# Patient Record
Sex: FEMALE | Race: WHITE | NOT HISPANIC OR LATINO
[De-identification: names, ages, dates, MRNs, and addresses within clinical notes are randomized per-mention and may not be internally consistent; named-entity substitution may affect disease eponyms.]

---

## 2017-01-04 ENCOUNTER — LABORATORY RESULT (OUTPATIENT)
Age: 57
End: 2017-01-04

## 2017-01-04 ENCOUNTER — APPOINTMENT (OUTPATIENT)
Dept: INTERNAL MEDICINE | Facility: CLINIC | Age: 57
End: 2017-01-04

## 2017-01-04 LAB
BASOPHILS # BLD AUTO: 0.04 K/UL
BASOPHILS NFR BLD AUTO: 0.5 %
EOSINOPHIL # BLD AUTO: 0.14 K/UL
EOSINOPHIL NFR BLD AUTO: 1.6 %
HCT VFR BLD CALC: 45 %
HGB BLD-MCNC: 14.5 G/DL
IMM GRANULOCYTES NFR BLD AUTO: 0.2 %
LYMPHOCYTES # BLD AUTO: 3.32 K/UL
LYMPHOCYTES NFR BLD AUTO: 38.3 %
MAN DIFF?: NORMAL
MCHC RBC-ENTMCNC: 29.7 PG
MCHC RBC-ENTMCNC: 32.2 GM/DL
MCV RBC AUTO: 92.2 FL
MONOCYTES # BLD AUTO: 0.55 K/UL
MONOCYTES NFR BLD AUTO: 6.4 %
NEUTROPHILS # BLD AUTO: 4.59 K/UL
NEUTROPHILS NFR BLD AUTO: 53 %
PLATELET # BLD AUTO: 240 K/UL
RBC # BLD: 4.88 M/UL
RBC # FLD: 13.4 %
SAVE SPECIMEN: NORMAL
WBC # FLD AUTO: 8.66 K/UL

## 2017-01-05 LAB
25(OH)D3 SERPL-MCNC: 25.4 NG/ML
ALBUMIN SERPL ELPH-MCNC: 4.5 G/DL
ALP BLD-CCNC: 77 U/L
ALT SERPL-CCNC: 26 U/L
ANION GAP SERPL CALC-SCNC: 17 MMOL/L
APPEARANCE: CLEAR
AST SERPL-CCNC: 27 U/L
BILIRUB SERPL-MCNC: 0.3 MG/DL
BILIRUBIN URINE: NEGATIVE
BLOOD URINE: NEGATIVE
BUN SERPL-MCNC: 32 MG/DL
CALCIUM SERPL-MCNC: 9.8 MG/DL
CHLORIDE SERPL-SCNC: 101 MMOL/L
CHOLEST SERPL-MCNC: 158 MG/DL
CHOLEST/HDLC SERPL: 3.1 RATIO
CO2 SERPL-SCNC: 22 MMOL/L
COLOR: YELLOW
CREAT SERPL-MCNC: 0.99 MG/DL
ERYTHROCYTE [SEDIMENTATION RATE] IN BLOOD BY WESTERGREN METHOD: 6 MM/HR
GLUCOSE QUALITATIVE U: NORMAL MG/DL
GLUCOSE SERPL-MCNC: 104 MG/DL
HDLC SERPL-MCNC: 51 MG/DL
KETONES URINE: NEGATIVE
LDLC SERPL CALC-MCNC: 59 MG/DL
LDLC SERPL DIRECT ASSAY-MCNC: 72 MG/DL
LEUKOCYTE ESTERASE URINE: ABNORMAL
NITRITE URINE: NEGATIVE
PH URINE: 7
POTASSIUM SERPL-SCNC: 3.9 MMOL/L
PROT SERPL-MCNC: 7.1 G/DL
PROTEIN URINE: NEGATIVE MG/DL
SODIUM SERPL-SCNC: 140 MMOL/L
SPECIFIC GRAVITY URINE: 1.02
T4 FREE SERPL-MCNC: 1.3 NG/DL
TRIGL SERPL-MCNC: 238 MG/DL
TSH SERPL-ACNC: 1.76 UU/ML
UROBILINOGEN URINE: NORMAL MG/DL

## 2017-01-07 ENCOUNTER — APPOINTMENT (OUTPATIENT)
Dept: INTERNAL MEDICINE | Facility: CLINIC | Age: 57
End: 2017-01-07

## 2017-01-07 VITALS — DIASTOLIC BLOOD PRESSURE: 70 MMHG | SYSTOLIC BLOOD PRESSURE: 110 MMHG

## 2017-08-11 ENCOUNTER — RX RENEWAL (OUTPATIENT)
Age: 57
End: 2017-08-11

## 2017-09-13 ENCOUNTER — OUTPATIENT (OUTPATIENT)
Dept: OUTPATIENT SERVICES | Facility: HOSPITAL | Age: 57
LOS: 1 days | Discharge: HOME | End: 2017-09-13

## 2017-09-13 DIAGNOSIS — Z92.21 PERSONAL HISTORY OF ANTINEOPLASTIC CHEMOTHERAPY: Chronic | ICD-10-CM

## 2017-09-13 DIAGNOSIS — Z90.13 ACQUIRED ABSENCE OF BILATERAL BREASTS AND NIPPLES: Chronic | ICD-10-CM

## 2017-09-13 DIAGNOSIS — Z98.89 OTHER SPECIFIED POSTPROCEDURAL STATES: Chronic | ICD-10-CM

## 2017-09-13 DIAGNOSIS — C50.919 MALIGNANT NEOPLASM OF UNSPECIFIED SITE OF UNSPECIFIED FEMALE BREAST: ICD-10-CM

## 2017-09-15 ENCOUNTER — APPOINTMENT (OUTPATIENT)
Dept: HEMATOLOGY ONCOLOGY | Facility: CLINIC | Age: 57
End: 2017-09-15
Payer: COMMERCIAL

## 2017-09-15 VITALS
HEART RATE: 62 BPM | OXYGEN SATURATION: 97 % | TEMPERATURE: 97.3 F | BODY MASS INDEX: 30.72 KG/M2 | WEIGHT: 152.12 LBS | RESPIRATION RATE: 16 BRPM | DIASTOLIC BLOOD PRESSURE: 70 MMHG | SYSTOLIC BLOOD PRESSURE: 120 MMHG

## 2017-09-15 PROCEDURE — 99214 OFFICE O/P EST MOD 30 MIN: CPT

## 2017-09-15 RX ORDER — AZITHROMYCIN DIHYDRATE 250 MG/1
250 TABLET, FILM COATED ORAL
Qty: 1 | Refills: 0 | Status: DISCONTINUED | COMMUNITY
Start: 2017-02-27 | End: 2017-09-15

## 2017-10-28 ENCOUNTER — TRANSCRIPTION ENCOUNTER (OUTPATIENT)
Age: 57
End: 2017-10-28

## 2017-11-01 ENCOUNTER — APPOINTMENT (OUTPATIENT)
Dept: INTERNAL MEDICINE | Facility: CLINIC | Age: 57
End: 2017-11-01
Payer: COMMERCIAL

## 2017-11-01 PROCEDURE — 77080 DXA BONE DENSITY AXIAL: CPT

## 2017-11-08 ENCOUNTER — TRANSCRIPTION ENCOUNTER (OUTPATIENT)
Age: 57
End: 2017-11-08

## 2018-01-29 ENCOUNTER — MEDICATION RENEWAL (OUTPATIENT)
Age: 58
End: 2018-01-29

## 2018-04-02 ENCOUNTER — APPOINTMENT (OUTPATIENT)
Dept: INTERNAL MEDICINE | Facility: CLINIC | Age: 58
End: 2018-04-02
Payer: COMMERCIAL

## 2018-04-02 LAB
ALBUMIN SERPL ELPH-MCNC: 4.8 G/DL
ALP BLD-CCNC: 95 U/L
ALT SERPL-CCNC: 29 U/L
ANION GAP SERPL CALC-SCNC: 13 MMOL/L
AST SERPL-CCNC: 32 U/L
BASOPHILS # BLD AUTO: 0.04 K/UL
BASOPHILS NFR BLD AUTO: 0.5 %
BILIRUB SERPL-MCNC: 0.3 MG/DL
BUN SERPL-MCNC: 26 MG/DL
CALCIUM SERPL-MCNC: 10.3 MG/DL
CHLORIDE SERPL-SCNC: 100 MMOL/L
CHOLEST SERPL-MCNC: 165 MG/DL
CHOLEST/HDLC SERPL: 3.4 RATIO
CO2 SERPL-SCNC: 26 MMOL/L
CREAT SERPL-MCNC: 0.91 MG/DL
EOSINOPHIL # BLD AUTO: 0.11 K/UL
EOSINOPHIL NFR BLD AUTO: 1.4 %
ERYTHROCYTE [SEDIMENTATION RATE] IN BLOOD BY WESTERGREN METHOD: 4 MM/HR
GLUCOSE SERPL-MCNC: 94 MG/DL
HCT VFR BLD CALC: 46.8 %
HDLC SERPL-MCNC: 48 MG/DL
HGB BLD-MCNC: 15.1 G/DL
IMM GRANULOCYTES NFR BLD AUTO: 0.3 %
LDLC SERPL CALC-MCNC: 84 MG/DL
LDLC SERPL DIRECT ASSAY-MCNC: 85 MG/DL
LYMPHOCYTES # BLD AUTO: 2.96 K/UL
LYMPHOCYTES NFR BLD AUTO: 37.5 %
MAN DIFF?: NORMAL
MCHC RBC-ENTMCNC: 30.3 PG
MCHC RBC-ENTMCNC: 32.3 GM/DL
MCV RBC AUTO: 94 FL
MONOCYTES # BLD AUTO: 0.51 K/UL
MONOCYTES NFR BLD AUTO: 6.5 %
NEUTROPHILS # BLD AUTO: 4.25 K/UL
NEUTROPHILS NFR BLD AUTO: 53.8 %
PLATELET # BLD AUTO: 258 K/UL
POTASSIUM SERPL-SCNC: 3.8 MMOL/L
PROT SERPL-MCNC: 7.7 G/DL
RBC # BLD: 4.98 M/UL
RBC # FLD: 13.4 %
SAVE SPECIMEN: NORMAL
SODIUM SERPL-SCNC: 139 MMOL/L
T4 FREE SERPL-MCNC: 1.4 NG/DL
TRIGL SERPL-MCNC: 166 MG/DL
TSH SERPL-ACNC: 2.2 UIU/ML
WBC # FLD AUTO: 7.89 K/UL

## 2018-04-02 PROCEDURE — 36415 COLL VENOUS BLD VENIPUNCTURE: CPT

## 2018-04-03 LAB
25(OH)D3 SERPL-MCNC: 37.8 NG/ML
APPEARANCE: CLEAR
BACTERIA: NEGATIVE
BILIRUBIN URINE: NEGATIVE
BLOOD URINE: NEGATIVE
COLOR: YELLOW
GLUCOSE QUALITATIVE U: NEGATIVE MG/DL
HBA1C MFR BLD HPLC: 5.7 %
HYALINE CASTS: 1 /LPF
KETONES URINE: NEGATIVE
LEUKOCYTE ESTERASE URINE: NEGATIVE
MICROSCOPIC-UA: NORMAL
NITRITE URINE: NEGATIVE
PH URINE: 7
PROTEIN URINE: NEGATIVE MG/DL
RED BLOOD CELLS URINE: 2 /HPF
SPECIFIC GRAVITY URINE: 1.02
SQUAMOUS EPITHELIAL CELLS: 1 /HPF
UROBILINOGEN URINE: NEGATIVE MG/DL
WHITE BLOOD CELLS URINE: 0 /HPF

## 2018-04-14 ENCOUNTER — NON-APPOINTMENT (OUTPATIENT)
Age: 58
End: 2018-04-14

## 2018-04-14 ENCOUNTER — APPOINTMENT (OUTPATIENT)
Dept: INTERNAL MEDICINE | Facility: CLINIC | Age: 58
End: 2018-04-14
Payer: COMMERCIAL

## 2018-04-14 VITALS — SYSTOLIC BLOOD PRESSURE: 100 MMHG | DIASTOLIC BLOOD PRESSURE: 80 MMHG

## 2018-04-14 VITALS
HEIGHT: 57.5 IN | SYSTOLIC BLOOD PRESSURE: 110 MMHG | BODY MASS INDEX: 31.49 KG/M2 | DIASTOLIC BLOOD PRESSURE: 90 MMHG | WEIGHT: 148 LBS | TEMPERATURE: 97.4 F

## 2018-04-14 DIAGNOSIS — E55.9 VITAMIN D DEFICIENCY, UNSPECIFIED: ICD-10-CM

## 2018-04-14 PROCEDURE — 93000 ELECTROCARDIOGRAM COMPLETE: CPT

## 2018-04-14 PROCEDURE — 94010 BREATHING CAPACITY TEST: CPT

## 2018-04-14 PROCEDURE — 99396 PREV VISIT EST AGE 40-64: CPT | Mod: 25

## 2018-04-18 ENCOUNTER — TRANSCRIPTION ENCOUNTER (OUTPATIENT)
Age: 58
End: 2018-04-18

## 2018-06-22 ENCOUNTER — TRANSCRIPTION ENCOUNTER (OUTPATIENT)
Age: 58
End: 2018-06-22

## 2018-06-29 ENCOUNTER — OUTPATIENT (OUTPATIENT)
Dept: OUTPATIENT SERVICES | Facility: HOSPITAL | Age: 58
LOS: 1 days | Discharge: HOME | End: 2018-06-29

## 2018-06-29 DIAGNOSIS — Z92.21 PERSONAL HISTORY OF ANTINEOPLASTIC CHEMOTHERAPY: Chronic | ICD-10-CM

## 2018-06-29 DIAGNOSIS — Z90.13 ACQUIRED ABSENCE OF BILATERAL BREASTS AND NIPPLES: Chronic | ICD-10-CM

## 2018-06-29 DIAGNOSIS — Z98.89 OTHER SPECIFIED POSTPROCEDURAL STATES: Chronic | ICD-10-CM

## 2018-06-29 DIAGNOSIS — C50.919 MALIGNANT NEOPLASM OF UNSPECIFIED SITE OF UNSPECIFIED FEMALE BREAST: ICD-10-CM

## 2018-07-02 DIAGNOSIS — Z86.19 PERSONAL HISTORY OF OTHER INFECTIOUS AND PARASITIC DISEASES: ICD-10-CM

## 2018-07-06 ENCOUNTER — APPOINTMENT (OUTPATIENT)
Dept: HEMATOLOGY ONCOLOGY | Facility: CLINIC | Age: 58
End: 2018-07-06
Payer: COMMERCIAL

## 2018-07-06 VITALS
RESPIRATION RATE: 17 BRPM | OXYGEN SATURATION: 99 % | TEMPERATURE: 98.6 F | HEART RATE: 70 BPM | DIASTOLIC BLOOD PRESSURE: 78 MMHG | SYSTOLIC BLOOD PRESSURE: 110 MMHG | BODY MASS INDEX: 31.17 KG/M2 | WEIGHT: 146.59 LBS

## 2018-07-06 PROCEDURE — 99214 OFFICE O/P EST MOD 30 MIN: CPT

## 2018-07-06 RX ORDER — FLUCONAZOLE 150 MG/1
150 TABLET ORAL
Qty: 1 | Refills: 0 | Status: DISCONTINUED | COMMUNITY
Start: 2018-07-02 | End: 2018-07-06

## 2018-07-06 RX ORDER — TOBRAMYCIN AND DEXAMETHASONE 3; 1 MG/ML; MG/ML
0.3-0.1 SUSPENSION/ DROPS OPHTHALMIC
Qty: 5 | Refills: 0 | Status: DISCONTINUED | COMMUNITY
Start: 2017-12-02 | End: 2018-07-06

## 2018-08-10 ENCOUNTER — TRANSCRIPTION ENCOUNTER (OUTPATIENT)
Age: 58
End: 2018-08-10

## 2018-08-22 ENCOUNTER — RESULT REVIEW (OUTPATIENT)
Age: 58
End: 2018-08-22

## 2018-11-03 ENCOUNTER — APPOINTMENT (OUTPATIENT)
Dept: CT IMAGING | Facility: CLINIC | Age: 58
End: 2018-11-03

## 2018-11-05 ENCOUNTER — APPOINTMENT (OUTPATIENT)
Dept: COLORECTAL SURGERY | Facility: CLINIC | Age: 58
End: 2018-11-05

## 2018-11-15 ENCOUNTER — CHART COPY (OUTPATIENT)
Age: 58
End: 2018-11-15

## 2018-12-05 ENCOUNTER — APPOINTMENT (OUTPATIENT)
Dept: SURGERY | Facility: CLINIC | Age: 58
End: 2018-12-05

## 2018-12-10 ENCOUNTER — OUTPATIENT (OUTPATIENT)
Dept: OUTPATIENT SERVICES | Facility: HOSPITAL | Age: 58
LOS: 1 days | Discharge: HOME | End: 2018-12-10

## 2018-12-10 DIAGNOSIS — Z90.13 ACQUIRED ABSENCE OF BILATERAL BREASTS AND NIPPLES: Chronic | ICD-10-CM

## 2018-12-10 DIAGNOSIS — Z92.21 PERSONAL HISTORY OF ANTINEOPLASTIC CHEMOTHERAPY: Chronic | ICD-10-CM

## 2018-12-10 DIAGNOSIS — C50.919 MALIGNANT NEOPLASM OF UNSPECIFIED SITE OF UNSPECIFIED FEMALE BREAST: ICD-10-CM

## 2018-12-10 DIAGNOSIS — Z98.89 OTHER SPECIFIED POSTPROCEDURAL STATES: Chronic | ICD-10-CM

## 2018-12-14 ENCOUNTER — APPOINTMENT (OUTPATIENT)
Dept: HEMATOLOGY ONCOLOGY | Facility: CLINIC | Age: 58
End: 2018-12-14
Payer: COMMERCIAL

## 2018-12-14 VITALS
OXYGEN SATURATION: 99 % | SYSTOLIC BLOOD PRESSURE: 139 MMHG | BODY MASS INDEX: 30.83 KG/M2 | DIASTOLIC BLOOD PRESSURE: 88 MMHG | WEIGHT: 145 LBS | RESPIRATION RATE: 16 BRPM | TEMPERATURE: 98.5 F | HEART RATE: 75 BPM

## 2018-12-14 DIAGNOSIS — E04.1 NONTOXIC SINGLE THYROID NODULE: ICD-10-CM

## 2018-12-14 PROCEDURE — 99214 OFFICE O/P EST MOD 30 MIN: CPT

## 2018-12-20 PROBLEM — E04.1 THYROID NODULE: Status: ACTIVE | Noted: 2018-12-20

## 2018-12-20 NOTE — HISTORY OF PRESENT ILLNESS
[Disease: _____________________] : Disease: [unfilled] [T: ___] : T[unfilled] [N: ___] : N[unfilled] [M: ___] : M[unfilled] [AJCC Stage: ____] : AJCC Stage: [unfilled] [de-identified] : H/O B/L MRM / CARLOS 7/13 (Dr. Martine Rico) for right breast cancer   1.8 cm ILC, 2+ / 8 LNs, ER+ LA+ her 2 rafiq negative. s/P DD AC-T 12 / 13 followed by nipple reconstruction. Began anastrozole 4/14 with initial moderate arthralgias that gradually decreased to a "tolerable" level.    [de-identified] : ILC [de-identified] : She is here for a f/up visit. \par \par In the interim she had c/o LLQ / groin discomfort and had a CT abd/pelvis sent with the finding of 7mm left renal angiomyolipoma and 5-7 mm LLL lung nodule. The pain resolved but she then went on to a CT Chest with the finding of  an 18 mm left thyroid nodule and a cluster of subpleural nodules in the left post costophrenic sulcus measuring up to 9 mm of indeterminate but likely benign etiology, possibly representing mucous plugging but with f/u in 3-6 mo recommended to assess for stability. A thyroid US showed a 16 mm nodule in the lower pole of the left thyroid with f/u in 6mo to assess stability recommended and alternately an FNA if clinically warranted.  Pt notes that her PCP, Dr. Krunal Curry said he would be repeating the CT and thyroid US in 6 months. \par \par She has minimal joint stiffness w/o change. She notes a good appetite, stable wt and excellent performance status. \par \par DEXA 11/17 : osteopenia

## 2018-12-20 NOTE — PHYSICAL EXAM
[Fully active, able to carry on all pre-disease performance without restriction] : Status 0 - Fully active, able to carry on all pre-disease performance without restriction [Normal] : affect appropriate [de-identified] : supple w/o JVD or thyromegaly; I was unable to feel a left thyroid nodule on my exam [de-identified] : s/p B/L MRM with Shirin reconstruction, no masses. B/L ax neg

## 2019-03-12 DIAGNOSIS — D17.9 BENIGN LIPOMATOUS NEOPLASM, UNSPECIFIED: ICD-10-CM

## 2019-05-03 ENCOUNTER — APPOINTMENT (OUTPATIENT)
Dept: INTERNAL MEDICINE | Facility: CLINIC | Age: 59
End: 2019-05-03
Payer: COMMERCIAL

## 2019-05-03 LAB
APPEARANCE: CLEAR
BACTERIA: NEGATIVE
BASOPHILS # BLD AUTO: 0.04 K/UL
BASOPHILS NFR BLD AUTO: 0.6 %
BILIRUBIN URINE: NEGATIVE
BLOOD URINE: NEGATIVE
COLOR: NORMAL
EOSINOPHIL # BLD AUTO: 0.12 K/UL
EOSINOPHIL NFR BLD AUTO: 1.7 %
ERYTHROCYTE [SEDIMENTATION RATE] IN BLOOD BY WESTERGREN METHOD: 5 MM/HR
GLUCOSE QUALITATIVE U: NEGATIVE
HCT VFR BLD CALC: 43.4 %
HGB BLD-MCNC: 14.2 G/DL
HYALINE CASTS: 1 /LPF
IMM GRANULOCYTES NFR BLD AUTO: 0.3 %
KETONES URINE: NEGATIVE
LEUKOCYTE ESTERASE URINE: NEGATIVE
LYMPHOCYTES # BLD AUTO: 2.69 K/UL
LYMPHOCYTES NFR BLD AUTO: 37.7 %
MAN DIFF?: NORMAL
MCHC RBC-ENTMCNC: 30.2 PG
MCHC RBC-ENTMCNC: 32.7 GM/DL
MCV RBC AUTO: 92.3 FL
MICROSCOPIC-UA: NORMAL
MONOCYTES # BLD AUTO: 0.46 K/UL
MONOCYTES NFR BLD AUTO: 6.5 %
NEUTROPHILS # BLD AUTO: 3.8 K/UL
NEUTROPHILS NFR BLD AUTO: 53.2 %
NITRITE URINE: NEGATIVE
PH URINE: 7
PLATELET # BLD AUTO: 232 K/UL
PROTEIN URINE: NEGATIVE
RBC # BLD: 4.7 M/UL
RBC # FLD: 13.2 %
RED BLOOD CELLS URINE: 2 /HPF
SAVE SPECIMEN: NORMAL
SPECIFIC GRAVITY URINE: 1.02
SQUAMOUS EPITHELIAL CELLS: 1 /HPF
UROBILINOGEN URINE: NORMAL
WBC # FLD AUTO: 7.13 K/UL
WHITE BLOOD CELLS URINE: 1 /HPF

## 2019-05-03 PROCEDURE — 36415 COLL VENOUS BLD VENIPUNCTURE: CPT

## 2019-05-04 LAB
25(OH)D3 SERPL-MCNC: 34.2 NG/ML
ALBUMIN SERPL ELPH-MCNC: 4.5 G/DL
ALP BLD-CCNC: 76 U/L
ALT SERPL-CCNC: 23 U/L
ANION GAP SERPL CALC-SCNC: 13 MMOL/L
AST SERPL-CCNC: 29 U/L
BILIRUB SERPL-MCNC: 0.4 MG/DL
BUN SERPL-MCNC: 14 MG/DL
CALCIUM SERPL-MCNC: 9.9 MG/DL
CHLORIDE SERPL-SCNC: 103 MMOL/L
CHOLEST SERPL-MCNC: 152 MG/DL
CHOLEST/HDLC SERPL: 3.3 RATIO
CO2 SERPL-SCNC: 26 MMOL/L
CREAT SERPL-MCNC: 0.72 MG/DL
GLUCOSE SERPL-MCNC: 102 MG/DL
HDLC SERPL-MCNC: 46 MG/DL
LDLC SERPL CALC-MCNC: 74 MG/DL
LDLC SERPL DIRECT ASSAY-MCNC: 81 MG/DL
POTASSIUM SERPL-SCNC: 3.8 MMOL/L
PROT SERPL-MCNC: 6.7 G/DL
SODIUM SERPL-SCNC: 142 MMOL/L
T4 FREE SERPL-MCNC: 1.3 NG/DL
TRIGL SERPL-MCNC: 159 MG/DL
TSH SERPL-ACNC: 1.87 UIU/ML

## 2019-05-07 LAB — HBA1C MFR BLD HPLC: 5.8 %

## 2019-05-18 ENCOUNTER — APPOINTMENT (OUTPATIENT)
Dept: INTERNAL MEDICINE | Facility: CLINIC | Age: 59
End: 2019-05-18
Payer: COMMERCIAL

## 2019-05-18 VITALS
TEMPERATURE: 98 F | WEIGHT: 152 LBS | BODY MASS INDEX: 30.64 KG/M2 | HEIGHT: 59 IN | DIASTOLIC BLOOD PRESSURE: 80 MMHG | SYSTOLIC BLOOD PRESSURE: 118 MMHG

## 2019-05-18 VITALS — DIASTOLIC BLOOD PRESSURE: 80 MMHG | SYSTOLIC BLOOD PRESSURE: 120 MMHG

## 2019-05-18 PROCEDURE — 99396 PREV VISIT EST AGE 40-64: CPT | Mod: 25

## 2019-05-18 PROCEDURE — 93000 ELECTROCARDIOGRAM COMPLETE: CPT

## 2019-05-18 NOTE — ASSESSMENT
[FreeTextEntry1] : \par \par ecg----SR; WNL\par cxr--had recent CT chest\par pfts--not performed\par \par imp--breast ca---s/p double mastx. in 2013; disease free; on anastrazole; followed by Dr. Naranjo\par         hypertension---normotensive on meds\par         hyperlipidemia----good LDLs on statin\par         impaired fasting glucsoe----A1C=5.8 (status quo)\par         pulmonary nodules---stable on chest CT\par \par plan---FBW reviewed with pt\par            dietary counseling re: carbs (following very low carb diet)\par            continue current meds\par             DEXA in Nov.\par             MRI w w/o contrast for f/u angiomyoplipoma in next 6 mos\par             Shingrix recommended\par             annualCPE\par

## 2019-05-18 NOTE — PHYSICAL EXAM
[No Acute Distress] : no acute distress [Well Nourished] : well nourished [Well Developed] : well developed [Well-Appearing] : well-appearing [Normal Sclera/Conjunctiva] : normal sclera/conjunctiva [PERRL] : pupils equal round and reactive to light [EOMI] : extraocular movements intact [Normal Outer Ear/Nose] : the outer ears and nose were normal in appearance [Normal Oropharynx] : the oropharynx was normal [No JVD] : no jugular venous distention [Supple] : supple [No Lymphadenopathy] : no lymphadenopathy [Thyroid Normal, No Nodules] : the thyroid was normal and there were no nodules present [Clear to Auscultation] : lungs were clear to auscultation bilaterally [No Respiratory Distress] : no respiratory distress  [No Accessory Muscle Use] : no accessory muscle use [Regular Rhythm] : with a regular rhythm [Normal S1, S2] : normal S1 and S2 [Normal Rate] : normal rate  [No Carotid Bruits] : no carotid bruits [No Murmur] : no murmur heard [No Varicosities] : no varicosities [No Abdominal Bruit] : a ~M bruit was not heard ~T in the abdomen [Pedal Pulses Present] : the pedal pulses are present [No Edema] : there was no peripheral edema [No Extremity Clubbing/Cyanosis] : no extremity clubbing/cyanosis [No Palpable Aorta] : no palpable aorta [Soft] : abdomen soft [Non Tender] : non-tender [Non-distended] : non-distended [No Masses] : no abdominal mass palpated [No HSM] : no HSM [Normal Bowel Sounds] : normal bowel sounds [Normal Posterior Cervical Nodes] : no posterior cervical lymphadenopathy [Normal Anterior Cervical Nodes] : no anterior cervical lymphadenopathy [No CVA Tenderness] : no CVA  tenderness [No Joint Swelling] : no joint swelling [No Spinal Tenderness] : no spinal tenderness [No Rash] : no rash [Grossly Normal Strength/Tone] : grossly normal strength/tone [Normal Gait] : normal gait [Coordination Grossly Intact] : coordination grossly intact [Deep Tendon Reflexes (DTR)] : deep tendon reflexes were 2+ and symmetric [No Focal Deficits] : no focal deficits [Normal Affect] : the affect was normal [Normal Insight/Judgement] : insight and judgment were intact [de-identified] : per oncology

## 2019-05-18 NOTE — HISTORY OF PRESENT ILLNESS
[FreeTextEntry1] : \par \par here for CPE [de-identified] : \par \par breast ca (lobular ca); dxd June 1, 2013; detected on physical; had bilat mastx.; on anastrazole----tolerating well; mild joint pain\par recent chest CT---stable pulmonary nodules\par \par recent head/neck  US---no change in thyroid nodules\par

## 2019-05-18 NOTE — HEALTH RISK ASSESSMENT
[Very Good] : ~his/her~  mood as very good [No falls in past year] : Patient reported no falls in the past year [0] : 2) Feeling down, depressed, or hopeless: Not at all (0) [Alone] : lives alone [None] : None [Fully functional (bathing, dressing, toileting, transferring, walking, feeding)] : Fully functional (bathing, dressing, toileting, transferring, walking, feeding) [Employed] : employed [Significant Other] : lives with significant other [Fully functional (using the telephone, shopping, preparing meals, housekeeping, doing laundry, using] : Fully functional and needs no help or supervision to perform IADLs (using the telephone, shopping, preparing meals, housekeeping, doing laundry, using transportation, managing medications and managing finances) [I will adhere to the patient's wishes as expressed in the advance directive except as noted below.] : I will adhere to the patient's wishes as expressed in the advance directive except as noted below [] : No [de-identified] : occasional, q. month [de-identified] : oncologist (Dr. Naranjo q. 6 mos) [de-identified] : q.AM---100 sit ups and stretches; no cardio....walks [de-identified] : low fat [[id number]] : 0 [Language] : denies difficulty with language [Change in mental status noted] : No change in mental status noted [Behavior] : denies difficulty with behavior [Handling Complex Tasks] : denies difficulty handling complex tasks [Learning/Retaining New Information] : denies difficulty learning/retaining new information [Reports changes in vision] : Reports no changes in vision [Spatial Ability and Orientation] : denies difficulty with spatial ability and orientation [Reasoning] : denies difficulty with reasoning [Reports changes in hearing] : Reports no changes in hearing [BoneDensityDate] : 11/17 [Reports changes in dental health] : Reports no changes in dental health [MammogramComments] : none---radha [AdvancecareDate] : 05/18/19 [FreeTextEntry2] : works for [employer] [ColonoscopyDate] : 02/2016

## 2019-11-13 ENCOUNTER — OUTPATIENT (OUTPATIENT)
Dept: OUTPATIENT SERVICES | Facility: HOSPITAL | Age: 59
LOS: 1 days | Discharge: HOME | End: 2019-11-13

## 2019-11-13 DIAGNOSIS — Z90.13 ACQUIRED ABSENCE OF BILATERAL BREASTS AND NIPPLES: Chronic | ICD-10-CM

## 2019-11-13 DIAGNOSIS — Z98.89 OTHER SPECIFIED POSTPROCEDURAL STATES: Chronic | ICD-10-CM

## 2019-11-13 DIAGNOSIS — Z92.21 PERSONAL HISTORY OF ANTINEOPLASTIC CHEMOTHERAPY: Chronic | ICD-10-CM

## 2019-11-13 DIAGNOSIS — C50.919 MALIGNANT NEOPLASM OF UNSPECIFIED SITE OF UNSPECIFIED FEMALE BREAST: ICD-10-CM

## 2019-11-20 ENCOUNTER — APPOINTMENT (OUTPATIENT)
Dept: HEMATOLOGY ONCOLOGY | Facility: CLINIC | Age: 59
End: 2019-11-20
Payer: COMMERCIAL

## 2019-11-20 VITALS
OXYGEN SATURATION: 96 % | SYSTOLIC BLOOD PRESSURE: 129 MMHG | TEMPERATURE: 98.3 F | HEART RATE: 75 BPM | RESPIRATION RATE: 16 BRPM | DIASTOLIC BLOOD PRESSURE: 84 MMHG | WEIGHT: 151.9 LBS | BODY MASS INDEX: 30.68 KG/M2

## 2019-11-20 PROCEDURE — 99214 OFFICE O/P EST MOD 30 MIN: CPT

## 2019-11-21 NOTE — PHYSICAL EXAM
[Fully active, able to carry on all pre-disease performance without restriction] : Status 0 - Fully active, able to carry on all pre-disease performance without restriction [Normal] : affect appropriate [de-identified] : supple w/o JVD or thyromegaly; I was unable to feel a left thyroid nodule on my exam [de-identified] : s/p B/L MRM with Shirin reconstruction, no masses. B/L ax neg

## 2019-11-21 NOTE — HISTORY OF PRESENT ILLNESS
[Disease: _____________________] : Disease: [unfilled] [T: ___] : T[unfilled] [N: ___] : N[unfilled] [M: ___] : M[unfilled] [AJCC Stage: ____] : AJCC Stage: [unfilled] [de-identified] : H/O B/L MRM / CARLOS 7/13 (Dr. Martine Rico) for right breast cancer   1.8 cm ILC, 2+ / 8 LNs, ER+ [place]+ her 2 rafiq negative. s/P DD AC-T 12 / 13 followed by nipple reconstruction. Began anastrozole 4/14 with initial moderate arthralgias that gradually decreased to a "tolerable" level.    [de-identified] : ILC [de-identified] : She is here for a f/up visit. \par \par She is coping with the loss of her mother. She works FT and then some,. She notes a good appetite stable weight and excellent performance status. She has baseline mild arthralgias w/o change. \par \par In the past  she had c/o LLQ / groin discomfort and had a CT abd/pelvis sent with the finding of 7mm left renal angiomyolipoma and 5-7 mm LLL lung nodule. The pain resolved but she then went on to a CT Chest with the finding of  an 18 mm left thyroid nodule and a cluster of subpleural nodules in the left post costophrenic sulcus measuring up to 9 mm of indeterminate but likely benign etiology, possibly representing mucous plugging but with f/u in 3-6 mo recommended to assess for stability. A thyroid US showed a 16 mm nodule in the lower pole of the left thyroid with f/u in 6mo to assess stability recommended and alternately an FNA if clinically warranted.  Pt noted that her PCP, Dr. Krunal Curry said he would be repeating the CT and thyroid US in 6 months. \par \par CT chest 5/19 no change\par \par DEXA 11/17 : osteopenia

## 2019-12-15 ENCOUNTER — TRANSCRIPTION ENCOUNTER (OUTPATIENT)
Age: 59
End: 2019-12-15

## 2020-02-12 ENCOUNTER — APPOINTMENT (OUTPATIENT)
Dept: INTERNAL MEDICINE | Facility: CLINIC | Age: 60
End: 2020-02-12
Payer: COMMERCIAL

## 2020-02-12 PROCEDURE — 77080 DXA BONE DENSITY AXIAL: CPT

## 2020-10-20 ENCOUNTER — APPOINTMENT (OUTPATIENT)
Dept: INTERNAL MEDICINE | Facility: CLINIC | Age: 60
End: 2020-10-20
Payer: COMMERCIAL

## 2020-10-20 LAB
ALBUMIN SERPL ELPH-MCNC: 5 G/DL
ALP BLD-CCNC: 93 U/L
ALT SERPL-CCNC: 27 U/L
ANION GAP SERPL CALC-SCNC: 14 MMOL/L
AST SERPL-CCNC: 28 U/L
BASOPHILS # BLD AUTO: 0.08 K/UL
BASOPHILS NFR BLD AUTO: 1 %
BILIRUB SERPL-MCNC: 0.4 MG/DL
BUN SERPL-MCNC: 16 MG/DL
CALCIUM SERPL-MCNC: 10 MG/DL
CHLORIDE SERPL-SCNC: 104 MMOL/L
CO2 SERPL-SCNC: 24 MMOL/L
CREAT SERPL-MCNC: 0.82 MG/DL
EOSINOPHIL # BLD AUTO: 0.13 K/UL
EOSINOPHIL NFR BLD AUTO: 1.6 %
GLUCOSE SERPL-MCNC: 101 MG/DL
HCT VFR BLD CALC: 45 %
HGB BLD-MCNC: 14.9 G/DL
IMM GRANULOCYTES NFR BLD AUTO: 0.1 %
LYMPHOCYTES # BLD AUTO: 2.97 K/UL
LYMPHOCYTES NFR BLD AUTO: 36.2 %
MAN DIFF?: NORMAL
MCHC RBC-ENTMCNC: 30.5 PG
MCHC RBC-ENTMCNC: 33.1 GM/DL
MCV RBC AUTO: 92 FL
MONOCYTES # BLD AUTO: 0.63 K/UL
MONOCYTES NFR BLD AUTO: 7.7 %
NEUTROPHILS # BLD AUTO: 4.38 K/UL
NEUTROPHILS NFR BLD AUTO: 53.4 %
PLATELET # BLD AUTO: 259 K/UL
POTASSIUM SERPL-SCNC: 3.6 MMOL/L
PROT SERPL-MCNC: 7.2 G/DL
RBC # BLD: 4.89 M/UL
RBC # FLD: 13.3 %
SAVE SPECIMEN: NORMAL
SODIUM SERPL-SCNC: 142 MMOL/L
WBC # FLD AUTO: 8.2 K/UL

## 2020-10-20 PROCEDURE — 36415 COLL VENOUS BLD VENIPUNCTURE: CPT

## 2020-10-21 LAB
25(OH)D3 SERPL-MCNC: 46.5 NG/ML
CHOLEST SERPL-MCNC: 158 MG/DL
HDLC SERPL-MCNC: 50 MG/DL
LDLC SERPL CALC-MCNC: 80 MG/DL
LDLC SERPL DIRECT ASSAY-MCNC: 86 MG/DL
NONHDLC SERPL-MCNC: 108 MG/DL
T4 FREE SERPL-MCNC: 1.3 NG/DL
TRIGL SERPL-MCNC: 138 MG/DL
TSH SERPL-ACNC: 2.63 UIU/ML

## 2020-11-05 ENCOUNTER — OUTPATIENT (OUTPATIENT)
Dept: OUTPATIENT SERVICES | Facility: HOSPITAL | Age: 60
LOS: 1 days | Discharge: HOME | End: 2020-11-05

## 2020-11-05 DIAGNOSIS — Z98.89 OTHER SPECIFIED POSTPROCEDURAL STATES: Chronic | ICD-10-CM

## 2020-11-05 DIAGNOSIS — Z92.21 PERSONAL HISTORY OF ANTINEOPLASTIC CHEMOTHERAPY: Chronic | ICD-10-CM

## 2020-11-05 DIAGNOSIS — Z90.13 ACQUIRED ABSENCE OF BILATERAL BREASTS AND NIPPLES: Chronic | ICD-10-CM

## 2020-11-05 DIAGNOSIS — C50.919 MALIGNANT NEOPLASM OF UNSPECIFIED SITE OF UNSPECIFIED FEMALE BREAST: ICD-10-CM

## 2020-11-11 ENCOUNTER — APPOINTMENT (OUTPATIENT)
Dept: HEMATOLOGY ONCOLOGY | Facility: CLINIC | Age: 60
End: 2020-11-11
Payer: COMMERCIAL

## 2020-11-11 VITALS
WEIGHT: 154.08 LBS | TEMPERATURE: 97.2 F | SYSTOLIC BLOOD PRESSURE: 129 MMHG | BODY MASS INDEX: 30.25 KG/M2 | DIASTOLIC BLOOD PRESSURE: 85 MMHG | OXYGEN SATURATION: 99 % | HEART RATE: 76 BPM | RESPIRATION RATE: 16 BRPM | HEIGHT: 59.84 IN

## 2020-11-11 DIAGNOSIS — Z79.811 LONG TERM (CURRENT) USE OF AROMATASE INHIBITORS: ICD-10-CM

## 2020-11-11 PROCEDURE — 99072 ADDL SUPL MATRL&STAF TM PHE: CPT

## 2020-11-11 PROCEDURE — 99214 OFFICE O/P EST MOD 30 MIN: CPT

## 2020-11-11 RX ORDER — DOXYCYCLINE 100 MG/1
100 TABLET, FILM COATED ORAL
Qty: 28 | Refills: 0 | Status: DISCONTINUED | COMMUNITY
Start: 2020-06-24 | End: 2020-11-11

## 2020-11-11 RX ORDER — PRAMOXINE HYDROCHLORIDE AND HYDROCORTISONE ACETATE 10; 25 MG/G; MG/G
2.5-1 CREAM TOPICAL
Qty: 1 | Refills: 5 | Status: DISCONTINUED | COMMUNITY
Start: 2018-04-14 | End: 2020-11-11

## 2020-11-11 RX ORDER — AZITHROMYCIN 250 MG/1
250 TABLET, FILM COATED ORAL
Qty: 1 | Refills: 0 | Status: DISCONTINUED | COMMUNITY
Start: 2017-09-28 | End: 2020-11-11

## 2020-11-12 ENCOUNTER — RX RENEWAL (OUTPATIENT)
Age: 60
End: 2020-11-12

## 2020-11-12 PROBLEM — Z79.811 AROMATASE INHIBITOR USE: Status: ACTIVE | Noted: 2020-11-12

## 2020-11-12 NOTE — HISTORY OF PRESENT ILLNESS
[Disease: _____________________] : Disease: [unfilled] [T: ___] : T[unfilled] [N: ___] : N[unfilled] [M: ___] : M[unfilled] [AJCC Stage: ____] : AJCC Stage: [unfilled] [de-identified] : H/O B/L MRM / CARLOS 7/13 (Dr. Martine Rico) for right breast cancer   1.8 cm ILC, 2+ / 8 LNs, ER+ [place]+ her 2 rafiq negative. s/P DD AC-T 12 / 13 followed by nipple reconstruction. Began anastrozole 4/14 with initial moderate arthralgias that gradually decreased to a "tolerable" level.    [de-identified] : ILC [de-identified] : She is here for a f/up visit. \par \par She notes a good appetite stable weight and excellent performance status. She cont to work remotely. She has baseline mild arthralgias w/o change. \par \par CT chest 1/20: no change in calcified and non-calcified pulm nodules. \par DEXA  2/20 : osteopenia

## 2020-11-12 NOTE — PHYSICAL EXAM
[Fully active, able to carry on all pre-disease performance without restriction] : Status 0 - Fully active, able to carry on all pre-disease performance without restriction [Normal] : affect appropriate [de-identified] : s/p B/L MRM with Shirin reconstruction, no masses. B/L ax neg

## 2020-11-30 ENCOUNTER — RESULT REVIEW (OUTPATIENT)
Age: 60
End: 2020-11-30

## 2020-12-16 PROBLEM — Z86.19 HISTORY OF CANDIDIASIS OF VAGINA: Status: RESOLVED | Noted: 2018-07-02 | Resolved: 2020-12-16

## 2020-12-17 ENCOUNTER — APPOINTMENT (OUTPATIENT)
Dept: INTERNAL MEDICINE | Facility: CLINIC | Age: 60
End: 2020-12-17
Payer: COMMERCIAL

## 2020-12-17 DIAGNOSIS — Z71.9 COUNSELING, UNSPECIFIED: ICD-10-CM

## 2020-12-17 PROCEDURE — 99422 OL DIG E/M SVC 11-20 MIN: CPT

## 2020-12-17 RX ORDER — MECLIZINE HYDROCHLORIDE 25 MG/1
25 TABLET ORAL 3 TIMES DAILY
Qty: 30 | Refills: 2 | Status: ACTIVE | COMMUNITY
Start: 2018-04-23 | End: 1900-01-01

## 2021-04-13 ENCOUNTER — NON-APPOINTMENT (OUTPATIENT)
Age: 61
End: 2021-04-13

## 2021-04-14 ENCOUNTER — APPOINTMENT (OUTPATIENT)
Dept: INTERNAL MEDICINE | Facility: CLINIC | Age: 61
End: 2021-04-14
Payer: COMMERCIAL

## 2021-04-14 VITALS
RESPIRATION RATE: 17 BRPM | HEIGHT: 59 IN | OXYGEN SATURATION: 99 % | HEART RATE: 90 BPM | BODY MASS INDEX: 30.84 KG/M2 | DIASTOLIC BLOOD PRESSURE: 70 MMHG | SYSTOLIC BLOOD PRESSURE: 118 MMHG | WEIGHT: 153 LBS

## 2021-04-14 VITALS — SYSTOLIC BLOOD PRESSURE: 126 MMHG | DIASTOLIC BLOOD PRESSURE: 72 MMHG

## 2021-04-14 DIAGNOSIS — J45.909 UNSPECIFIED ASTHMA, UNCOMPLICATED: ICD-10-CM

## 2021-04-14 PROCEDURE — 99072 ADDL SUPL MATRL&STAF TM PHE: CPT

## 2021-04-14 PROCEDURE — 71046 X-RAY EXAM CHEST 2 VIEWS: CPT

## 2021-04-14 PROCEDURE — 99396 PREV VISIT EST AGE 40-64: CPT | Mod: 25

## 2021-04-14 PROCEDURE — 93000 ELECTROCARDIOGRAM COMPLETE: CPT

## 2021-04-15 LAB
ALBUMIN SERPL ELPH-MCNC: 4.6 G/DL
ALP BLD-CCNC: 87 U/L
ALT SERPL-CCNC: 30 U/L
ANION GAP SERPL CALC-SCNC: 13 MMOL/L
AST SERPL-CCNC: 28 U/L
BASOPHILS # BLD AUTO: 0.07 K/UL
BASOPHILS NFR BLD AUTO: 0.9 %
BILIRUB SERPL-MCNC: 0.4 MG/DL
BUN SERPL-MCNC: 13 MG/DL
CALCIUM SERPL-MCNC: 10.1 MG/DL
CHLORIDE SERPL-SCNC: 103 MMOL/L
CHOLEST SERPL-MCNC: 146 MG/DL
CO2 SERPL-SCNC: 26 MMOL/L
CREAT SERPL-MCNC: 0.79 MG/DL
EOSINOPHIL # BLD AUTO: 0.14 K/UL
EOSINOPHIL NFR BLD AUTO: 1.8 %
ESTIMATED AVERAGE GLUCOSE: 114 MG/DL
GLUCOSE SERPL-MCNC: 102 MG/DL
HBA1C MFR BLD HPLC: 5.6 %
HCT VFR BLD CALC: 49 %
HDLC SERPL-MCNC: 45 MG/DL
HGB BLD-MCNC: 15.6 G/DL
IMM GRANULOCYTES NFR BLD AUTO: 0.1 %
LDLC SERPL CALC-MCNC: 64 MG/DL
LDLC SERPL DIRECT ASSAY-MCNC: 71 MG/DL
LYMPHOCYTES # BLD AUTO: 3.14 K/UL
LYMPHOCYTES NFR BLD AUTO: 41.2 %
MAN DIFF?: NORMAL
MCHC RBC-ENTMCNC: 30 PG
MCHC RBC-ENTMCNC: 31.8 GM/DL
MCV RBC AUTO: 94.2 FL
MONOCYTES # BLD AUTO: 0.39 K/UL
MONOCYTES NFR BLD AUTO: 5.1 %
NEUTROPHILS # BLD AUTO: 3.87 K/UL
NEUTROPHILS NFR BLD AUTO: 50.9 %
NONHDLC SERPL-MCNC: 101 MG/DL
PLATELET # BLD AUTO: 252 K/UL
POTASSIUM SERPL-SCNC: 4.1 MMOL/L
PROT SERPL-MCNC: 7.1 G/DL
RBC # BLD: 5.2 M/UL
RBC # FLD: 13.3 %
SODIUM SERPL-SCNC: 142 MMOL/L
T4 FREE SERPL-MCNC: 1.3 NG/DL
TRIGL SERPL-MCNC: 188 MG/DL
TSH SERPL-ACNC: 1.69 UIU/ML
VIT B12 SERPL-MCNC: 951 PG/ML
WBC # FLD AUTO: 7.62 K/UL

## 2021-09-21 ENCOUNTER — RX RENEWAL (OUTPATIENT)
Age: 61
End: 2021-09-21

## 2022-01-11 ENCOUNTER — NON-APPOINTMENT (OUTPATIENT)
Age: 62
End: 2022-01-11

## 2022-01-12 ENCOUNTER — APPOINTMENT (OUTPATIENT)
Dept: OBGYN | Facility: CLINIC | Age: 62
End: 2022-01-12
Payer: COMMERCIAL

## 2022-01-12 ENCOUNTER — ASOB RESULT (OUTPATIENT)
Age: 62
End: 2022-01-12

## 2022-01-12 PROCEDURE — 99213 OFFICE O/P EST LOW 20 MIN: CPT

## 2022-01-12 PROCEDURE — 76830 TRANSVAGINAL US NON-OB: CPT

## 2022-01-12 NOTE — PHYSICAL EXAM
[Chaperone Present] : A chaperone was present in the examining room during all aspects of the physical examination [Appropriately responsive] : appropriately responsive [Alert] : alert [No Acute Distress] : no acute distress [Soft] : soft [Non-tender] : non-tender [Non-distended] : non-distended [No Lesions] : no lesions [No Mass] : no mass [Oriented x3] : oriented x3 [Vulvar Atrophy] : vulvar atrophy [Labia Majora] : normal [Labia Minora] : normal [Atrophy] : atrophy [Normal] : normal [Absent] : absent [FreeTextEntry7] : point tenderness on R side superior to iliac crest when palpated. No CVAT

## 2022-01-12 NOTE — HISTORY OF PRESENT ILLNESS
[FreeTextEntry1] : 61 year old female pt presents with complaints right sided back pain radiating to the front groin since end of 12/2021. Pt reports she broke her toe at that time and has been walking with a limp wearing a boot. \par \par Pt takes Advil which improves pain. No fever or chills. No N/V, NO dysuria, denies crampy pain or vaginal bleeding. \par \par Shx partial hysterectomy, denies VB. NO major gyn problems. Is sexualyl active with BF. \par \par \par Pt had pelvic US today 01/12/2022 - uterus is surgically absent. normal cervix visualized. R ovary normal. L ovary normal with 0.6 cm simple cyst\par \par She lives at home with boyfriend

## 2022-01-12 NOTE — PLAN
[FreeTextEntry1] : 61 year old female pt presents for back pain radiating to front. Not likley GYN in origin. \par Reviewed today's pelvic US - normal. no gyn concerns, likely muscle spasms\par Take 600mg Tylenol q 6 hours after meals\par F/u with PCP for further management and muscle relaxers\par \par RTO PRN

## 2022-04-27 ENCOUNTER — APPOINTMENT (OUTPATIENT)
Dept: INTERNAL MEDICINE | Facility: CLINIC | Age: 62
End: 2022-04-27
Payer: COMMERCIAL

## 2022-04-27 LAB
25(OH)D3 SERPL-MCNC: 50.2 NG/ML
ALBUMIN SERPL ELPH-MCNC: 4.7 G/DL
ALP BLD-CCNC: 81 U/L
ALT SERPL-CCNC: 21 U/L
ANION GAP SERPL CALC-SCNC: 14 MMOL/L
AST SERPL-CCNC: 25 U/L
BASOPHILS # BLD AUTO: 0.07 K/UL
BASOPHILS NFR BLD AUTO: 1 %
BILIRUB SERPL-MCNC: 0.4 MG/DL
BUN SERPL-MCNC: 10 MG/DL
CALCIUM SERPL-MCNC: 10 MG/DL
CHLORIDE SERPL-SCNC: 104 MMOL/L
CHOLEST SERPL-MCNC: 150 MG/DL
CO2 SERPL-SCNC: 25 MMOL/L
CREAT SERPL-MCNC: 0.77 MG/DL
EGFR: 87 ML/MIN/1.73M2
EOSINOPHIL # BLD AUTO: 0.15 K/UL
EOSINOPHIL NFR BLD AUTO: 2.2 %
ESTIMATED AVERAGE GLUCOSE: 117 MG/DL
GLUCOSE SERPL-MCNC: 101 MG/DL
HBA1C MFR BLD HPLC: 5.7 %
HCT VFR BLD CALC: 44.8 %
HDLC SERPL-MCNC: 51 MG/DL
HGB BLD-MCNC: 14.3 G/DL
IMM GRANULOCYTES NFR BLD AUTO: 0.1 %
LDLC SERPL CALC-MCNC: 68 MG/DL
LDLC SERPL DIRECT ASSAY-MCNC: 74 MG/DL
LYMPHOCYTES # BLD AUTO: 2.52 K/UL
LYMPHOCYTES NFR BLD AUTO: 37.7 %
MAN DIFF?: NORMAL
MCHC RBC-ENTMCNC: 29.3 PG
MCHC RBC-ENTMCNC: 31.9 GM/DL
MCV RBC AUTO: 91.8 FL
MONOCYTES # BLD AUTO: 0.48 K/UL
MONOCYTES NFR BLD AUTO: 7.2 %
NEUTROPHILS # BLD AUTO: 3.45 K/UL
NEUTROPHILS NFR BLD AUTO: 51.8 %
NONHDLC SERPL-MCNC: 98 MG/DL
PLATELET # BLD AUTO: 244 K/UL
POTASSIUM SERPL-SCNC: 3.6 MMOL/L
PROT SERPL-MCNC: 7 G/DL
RBC # BLD: 4.88 M/UL
RBC # FLD: 13.7 %
SODIUM SERPL-SCNC: 143 MMOL/L
T4 FREE SERPL-MCNC: 1.4 NG/DL
TRIGL SERPL-MCNC: 154 MG/DL
TSH SERPL-ACNC: 2.64 UIU/ML
WBC # FLD AUTO: 6.68 K/UL

## 2022-04-27 PROCEDURE — 36415 COLL VENOUS BLD VENIPUNCTURE: CPT

## 2022-05-04 ENCOUNTER — APPOINTMENT (OUTPATIENT)
Dept: INTERNAL MEDICINE | Facility: CLINIC | Age: 62
End: 2022-05-04
Payer: COMMERCIAL

## 2022-05-04 ENCOUNTER — NON-APPOINTMENT (OUTPATIENT)
Age: 62
End: 2022-05-04

## 2022-05-04 ENCOUNTER — APPOINTMENT (OUTPATIENT)
Dept: GASTROENTEROLOGY | Facility: CLINIC | Age: 62
End: 2022-05-04

## 2022-05-04 ENCOUNTER — LABORATORY RESULT (OUTPATIENT)
Age: 62
End: 2022-05-04

## 2022-05-04 VITALS
DIASTOLIC BLOOD PRESSURE: 84 MMHG | WEIGHT: 153 LBS | TEMPERATURE: 97.2 F | SYSTOLIC BLOOD PRESSURE: 126 MMHG | BODY MASS INDEX: 30.84 KG/M2 | OXYGEN SATURATION: 98 % | HEART RATE: 90 BPM | HEIGHT: 59 IN

## 2022-05-04 VITALS — DIASTOLIC BLOOD PRESSURE: 80 MMHG | SYSTOLIC BLOOD PRESSURE: 120 MMHG

## 2022-05-04 DIAGNOSIS — R73.01 IMPAIRED FASTING GLUCOSE: ICD-10-CM

## 2022-05-04 DIAGNOSIS — E66.3 OVERWEIGHT: ICD-10-CM

## 2022-05-04 DIAGNOSIS — R91.1 SOLITARY PULMONARY NODULE: ICD-10-CM

## 2022-05-04 PROCEDURE — 93000 ELECTROCARDIOGRAM COMPLETE: CPT

## 2022-05-04 PROCEDURE — 99396 PREV VISIT EST AGE 40-64: CPT | Mod: 25

## 2022-05-31 ENCOUNTER — OUTPATIENT (OUTPATIENT)
Dept: OUTPATIENT SERVICES | Facility: HOSPITAL | Age: 62
LOS: 1 days | Discharge: HOME | End: 2022-05-31

## 2022-05-31 DIAGNOSIS — Z92.21 PERSONAL HISTORY OF ANTINEOPLASTIC CHEMOTHERAPY: Chronic | ICD-10-CM

## 2022-05-31 DIAGNOSIS — C50.919 MALIGNANT NEOPLASM OF UNSPECIFIED SITE OF UNSPECIFIED FEMALE BREAST: ICD-10-CM

## 2022-05-31 DIAGNOSIS — Z90.13 ACQUIRED ABSENCE OF BILATERAL BREASTS AND NIPPLES: Chronic | ICD-10-CM

## 2022-05-31 DIAGNOSIS — Z98.89 OTHER SPECIFIED POSTPROCEDURAL STATES: Chronic | ICD-10-CM

## 2022-06-03 ENCOUNTER — APPOINTMENT (OUTPATIENT)
Dept: HEMATOLOGY ONCOLOGY | Facility: CLINIC | Age: 62
End: 2022-06-03
Payer: COMMERCIAL

## 2022-06-03 VITALS
HEART RATE: 89 BPM | WEIGHT: 152.12 LBS | BODY MASS INDEX: 30.67 KG/M2 | OXYGEN SATURATION: 98 % | RESPIRATION RATE: 16 BRPM | TEMPERATURE: 97.7 F | DIASTOLIC BLOOD PRESSURE: 88 MMHG | SYSTOLIC BLOOD PRESSURE: 139 MMHG | HEIGHT: 58.98 IN

## 2022-06-03 PROCEDURE — 99213 OFFICE O/P EST LOW 20 MIN: CPT

## 2022-06-03 NOTE — HISTORY OF PRESENT ILLNESS
[Disease: _____________________] : Disease: [unfilled] [T: ___] : T[unfilled] [N: ___] : N[unfilled] [M: ___] : M[unfilled] [AJCC Stage: ____] : AJCC Stage: [unfilled] [de-identified] : H/O B/L MRM / CARLOS 7/13 (Dr. Martine Rico) for right breast cancer   1.8 cm ILC, 2+ / 8 LNs, ER+ LA+ her 2 rafiq negative. s/P DD AC-T 12 / 13 followed by nipple reconstruction. Began anastrozole 4/14 with initial moderate arthralgias that gradually decreased to a "tolerable" level.   \par \par She completed a 7 yr course of anastrozole in 3/21 and then d/c'd it.  [de-identified] : ILC [de-identified] : She is here for a f/up visit. \par \par She notes a good appetite stable weight and excellent performance status. \par \par She cont to work remotely. \par \par She has baseline mild arthralgias w/o change. \par \par "I have countless energy". \par \par

## 2022-06-03 NOTE — PHYSICAL EXAM
[Fully active, able to carry on all pre-disease performance without restriction] : Status 0 - Fully active, able to carry on all pre-disease performance without restriction [Normal] : affect appropriate [de-identified] : s/p B/L MRM with CARLOS reconstruction, no masses. B/L ax neg

## 2022-06-06 DIAGNOSIS — Z20.822 CONTACT WITH AND (SUSPECTED) EXPOSURE TO COVID-19: ICD-10-CM

## 2022-06-07 LAB — SARS-COV-2 N GENE NPH QL NAA+PROBE: NOT DETECTED

## 2022-06-09 ENCOUNTER — APPOINTMENT (OUTPATIENT)
Dept: GASTROENTEROLOGY | Facility: CLINIC | Age: 62
End: 2022-06-09
Payer: COMMERCIAL

## 2022-06-09 DIAGNOSIS — Z86.010 PERSONAL HISTORY OF COLONIC POLYPS: ICD-10-CM

## 2022-06-09 PROCEDURE — 45378 DIAGNOSTIC COLONOSCOPY: CPT | Mod: 33

## 2022-07-17 ENCOUNTER — NON-APPOINTMENT (OUTPATIENT)
Age: 62
End: 2022-07-17

## 2022-12-29 ENCOUNTER — APPOINTMENT (OUTPATIENT)
Dept: INTERNAL MEDICINE | Facility: CLINIC | Age: 62
End: 2022-12-29

## 2022-12-29 VITALS
HEIGHT: 59 IN | SYSTOLIC BLOOD PRESSURE: 138 MMHG | DIASTOLIC BLOOD PRESSURE: 86 MMHG | BODY MASS INDEX: 30.44 KG/M2 | WEIGHT: 151 LBS

## 2022-12-29 VITALS
WEIGHT: 151 LBS | BODY MASS INDEX: 30.44 KG/M2 | HEART RATE: 81 BPM | TEMPERATURE: 97.2 F | SYSTOLIC BLOOD PRESSURE: 145 MMHG | HEIGHT: 59 IN | DIASTOLIC BLOOD PRESSURE: 86 MMHG | OXYGEN SATURATION: 97 %

## 2022-12-29 DIAGNOSIS — Z00.00 ENCOUNTER FOR GENERAL ADULT MEDICAL EXAMINATION W/OUT ABNORMAL FINDINGS: ICD-10-CM

## 2022-12-29 PROCEDURE — 99205 OFFICE O/P NEW HI 60 MIN: CPT

## 2022-12-29 NOTE — HISTORY OF PRESENT ILLNESS
[FreeTextEntry1] : Establish care, transferring from previous PCP Dr. Krunal Curry who is retiring [de-identified] : \par Advised to get CT chest by previous PCP to follow up a lung nodule vs mucus plug but pt declines. She does not want to know if there is something worrisome there or not.\par \par She is due for DEXA scan and would like to get this done to f/u her osteopenia.\par \par She follows with her oncologist yearly now for her history of breast cancer.\par \par She takes medication for cholesterol and a blood pressure medication too but she says this is for vertigo.

## 2022-12-29 NOTE — ASSESSMENT
[FreeTextEntry1] : \par HLD: cont statin, check FLP today, LFTs\par \par HTN: on diuretic although pt says this is for vertigo, not HTN\par \par Osteopenia: ordered DEXA scan\par \par Lung nodule/plug: overdue for CT chest but pt declines; she understands the risk of not detecting cancer early but she does not wish to proceed with more imaging

## 2023-01-04 LAB
ALBUMIN SERPL ELPH-MCNC: 4.9 G/DL
ALP BLD-CCNC: 93 U/L
ALT SERPL-CCNC: 29 U/L
ANION GAP SERPL CALC-SCNC: 13 MMOL/L
AST SERPL-CCNC: 26 U/L
BASOPHILS # BLD AUTO: 0.05 K/UL
BASOPHILS NFR BLD AUTO: 0.7 %
BILIRUB SERPL-MCNC: 0.5 MG/DL
BUN SERPL-MCNC: 13 MG/DL
CALCIUM SERPL-MCNC: 10.1 MG/DL
CHLORIDE SERPL-SCNC: 103 MMOL/L
CHOLEST SERPL-MCNC: 162 MG/DL
CO2 SERPL-SCNC: 24 MMOL/L
CREAT SERPL-MCNC: 0.75 MG/DL
EGFR: 90 ML/MIN/1.73M2
EOSINOPHIL # BLD AUTO: 0.11 K/UL
EOSINOPHIL NFR BLD AUTO: 1.5 %
ESTIMATED AVERAGE GLUCOSE: 117 MG/DL
GLUCOSE SERPL-MCNC: 101 MG/DL
HBA1C MFR BLD HPLC: 5.7 %
HCT VFR BLD CALC: 44.8 %
HDLC SERPL-MCNC: 59 MG/DL
HGB BLD-MCNC: 14.8 G/DL
IMM GRANULOCYTES NFR BLD AUTO: 0.1 %
LDLC SERPL CALC-MCNC: 67 MG/DL
LYMPHOCYTES # BLD AUTO: 2.85 K/UL
LYMPHOCYTES NFR BLD AUTO: 38.2 %
MAN DIFF?: NORMAL
MCHC RBC-ENTMCNC: 30.1 PG
MCHC RBC-ENTMCNC: 33 GM/DL
MCV RBC AUTO: 91.1 FL
MONOCYTES # BLD AUTO: 0.48 K/UL
MONOCYTES NFR BLD AUTO: 6.4 %
NEUTROPHILS # BLD AUTO: 3.96 K/UL
NEUTROPHILS NFR BLD AUTO: 53.1 %
NONHDLC SERPL-MCNC: 103 MG/DL
PLATELET # BLD AUTO: 286 K/UL
POTASSIUM SERPL-SCNC: 3.7 MMOL/L
PROT SERPL-MCNC: 7.1 G/DL
RBC # BLD: 4.92 M/UL
RBC # FLD: 13.7 %
SODIUM SERPL-SCNC: 141 MMOL/L
TRIGL SERPL-MCNC: 181 MG/DL
TSH SERPL-ACNC: 1.67 UIU/ML
WBC # FLD AUTO: 7.46 K/UL

## 2023-01-21 ENCOUNTER — APPOINTMENT (OUTPATIENT)
Dept: INTERNAL MEDICINE | Facility: CLINIC | Age: 63
End: 2023-01-21

## 2023-03-15 ENCOUNTER — APPOINTMENT (OUTPATIENT)
Dept: INTERNAL MEDICINE | Facility: CLINIC | Age: 63
End: 2023-03-15
Payer: COMMERCIAL

## 2023-03-15 VITALS
TEMPERATURE: 97.5 F | HEART RATE: 79 BPM | HEIGHT: 59 IN | BODY MASS INDEX: 31.25 KG/M2 | OXYGEN SATURATION: 98 % | WEIGHT: 155 LBS | SYSTOLIC BLOOD PRESSURE: 132 MMHG | DIASTOLIC BLOOD PRESSURE: 82 MMHG

## 2023-03-15 DIAGNOSIS — R53.83 OTHER FATIGUE: ICD-10-CM

## 2023-03-15 PROCEDURE — 99213 OFFICE O/P EST LOW 20 MIN: CPT

## 2023-03-15 RX ORDER — AMOXICILLIN AND CLAVULANATE POTASSIUM 875; 125 MG/1; MG/1
875-125 TABLET, COATED ORAL TWICE DAILY
Qty: 20 | Refills: 0 | Status: COMPLETED | COMMUNITY
Start: 2023-03-15 | End: 2023-03-25

## 2023-03-16 VITALS — DIASTOLIC BLOOD PRESSURE: 82 MMHG | TEMPERATURE: 97.5 F | SYSTOLIC BLOOD PRESSURE: 132 MMHG | HEART RATE: 79 BPM

## 2023-03-16 NOTE — HISTORY OF PRESENT ILLNESS
[FreeTextEntry8] : Sinus pressure, congestion for 10 days. No fevers or chills. Thought viral etiology initially but symptoms are worsening. Describes thick green/yellow nasal discharge and cough.

## 2023-03-16 NOTE — ASSESSMENT
[FreeTextEntry1] : \par Sinusitis: given length and severity of symptoms, will start Augmentin Abx now x10 days

## 2023-03-16 NOTE — PHYSICAL EXAM
[Normal] : soft, non-tender, non-distended, no masses palpated, no HSM and normal bowel sounds [de-identified] : thick nasal discharge and sinus tenderness upon palpation

## 2023-03-20 LAB
ALBUMIN SERPL ELPH-MCNC: 4.7 G/DL
ALP BLD-CCNC: 110 U/L
ALT SERPL-CCNC: 27 U/L
ANION GAP SERPL CALC-SCNC: 15 MMOL/L
AST SERPL-CCNC: 26 U/L
BASOPHILS # BLD AUTO: 0.07 K/UL
BASOPHILS NFR BLD AUTO: 0.8 %
BILIRUB SERPL-MCNC: 0.3 MG/DL
BUN SERPL-MCNC: 18 MG/DL
CALCIUM SERPL-MCNC: 10.7 MG/DL
CHLORIDE SERPL-SCNC: 102 MMOL/L
CHOLEST SERPL-MCNC: 167 MG/DL
CO2 SERPL-SCNC: 25 MMOL/L
CREAT SERPL-MCNC: 0.67 MG/DL
EGFR: 99 ML/MIN/1.73M2
EOSINOPHIL # BLD AUTO: 0.18 K/UL
EOSINOPHIL NFR BLD AUTO: 2 %
ESTIMATED AVERAGE GLUCOSE: 117 MG/DL
FERRITIN SERPL-MCNC: 186 NG/ML
FOLATE SERPL-MCNC: 7.6 NG/ML
GLUCOSE SERPL-MCNC: 89 MG/DL
HBA1C MFR BLD HPLC: 5.7 %
HCT VFR BLD CALC: 46.4 %
HDLC SERPL-MCNC: 58 MG/DL
HGB BLD-MCNC: 15.1 G/DL
IMM GRANULOCYTES NFR BLD AUTO: 0.2 %
IRON SATN MFR SERPL: 18 %
IRON SERPL-MCNC: 62 UG/DL
LDLC SERPL CALC-MCNC: 71 MG/DL
LYMPHOCYTES # BLD AUTO: 3.37 K/UL
LYMPHOCYTES NFR BLD AUTO: 37.7 %
MAN DIFF?: NORMAL
MCHC RBC-ENTMCNC: 30.1 PG
MCHC RBC-ENTMCNC: 32.5 GM/DL
MCV RBC AUTO: 92.6 FL
MONOCYTES # BLD AUTO: 0.42 K/UL
MONOCYTES NFR BLD AUTO: 4.7 %
NEUTROPHILS # BLD AUTO: 4.87 K/UL
NEUTROPHILS NFR BLD AUTO: 54.6 %
NONHDLC SERPL-MCNC: 109 MG/DL
PLATELET # BLD AUTO: 268 K/UL
POTASSIUM SERPL-SCNC: 4.1 MMOL/L
PROT SERPL-MCNC: 7.1 G/DL
RBC # BLD: 5.01 M/UL
RBC # FLD: 13.4 %
SODIUM SERPL-SCNC: 142 MMOL/L
TIBC SERPL-MCNC: 334 UG/DL
TRIGL SERPL-MCNC: 190 MG/DL
TSH SERPL-ACNC: 1.46 UIU/ML
UIBC SERPL-MCNC: 272 UG/DL
VIT B12 SERPL-MCNC: >2000 PG/ML
WBC # FLD AUTO: 8.93 K/UL

## 2023-03-24 ENCOUNTER — OFFICE (OUTPATIENT)
Dept: URBAN - METROPOLITAN AREA CLINIC 102 | Facility: CLINIC | Age: 63
Setting detail: OPHTHALMOLOGY
End: 2023-03-24
Payer: COMMERCIAL

## 2023-03-24 DIAGNOSIS — H01.004: ICD-10-CM

## 2023-03-24 DIAGNOSIS — H25.13: ICD-10-CM

## 2023-03-24 DIAGNOSIS — H01.002: ICD-10-CM

## 2023-03-24 DIAGNOSIS — H01.005: ICD-10-CM

## 2023-03-24 DIAGNOSIS — H43.813: ICD-10-CM

## 2023-03-24 DIAGNOSIS — D31.32: ICD-10-CM

## 2023-03-24 DIAGNOSIS — H01.001: ICD-10-CM

## 2023-03-24 PROCEDURE — 92250 FUNDUS PHOTOGRAPHY W/I&R: CPT | Performed by: OPHTHALMOLOGY

## 2023-03-24 PROCEDURE — 99214 OFFICE O/P EST MOD 30 MIN: CPT | Performed by: OPHTHALMOLOGY

## 2023-03-24 ASSESSMENT — REFRACTION_CURRENTRX
OD_VPRISM_DIRECTION: PROGS
OS_OVR_VA: 20/
OD_SPHERE: -5.75
OD_OVR_VA: 20/
OD_CYLINDER: -2.50
OS_ADD: +2.00
OS_OVR_VA: 20/
OD_VPRISM_DIRECTION: PROGS
OS_CYLINDER: -2.75
OS_VPRISM_DIRECTION: PROGS
OS_CYLINDER: -2.50
OD_SPHERE: -5.50
OD_ADD: +2.00
OS_AXIS: 169
OS_SPHERE: -5.00
OS_SPHERE: -5.25
OS_ADD: +2.50
OS_AXIS: 170
OD_CYLINDER: -2.50
OD_OVR_VA: 20/
OD_AXIS: 005
OD_AXIS: 11
OD_ADD: +2.50
OS_VPRISM_DIRECTION: PROGS

## 2023-03-24 ASSESSMENT — AXIALLENGTH_DERIVED
OD_AL: 25.4811
OS_AL: 25.259
OD_AL: 25.6527
OS_AL: 25.5413
OS_AL: 25.5413
OD_AL: 25.4811

## 2023-03-24 ASSESSMENT — REFRACTION_MANIFEST
OD_VA1: 20/40
OD_AXIS: 010
OD_AXIS: 010
OD_VA1: 20/NI
OS_CYLINDER: -2.75
OD_SPHERE: -5.50
OS_VA1: 20/50
OD_CYLINDER: -2.50
OS_ADD: +2.50
OS_ADD: +2.50
OS_AXIS: 170
OS_SPHERE: -5.25
OD_ADD: +2.50
OS_AXIS: 170
OD_ADD: +2.50
OS_SPHERE: -5.25
OD_SPHERE: -5.50
OS_VA1: 20/NI
OD_CYLINDER: -1.75
OS_CYLINDER: -2.75

## 2023-03-24 ASSESSMENT — VISUAL ACUITY
OS_BCVA: 20/40
OD_BCVA: 20/50

## 2023-03-24 ASSESSMENT — KERATOMETRY
METHOD_AUTO_MANUAL: AUTO
OS_K2POWER_DIOPTERS: 47.00
OS_K1POWER_DIOPTERS: 44.25
OD_K2POWER_DIOPTERS: 46.50
OD_K1POWER_DIOPTERS: 44.50
OD_AXISANGLE_DEGREES: 101
OS_AXISANGLE_DEGREES: 077

## 2023-03-24 ASSESSMENT — CONFRONTATIONAL VISUAL FIELD TEST (CVF)
OS_FINDINGS: FULL
OD_FINDINGS: FULL

## 2023-03-24 ASSESSMENT — SPHEQUIV_DERIVED
OS_SPHEQUIV: -6.625
OD_SPHEQUIV: -6.75
OS_SPHEQUIV: -6.625
OS_SPHEQUIV: -6
OD_SPHEQUIV: -6.375
OD_SPHEQUIV: -6.375

## 2023-03-24 ASSESSMENT — REFRACTION_AUTOREFRACTION
OD_AXIS: 019
OS_CYLINDER: -3.00
OD_SPHERE: -5.50
OS_AXIS: 150
OD_CYLINDER: -1.75
OS_SPHERE: -4.50

## 2023-03-24 ASSESSMENT — LID EXAM ASSESSMENTS
OS_BLEPHARITIS: LLL LUL T
OD_BLEPHARITIS: RLL RUL T

## 2023-03-24 ASSESSMENT — TONOMETRY
OD_IOP_MMHG: 11
OS_IOP_MMHG: 10

## 2023-05-12 ENCOUNTER — APPOINTMENT (OUTPATIENT)
Dept: INTERNAL MEDICINE | Facility: CLINIC | Age: 63
End: 2023-05-12

## 2023-05-31 ENCOUNTER — APPOINTMENT (OUTPATIENT)
Dept: OBGYN | Facility: CLINIC | Age: 63
End: 2023-05-31
Payer: COMMERCIAL

## 2023-05-31 PROCEDURE — 77080 DXA BONE DENSITY AXIAL: CPT

## 2023-06-20 ENCOUNTER — APPOINTMENT (OUTPATIENT)
Dept: NUCLEAR MEDICINE | Facility: IMAGING CENTER | Age: 63
End: 2023-06-20
Payer: COMMERCIAL

## 2023-06-20 ENCOUNTER — OUTPATIENT (OUTPATIENT)
Dept: OUTPATIENT SERVICES | Facility: HOSPITAL | Age: 63
LOS: 1 days | End: 2023-06-20
Payer: COMMERCIAL

## 2023-06-20 DIAGNOSIS — Z98.89 OTHER SPECIFIED POSTPROCEDURAL STATES: Chronic | ICD-10-CM

## 2023-06-20 DIAGNOSIS — Z92.21 PERSONAL HISTORY OF ANTINEOPLASTIC CHEMOTHERAPY: Chronic | ICD-10-CM

## 2023-06-20 DIAGNOSIS — Z90.13 ACQUIRED ABSENCE OF BILATERAL BREASTS AND NIPPLES: Chronic | ICD-10-CM

## 2023-06-20 DIAGNOSIS — C50.919 MALIGNANT NEOPLASM OF UNSPECIFIED SITE OF UNSPECIFIED FEMALE BREAST: ICD-10-CM

## 2023-06-20 PROCEDURE — 78815 PET IMAGE W/CT SKULL-THIGH: CPT

## 2023-06-20 PROCEDURE — 78815 PET IMAGE W/CT SKULL-THIGH: CPT | Mod: 26,PI

## 2023-06-20 PROCEDURE — A9552: CPT

## 2023-06-21 ENCOUNTER — RESULT REVIEW (OUTPATIENT)
Age: 63
End: 2023-06-21

## 2023-06-23 ENCOUNTER — RESULT REVIEW (OUTPATIENT)
Age: 63
End: 2023-06-23

## 2023-06-23 ENCOUNTER — OUTPATIENT (OUTPATIENT)
Dept: OUTPATIENT SERVICES | Facility: HOSPITAL | Age: 63
LOS: 1 days | Discharge: HOME | End: 2023-06-23
Payer: COMMERCIAL

## 2023-06-23 ENCOUNTER — APPOINTMENT (OUTPATIENT)
Dept: HEMATOLOGY ONCOLOGY | Facility: CLINIC | Age: 63
End: 2023-06-23
Payer: COMMERCIAL

## 2023-06-23 VITALS
TEMPERATURE: 96.3 F | OXYGEN SATURATION: 99 % | DIASTOLIC BLOOD PRESSURE: 83 MMHG | RESPIRATION RATE: 16 BRPM | WEIGHT: 147.71 LBS | HEART RATE: 90 BPM | BODY MASS INDEX: 29.83 KG/M2 | SYSTOLIC BLOOD PRESSURE: 134 MMHG

## 2023-06-23 DIAGNOSIS — Z98.89 OTHER SPECIFIED POSTPROCEDURAL STATES: Chronic | ICD-10-CM

## 2023-06-23 DIAGNOSIS — Z90.13 ACQUIRED ABSENCE OF BILATERAL BREASTS AND NIPPLES: Chronic | ICD-10-CM

## 2023-06-23 DIAGNOSIS — Z92.21 PERSONAL HISTORY OF ANTINEOPLASTIC CHEMOTHERAPY: Chronic | ICD-10-CM

## 2023-06-23 DIAGNOSIS — C50.919 MALIGNANT NEOPLASM OF UNSPECIFIED SITE OF UNSPECIFIED FEMALE BREAST: ICD-10-CM

## 2023-06-23 LAB
BASOPHILS # BLD AUTO: 0.07 K/UL — SIGNIFICANT CHANGE UP (ref 0–0.2)
BASOPHILS NFR BLD AUTO: 0.7 % — SIGNIFICANT CHANGE UP (ref 0–2)
EOSINOPHIL # BLD AUTO: 0.1 K/UL — SIGNIFICANT CHANGE UP (ref 0–0.5)
EOSINOPHIL NFR BLD AUTO: 1 % — SIGNIFICANT CHANGE UP (ref 0–6)
HCT VFR BLD CALC: 44.3 % — SIGNIFICANT CHANGE UP (ref 34.5–45)
HGB BLD-MCNC: 15.2 G/DL — SIGNIFICANT CHANGE UP (ref 11.5–15.5)
IMM GRANULOCYTES NFR BLD AUTO: 0.2 % — SIGNIFICANT CHANGE UP (ref 0–0.9)
LYMPHOCYTES # BLD AUTO: 3.15 K/UL — SIGNIFICANT CHANGE UP (ref 1–3.3)
LYMPHOCYTES # BLD AUTO: 31 % — SIGNIFICANT CHANGE UP (ref 13–44)
MCHC RBC-ENTMCNC: 30.1 PG — SIGNIFICANT CHANGE UP (ref 27–34)
MCHC RBC-ENTMCNC: 34.3 G/DL — SIGNIFICANT CHANGE UP (ref 32–36)
MCV RBC AUTO: 87.7 FL — SIGNIFICANT CHANGE UP (ref 80–100)
MONOCYTES # BLD AUTO: 0.67 K/UL — SIGNIFICANT CHANGE UP (ref 0–0.9)
MONOCYTES NFR BLD AUTO: 6.6 % — SIGNIFICANT CHANGE UP (ref 2–14)
NEUTROPHILS # BLD AUTO: 6.16 K/UL — SIGNIFICANT CHANGE UP (ref 1.8–7.4)
NEUTROPHILS NFR BLD AUTO: 60.5 % — SIGNIFICANT CHANGE UP (ref 43–77)
NRBC # BLD: 0 /100 WBCS — SIGNIFICANT CHANGE UP (ref 0–0)
PLATELET # BLD AUTO: 316 K/UL — SIGNIFICANT CHANGE UP (ref 150–400)
RBC # BLD: 5.05 M/UL — SIGNIFICANT CHANGE UP (ref 3.8–5.2)
RBC # FLD: 13.3 % — SIGNIFICANT CHANGE UP (ref 10.3–14.5)
WBC # BLD: 10.17 K/UL — SIGNIFICANT CHANGE UP (ref 3.8–10.5)
WBC # FLD AUTO: 10.17 K/UL — SIGNIFICANT CHANGE UP (ref 3.8–10.5)

## 2023-06-23 PROCEDURE — 93010 ELECTROCARDIOGRAM REPORT: CPT

## 2023-06-23 PROCEDURE — 99417 PROLNG OP E/M EACH 15 MIN: CPT

## 2023-06-23 PROCEDURE — 99215 OFFICE O/P EST HI 40 MIN: CPT

## 2023-06-23 PROCEDURE — G2212 PROLONG OUTPT/OFFICE VIS: CPT

## 2023-06-26 ENCOUNTER — APPOINTMENT (OUTPATIENT)
Dept: OBGYN | Facility: CLINIC | Age: 63
End: 2023-06-26
Payer: COMMERCIAL

## 2023-06-26 ENCOUNTER — ASOB RESULT (OUTPATIENT)
Age: 63
End: 2023-06-26

## 2023-06-26 VITALS
DIASTOLIC BLOOD PRESSURE: 87 MMHG | SYSTOLIC BLOOD PRESSURE: 139 MMHG | HEIGHT: 59 IN | WEIGHT: 148 LBS | BODY MASS INDEX: 29.84 KG/M2

## 2023-06-26 DIAGNOSIS — R10.2 PELVIC AND PERINEAL PAIN: ICD-10-CM

## 2023-06-26 PROCEDURE — 99213 OFFICE O/P EST LOW 20 MIN: CPT | Mod: 25

## 2023-06-26 PROCEDURE — 99396 PREV VISIT EST AGE 40-64: CPT

## 2023-06-26 PROCEDURE — 82270 OCCULT BLOOD FECES: CPT

## 2023-06-26 PROCEDURE — 76830 TRANSVAGINAL US NON-OB: CPT

## 2023-06-26 NOTE — PHYSICAL EXAM
[Chaperone Present] : A chaperone was present in the examining room during all aspects of the physical examination [Appropriately responsive] : appropriately responsive [Alert] : alert [No Acute Distress] : no acute distress [No Lymphadenopathy] : no lymphadenopathy [Regular Rate Rhythm] : regular rate rhythm [No Murmurs] : no murmurs [Clear to Auscultation B/L] : clear to auscultation bilaterally [Soft] : soft [Non-tender] : non-tender [Non-distended] : non-distended [No HSM] : No HSM [No Lesions] : no lesions [No Mass] : no mass [Oriented x3] : oriented x3 [Examination Of The Breasts] : a normal appearance [No Masses] : no breast masses were palpable [Labia Majora] : normal [Labia Minora] : normal [Normal] : normal [Absent] : absent [Uterine Adnexae] : normal [FreeTextEntry9] : Guaiac negative, no masses

## 2023-06-26 NOTE — HISTORY OF PRESENT ILLNESS
[Disease: _____________________] : Disease: [unfilled] [T: ___] : T[unfilled] [N: ___] : N[unfilled] [M: ___] : M[unfilled] [AJCC Stage: ____] : AJCC Stage: [unfilled] [de-identified] : H/O B/L MRM / CARLOS 7/13 (Dr. Martine Rico) for right breast cancer   1.8 cm ILC, 2+ / 8 LNs, ER+ [place]+ her 2 rafiq negative. s/P DD AC-T 12 / 13 followed by nipple reconstruction. Began anastrozole 4/14 with initial moderate arthralgias that gradually decreased to a "tolerable" level.   \par \par She completed a 7 yr course of anastrozole in 3/21 and then d/c'd it.  [de-identified] : ILC [de-identified] : Here today for an evaluation and treatment recommendation for her newly dxd met breast cancer.\par \par Last seen by me in 6/22\par \par In th e interim seen by derm for a new R chest wall "reddish pimple" - bx returned c/w met breast cancer, invasive mod diff ILC in dermis ,  ER+ [place]- Her 2 rafiq (-). \par \par Spoke with her by phone and ordered an EOD w/u. Found to have met br ca. \par \par Pt c/o vague discomfort R post hip area, not wt bearing related . \par \par She notes a good appetite stable weight and excellent performance status. \par \par She cont to work remotely. \par \par She has baseline mild arthralgias w/o change. \par \par PEt CT 6/20/23\par IMPRESSION:\par 1.  Mildly avid RIGHT axillary mass, with infiltration of adjacent structures. This is consistent with reported RIGHT chest wall recurrence of patient's breast cancer.\par 2.  Findings highly suspicious for osseous metastases involving the axial and appendicular skeleton.\par \par

## 2023-06-26 NOTE — PLAN
[FreeTextEntry1] : Health Maintenance:\par Normal gyn examination.\par Pap smear performed.\par F/u with Internist for screening laboratories.\par Nutrition and exercised discussed.\par \par Breast Ca Recurrence:\par Pt under care of Dr. Naranjo\par Encouraged genetic counseling, as pt has daughter\par \par Pelvic Discomfort:\par Reviewed PET and pelvic sono\par No obvious mass on exam or etiology  \par \par F/u in 9/23, or PRN

## 2023-06-26 NOTE — PHYSICAL EXAM
[Fully active, able to carry on all pre-disease performance without restriction] : Status 0 - Fully active, able to carry on all pre-disease performance without restriction [Normal] : affect appropriate [de-identified] : s/p B/L MRM with CARLOS reconstruction, no masses.R axilla with fixed density / mass extending around th eback of axilla wall. L ax neg

## 2023-06-26 NOTE — HISTORY OF PRESENT ILLNESS
[FreeTextEntry1] : 2023 [facility] 63 year old female presents for an annual gyn exam. Accompanied by partner Soni.\par Patient offers complaints of vaginal pressure towards rectum. No abnl VB, pain, or vaginal discharge.\par Today's pelvic sonogram revealed hysterectomy, no free fluid, stable small R hypoechoic lesion, small L ovarian simple cyst, normal b/l adnexa.\par Recently dx'd w/ recurrence of breast ca metastasized to bones. Currently under the care of medical onc Dr. Naranjo. Plan to start Fulvestrant tomorrow. Reviewed PET scan from .\par Reviewed patient's current medications.\par \par MedHx breast ca ER+ IL+ HER2- treated w/ chemo (), stage 4 breast ca\par OBHx  x1\par SurgHx CARLOS Flap, hysterectomy for fibroid\par Meds Crestor, Dyazide

## 2023-06-27 ENCOUNTER — APPOINTMENT (OUTPATIENT)
Dept: INFUSION THERAPY | Facility: HOSPITAL | Age: 63
End: 2023-06-27

## 2023-06-27 ENCOUNTER — NON-APPOINTMENT (OUTPATIENT)
Age: 63
End: 2023-06-27

## 2023-06-27 LAB — HPV HIGH+LOW RISK DNA PNL CVX: NOT DETECTED

## 2023-06-28 ENCOUNTER — NON-APPOINTMENT (OUTPATIENT)
Age: 63
End: 2023-06-28

## 2023-06-28 LAB
ALBUMIN SERPL ELPH-MCNC: 5.2 G/DL
ALP BLD-CCNC: 127 U/L
ALT SERPL-CCNC: 22 U/L
ANION GAP SERPL CALC-SCNC: 18 MMOL/L
AST SERPL-CCNC: 28 U/L
BILIRUB SERPL-MCNC: 0.4 MG/DL
BUN SERPL-MCNC: 17 MG/DL
CALCIUM SERPL-MCNC: 10.8 MG/DL
CANCER AG27-29 SERPL-ACNC: 121.9 U/ML
CEA SERPL-MCNC: 12.7 NG/ML
CHLORIDE SERPL-SCNC: 102 MMOL/L
CO2 SERPL-SCNC: 24 MMOL/L
CREAT SERPL-MCNC: 0.78 MG/DL
EGFR: 85 ML/MIN/1.73M2
GLUCOSE SERPL-MCNC: 80 MG/DL
POTASSIUM SERPL-SCNC: 3.9 MMOL/L
PROT SERPL-MCNC: 7.5 G/DL
SODIUM SERPL-SCNC: 144 MMOL/L

## 2023-06-29 LAB — CYTOLOGY CVX/VAG DOC THIN PREP: NORMAL

## 2023-07-05 ENCOUNTER — APPOINTMENT (OUTPATIENT)
Dept: INTERNAL MEDICINE | Facility: CLINIC | Age: 63
End: 2023-07-05
Payer: COMMERCIAL

## 2023-07-05 VITALS
SYSTOLIC BLOOD PRESSURE: 120 MMHG | DIASTOLIC BLOOD PRESSURE: 80 MMHG | BODY MASS INDEX: 29.84 KG/M2 | WEIGHT: 148 LBS | HEIGHT: 59 IN

## 2023-07-05 DIAGNOSIS — Z82.69 FAMILY HISTORY OF OTHER DISEASES OF THE MUSCULOSKELETAL SYSTEM AND CONNECTIVE TISSUE: ICD-10-CM

## 2023-07-05 DIAGNOSIS — Z87.09 PERSONAL HISTORY OF OTHER DISEASES OF THE RESPIRATORY SYSTEM: ICD-10-CM

## 2023-07-05 DIAGNOSIS — M65.9 SYNOVITIS AND TENOSYNOVITIS, UNSPECIFIED: ICD-10-CM

## 2023-07-05 DIAGNOSIS — Z87.898 PERSONAL HISTORY OF OTHER SPECIFIED CONDITIONS: ICD-10-CM

## 2023-07-05 DIAGNOSIS — Z80.52 FAMILY HISTORY OF MALIGNANT NEOPLASM OF BLADDER: ICD-10-CM

## 2023-07-05 DIAGNOSIS — M62.830 MUSCLE SPASM OF BACK: ICD-10-CM

## 2023-07-05 DIAGNOSIS — R21 RASH AND OTHER NONSPECIFIC SKIN ERUPTION: ICD-10-CM
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
## 2023-07-05 DIAGNOSIS — N39.0 URINARY TRACT INFECTION, SITE NOT SPECIFIED: ICD-10-CM

## 2023-07-05 DIAGNOSIS — K62.89 OTHER SPECIFIED DISEASES OF ANUS AND RECTUM: ICD-10-CM

## 2023-07-05 PROCEDURE — 99213 OFFICE O/P EST LOW 20 MIN: CPT

## 2023-07-05 PROCEDURE — 99203 OFFICE O/P NEW LOW 30 MIN: CPT

## 2023-07-05 RX ORDER — COVID-19 MOLECULAR TEST ASSAY
KIT MISCELLANEOUS
Qty: 8 | Refills: 0 | Status: COMPLETED | COMMUNITY
Start: 2023-04-29

## 2023-07-05 RX ORDER — CEFADROXIL 500 MG/1
500 CAPSULE ORAL
Qty: 10 | Refills: 0 | Status: COMPLETED | COMMUNITY
Start: 2023-05-11

## 2023-07-05 RX ORDER — FLUTICASONE PROPIONATE 0.5 MG/G
0.05 CREAM TOPICAL
Qty: 15 | Refills: 0 | Status: COMPLETED | COMMUNITY
Start: 2023-05-11

## 2023-07-05 RX ORDER — DOXYCYCLINE HYCLATE 100 MG/1
100 CAPSULE ORAL
Qty: 20 | Refills: 1 | Status: DISCONTINUED | COMMUNITY
Start: 2022-11-22 | End: 2023-07-05

## 2023-07-05 NOTE — HISTORY OF PRESENT ILLNESS
[Other: _____] : [unfilled] [FreeTextEntry8] : cc: here to establish care - previous PCP moved\par \par 63F with BPPV on diuretic, HLD, recently diagnosed with metastatic breast cancer started on chemo & following up with oncologist Dr. Naranjo is here to establish care. \par \par after 1st chemo treatment had lower back spasm x 24 hours that resolved- not a/w paresthesia or weakness \par no bowel or bladder incontinence \par in the distant past had taken valium prn for low back spasm & would like a renewal \par

## 2023-07-05 NOTE — PHYSICAL EXAM
[No Acute Distress] : no acute distress [Well Nourished] : well nourished [No Accessory Muscle Use] : no accessory muscle use [Clear to Auscultation] : lungs were clear to auscultation bilaterally [Regular Rhythm] : with a regular rhythm [Normal S1, S2] : normal S1 and S2 [No Murmur] : no murmur heard [No Spinal Tenderness] : no spinal tenderness [Normal] : the cranial nerves were intact [Sensation Tactile Decrease] : light touch was intact [2+] : left 2+ [Normal Affect] : the affect was normal [Normal Insight/Judgement] : insight and judgment were intact

## 2023-07-05 NOTE — ASSESSMENT
[FreeTextEntry1] : \par 63F with BPPV on diuretic, HLD, recently diagnosed with metastatic breast cancer started on chemo & following up with oncologist Dr. Naranjo is here to establish care. \par \par \par can check TSH, lipids, a1c in 4 months - can get BW drawn with oncologist - to call here for rx\par \par \par f/u in 1year for CPE or prn \par

## 2023-07-11 ENCOUNTER — RESULT REVIEW (OUTPATIENT)
Age: 63
End: 2023-07-11

## 2023-07-11 ENCOUNTER — APPOINTMENT (OUTPATIENT)
Dept: INFUSION THERAPY | Facility: HOSPITAL | Age: 63
End: 2023-07-11

## 2023-07-11 ENCOUNTER — APPOINTMENT (OUTPATIENT)
Dept: HEMATOLOGY ONCOLOGY | Facility: CLINIC | Age: 63
End: 2023-07-11

## 2023-07-11 LAB
BASOPHILS # BLD AUTO: 0.05 K/UL — SIGNIFICANT CHANGE UP (ref 0–0.2)
BASOPHILS NFR BLD AUTO: 1.4 % — SIGNIFICANT CHANGE UP (ref 0–2)
EOSINOPHIL # BLD AUTO: 0.11 K/UL — SIGNIFICANT CHANGE UP (ref 0–0.5)
EOSINOPHIL NFR BLD AUTO: 3.1 % — SIGNIFICANT CHANGE UP (ref 0–6)
HCT VFR BLD CALC: 39.5 % — SIGNIFICANT CHANGE UP (ref 34.5–45)
HGB BLD-MCNC: 13.4 G/DL — SIGNIFICANT CHANGE UP (ref 11.5–15.5)
IMM GRANULOCYTES NFR BLD AUTO: 0.3 % — SIGNIFICANT CHANGE UP (ref 0–0.9)
LYMPHOCYTES # BLD AUTO: 1.66 K/UL — SIGNIFICANT CHANGE UP (ref 1–3.3)
LYMPHOCYTES # BLD AUTO: 46.2 % — HIGH (ref 13–44)
MCHC RBC-ENTMCNC: 30.5 PG — SIGNIFICANT CHANGE UP (ref 27–34)
MCHC RBC-ENTMCNC: 33.9 G/DL — SIGNIFICANT CHANGE UP (ref 32–36)
MCV RBC AUTO: 90 FL — SIGNIFICANT CHANGE UP (ref 80–100)
MONOCYTES # BLD AUTO: 0.14 K/UL — SIGNIFICANT CHANGE UP (ref 0–0.9)
MONOCYTES NFR BLD AUTO: 3.9 % — SIGNIFICANT CHANGE UP (ref 2–14)
NEUTROPHILS # BLD AUTO: 1.62 K/UL — LOW (ref 1.8–7.4)
NEUTROPHILS NFR BLD AUTO: 45.1 % — SIGNIFICANT CHANGE UP (ref 43–77)
NRBC # BLD: 0 /100 WBCS — SIGNIFICANT CHANGE UP (ref 0–0)
PLATELET # BLD AUTO: 303 K/UL — SIGNIFICANT CHANGE UP (ref 150–400)
RBC # BLD: 4.39 M/UL — SIGNIFICANT CHANGE UP (ref 3.8–5.2)
RBC # FLD: 13.6 % — SIGNIFICANT CHANGE UP (ref 10.3–14.5)
WBC # BLD: 3.59 K/UL — LOW (ref 3.8–10.5)
WBC # FLD AUTO: 3.59 K/UL — LOW (ref 3.8–10.5)

## 2023-07-11 PROCEDURE — 93010 ELECTROCARDIOGRAM REPORT: CPT

## 2023-07-13 LAB
ALBUMIN SERPL ELPH-MCNC: 4.8 G/DL
ALP BLD-CCNC: 118 U/L
ALT SERPL-CCNC: 20 U/L
ANION GAP SERPL CALC-SCNC: 12 MMOL/L
AST SERPL-CCNC: 29 U/L
BILIRUB SERPL-MCNC: 0.3 MG/DL
BUN SERPL-MCNC: 15 MG/DL
CALCIUM SERPL-MCNC: 10.3 MG/DL
CHLORIDE SERPL-SCNC: 102 MMOL/L
CO2 SERPL-SCNC: 28 MMOL/L
CREAT SERPL-MCNC: 0.98 MG/DL
EGFR: 65 ML/MIN/1.73M2
GLUCOSE SERPL-MCNC: 99 MG/DL
POTASSIUM SERPL-SCNC: 4.1 MMOL/L
PROT SERPL-MCNC: 6.9 G/DL
SODIUM SERPL-SCNC: 142 MMOL/L

## 2023-07-25 ENCOUNTER — APPOINTMENT (OUTPATIENT)
Dept: HEMATOLOGY ONCOLOGY | Facility: CLINIC | Age: 63
End: 2023-07-25
Payer: COMMERCIAL

## 2023-07-25 ENCOUNTER — APPOINTMENT (OUTPATIENT)
Dept: INFUSION THERAPY | Facility: HOSPITAL | Age: 63
End: 2023-07-25

## 2023-07-25 ENCOUNTER — RESULT REVIEW (OUTPATIENT)
Age: 63
End: 2023-07-25

## 2023-07-25 VITALS
HEART RATE: 80 BPM | OXYGEN SATURATION: 98 % | BODY MASS INDEX: 29.78 KG/M2 | RESPIRATION RATE: 16 BRPM | SYSTOLIC BLOOD PRESSURE: 132 MMHG | HEIGHT: 58.98 IN | DIASTOLIC BLOOD PRESSURE: 85 MMHG | TEMPERATURE: 98 F | WEIGHT: 147.71 LBS

## 2023-07-25 LAB
BASOPHILS # BLD AUTO: 0.05 K/UL — SIGNIFICANT CHANGE UP (ref 0–0.2)
BASOPHILS NFR BLD AUTO: 1.1 % — SIGNIFICANT CHANGE UP (ref 0–2)
EOSINOPHIL # BLD AUTO: 0.05 K/UL — SIGNIFICANT CHANGE UP (ref 0–0.5)
EOSINOPHIL NFR BLD AUTO: 1.1 % — SIGNIFICANT CHANGE UP (ref 0–6)
HCT VFR BLD CALC: 40.7 % — SIGNIFICANT CHANGE UP (ref 34.5–45)
HGB BLD-MCNC: 13.5 G/DL — SIGNIFICANT CHANGE UP (ref 11.5–15.5)
IMM GRANULOCYTES NFR BLD AUTO: 0 % — SIGNIFICANT CHANGE UP (ref 0–0.9)
LYMPHOCYTES # BLD AUTO: 2.47 K/UL — SIGNIFICANT CHANGE UP (ref 1–3.3)
LYMPHOCYTES # BLD AUTO: 56.4 % — HIGH (ref 13–44)
MCHC RBC-ENTMCNC: 30.3 PG — SIGNIFICANT CHANGE UP (ref 27–34)
MCHC RBC-ENTMCNC: 33.2 G/DL — SIGNIFICANT CHANGE UP (ref 32–36)
MCV RBC AUTO: 91.5 FL — SIGNIFICANT CHANGE UP (ref 80–100)
MONOCYTES # BLD AUTO: 0.45 K/UL — SIGNIFICANT CHANGE UP (ref 0–0.9)
MONOCYTES NFR BLD AUTO: 10.3 % — SIGNIFICANT CHANGE UP (ref 2–14)
NEUTROPHILS # BLD AUTO: 1.36 K/UL — LOW (ref 1.8–7.4)
NEUTROPHILS NFR BLD AUTO: 31.1 % — LOW (ref 43–77)
NRBC # BLD: 0 /100 WBCS — SIGNIFICANT CHANGE UP (ref 0–0)
PLATELET # BLD AUTO: 287 K/UL — SIGNIFICANT CHANGE UP (ref 150–400)
RBC # BLD: 4.45 M/UL — SIGNIFICANT CHANGE UP (ref 3.8–5.2)
RBC # FLD: 15.1 % — HIGH (ref 10.3–14.5)
WBC # BLD: 4.38 K/UL — SIGNIFICANT CHANGE UP (ref 3.8–10.5)
WBC # FLD AUTO: 4.38 K/UL — SIGNIFICANT CHANGE UP (ref 3.8–10.5)

## 2023-07-25 PROCEDURE — 99214 OFFICE O/P EST MOD 30 MIN: CPT

## 2023-07-25 PROCEDURE — 93010 ELECTROCARDIOGRAM REPORT: CPT

## 2023-07-25 NOTE — PHYSICAL EXAM
[Fully active, able to carry on all pre-disease performance without restriction] : Status 0 - Fully active, able to carry on all pre-disease performance without restriction [Normal] : affect appropriate [de-identified] : s/p B/L MRM with CARLOS reconstruction, no discrete palpable masses.  R axilla with fixed density / mass extending around the back of axilla wall, stable.  No palpable axillary  nodes.

## 2023-07-25 NOTE — HISTORY OF PRESENT ILLNESS
[Disease: _____________________] : Disease: [unfilled] [T: ___] : T[unfilled] [N: ___] : N[unfilled] [M: ___] : M[unfilled] [AJCC Stage: ____] : AJCC Stage: [unfilled] [de-identified] : H/O B/L MRM / CARLOS 7/13 (Dr. Martine Rico) for right breast cancer   1.8 cm ILC, 2+ / 8 LNs, ER+ [place]+ her 2 rafiq negative. s/P DD AC-T 12 / 13 followed by nipple reconstruction. Began anastrozole 4/14 with initial moderate arthralgias that gradually decreased to a "tolerable" level.   \par \par She completed a 7 yr course of anastrozole in 3/21 and then d/c'd it. \par \par 6/2023 seen by derm for a new R chest wall "reddish pimple" - bx returned c/w met breast cancer, invasive mod diff ILC in dermis ,  ER+ [place]- Her 2 rafiq (-).  EOD w/u was done and showed met br ca.  \par \par The patient was started on ribociclib/fulvestrant  6/27/23.\par \par Guardant 360dx reveled no ESR1 mutation.  Test showed VUS in FOREST and ARID1A genes.   [de-identified] : ILC [de-identified] : Here today for follow up.  The patient is on ribociclib/fulvestrant since 6/27/23.\par \par She is currently on C2 D1 of ribociclib.\par \par She states she has noticed some hair thinning since starting treatment.  \par \par She denies any other treatment related complaints. \par \par She c/o occasional arthralgias with no change. \par \par She states she tripped and fell this week on hip.  She denies any residual complaints from fall, other than mild soreness of hip.\par \par She notes a good appetite stable weight and excellent performance status.  She remains active and swims regularly.\par \par She cont to work remotely. \par \par  \par PEt CT 6/20/23\par IMPRESSION:\par 1.  Mildly avid RIGHT axillary mass, with infiltration of adjacent structures. This is consistent with reported RIGHT chest wall recurrence of patient's breast cancer.\par 2.  Findings highly suspicious for osseous metastases involving the axial and appendicular skeleton.\par \par

## 2023-07-26 LAB
ALBUMIN SERPL ELPH-MCNC: 4.7 G/DL
ALP BLD-CCNC: 121 U/L
ALT SERPL-CCNC: 19 U/L
ANION GAP SERPL CALC-SCNC: 15 MMOL/L
AST SERPL-CCNC: 26 U/L
BILIRUB SERPL-MCNC: 0.2 MG/DL
BUN SERPL-MCNC: 20 MG/DL
CALCIUM SERPL-MCNC: 9.7 MG/DL
CANCER AG27-29 SERPL-ACNC: 117.9 U/ML
CEA SERPL-MCNC: 11.1 NG/ML
CHLORIDE SERPL-SCNC: 100 MMOL/L
CO2 SERPL-SCNC: 23 MMOL/L
CREAT SERPL-MCNC: 0.92 MG/DL
EGFR: 70 ML/MIN/1.73M2
GLUCOSE SERPL-MCNC: 103 MG/DL
POTASSIUM SERPL-SCNC: 4.1 MMOL/L
PROT SERPL-MCNC: 7 G/DL
SODIUM SERPL-SCNC: 137 MMOL/L

## 2023-07-28 ENCOUNTER — TRANSCRIPTION ENCOUNTER (OUTPATIENT)
Age: 63
End: 2023-07-28

## 2023-08-02 ENCOUNTER — APPOINTMENT (OUTPATIENT)
Dept: HEMATOLOGY ONCOLOGY | Facility: CLINIC | Age: 63
End: 2023-08-02

## 2023-08-02 DIAGNOSIS — Z01.818 ENCOUNTER FOR OTHER PREPROCEDURAL EXAMINATION: ICD-10-CM

## 2023-08-08 ENCOUNTER — RESULT REVIEW (OUTPATIENT)
Age: 63
End: 2023-08-08

## 2023-08-08 ENCOUNTER — APPOINTMENT (OUTPATIENT)
Dept: HEMATOLOGY ONCOLOGY | Facility: CLINIC | Age: 63
End: 2023-08-08

## 2023-08-08 LAB
ALBUMIN SERPL ELPH-MCNC: 4.5 G/DL
ALP BLD-CCNC: 113 U/L
ALT SERPL-CCNC: 17 U/L
ANION GAP SERPL CALC-SCNC: 12 MMOL/L
AST SERPL-CCNC: 16 U/L
BASOPHILS # BLD AUTO: 0.07 K/UL — SIGNIFICANT CHANGE UP (ref 0–0.2)
BASOPHILS NFR BLD AUTO: 1.8 % — SIGNIFICANT CHANGE UP (ref 0–2)
BILIRUB SERPL-MCNC: 0.4 MG/DL
BUN SERPL-MCNC: 22 MG/DL
CALCIUM SERPL-MCNC: 9.8 MG/DL
CHLORIDE SERPL-SCNC: 103 MMOL/L
CO2 SERPL-SCNC: 26 MMOL/L
CREAT SERPL-MCNC: 1.15 MG/DL
EGFR: 54 ML/MIN/1.73M2
EOSINOPHIL # BLD AUTO: 0.07 K/UL — SIGNIFICANT CHANGE UP (ref 0–0.5)
EOSINOPHIL NFR BLD AUTO: 1.8 % — SIGNIFICANT CHANGE UP (ref 0–6)
GLUCOSE SERPL-MCNC: 96 MG/DL
HCT VFR BLD CALC: 37.5 % — SIGNIFICANT CHANGE UP (ref 34.5–45)
HGB BLD-MCNC: 12.8 G/DL — SIGNIFICANT CHANGE UP (ref 11.5–15.5)
IMM GRANULOCYTES NFR BLD AUTO: 0 % — SIGNIFICANT CHANGE UP (ref 0–0.9)
LYMPHOCYTES # BLD AUTO: 2.18 K/UL — SIGNIFICANT CHANGE UP (ref 1–3.3)
LYMPHOCYTES # BLD AUTO: 56.6 % — HIGH (ref 13–44)
MCHC RBC-ENTMCNC: 31.1 PG — SIGNIFICANT CHANGE UP (ref 27–34)
MCHC RBC-ENTMCNC: 34.1 G/DL — SIGNIFICANT CHANGE UP (ref 32–36)
MCV RBC AUTO: 91.2 FL — SIGNIFICANT CHANGE UP (ref 80–100)
MONOCYTES # BLD AUTO: 0.2 K/UL — SIGNIFICANT CHANGE UP (ref 0–0.9)
MONOCYTES NFR BLD AUTO: 5.2 % — SIGNIFICANT CHANGE UP (ref 2–14)
NEUTROPHILS # BLD AUTO: 1.33 K/UL — LOW (ref 1.8–7.4)
NEUTROPHILS NFR BLD AUTO: 34.6 % — LOW (ref 43–77)
NRBC # BLD: 0 /100 WBCS — SIGNIFICANT CHANGE UP (ref 0–0)
PLATELET # BLD AUTO: 355 K/UL — SIGNIFICANT CHANGE UP (ref 150–400)
POTASSIUM SERPL-SCNC: 4.4 MMOL/L
PROT SERPL-MCNC: 6.5 G/DL
RBC # BLD: 4.11 M/UL — SIGNIFICANT CHANGE UP (ref 3.8–5.2)
RBC # FLD: 15.4 % — HIGH (ref 10.3–14.5)
SODIUM SERPL-SCNC: 141 MMOL/L
WBC # BLD: 3.85 K/UL — SIGNIFICANT CHANGE UP (ref 3.8–10.5)
WBC # FLD AUTO: 3.85 K/UL — SIGNIFICANT CHANGE UP (ref 3.8–10.5)

## 2023-08-09 ENCOUNTER — NON-APPOINTMENT (OUTPATIENT)
Age: 63
End: 2023-08-09

## 2023-08-22 ENCOUNTER — APPOINTMENT (OUTPATIENT)
Dept: INFUSION THERAPY | Facility: HOSPITAL | Age: 63
End: 2023-08-22

## 2023-08-22 ENCOUNTER — RESULT REVIEW (OUTPATIENT)
Age: 63
End: 2023-08-22

## 2023-08-22 ENCOUNTER — APPOINTMENT (OUTPATIENT)
Dept: HEMATOLOGY ONCOLOGY | Facility: CLINIC | Age: 63
End: 2023-08-22
Payer: COMMERCIAL

## 2023-08-22 VITALS
RESPIRATION RATE: 16 BRPM | WEIGHT: 147.71 LBS | DIASTOLIC BLOOD PRESSURE: 84 MMHG | HEIGHT: 58.98 IN | BODY MASS INDEX: 29.78 KG/M2 | SYSTOLIC BLOOD PRESSURE: 131 MMHG | HEART RATE: 75 BPM | OXYGEN SATURATION: 98 % | TEMPERATURE: 98 F

## 2023-08-22 LAB
BASOPHILS # BLD AUTO: 0.12 K/UL — SIGNIFICANT CHANGE UP (ref 0–0.2)
BASOPHILS NFR BLD AUTO: 2.7 % — HIGH (ref 0–2)
EOSINOPHIL # BLD AUTO: 0.04 K/UL — SIGNIFICANT CHANGE UP (ref 0–0.5)
EOSINOPHIL NFR BLD AUTO: 0.9 % — SIGNIFICANT CHANGE UP (ref 0–6)
HCT VFR BLD CALC: 40.6 % — SIGNIFICANT CHANGE UP (ref 34.5–45)
HGB BLD-MCNC: 14 G/DL — SIGNIFICANT CHANGE UP (ref 11.5–15.5)
IMM GRANULOCYTES NFR BLD AUTO: 0.2 % — SIGNIFICANT CHANGE UP (ref 0–0.9)
LYMPHOCYTES # BLD AUTO: 2.21 K/UL — SIGNIFICANT CHANGE UP (ref 1–3.3)
LYMPHOCYTES # BLD AUTO: 48.9 % — HIGH (ref 13–44)
MCHC RBC-ENTMCNC: 31.7 PG — SIGNIFICANT CHANGE UP (ref 27–34)
MCHC RBC-ENTMCNC: 34.5 G/DL — SIGNIFICANT CHANGE UP (ref 32–36)
MCV RBC AUTO: 91.9 FL — SIGNIFICANT CHANGE UP (ref 80–100)
MONOCYTES # BLD AUTO: 0.44 K/UL — SIGNIFICANT CHANGE UP (ref 0–0.9)
MONOCYTES NFR BLD AUTO: 9.7 % — SIGNIFICANT CHANGE UP (ref 2–14)
NEUTROPHILS # BLD AUTO: 1.7 K/UL — LOW (ref 1.8–7.4)
NEUTROPHILS NFR BLD AUTO: 37.6 % — LOW (ref 43–77)
NRBC # BLD: 0 /100 WBCS — SIGNIFICANT CHANGE UP (ref 0–0)
PLATELET # BLD AUTO: 268 K/UL — SIGNIFICANT CHANGE UP (ref 150–400)
RBC # BLD: 4.42 M/UL — SIGNIFICANT CHANGE UP (ref 3.8–5.2)
RBC # FLD: 16.5 % — HIGH (ref 10.3–14.5)
WBC # BLD: 4.52 K/UL — SIGNIFICANT CHANGE UP (ref 3.8–10.5)
WBC # FLD AUTO: 4.52 K/UL — SIGNIFICANT CHANGE UP (ref 3.8–10.5)

## 2023-08-22 PROCEDURE — 99214 OFFICE O/P EST MOD 30 MIN: CPT

## 2023-08-22 NOTE — HISTORY OF PRESENT ILLNESS
[Disease: _____________________] : Disease: [unfilled] [T: ___] : T[unfilled] [N: ___] : N[unfilled] [M: ___] : M[unfilled] [AJCC Stage: ____] : AJCC Stage: [unfilled] [de-identified] : H/O B/L MRM / CARLOS 7/13 (Dr. Martine Rico) for right breast cancer   1.8 cm ILC, 2+ / 8 LNs, ER+ NH+ her 2 rafiq negative. s/P DD AC-T 12 / 13 followed by nipple reconstruction. Began anastrozole 4/14 with initial moderate arthralgias that gradually decreased to a "tolerable" level.   \par  \par  She completed a 7 yr course of anastrozole in 3/21 and then d/c'd it. \par  \par  6/2023 seen by derm for a new R chest wall "reddish pimple" - bx returned c/w met breast cancer, invasive mod diff ILC in dermis ,  ER+ NH- Her 2 rafiq (-).  EOD w/u was done and showed met br ca.  \par  \par  The patient was started on ribociclib/fulvestrant  6/27/23.\par  \par  Guardant 360dx reveled no ESR1 mutation.  Test showed VUS in FOREST and ARID1A genes.   [de-identified] : ILC [de-identified] : Here today for follow up.  The patient is on ribociclib/fulvestrant since 6/27/23.  She is currently starting third week of current cycle of ribociclib.   She denies any treatment related complaints.  She states she is feeling well.  She states hair thinning has resolved.  She denies any complaints.  She notes a good appetite stable weight and excellent performance status.  She remains active and swims regularly.  She cont to work remotely.     PEt CT 6/20/23 IMPRESSION: 1.  Mildly avid RIGHT axillary mass, with infiltration of adjacent structures. This is consistent with reported RIGHT chest wall recurrence of patient's breast cancer. 2.  Findings highly suspicious for osseous metastases involving the axial and appendicular skeleton.

## 2023-08-22 NOTE — PHYSICAL EXAM
[Fully active, able to carry on all pre-disease performance without restriction] : Status 0 - Fully active, able to carry on all pre-disease performance without restriction [Normal] : affect appropriate [de-identified] : s/p B/L MRM with CARLOS reconstruction, no discrete palpable masses.  R axilla with fixed density / mass extending around the back of axilla wall, stable.  No palpable axillary  nodes.

## 2023-08-23 ENCOUNTER — NON-APPOINTMENT (OUTPATIENT)
Age: 63
End: 2023-08-23

## 2023-08-23 LAB
ALBUMIN SERPL ELPH-MCNC: 4.8 G/DL
ALP BLD-CCNC: 117 U/L
ALT SERPL-CCNC: 25 U/L
ANION GAP SERPL CALC-SCNC: 17 MMOL/L
AST SERPL-CCNC: 26 U/L
BILIRUB SERPL-MCNC: 0.3 MG/DL
BUN SERPL-MCNC: 22 MG/DL
CALCIUM SERPL-MCNC: 10 MG/DL
CANCER AG27-29 SERPL-ACNC: 136.6 U/ML
CEA SERPL-MCNC: 14.3 NG/ML
CHLORIDE SERPL-SCNC: 102 MMOL/L
CO2 SERPL-SCNC: 23 MMOL/L
CREAT SERPL-MCNC: 0.89 MG/DL
EGFR: 73 ML/MIN/1.73M2
GLUCOSE SERPL-MCNC: 104 MG/DL
POTASSIUM SERPL-SCNC: 4 MMOL/L
PROT SERPL-MCNC: 7.2 G/DL
SODIUM SERPL-SCNC: 142 MMOL/L

## 2023-08-28 ENCOUNTER — TRANSCRIPTION ENCOUNTER (OUTPATIENT)
Age: 63
End: 2023-08-28

## 2023-08-29 ENCOUNTER — TRANSCRIPTION ENCOUNTER (OUTPATIENT)
Age: 63
End: 2023-08-29

## 2023-09-13 ENCOUNTER — OUTPATIENT (OUTPATIENT)
Dept: OUTPATIENT SERVICES | Facility: HOSPITAL | Age: 63
LOS: 1 days | Discharge: HOME | End: 2023-09-13

## 2023-09-13 DIAGNOSIS — Z98.89 OTHER SPECIFIED POSTPROCEDURAL STATES: Chronic | ICD-10-CM

## 2023-09-13 DIAGNOSIS — Z90.13 ACQUIRED ABSENCE OF BILATERAL BREASTS AND NIPPLES: Chronic | ICD-10-CM

## 2023-09-13 DIAGNOSIS — C50.919 MALIGNANT NEOPLASM OF UNSPECIFIED SITE OF UNSPECIFIED FEMALE BREAST: ICD-10-CM

## 2023-09-13 DIAGNOSIS — Z92.21 PERSONAL HISTORY OF ANTINEOPLASTIC CHEMOTHERAPY: Chronic | ICD-10-CM

## 2023-09-19 ENCOUNTER — RESULT REVIEW (OUTPATIENT)
Age: 63
End: 2023-09-19

## 2023-09-19 ENCOUNTER — APPOINTMENT (OUTPATIENT)
Dept: INFUSION THERAPY | Facility: HOSPITAL | Age: 63
End: 2023-09-19

## 2023-09-19 ENCOUNTER — APPOINTMENT (OUTPATIENT)
Dept: HEMATOLOGY ONCOLOGY | Facility: CLINIC | Age: 63
End: 2023-09-19
Payer: COMMERCIAL

## 2023-09-19 VITALS
SYSTOLIC BLOOD PRESSURE: 138 MMHG | BODY MASS INDEX: 29.72 KG/M2 | RESPIRATION RATE: 16 BRPM | HEART RATE: 75 BPM | DIASTOLIC BLOOD PRESSURE: 81 MMHG | TEMPERATURE: 96.8 F | OXYGEN SATURATION: 98 % | WEIGHT: 147.05 LBS

## 2023-09-19 LAB
BASOPHILS # BLD AUTO: 0.07 K/UL — SIGNIFICANT CHANGE UP (ref 0–0.2)
BASOPHILS NFR BLD AUTO: 1.5 % — SIGNIFICANT CHANGE UP (ref 0–2)
EOSINOPHIL # BLD AUTO: 0.03 K/UL — SIGNIFICANT CHANGE UP (ref 0–0.5)
EOSINOPHIL NFR BLD AUTO: 0.7 % — SIGNIFICANT CHANGE UP (ref 0–6)
HCT VFR BLD CALC: 38.6 % — SIGNIFICANT CHANGE UP (ref 34.5–45)
HGB BLD-MCNC: 13.5 G/DL — SIGNIFICANT CHANGE UP (ref 11.5–15.5)
IMM GRANULOCYTES NFR BLD AUTO: 0.2 % — SIGNIFICANT CHANGE UP (ref 0–0.9)
LYMPHOCYTES # BLD AUTO: 2.17 K/UL — SIGNIFICANT CHANGE UP (ref 1–3.3)
LYMPHOCYTES # BLD AUTO: 47.9 % — HIGH (ref 13–44)
MCHC RBC-ENTMCNC: 32.5 PG — SIGNIFICANT CHANGE UP (ref 27–34)
MCHC RBC-ENTMCNC: 35 G/DL — SIGNIFICANT CHANGE UP (ref 32–36)
MCV RBC AUTO: 92.8 FL — SIGNIFICANT CHANGE UP (ref 80–100)
MONOCYTES # BLD AUTO: 0.48 K/UL — SIGNIFICANT CHANGE UP (ref 0–0.9)
MONOCYTES NFR BLD AUTO: 10.6 % — SIGNIFICANT CHANGE UP (ref 2–14)
NEUTROPHILS # BLD AUTO: 1.77 K/UL — LOW (ref 1.8–7.4)
NEUTROPHILS NFR BLD AUTO: 39.1 % — LOW (ref 43–77)
NRBC # BLD: 0 /100 WBCS — SIGNIFICANT CHANGE UP (ref 0–0)
PLATELET # BLD AUTO: 241 K/UL — SIGNIFICANT CHANGE UP (ref 150–400)
RBC # BLD: 4.16 M/UL — SIGNIFICANT CHANGE UP (ref 3.8–5.2)
RBC # FLD: 16.4 % — HIGH (ref 10.3–14.5)
WBC # BLD: 4.53 K/UL — SIGNIFICANT CHANGE UP (ref 3.8–10.5)
WBC # FLD AUTO: 4.53 K/UL — SIGNIFICANT CHANGE UP (ref 3.8–10.5)

## 2023-09-19 PROCEDURE — 99214 OFFICE O/P EST MOD 30 MIN: CPT

## 2023-09-20 DIAGNOSIS — Z51.11 ENCOUNTER FOR ANTINEOPLASTIC CHEMOTHERAPY: ICD-10-CM

## 2023-09-22 ENCOUNTER — APPOINTMENT (OUTPATIENT)
Dept: OBGYN | Facility: CLINIC | Age: 63
End: 2023-09-22
Payer: COMMERCIAL

## 2023-09-22 ENCOUNTER — TRANSCRIPTION ENCOUNTER (OUTPATIENT)
Age: 63
End: 2023-09-22

## 2023-09-22 DIAGNOSIS — C50.919 MALIGNANT NEOPLASM OF UNSPECIFIED SITE OF UNSPECIFIED FEMALE BREAST: ICD-10-CM

## 2023-09-22 DIAGNOSIS — Z15.01 GENETIC SUSCEPTIBILITY TO MALIGNANT NEOPLASM OF BREAST: ICD-10-CM

## 2023-09-22 LAB
ALBUMIN SERPL ELPH-MCNC: 4.8 G/DL
ALP BLD-CCNC: 101 U/L
ALT SERPL-CCNC: 23 U/L
ANION GAP SERPL CALC-SCNC: 16 MMOL/L
AST SERPL-CCNC: 27 U/L
BILIRUB SERPL-MCNC: 0.3 MG/DL
BUN SERPL-MCNC: 19 MG/DL
CALCIUM SERPL-MCNC: 9.8 MG/DL
CANCER AG27-29 SERPL-ACNC: 120.9 U/ML
CEA SERPL-MCNC: 15.3 NG/ML
CHLORIDE SERPL-SCNC: 103 MMOL/L
CO2 SERPL-SCNC: 21 MMOL/L
CREAT SERPL-MCNC: 0.75 MG/DL
EGFR: 89 ML/MIN/1.73M2
GLUCOSE SERPL-MCNC: 97 MG/DL
POTASSIUM SERPL-SCNC: 4 MMOL/L
PROT SERPL-MCNC: 6.9 G/DL
SODIUM SERPL-SCNC: 141 MMOL/L

## 2023-09-22 PROCEDURE — 99214 OFFICE O/P EST MOD 30 MIN: CPT

## 2023-10-10 ENCOUNTER — RESULT REVIEW (OUTPATIENT)
Age: 63
End: 2023-10-10

## 2023-10-10 ENCOUNTER — APPOINTMENT (OUTPATIENT)
Dept: HEMATOLOGY ONCOLOGY | Facility: CLINIC | Age: 63
End: 2023-10-10

## 2023-10-10 LAB
BASOPHILS # BLD AUTO: 0.09 K/UL — SIGNIFICANT CHANGE UP (ref 0–0.2)
BASOPHILS NFR BLD AUTO: 2.3 % — HIGH (ref 0–2)
EOSINOPHIL # BLD AUTO: 0.05 K/UL — SIGNIFICANT CHANGE UP (ref 0–0.5)
EOSINOPHIL NFR BLD AUTO: 1.3 % — SIGNIFICANT CHANGE UP (ref 0–6)
HCT VFR BLD CALC: 40.3 % — SIGNIFICANT CHANGE UP (ref 34.5–45)
HGB BLD-MCNC: 13.9 G/DL — SIGNIFICANT CHANGE UP (ref 11.5–15.5)
IMM GRANULOCYTES NFR BLD AUTO: 0.3 % — SIGNIFICANT CHANGE UP (ref 0–0.9)
LYMPHOCYTES # BLD AUTO: 1.53 K/UL — SIGNIFICANT CHANGE UP (ref 1–3.3)
LYMPHOCYTES # BLD AUTO: 38.5 % — SIGNIFICANT CHANGE UP (ref 13–44)
MCHC RBC-ENTMCNC: 32.9 PG — SIGNIFICANT CHANGE UP (ref 27–34)
MCHC RBC-ENTMCNC: 34.5 G/DL — SIGNIFICANT CHANGE UP (ref 32–36)
MCV RBC AUTO: 95.3 FL — SIGNIFICANT CHANGE UP (ref 80–100)
MONOCYTES # BLD AUTO: 0.31 K/UL — SIGNIFICANT CHANGE UP (ref 0–0.9)
MONOCYTES NFR BLD AUTO: 7.8 % — SIGNIFICANT CHANGE UP (ref 2–14)
NEUTROPHILS # BLD AUTO: 1.98 K/UL — SIGNIFICANT CHANGE UP (ref 1.8–7.4)
NEUTROPHILS NFR BLD AUTO: 49.8 % — SIGNIFICANT CHANGE UP (ref 43–77)
NRBC # BLD: 0 /100 WBCS — SIGNIFICANT CHANGE UP (ref 0–0)
PLATELET # BLD AUTO: 281 K/UL — SIGNIFICANT CHANGE UP (ref 150–400)
RBC # BLD: 4.23 M/UL — SIGNIFICANT CHANGE UP (ref 3.8–5.2)
RBC # FLD: 15.9 % — HIGH (ref 10.3–14.5)
WBC # BLD: 3.97 K/UL — SIGNIFICANT CHANGE UP (ref 3.8–10.5)
WBC # FLD AUTO: 3.97 K/UL — SIGNIFICANT CHANGE UP (ref 3.8–10.5)

## 2023-10-12 ENCOUNTER — TRANSCRIPTION ENCOUNTER (OUTPATIENT)
Age: 63
End: 2023-10-12

## 2023-10-13 ENCOUNTER — TRANSCRIPTION ENCOUNTER (OUTPATIENT)
Age: 63
End: 2023-10-13

## 2023-10-13 LAB
ALBUMIN SERPL ELPH-MCNC: 4.5 G/DL
ALP BLD-CCNC: 115 U/L
ALT SERPL-CCNC: 94 U/L
ANION GAP SERPL CALC-SCNC: 15 MMOL/L
AST SERPL-CCNC: 60 U/L
BILIRUB SERPL-MCNC: 0.3 MG/DL
BUN SERPL-MCNC: 27 MG/DL
CALCIUM SERPL-MCNC: 10.3 MG/DL
CANCER AG27-29 SERPL-ACNC: 148.6 U/ML
CEA SERPL-MCNC: 17.1 NG/ML
CHLORIDE SERPL-SCNC: 102 MMOL/L
CO2 SERPL-SCNC: 24 MMOL/L
CREAT SERPL-MCNC: 1.23 MG/DL
EGFR: 49 ML/MIN/1.73M2
GLUCOSE SERPL-MCNC: 105 MG/DL
POTASSIUM SERPL-SCNC: 4.3 MMOL/L
PROT SERPL-MCNC: 7 G/DL
SODIUM SERPL-SCNC: 141 MMOL/L

## 2023-10-16 ENCOUNTER — TRANSCRIPTION ENCOUNTER (OUTPATIENT)
Age: 63
End: 2023-10-16

## 2023-10-17 ENCOUNTER — TRANSCRIPTION ENCOUNTER (OUTPATIENT)
Age: 63
End: 2023-10-17

## 2023-10-17 ENCOUNTER — APPOINTMENT (OUTPATIENT)
Dept: HEMATOLOGY ONCOLOGY | Facility: CLINIC | Age: 63
End: 2023-10-17
Payer: COMMERCIAL

## 2023-10-17 ENCOUNTER — APPOINTMENT (OUTPATIENT)
Dept: INFUSION THERAPY | Facility: HOSPITAL | Age: 63
End: 2023-10-17

## 2023-10-17 ENCOUNTER — APPOINTMENT (OUTPATIENT)
Dept: HEMATOLOGY ONCOLOGY | Facility: CLINIC | Age: 63
End: 2023-10-17

## 2023-10-17 VITALS
OXYGEN SATURATION: 98 % | TEMPERATURE: 96.8 F | RESPIRATION RATE: 16 BRPM | HEART RATE: 72 BPM | WEIGHT: 147.71 LBS | DIASTOLIC BLOOD PRESSURE: 89 MMHG | BODY MASS INDEX: 29.86 KG/M2 | SYSTOLIC BLOOD PRESSURE: 136 MMHG

## 2023-10-17 PROCEDURE — 99215 OFFICE O/P EST HI 40 MIN: CPT

## 2023-10-18 ENCOUNTER — TRANSCRIPTION ENCOUNTER (OUTPATIENT)
Age: 63
End: 2023-10-18

## 2023-10-19 ENCOUNTER — TRANSCRIPTION ENCOUNTER (OUTPATIENT)
Age: 63
End: 2023-10-19

## 2023-10-20 ENCOUNTER — APPOINTMENT (OUTPATIENT)
Dept: NUCLEAR MEDICINE | Facility: IMAGING CENTER | Age: 63
End: 2023-10-20
Payer: COMMERCIAL

## 2023-10-20 ENCOUNTER — OUTPATIENT (OUTPATIENT)
Dept: OUTPATIENT SERVICES | Facility: HOSPITAL | Age: 63
LOS: 1 days | End: 2023-10-20
Payer: COMMERCIAL

## 2023-10-20 DIAGNOSIS — C50.919 MALIGNANT NEOPLASM OF UNSPECIFIED SITE OF UNSPECIFIED FEMALE BREAST: ICD-10-CM

## 2023-10-20 DIAGNOSIS — Z92.21 PERSONAL HISTORY OF ANTINEOPLASTIC CHEMOTHERAPY: Chronic | ICD-10-CM

## 2023-10-20 DIAGNOSIS — Z98.89 OTHER SPECIFIED POSTPROCEDURAL STATES: Chronic | ICD-10-CM

## 2023-10-20 DIAGNOSIS — Z90.13 ACQUIRED ABSENCE OF BILATERAL BREASTS AND NIPPLES: Chronic | ICD-10-CM

## 2023-10-20 DIAGNOSIS — Z00.8 ENCOUNTER FOR OTHER GENERAL EXAMINATION: ICD-10-CM

## 2023-10-20 PROCEDURE — 78815 PET IMAGE W/CT SKULL-THIGH: CPT | Mod: 26,PS

## 2023-10-20 PROCEDURE — 78815 PET IMAGE W/CT SKULL-THIGH: CPT

## 2023-10-20 PROCEDURE — A9552: CPT

## 2023-10-24 ENCOUNTER — RESULT REVIEW (OUTPATIENT)
Age: 63
End: 2023-10-24

## 2023-10-24 ENCOUNTER — APPOINTMENT (OUTPATIENT)
Dept: HEMATOLOGY ONCOLOGY | Facility: CLINIC | Age: 63
End: 2023-10-24
Payer: COMMERCIAL

## 2023-10-24 ENCOUNTER — APPOINTMENT (OUTPATIENT)
Dept: INFUSION THERAPY | Facility: HOSPITAL | Age: 63
End: 2023-10-24

## 2023-10-24 VITALS
HEIGHT: 58.98 IN | TEMPERATURE: 97.7 F | OXYGEN SATURATION: 98 % | RESPIRATION RATE: 16 BRPM | DIASTOLIC BLOOD PRESSURE: 94 MMHG | SYSTOLIC BLOOD PRESSURE: 150 MMHG | WEIGHT: 149.25 LBS | BODY MASS INDEX: 30.09 KG/M2 | HEART RATE: 77 BPM

## 2023-10-24 LAB
BASOPHILS # BLD AUTO: 0.14 K/UL — SIGNIFICANT CHANGE UP (ref 0–0.2)
BASOPHILS # BLD AUTO: 0.14 K/UL — SIGNIFICANT CHANGE UP (ref 0–0.2)
BASOPHILS NFR BLD AUTO: 1.7 % — SIGNIFICANT CHANGE UP (ref 0–2)
BASOPHILS NFR BLD AUTO: 1.7 % — SIGNIFICANT CHANGE UP (ref 0–2)
EOSINOPHIL # BLD AUTO: 0.06 K/UL — SIGNIFICANT CHANGE UP (ref 0–0.5)
EOSINOPHIL # BLD AUTO: 0.06 K/UL — SIGNIFICANT CHANGE UP (ref 0–0.5)
EOSINOPHIL NFR BLD AUTO: 0.7 % — SIGNIFICANT CHANGE UP (ref 0–6)
EOSINOPHIL NFR BLD AUTO: 0.7 % — SIGNIFICANT CHANGE UP (ref 0–6)
HCT VFR BLD CALC: 39.9 % — SIGNIFICANT CHANGE UP (ref 34.5–45)
HCT VFR BLD CALC: 39.9 % — SIGNIFICANT CHANGE UP (ref 34.5–45)
HGB BLD-MCNC: 14.5 G/DL — SIGNIFICANT CHANGE UP (ref 11.5–15.5)
HGB BLD-MCNC: 14.5 G/DL — SIGNIFICANT CHANGE UP (ref 11.5–15.5)
IMM GRANULOCYTES NFR BLD AUTO: 0.4 % — SIGNIFICANT CHANGE UP (ref 0–0.9)
IMM GRANULOCYTES NFR BLD AUTO: 0.4 % — SIGNIFICANT CHANGE UP (ref 0–0.9)
LYMPHOCYTES # BLD AUTO: 3.03 K/UL — SIGNIFICANT CHANGE UP (ref 1–3.3)
LYMPHOCYTES # BLD AUTO: 3.03 K/UL — SIGNIFICANT CHANGE UP (ref 1–3.3)
LYMPHOCYTES # BLD AUTO: 37.5 % — SIGNIFICANT CHANGE UP (ref 13–44)
LYMPHOCYTES # BLD AUTO: 37.5 % — SIGNIFICANT CHANGE UP (ref 13–44)
MCHC RBC-ENTMCNC: 33.9 PG — SIGNIFICANT CHANGE UP (ref 27–34)
MCHC RBC-ENTMCNC: 33.9 PG — SIGNIFICANT CHANGE UP (ref 27–34)
MCHC RBC-ENTMCNC: 36.3 G/DL — HIGH (ref 32–36)
MCHC RBC-ENTMCNC: 36.3 G/DL — HIGH (ref 32–36)
MCV RBC AUTO: 93.2 FL — SIGNIFICANT CHANGE UP (ref 80–100)
MCV RBC AUTO: 93.2 FL — SIGNIFICANT CHANGE UP (ref 80–100)
MONOCYTES # BLD AUTO: 0.55 K/UL — SIGNIFICANT CHANGE UP (ref 0–0.9)
MONOCYTES # BLD AUTO: 0.55 K/UL — SIGNIFICANT CHANGE UP (ref 0–0.9)
MONOCYTES NFR BLD AUTO: 6.8 % — SIGNIFICANT CHANGE UP (ref 2–14)
MONOCYTES NFR BLD AUTO: 6.8 % — SIGNIFICANT CHANGE UP (ref 2–14)
NEUTROPHILS # BLD AUTO: 4.27 K/UL — SIGNIFICANT CHANGE UP (ref 1.8–7.4)
NEUTROPHILS # BLD AUTO: 4.27 K/UL — SIGNIFICANT CHANGE UP (ref 1.8–7.4)
NEUTROPHILS NFR BLD AUTO: 52.9 % — SIGNIFICANT CHANGE UP (ref 43–77)
NEUTROPHILS NFR BLD AUTO: 52.9 % — SIGNIFICANT CHANGE UP (ref 43–77)
NRBC # BLD: 0 /100 WBCS — SIGNIFICANT CHANGE UP (ref 0–0)
NRBC # BLD: 0 /100 WBCS — SIGNIFICANT CHANGE UP (ref 0–0)
PLATELET # BLD AUTO: 274 K/UL — SIGNIFICANT CHANGE UP (ref 150–400)
PLATELET # BLD AUTO: 274 K/UL — SIGNIFICANT CHANGE UP (ref 150–400)
RBC # BLD: 4.28 M/UL — SIGNIFICANT CHANGE UP (ref 3.8–5.2)
RBC # BLD: 4.28 M/UL — SIGNIFICANT CHANGE UP (ref 3.8–5.2)
RBC # FLD: 14.6 % — HIGH (ref 10.3–14.5)
RBC # FLD: 14.6 % — HIGH (ref 10.3–14.5)
WBC # BLD: 8.08 K/UL — SIGNIFICANT CHANGE UP (ref 3.8–10.5)
WBC # BLD: 8.08 K/UL — SIGNIFICANT CHANGE UP (ref 3.8–10.5)
WBC # FLD AUTO: 8.08 K/UL — SIGNIFICANT CHANGE UP (ref 3.8–10.5)
WBC # FLD AUTO: 8.08 K/UL — SIGNIFICANT CHANGE UP (ref 3.8–10.5)

## 2023-10-24 PROCEDURE — 99214 OFFICE O/P EST MOD 30 MIN: CPT

## 2023-10-25 ENCOUNTER — TRANSCRIPTION ENCOUNTER (OUTPATIENT)
Age: 63
End: 2023-10-25

## 2023-10-26 LAB
ALBUMIN SERPL ELPH-MCNC: 4.7 G/DL
ALP BLD-CCNC: 108 U/L
ALT SERPL-CCNC: 120 U/L
ANION GAP SERPL CALC-SCNC: 15 MMOL/L
AST SERPL-CCNC: 75 U/L
BILIRUB SERPL-MCNC: 0.3 MG/DL
BUN SERPL-MCNC: 15 MG/DL
CALCIUM SERPL-MCNC: 10.4 MG/DL
CANCER AG27-29 SERPL-ACNC: 169.8 U/ML
CEA SERPL-MCNC: 19.9 NG/ML
CHLORIDE SERPL-SCNC: 101 MMOL/L
CO2 SERPL-SCNC: 26 MMOL/L
CREAT SERPL-MCNC: 0.82 MG/DL
EGFR: 80 ML/MIN/1.73M2
GLUCOSE SERPL-MCNC: 99 MG/DL
POTASSIUM SERPL-SCNC: 3.9 MMOL/L
PROT SERPL-MCNC: 6.9 G/DL
SODIUM SERPL-SCNC: 142 MMOL/L

## 2023-11-08 ENCOUNTER — OUTPATIENT (OUTPATIENT)
Dept: OUTPATIENT SERVICES | Facility: HOSPITAL | Age: 63
LOS: 1 days | Discharge: HOME | End: 2023-11-08

## 2023-11-08 DIAGNOSIS — Z90.13 ACQUIRED ABSENCE OF BILATERAL BREASTS AND NIPPLES: Chronic | ICD-10-CM

## 2023-11-08 DIAGNOSIS — Z98.89 OTHER SPECIFIED POSTPROCEDURAL STATES: Chronic | ICD-10-CM

## 2023-11-08 DIAGNOSIS — Z92.21 PERSONAL HISTORY OF ANTINEOPLASTIC CHEMOTHERAPY: Chronic | ICD-10-CM

## 2023-11-08 DIAGNOSIS — C50.919 MALIGNANT NEOPLASM OF UNSPECIFIED SITE OF UNSPECIFIED FEMALE BREAST: ICD-10-CM

## 2023-11-14 ENCOUNTER — APPOINTMENT (OUTPATIENT)
Dept: INFUSION THERAPY | Facility: HOSPITAL | Age: 63
End: 2023-11-14

## 2023-11-21 ENCOUNTER — APPOINTMENT (OUTPATIENT)
Dept: INFUSION THERAPY | Facility: HOSPITAL | Age: 63
End: 2023-11-21

## 2023-11-21 ENCOUNTER — RESULT REVIEW (OUTPATIENT)
Age: 63
End: 2023-11-21

## 2023-11-21 ENCOUNTER — APPOINTMENT (OUTPATIENT)
Dept: HEMATOLOGY ONCOLOGY | Facility: CLINIC | Age: 63
End: 2023-11-21
Payer: COMMERCIAL

## 2023-11-21 VITALS
HEART RATE: 81 BPM | SYSTOLIC BLOOD PRESSURE: 128 MMHG | BODY MASS INDEX: 30.52 KG/M2 | TEMPERATURE: 97.3 F | RESPIRATION RATE: 16 BRPM | OXYGEN SATURATION: 99 % | WEIGHT: 150.99 LBS | DIASTOLIC BLOOD PRESSURE: 83 MMHG

## 2023-11-21 LAB
BASOPHILS # BLD AUTO: 0.1 K/UL — SIGNIFICANT CHANGE UP (ref 0–0.2)
BASOPHILS # BLD AUTO: 0.1 K/UL — SIGNIFICANT CHANGE UP (ref 0–0.2)
BASOPHILS NFR BLD AUTO: 2 % — SIGNIFICANT CHANGE UP (ref 0–2)
BASOPHILS NFR BLD AUTO: 2 % — SIGNIFICANT CHANGE UP (ref 0–2)
EOSINOPHIL # BLD AUTO: 0.04 K/UL — SIGNIFICANT CHANGE UP (ref 0–0.5)
EOSINOPHIL # BLD AUTO: 0.04 K/UL — SIGNIFICANT CHANGE UP (ref 0–0.5)
EOSINOPHIL NFR BLD AUTO: 0.8 % — SIGNIFICANT CHANGE UP (ref 0–6)
EOSINOPHIL NFR BLD AUTO: 0.8 % — SIGNIFICANT CHANGE UP (ref 0–6)
HCT VFR BLD CALC: 39.9 % — SIGNIFICANT CHANGE UP (ref 34.5–45)
HCT VFR BLD CALC: 39.9 % — SIGNIFICANT CHANGE UP (ref 34.5–45)
HGB BLD-MCNC: 14.2 G/DL — SIGNIFICANT CHANGE UP (ref 11.5–15.5)
HGB BLD-MCNC: 14.2 G/DL — SIGNIFICANT CHANGE UP (ref 11.5–15.5)
IMM GRANULOCYTES NFR BLD AUTO: 0.4 % — SIGNIFICANT CHANGE UP (ref 0–0.9)
IMM GRANULOCYTES NFR BLD AUTO: 0.4 % — SIGNIFICANT CHANGE UP (ref 0–0.9)
LYMPHOCYTES # BLD AUTO: 2.68 K/UL — SIGNIFICANT CHANGE UP (ref 1–3.3)
LYMPHOCYTES # BLD AUTO: 2.68 K/UL — SIGNIFICANT CHANGE UP (ref 1–3.3)
LYMPHOCYTES # BLD AUTO: 52.3 % — HIGH (ref 13–44)
LYMPHOCYTES # BLD AUTO: 52.3 % — HIGH (ref 13–44)
MCHC RBC-ENTMCNC: 33.8 PG — SIGNIFICANT CHANGE UP (ref 27–34)
MCHC RBC-ENTMCNC: 33.8 PG — SIGNIFICANT CHANGE UP (ref 27–34)
MCHC RBC-ENTMCNC: 35.6 G/DL — SIGNIFICANT CHANGE UP (ref 32–36)
MCHC RBC-ENTMCNC: 35.6 G/DL — SIGNIFICANT CHANGE UP (ref 32–36)
MCV RBC AUTO: 95 FL — SIGNIFICANT CHANGE UP (ref 80–100)
MCV RBC AUTO: 95 FL — SIGNIFICANT CHANGE UP (ref 80–100)
MONOCYTES # BLD AUTO: 0.42 K/UL — SIGNIFICANT CHANGE UP (ref 0–0.9)
MONOCYTES # BLD AUTO: 0.42 K/UL — SIGNIFICANT CHANGE UP (ref 0–0.9)
MONOCYTES NFR BLD AUTO: 8.2 % — SIGNIFICANT CHANGE UP (ref 2–14)
MONOCYTES NFR BLD AUTO: 8.2 % — SIGNIFICANT CHANGE UP (ref 2–14)
NEUTROPHILS # BLD AUTO: 1.86 K/UL — SIGNIFICANT CHANGE UP (ref 1.8–7.4)
NEUTROPHILS # BLD AUTO: 1.86 K/UL — SIGNIFICANT CHANGE UP (ref 1.8–7.4)
NEUTROPHILS NFR BLD AUTO: 36.3 % — LOW (ref 43–77)
NEUTROPHILS NFR BLD AUTO: 36.3 % — LOW (ref 43–77)
NRBC # BLD: 0 /100 WBCS — SIGNIFICANT CHANGE UP (ref 0–0)
NRBC # BLD: 0 /100 WBCS — SIGNIFICANT CHANGE UP (ref 0–0)
PLATELET # BLD AUTO: 245 K/UL — SIGNIFICANT CHANGE UP (ref 150–400)
PLATELET # BLD AUTO: 245 K/UL — SIGNIFICANT CHANGE UP (ref 150–400)
RBC # BLD: 4.2 M/UL — SIGNIFICANT CHANGE UP (ref 3.8–5.2)
RBC # BLD: 4.2 M/UL — SIGNIFICANT CHANGE UP (ref 3.8–5.2)
RBC # FLD: 14.3 % — SIGNIFICANT CHANGE UP (ref 10.3–14.5)
RBC # FLD: 14.3 % — SIGNIFICANT CHANGE UP (ref 10.3–14.5)
WBC # BLD: 5.12 K/UL — SIGNIFICANT CHANGE UP (ref 3.8–10.5)
WBC # BLD: 5.12 K/UL — SIGNIFICANT CHANGE UP (ref 3.8–10.5)
WBC # FLD AUTO: 5.12 K/UL — SIGNIFICANT CHANGE UP (ref 3.8–10.5)
WBC # FLD AUTO: 5.12 K/UL — SIGNIFICANT CHANGE UP (ref 3.8–10.5)

## 2023-11-21 PROCEDURE — 99214 OFFICE O/P EST MOD 30 MIN: CPT

## 2023-11-22 DIAGNOSIS — Z51.11 ENCOUNTER FOR ANTINEOPLASTIC CHEMOTHERAPY: ICD-10-CM

## 2023-12-03 LAB
ALBUMIN SERPL ELPH-MCNC: 4.7 G/DL
ALP BLD-CCNC: 107 U/L
ALT SERPL-CCNC: 74 U/L
ANION GAP SERPL CALC-SCNC: 13 MMOL/L
AST SERPL-CCNC: 53 U/L
BILIRUB SERPL-MCNC: 0.3 MG/DL
BUN SERPL-MCNC: 26 MG/DL
CALCIUM SERPL-MCNC: 10.5 MG/DL
CANCER AG27-29 SERPL-ACNC: 163.5 U/ML
CEA SERPL-MCNC: 21.2 NG/ML
CHLORIDE SERPL-SCNC: 102 MMOL/L
CO2 SERPL-SCNC: 23 MMOL/L
CREAT SERPL-MCNC: 0.94 MG/DL
EGFR: 68 ML/MIN/1.73M2
GLUCOSE SERPL-MCNC: 102 MG/DL
POTASSIUM SERPL-SCNC: 3.8 MMOL/L
PROT SERPL-MCNC: 6.9 G/DL
SODIUM SERPL-SCNC: 139 MMOL/L

## 2023-12-06 ENCOUNTER — APPOINTMENT (OUTPATIENT)
Dept: NUCLEAR MEDICINE | Facility: IMAGING CENTER | Age: 63
End: 2023-12-06
Payer: COMMERCIAL

## 2023-12-06 ENCOUNTER — OUTPATIENT (OUTPATIENT)
Dept: OUTPATIENT SERVICES | Facility: HOSPITAL | Age: 63
LOS: 1 days | End: 2023-12-06
Payer: COMMERCIAL

## 2023-12-06 DIAGNOSIS — Z98.89 OTHER SPECIFIED POSTPROCEDURAL STATES: Chronic | ICD-10-CM

## 2023-12-06 DIAGNOSIS — Z92.21 PERSONAL HISTORY OF ANTINEOPLASTIC CHEMOTHERAPY: Chronic | ICD-10-CM

## 2023-12-06 DIAGNOSIS — C50.919 MALIGNANT NEOPLASM OF UNSPECIFIED SITE OF UNSPECIFIED FEMALE BREAST: ICD-10-CM

## 2023-12-06 DIAGNOSIS — Z00.8 ENCOUNTER FOR OTHER GENERAL EXAMINATION: ICD-10-CM

## 2023-12-06 DIAGNOSIS — Z90.13 ACQUIRED ABSENCE OF BILATERAL BREASTS AND NIPPLES: Chronic | ICD-10-CM

## 2023-12-06 PROCEDURE — 78815 PET IMAGE W/CT SKULL-THIGH: CPT

## 2023-12-06 PROCEDURE — A9552: CPT

## 2023-12-06 PROCEDURE — 78815 PET IMAGE W/CT SKULL-THIGH: CPT | Mod: 26,PS

## 2023-12-10 ENCOUNTER — OUTPATIENT (OUTPATIENT)
Dept: OUTPATIENT SERVICES | Facility: HOSPITAL | Age: 63
LOS: 1 days | End: 2023-12-10
Payer: COMMERCIAL

## 2023-12-10 ENCOUNTER — APPOINTMENT (OUTPATIENT)
Dept: MRI IMAGING | Facility: CLINIC | Age: 63
End: 2023-12-10
Payer: COMMERCIAL

## 2023-12-10 DIAGNOSIS — Z90.13 ACQUIRED ABSENCE OF BILATERAL BREASTS AND NIPPLES: Chronic | ICD-10-CM

## 2023-12-10 DIAGNOSIS — Z92.21 PERSONAL HISTORY OF ANTINEOPLASTIC CHEMOTHERAPY: Chronic | ICD-10-CM

## 2023-12-10 DIAGNOSIS — Z98.89 OTHER SPECIFIED POSTPROCEDURAL STATES: Chronic | ICD-10-CM

## 2023-12-10 DIAGNOSIS — Z00.8 ENCOUNTER FOR OTHER GENERAL EXAMINATION: ICD-10-CM

## 2023-12-10 DIAGNOSIS — C50.919 MALIGNANT NEOPLASM OF UNSPECIFIED SITE OF UNSPECIFIED FEMALE BREAST: ICD-10-CM

## 2023-12-10 PROCEDURE — 70553 MRI BRAIN STEM W/O & W/DYE: CPT | Mod: 26

## 2023-12-10 PROCEDURE — A9585: CPT

## 2023-12-10 PROCEDURE — 70553 MRI BRAIN STEM W/O & W/DYE: CPT

## 2023-12-12 ENCOUNTER — APPOINTMENT (OUTPATIENT)
Dept: INFUSION THERAPY | Facility: HOSPITAL | Age: 63
End: 2023-12-12

## 2023-12-19 ENCOUNTER — APPOINTMENT (OUTPATIENT)
Dept: INFUSION THERAPY | Facility: HOSPITAL | Age: 63
End: 2023-12-19

## 2023-12-19 ENCOUNTER — RESULT REVIEW (OUTPATIENT)
Age: 63
End: 2023-12-19

## 2023-12-19 ENCOUNTER — APPOINTMENT (OUTPATIENT)
Dept: HEMATOLOGY ONCOLOGY | Facility: CLINIC | Age: 63
End: 2023-12-19
Payer: COMMERCIAL

## 2023-12-19 VITALS
OXYGEN SATURATION: 98 % | SYSTOLIC BLOOD PRESSURE: 145 MMHG | HEIGHT: 58.98 IN | HEART RATE: 72 BPM | RESPIRATION RATE: 16 BRPM | WEIGHT: 151.68 LBS | TEMPERATURE: 97.7 F | DIASTOLIC BLOOD PRESSURE: 96 MMHG | BODY MASS INDEX: 30.58 KG/M2

## 2023-12-19 DIAGNOSIS — Z79.899 OTHER LONG TERM (CURRENT) DRUG THERAPY: ICD-10-CM

## 2023-12-19 LAB
BASOPHILS # BLD AUTO: 0.06 K/UL — SIGNIFICANT CHANGE UP (ref 0–0.2)
BASOPHILS # BLD AUTO: 0.06 K/UL — SIGNIFICANT CHANGE UP (ref 0–0.2)
BASOPHILS NFR BLD AUTO: 1.4 % — SIGNIFICANT CHANGE UP (ref 0–2)
BASOPHILS NFR BLD AUTO: 1.4 % — SIGNIFICANT CHANGE UP (ref 0–2)
EOSINOPHIL # BLD AUTO: 0.04 K/UL — SIGNIFICANT CHANGE UP (ref 0–0.5)
EOSINOPHIL # BLD AUTO: 0.04 K/UL — SIGNIFICANT CHANGE UP (ref 0–0.5)
EOSINOPHIL NFR BLD AUTO: 0.9 % — SIGNIFICANT CHANGE UP (ref 0–6)
EOSINOPHIL NFR BLD AUTO: 0.9 % — SIGNIFICANT CHANGE UP (ref 0–6)
HCT VFR BLD CALC: 40.6 % — SIGNIFICANT CHANGE UP (ref 34.5–45)
HCT VFR BLD CALC: 40.6 % — SIGNIFICANT CHANGE UP (ref 34.5–45)
HGB BLD-MCNC: 14.2 G/DL — SIGNIFICANT CHANGE UP (ref 11.5–15.5)
HGB BLD-MCNC: 14.2 G/DL — SIGNIFICANT CHANGE UP (ref 11.5–15.5)
IMM GRANULOCYTES NFR BLD AUTO: 0.2 % — SIGNIFICANT CHANGE UP (ref 0–0.9)
IMM GRANULOCYTES NFR BLD AUTO: 0.2 % — SIGNIFICANT CHANGE UP (ref 0–0.9)
LYMPHOCYTES # BLD AUTO: 2.25 K/UL — SIGNIFICANT CHANGE UP (ref 1–3.3)
LYMPHOCYTES # BLD AUTO: 2.25 K/UL — SIGNIFICANT CHANGE UP (ref 1–3.3)
LYMPHOCYTES # BLD AUTO: 52 % — HIGH (ref 13–44)
LYMPHOCYTES # BLD AUTO: 52 % — HIGH (ref 13–44)
MCHC RBC-ENTMCNC: 33.8 PG — SIGNIFICANT CHANGE UP (ref 27–34)
MCHC RBC-ENTMCNC: 33.8 PG — SIGNIFICANT CHANGE UP (ref 27–34)
MCHC RBC-ENTMCNC: 35 G/DL — SIGNIFICANT CHANGE UP (ref 32–36)
MCHC RBC-ENTMCNC: 35 G/DL — SIGNIFICANT CHANGE UP (ref 32–36)
MCV RBC AUTO: 96.7 FL — SIGNIFICANT CHANGE UP (ref 80–100)
MCV RBC AUTO: 96.7 FL — SIGNIFICANT CHANGE UP (ref 80–100)
MONOCYTES # BLD AUTO: 0.46 K/UL — SIGNIFICANT CHANGE UP (ref 0–0.9)
MONOCYTES # BLD AUTO: 0.46 K/UL — SIGNIFICANT CHANGE UP (ref 0–0.9)
MONOCYTES NFR BLD AUTO: 10.6 % — SIGNIFICANT CHANGE UP (ref 2–14)
MONOCYTES NFR BLD AUTO: 10.6 % — SIGNIFICANT CHANGE UP (ref 2–14)
NEUTROPHILS # BLD AUTO: 1.51 K/UL — LOW (ref 1.8–7.4)
NEUTROPHILS # BLD AUTO: 1.51 K/UL — LOW (ref 1.8–7.4)
NEUTROPHILS NFR BLD AUTO: 34.9 % — LOW (ref 43–77)
NEUTROPHILS NFR BLD AUTO: 34.9 % — LOW (ref 43–77)
NRBC # BLD: 0 /100 WBCS — SIGNIFICANT CHANGE UP (ref 0–0)
NRBC # BLD: 0 /100 WBCS — SIGNIFICANT CHANGE UP (ref 0–0)
PLATELET # BLD AUTO: 264 K/UL — SIGNIFICANT CHANGE UP (ref 150–400)
PLATELET # BLD AUTO: 264 K/UL — SIGNIFICANT CHANGE UP (ref 150–400)
RBC # BLD: 4.2 M/UL — SIGNIFICANT CHANGE UP (ref 3.8–5.2)
RBC # BLD: 4.2 M/UL — SIGNIFICANT CHANGE UP (ref 3.8–5.2)
RBC # FLD: 14.2 % — SIGNIFICANT CHANGE UP (ref 10.3–14.5)
RBC # FLD: 14.2 % — SIGNIFICANT CHANGE UP (ref 10.3–14.5)
WBC # BLD: 4.33 K/UL — SIGNIFICANT CHANGE UP (ref 3.8–10.5)
WBC # BLD: 4.33 K/UL — SIGNIFICANT CHANGE UP (ref 3.8–10.5)
WBC # FLD AUTO: 4.33 K/UL — SIGNIFICANT CHANGE UP (ref 3.8–10.5)
WBC # FLD AUTO: 4.33 K/UL — SIGNIFICANT CHANGE UP (ref 3.8–10.5)

## 2023-12-19 PROCEDURE — 99214 OFFICE O/P EST MOD 30 MIN: CPT

## 2023-12-21 PROBLEM — Z79.899 HIGH RISK MEDICATION USE: Status: ACTIVE | Noted: 2023-06-26

## 2023-12-21 LAB
ALBUMIN SERPL ELPH-MCNC: 5 G/DL
ALP BLD-CCNC: 114 U/L
ALT SERPL-CCNC: 81 U/L
ANION GAP SERPL CALC-SCNC: 12 MMOL/L
AST SERPL-CCNC: 61 U/L
BILIRUB SERPL-MCNC: 0.4 MG/DL
BUN SERPL-MCNC: 12 MG/DL
CALCIUM SERPL-MCNC: 10.2 MG/DL
CANCER AG27-29 SERPL-ACNC: 193.9 U/ML
CEA SERPL-MCNC: 22.8 NG/ML
CHLORIDE SERPL-SCNC: 103 MMOL/L
CO2 SERPL-SCNC: 25 MMOL/L
CREAT SERPL-MCNC: 0.9 MG/DL
EGFR: 72 ML/MIN/1.73M2
GLUCOSE SERPL-MCNC: 99 MG/DL
POTASSIUM SERPL-SCNC: 4.4 MMOL/L
PROT SERPL-MCNC: 7.2 G/DL
SODIUM SERPL-SCNC: 140 MMOL/L

## 2023-12-21 RX ORDER — DIAZEPAM 5 MG/1
5 TABLET ORAL DAILY
Qty: 14 | Refills: 0 | Status: ACTIVE | COMMUNITY
Start: 2023-07-05 | End: 1900-01-01

## 2023-12-21 NOTE — PHYSICAL EXAM
[Fully active, able to carry on all pre-disease performance without restriction] : Status 0 - Fully active, able to carry on all pre-disease performance without restriction [Normal] : affect appropriate [de-identified] : s/p B/L MRM with CARLOS reconstruction, no discrete palpable masses.  R post axilla with fixed density / mass extending around the back of axillary wall, subjectively softer an dperhaps smaller .  No palpable axillary  nodes. [de-identified] : extreme mood swings, irate at times and then calm and appreciative.

## 2023-12-21 NOTE — REASON FOR VISIT
[Follow-Up Visit] : a follow-up [Pre-Treatment Visit] : a pre-treatment [Other: _____] : [unfilled] [FreeTextEntry2] : metastatic breast cancer

## 2023-12-21 NOTE — HISTORY OF PRESENT ILLNESS
[Disease: _____________________] : Disease: [unfilled] [T: ___] : T[unfilled] [N: ___] : N[unfilled] [M: ___] : M[unfilled] [AJCC Stage: ____] : AJCC Stage: [unfilled] [de-identified] : H/O B/L MRM / CARLOS 7/13 (Dr. Martine Rico) for right breast cancer   1.8 cm ILC, 2+ / 8 LNs, ER+ OR+ her 2 rafiq negative. s/P DD AC-T 12 / 13 followed by nipple reconstruction. Began anastrozole 4/14 with initial moderate arthralgias that gradually decreased to a "tolerable" level.     She completed a 7 yr course of anastrozole in 3/21 and then d/c'd it.   6/2023 seen by derm for a new R chest wall "reddish pimple" - bx returned c/w met breast cancer, invasive mod diff ILC in dermis ,  ER+ OR- Her 2 rafiq (-).  EOD w/u was done and showed met br ca.    The patient was started on ribociclib/fulvestrant  6/27/23.  Guardant 360dx revealed no ESR1 mutation.  Test showed VUS in FOREST and ARID1A genes.   [de-identified] : ILC [de-identified] : Here today for follow up.    The patient is on ribociclib/fulvestrant since 6/27/23.  Has been on off week of ribo - to restart today.   She has occ mild dyspepsia after taking ribo - takes OTC pepcid as needed.   Her headaches are better.      He prior back pain is resolved; her R hip pain is sig better.   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 She remains physical active with walks ("6 mi").   She denies any other complaints.    She notes a good appetite stable weight and excellent performance status.   MRI head 12/10/23  IMPRESSION:  -Numerous calvarial metastatic lesions, largest in left parietal bone measuring up to 3.8 cm with mild expansile changes and mild mass effect upon the underlying left parietal lobe. No brain parenchymal signal abnormality.  -Mild right frontal dural thickening, cannot rule out dural metastatic involvement.  -No brain parenchymal lesions identified.   PET CT 12/6/23 IMPRESSION:  Compared with PET/CT 10/20/2023:  1. Stable soft tissue changes in the right axilla, with no increasing FDG avidity.  2. FDG avid bone lesion shows similar to slightly less FDG avidity when compared with prior. Residual disease is evident.  3. No new FDG avid lesions  PEt CT 10/20/23 IMPRESSION: 1. Although not well delineated, soft tissue changes in the right axilla appear less prominent than on prior examination with low-grade activity low-grade activity. Right subscapularis muscle has smaller area of hypodense changes with low-grade activity. No new abnormal activity around either breast reconstruction site.  2. Various sites of bone abnormalities showing increased sclerosis since prior PET/CT with variable changes in intensity of FDG activity likely reflecting different stages of bone healing and lingering metastases. Right humeral shaft shows no interval low-dose CT changes with intense activity suggestive of lingering metastasis. Attention is called to the left femoral neck, which even with improved sclerosis, has intense activity likely reflecting bone healing and persistent metastatic process; there may be increased risk of fracture at the site. Reassess these findings on follow up.

## 2023-12-22 ENCOUNTER — TRANSCRIPTION ENCOUNTER (OUTPATIENT)
Age: 63
End: 2023-12-22

## 2023-12-26 ENCOUNTER — TRANSCRIPTION ENCOUNTER (OUTPATIENT)
Age: 63
End: 2023-12-26

## 2023-12-28 ENCOUNTER — APPOINTMENT (OUTPATIENT)
Dept: OBGYN | Facility: CLINIC | Age: 63
End: 2023-12-28

## 2024-01-10 ENCOUNTER — OUTPATIENT (OUTPATIENT)
Dept: OUTPATIENT SERVICES | Facility: HOSPITAL | Age: 64
LOS: 1 days | Discharge: HOME | End: 2024-01-10

## 2024-01-10 ENCOUNTER — APPOINTMENT (OUTPATIENT)
Dept: INFUSION THERAPY | Facility: HOSPITAL | Age: 64
End: 2024-01-10

## 2024-01-10 DIAGNOSIS — C50.919 MALIGNANT NEOPLASM OF UNSPECIFIED SITE OF UNSPECIFIED FEMALE BREAST: ICD-10-CM

## 2024-01-10 DIAGNOSIS — Z92.21 PERSONAL HISTORY OF ANTINEOPLASTIC CHEMOTHERAPY: Chronic | ICD-10-CM

## 2024-01-10 DIAGNOSIS — Z98.89 OTHER SPECIFIED POSTPROCEDURAL STATES: Chronic | ICD-10-CM

## 2024-01-10 DIAGNOSIS — Z90.13 ACQUIRED ABSENCE OF BILATERAL BREASTS AND NIPPLES: Chronic | ICD-10-CM

## 2024-01-16 ENCOUNTER — TRANSCRIPTION ENCOUNTER (OUTPATIENT)
Age: 64
End: 2024-01-16

## 2024-01-17 ENCOUNTER — APPOINTMENT (OUTPATIENT)
Dept: HEMATOLOGY ONCOLOGY | Facility: CLINIC | Age: 64
End: 2024-01-17
Payer: COMMERCIAL

## 2024-01-17 ENCOUNTER — RESULT REVIEW (OUTPATIENT)
Age: 64
End: 2024-01-17

## 2024-01-17 ENCOUNTER — APPOINTMENT (OUTPATIENT)
Dept: INFUSION THERAPY | Facility: HOSPITAL | Age: 64
End: 2024-01-17

## 2024-01-17 VITALS
SYSTOLIC BLOOD PRESSURE: 138 MMHG | TEMPERATURE: 97.2 F | DIASTOLIC BLOOD PRESSURE: 84 MMHG | RESPIRATION RATE: 16 BRPM | WEIGHT: 150 LBS | BODY MASS INDEX: 30.32 KG/M2 | OXYGEN SATURATION: 99 % | HEART RATE: 76 BPM

## 2024-01-17 DIAGNOSIS — Z79.818 LONG TERM (CURRENT) USE OF OTHER AGENTS AFFECTING ESTROGEN RECEPTORS AND ESTROGEN LEVELS: ICD-10-CM

## 2024-01-17 DIAGNOSIS — M85.80 OTHER SPECIFIED DISORDERS OF BONE DENSITY AND STRUCTURE, UNSPECIFIED SITE: ICD-10-CM

## 2024-01-17 LAB
BASOPHILS # BLD AUTO: 0.07 K/UL — SIGNIFICANT CHANGE UP (ref 0–0.2)
BASOPHILS NFR BLD AUTO: 1.4 % — SIGNIFICANT CHANGE UP (ref 0–2)
EOSINOPHIL # BLD AUTO: 0.03 K/UL — SIGNIFICANT CHANGE UP (ref 0–0.5)
EOSINOPHIL NFR BLD AUTO: 0.6 % — SIGNIFICANT CHANGE UP (ref 0–6)
HCT VFR BLD CALC: 40.8 % — SIGNIFICANT CHANGE UP (ref 34.5–45)
HGB BLD-MCNC: 14.4 G/DL — SIGNIFICANT CHANGE UP (ref 11.5–15.5)
IMM GRANULOCYTES NFR BLD AUTO: 0.2 % — SIGNIFICANT CHANGE UP (ref 0–0.9)
LYMPHOCYTES # BLD AUTO: 1.96 K/UL — SIGNIFICANT CHANGE UP (ref 1–3.3)
LYMPHOCYTES # BLD AUTO: 40.4 % — SIGNIFICANT CHANGE UP (ref 13–44)
MCHC RBC-ENTMCNC: 34 PG — SIGNIFICANT CHANGE UP (ref 27–34)
MCHC RBC-ENTMCNC: 35.3 G/DL — SIGNIFICANT CHANGE UP (ref 32–36)
MCV RBC AUTO: 96.5 FL — SIGNIFICANT CHANGE UP (ref 80–100)
MONOCYTES # BLD AUTO: 0.45 K/UL — SIGNIFICANT CHANGE UP (ref 0–0.9)
MONOCYTES NFR BLD AUTO: 9.3 % — SIGNIFICANT CHANGE UP (ref 2–14)
NEUTROPHILS # BLD AUTO: 2.33 K/UL — SIGNIFICANT CHANGE UP (ref 1.8–7.4)
NEUTROPHILS NFR BLD AUTO: 48.1 % — SIGNIFICANT CHANGE UP (ref 43–77)
NRBC # BLD: 0 /100 WBCS — SIGNIFICANT CHANGE UP (ref 0–0)
PLATELET # BLD AUTO: 239 K/UL — SIGNIFICANT CHANGE UP (ref 150–400)
RBC # BLD: 4.23 M/UL — SIGNIFICANT CHANGE UP (ref 3.8–5.2)
RBC # FLD: 14.2 % — SIGNIFICANT CHANGE UP (ref 10.3–14.5)
WBC # BLD: 4.85 K/UL — SIGNIFICANT CHANGE UP (ref 3.8–10.5)
WBC # FLD AUTO: 4.85 K/UL — SIGNIFICANT CHANGE UP (ref 3.8–10.5)

## 2024-01-17 PROCEDURE — 99215 OFFICE O/P EST HI 40 MIN: CPT

## 2024-01-18 DIAGNOSIS — Z51.11 ENCOUNTER FOR ANTINEOPLASTIC CHEMOTHERAPY: ICD-10-CM

## 2024-01-18 LAB
ALBUMIN SERPL ELPH-MCNC: 4.6 G/DL
ALP BLD-CCNC: 107 U/L
ALT SERPL-CCNC: 71 U/L
ANION GAP SERPL CALC-SCNC: 15 MMOL/L
AST SERPL-CCNC: 59 U/L
BILIRUB SERPL-MCNC: 0.3 MG/DL
BUN SERPL-MCNC: 23 MG/DL
CALCIUM SERPL-MCNC: 10.3 MG/DL
CANCER AG27-29 SERPL-ACNC: 205.4 U/ML
CEA SERPL-MCNC: 23.2 NG/ML
CHLORIDE SERPL-SCNC: 104 MMOL/L
CO2 SERPL-SCNC: 24 MMOL/L
CREAT SERPL-MCNC: 0.85 MG/DL
EGFR: 77 ML/MIN/1.73M2
GLUCOSE SERPL-MCNC: 95 MG/DL
POTASSIUM SERPL-SCNC: 4 MMOL/L
PROT SERPL-MCNC: 7.3 G/DL
SODIUM SERPL-SCNC: 143 MMOL/L

## 2024-01-18 NOTE — END OF VISIT
[] : Fellow [FreeTextEntry3] : Patient being seen as per physician's primary plan of care. Pt on 1st line palliative CDKi and fulvestrant with genomic testing prior to treatment without ESR 1 mutation. Tolerating therapy with CBC counts stable and mild Grade 1 transaminitis. We reviewed her concerns for denosumab versus zolendronic acid. On exam, lungs clear, chest wall changes: site of prior skin biopsy scar over the R outer quadrant with hyperpigmentation but no puckering or dimpling, abdomen NT and nondistended. We reviewed slowly uptrending tumor markers with negative Pet/ CT. Given invasive lobular carcinoma, there may be subtle changes that are difficult to detect with imaging; will consider short interval imaging in March: [place] pet is consideration given lobular histology; will ask insurance authorization and if not authorized, will get CT imaging.  [Time Spent: ___ minutes] : I have spent [unfilled] minutes of time on the encounter.

## 2024-01-18 NOTE — PHYSICAL EXAM
[Fully active, able to carry on all pre-disease performance without restriction] : Status 0 - Fully active, able to carry on all pre-disease performance without restriction [Normal] : affect appropriate [de-identified] : s/p B/L MRM with CARLOS reconstruction, no discrete palpable masses, + post surgical thickening. No palpable axillary nodes.

## 2024-01-18 NOTE — HISTORY OF PRESENT ILLNESS
[Disease: _____________________] : Disease: [unfilled] [T: ___] : T[unfilled] [N: ___] : N[unfilled] [M: ___] : M[unfilled] [ECOG Performance Status: 0 - Fully active, able to carry on all pre-disease performance without restriction] : Performance Status: 0 - Fully active, able to carry on all pre-disease performance without restriction [AJCC Stage: ____] : AJCC Stage: [unfilled] [de-identified] : Age 53: screen detected: 9:00 3 cm FN ill-defined solid mass R breast: 14 x 14mm and second irregular area 9:00 6 cm FN. She had u/s guided biopsy of the irregular R breast showing 9:00 3 cm: invasive moderate differentiated lobular carcinoma SBR 6/9 measuring at least 1.2 cm; and 9:00 6 cm FN: invasive moderately differentiated carcinoma lobular carcinoma measuring at least 0.5 cm. She had B/L MRM/ CARL[facility] (Dr. Martine Rico) for right breast cancer 1.8 cm ILC, 2+ / 8 LNs, ER+ NM+ her 2 rafiq negative. With Dr Romero, she underwent adjuvant chemotherapy with dd AC-T 12 /2013 followed by nipple reconstruction. Began anastrozole 4/2014 with initial moderate arthralgias that gradually decreased to a "tolerable" level.  She completed a 7-year course of anastrozole in 3/21 and was monitored off therapy.   Age 63: 6/2023 seen by derm for a new R chest wall "reddish pimple" - bx returned c/w met breast cancer, invasive mod diff ILC in dermis, ER+100% NM- Her 2 rafiq (-).  EOD w/u was done: Pet/ CT showed mildly avid R axillary mass with infiltration of the adjacent structures consistent with chest wall recurrence, diffuse mild moderate uptake throughout the skeleton: T4, T6, T7, L1, L2. With Dr Naranjo, she was started on ribociclib/fulvestrant 6/27/23.  [de-identified] : invasive lobular carcinoma %, PA negative, Her2 negative (1) Ki67 15%  [de-identified] : Genomic testin2023 Guardant 360dx revealed no ESR1 mutation. Test showed VUS in FOREST and ARID1A genes.   [de-identified] : Patient here for an initial follow up visit, transfer of care from Dr. Naranjo, accompanied by soon to be jessica. Patient reports to be complaint with ribociclib/fulvestrant since 6/27/23. Her only active symptom is mild dyspepsia after taking ribo - takes OTC pepcid as needed. She reports not bone pain/arthralgias at rest, in which endorses right hip pain after walking >3 miles. She continues to work fulltime remotely, and expressed her concern of staying away from large crowed to prevent infections. Continues to have a good appetite and energy levels. Concerned about numerous calvarial metastatic lesions and diffuse bony mets. Reported had discussed on starting osteoclast inhibitor however was deferred at that time due to side effect profile (cytopenia, concern for neurological decline in memory). Patient reports to be tolerating ribociclib/fulvestrant and denies any other complaints.

## 2024-01-18 NOTE — REVIEW OF SYSTEMS
[Fatigue] : fatigue [Negative] : Endocrine [Fever] : no fever [Chills] : no chills [Night Sweats] : no night sweats [Recent Change In Weight] : ~T no recent weight change

## 2024-01-18 NOTE — ASSESSMENT
[FreeTextEntry1] : She is a 62 y/o F with metastatic invasive lobular carcinoma to the chest wall and bones on ribociclib and fulvestrant since 6/2023. Clinically she has been asymptomatic from bone metastases and chest wall lesion has resolved. She is tolerating CDKi and endocrine therapy well. We reviewed her blood counts which have been stable on therapy: currently D2 of therapy with ANC above 1 K/microL. LFTs mild elevation due to ribociclib with no evidence of metastatic disease on Pet CT 12/2023 to the liver. Her tumor markers have continued to slowly uptrend and will consider short interval repeat imaging to further evaluate disease.   Bone metastases: she has not yet started denosumab once a month. Reviewed pros and cons of therapy and concerns for this therapy. Had seen dental in the fall. Will ask insurance approval for addition of denosumab next month.  [Palliative] : Goals of care discussed with patient: Palliative

## 2024-02-06 ENCOUNTER — APPOINTMENT (OUTPATIENT)
Dept: INFUSION THERAPY | Facility: HOSPITAL | Age: 64
End: 2024-02-06

## 2024-02-13 ENCOUNTER — APPOINTMENT (OUTPATIENT)
Dept: HEMATOLOGY ONCOLOGY | Facility: CLINIC | Age: 64
End: 2024-02-13

## 2024-02-13 ENCOUNTER — RESULT REVIEW (OUTPATIENT)
Age: 64
End: 2024-02-13

## 2024-02-13 ENCOUNTER — APPOINTMENT (OUTPATIENT)
Dept: INFUSION THERAPY | Facility: HOSPITAL | Age: 64
End: 2024-02-13

## 2024-02-13 LAB
BASOPHILS # BLD AUTO: 0.09 K/UL — SIGNIFICANT CHANGE UP (ref 0–0.2)
BASOPHILS NFR BLD AUTO: 2 % — SIGNIFICANT CHANGE UP (ref 0–2)
EOSINOPHIL # BLD AUTO: 0.07 K/UL — SIGNIFICANT CHANGE UP (ref 0–0.5)
EOSINOPHIL NFR BLD AUTO: 1.5 % — SIGNIFICANT CHANGE UP (ref 0–6)
HCT VFR BLD CALC: 39.5 % — SIGNIFICANT CHANGE UP (ref 34.5–45)
HGB BLD-MCNC: 13.7 G/DL — SIGNIFICANT CHANGE UP (ref 11.5–15.5)
IMM GRANULOCYTES NFR BLD AUTO: 0.4 % — SIGNIFICANT CHANGE UP (ref 0–0.9)
LYMPHOCYTES # BLD AUTO: 2.56 K/UL — SIGNIFICANT CHANGE UP (ref 1–3.3)
LYMPHOCYTES # BLD AUTO: 55.9 % — HIGH (ref 13–44)
MCHC RBC-ENTMCNC: 33.7 PG — SIGNIFICANT CHANGE UP (ref 27–34)
MCHC RBC-ENTMCNC: 34.7 G/DL — SIGNIFICANT CHANGE UP (ref 32–36)
MCV RBC AUTO: 97.1 FL — SIGNIFICANT CHANGE UP (ref 80–100)
MONOCYTES # BLD AUTO: 0.5 K/UL — SIGNIFICANT CHANGE UP (ref 0–0.9)
MONOCYTES NFR BLD AUTO: 10.9 % — SIGNIFICANT CHANGE UP (ref 2–14)
NEUTROPHILS # BLD AUTO: 1.34 K/UL — LOW (ref 1.8–7.4)
NEUTROPHILS NFR BLD AUTO: 29.3 % — LOW (ref 43–77)
NRBC # BLD: 0 /100 WBCS — SIGNIFICANT CHANGE UP (ref 0–0)
PLATELET # BLD AUTO: 243 K/UL — SIGNIFICANT CHANGE UP (ref 150–400)
RBC # BLD: 4.07 M/UL — SIGNIFICANT CHANGE UP (ref 3.8–5.2)
RBC # FLD: 14.1 % — SIGNIFICANT CHANGE UP (ref 10.3–14.5)
WBC # BLD: 4.58 K/UL — SIGNIFICANT CHANGE UP (ref 3.8–10.5)
WBC # FLD AUTO: 4.58 K/UL — SIGNIFICANT CHANGE UP (ref 3.8–10.5)

## 2024-02-14 ENCOUNTER — APPOINTMENT (OUTPATIENT)
Dept: HEMATOLOGY ONCOLOGY | Facility: CLINIC | Age: 64
End: 2024-02-14
Payer: COMMERCIAL

## 2024-02-14 ENCOUNTER — APPOINTMENT (OUTPATIENT)
Dept: RADIOLOGY | Facility: IMAGING CENTER | Age: 64
End: 2024-02-14
Payer: COMMERCIAL

## 2024-02-14 ENCOUNTER — RESULT REVIEW (OUTPATIENT)
Age: 64
End: 2024-02-14

## 2024-02-14 ENCOUNTER — APPOINTMENT (OUTPATIENT)
Dept: INFUSION THERAPY | Facility: HOSPITAL | Age: 64
End: 2024-02-14

## 2024-02-14 ENCOUNTER — OUTPATIENT (OUTPATIENT)
Dept: OUTPATIENT SERVICES | Facility: HOSPITAL | Age: 64
LOS: 1 days | End: 2024-02-14
Payer: COMMERCIAL

## 2024-02-14 VITALS
BODY MASS INDEX: 30.52 KG/M2 | WEIGHT: 150.99 LBS | TEMPERATURE: 97.2 F | HEART RATE: 70 BPM | SYSTOLIC BLOOD PRESSURE: 154 MMHG | OXYGEN SATURATION: 98 % | DIASTOLIC BLOOD PRESSURE: 85 MMHG | RESPIRATION RATE: 16 BRPM

## 2024-02-14 DIAGNOSIS — C50.919 MALIGNANT NEOPLASM OF UNSPECIFIED SITE OF UNSPECIFIED FEMALE BREAST: ICD-10-CM

## 2024-02-14 DIAGNOSIS — Z98.89 OTHER SPECIFIED POSTPROCEDURAL STATES: Chronic | ICD-10-CM

## 2024-02-14 DIAGNOSIS — Z92.21 PERSONAL HISTORY OF ANTINEOPLASTIC CHEMOTHERAPY: Chronic | ICD-10-CM

## 2024-02-14 LAB
ALBUMIN SERPL ELPH-MCNC: 4.7 G/DL
ALP BLD-CCNC: 114 U/L
ALT SERPL-CCNC: 50 U/L
ANION GAP SERPL CALC-SCNC: 14 MMOL/L
AST SERPL-CCNC: 37 U/L
BILIRUB SERPL-MCNC: 0.4 MG/DL
BUN SERPL-MCNC: 23 MG/DL
CALCIUM SERPL-MCNC: 10.4 MG/DL
CANCER AG27-29 SERPL-ACNC: 218.6 U/ML
CEA SERPL-MCNC: 32.8 NG/ML
CHLORIDE SERPL-SCNC: 101 MMOL/L
CO2 SERPL-SCNC: 26 MMOL/L
CREAT SERPL-MCNC: 1.05 MG/DL
EGFR: 60 ML/MIN/1.73M2
GLUCOSE SERPL-MCNC: 103 MG/DL
POTASSIUM SERPL-SCNC: 4.3 MMOL/L
PROT SERPL-MCNC: 6.6 G/DL
SODIUM SERPL-SCNC: 141 MMOL/L

## 2024-02-14 PROCEDURE — 73521 X-RAY EXAM HIPS BI 2 VIEWS: CPT | Mod: 26

## 2024-02-14 PROCEDURE — 99215 OFFICE O/P EST HI 40 MIN: CPT

## 2024-02-14 PROCEDURE — 73521 X-RAY EXAM HIPS BI 2 VIEWS: CPT

## 2024-02-14 NOTE — PHYSICAL EXAM
[Fully active, able to carry on all pre-disease performance without restriction] : Status 0 - Fully active, able to carry on all pre-disease performance without restriction [Normal] : affect appropriate [de-identified] : B mastectomy with CARLOS flap reconstruction: no abnl masses or skin changes or axillary LN  [de-identified] : point tenderness over the L hip on palpation

## 2024-02-14 NOTE — REASON FOR VISIT
[Follow-Up Visit] : a follow-up [Spouse] : spouse [FreeTextEntry2] : follow up for metastatic breast cancer on fulvestrant and Kisqali with increased L hip pain

## 2024-02-14 NOTE — ASSESSMENT
[FreeTextEntry1] : She is a 64 y/o F with metastatic invasive lobular carcinoma to the chest wall and bones on ribociclib and fulvestrant since 6/2023. Clinically she has been asymptomatic from bone metastases and chest wall lesion has resolved. She is tolerating CDKi and endocrine therapy with ANC over 1 K/microL. We reviewed tumor markers are continuing to rise and would recommend short interval imaging with CT of the chest/ abd/ pelvis to evaluate for progression of disease. Questions answered to her satisfaction. She is agreeable with plan. Next follow up in 1 month but earlier if any new symptoms  L more than R hip pain: she had bone mets and increasing symptoms: will start dexamethasone 2 mg in am after food to help decrease cancer related bone pain and will obtain Xray today to further evaluate and assess need for orthopedic evaluation/ radiation evaluation.   Bone mets: will proceed with denosumab today. Reviewed potential AE from denosumab. She will take calcium supplement while on Prolia and will monitor calcium levels.

## 2024-02-14 NOTE — HISTORY OF PRESENT ILLNESS
[Disease: _____________________] : Disease: [unfilled] [T: ___] : T[unfilled] [N: ___] : N[unfilled] [M: ___] : M[unfilled] [AJCC Stage: ____] : AJCC Stage: [unfilled] [de-identified] : Age 53: screen detected: 9:00 3 cm FN ill-defined solid mass R breast: 14 x 14mm and second irregular area 9:00 6 cm FN. She had u/s guided biopsy of the irregular R breast showing 9:00 3 cm: invasive moderate differentiated lobular carcinoma SBR 6/9 measuring at least 1.2 cm; and 9:00 6 cm FN: invasive moderately differentiated carcinoma lobular carcinoma measuring at least 0.5 cm. She had B/L MRM/ CARL[facility] (Dr. Martine Rico) for right breast cancer 1.8 cm ILC, 2+ / 8 LNs, ER+ VT+ her 2 rafiq negative. With Dr Romero, she underwent adjuvant chemotherapy with dd AC-T 12 /2013 followed by nipple reconstruction. Began anastrozole 4/2014 with initial moderate arthralgias that gradually decreased to a "tolerable" level.  She completed a 7-year course of anastrozole in 3/21 and was monitored off therapy.   Age 63: 6/2023 seen by derm for a new R chest wall "reddish pimple" - bx returned c/w met breast cancer, invasive mod diff ILC in dermis, ER+100% VT- Her 2 rafiq (-).  EOD w/u was done: Pet/ CT showed mildly avid R axillary mass with infiltration of the adjacent structures consistent with chest wall recurrence, diffuse mild moderate uptake throughout the skeleton: T4, T6, T7, L1, L2. With Dr Naranjo, she was started on ribociclib/fulvestrant 6/27/23.  [de-identified] : invasive lobular carcinoma %, ME negative, Her2 negative (1) Ki67 15%  [de-identified] : Genomic testin2023 Guardant 360dx revealed no ESR1 mutation. Test showed VUS in FOREST and ARID1A genes.   [de-identified] : She has been noticing more L hip pain that has woken her up during sleep few nights. Occasionally takes Advil. Able to walk and weight bear. She has been having R hip pain also but feels the L hip is worse. She has not been taking anything chronically for the prevention of pain. Denies any weakness of the legs. She denies any new HA or cough. She will be starting denosumab today. Wondering about changes in blood work and rising tumor markers.

## 2024-02-14 NOTE — REVIEW OF SYSTEMS
[Diarrhea: Grade 0] : Diarrhea: Grade 0 [Joint Pain] : joint pain [Joint Stiffness] : no joint stiffness [Muscle Pain] : no muscle pain [Muscle Weakness] : no muscle weakness [Negative] : Allergic/Immunologic [FreeTextEntry9] : L more than R hip pain

## 2024-02-16 ENCOUNTER — APPOINTMENT (OUTPATIENT)
Dept: CT IMAGING | Facility: CLINIC | Age: 64
End: 2024-02-16
Payer: COMMERCIAL

## 2024-02-16 ENCOUNTER — OUTPATIENT (OUTPATIENT)
Dept: OUTPATIENT SERVICES | Facility: HOSPITAL | Age: 64
LOS: 1 days | End: 2024-02-16
Payer: COMMERCIAL

## 2024-02-16 ENCOUNTER — NON-APPOINTMENT (OUTPATIENT)
Age: 64
End: 2024-02-16

## 2024-02-16 ENCOUNTER — RESULT REVIEW (OUTPATIENT)
Age: 64
End: 2024-02-16

## 2024-02-16 DIAGNOSIS — Z90.13 ACQUIRED ABSENCE OF BILATERAL BREASTS AND NIPPLES: Chronic | ICD-10-CM

## 2024-02-16 DIAGNOSIS — C50.919 MALIGNANT NEOPLASM OF UNSPECIFIED SITE OF UNSPECIFIED FEMALE BREAST: ICD-10-CM

## 2024-02-16 DIAGNOSIS — Z98.89 OTHER SPECIFIED POSTPROCEDURAL STATES: Chronic | ICD-10-CM

## 2024-02-16 DIAGNOSIS — Z92.21 PERSONAL HISTORY OF ANTINEOPLASTIC CHEMOTHERAPY: Chronic | ICD-10-CM

## 2024-02-16 DIAGNOSIS — C41.9 MALIGNANT NEOPLASM OF BONE AND ARTICULAR CARTILAGE, UNSPECIFIED: ICD-10-CM

## 2024-02-16 PROCEDURE — 74177 CT ABD & PELVIS W/CONTRAST: CPT

## 2024-02-16 PROCEDURE — 74177 CT ABD & PELVIS W/CONTRAST: CPT | Mod: 26

## 2024-02-16 PROCEDURE — 71260 CT THORAX DX C+: CPT

## 2024-02-16 PROCEDURE — 71260 CT THORAX DX C+: CPT | Mod: 26

## 2024-02-20 ENCOUNTER — APPOINTMENT (OUTPATIENT)
Dept: ORTHOPEDIC SURGERY | Facility: CLINIC | Age: 64
End: 2024-02-20
Payer: COMMERCIAL

## 2024-02-20 DIAGNOSIS — M54.9 DORSALGIA, UNSPECIFIED: ICD-10-CM

## 2024-02-20 PROCEDURE — 99204 OFFICE O/P NEW MOD 45 MIN: CPT

## 2024-02-22 PROBLEM — M54.9 BACK PAIN, ACUTE: Status: ACTIVE | Noted: 2022-01-12

## 2024-02-22 NOTE — PHYSICAL EXAM
[FreeTextEntry1] : On exam the patient stands in good balance.  The majority of her pain is coming from her posterior ilium on the left.  She is tender directly in this area over the iliac crest posteriorly.  She has some mild pain going radiating down her she has no radiculopathy below her hip.  She has no pain in the groin.  She has minimal pain with rotation of the hip itself.  She has good flexion up to 100 degrees.  She is actively able to flex both hips without any problem.  She is able to do single-leg stance on the left with pain only in the back.  Her leg lengths are equal.  She is neurovascular intact distally.  She is pain in her upper extremities. [General Appearance - Well-Appearing] : Well appearing [General Appearance - Well Nourished] : well nourished [Oriented To Time, Place, And Person] : Oriented to person, place, and time [Sclera] : the sclera and conjunctiva were normal [Heart Rate And Rhythm] : heart rate was normal and rhythm regular [Neck Cervical Mass (___cm)] : no neck mass was observed [] : No respiratory distress [Abdomen Soft] : Soft [Swelling] : no swelling [Skin Changes - Describe changes:] : No skin changes noted [Full ROM Unless otherwise noted:] : Full range of motion unless otherwise noted: [LE  Motor Strength Normal unless otherwise noted:] : 5/5 strength in bilateral lower extemities unless otherwise noted. [Normal] : Sensation intact to light touch. [Tenderness] : tenderness

## 2024-02-22 NOTE — HISTORY OF PRESENT ILLNESS
[FreeTextEntry1] : This is a 63-year-old who is approximately 10 years status post breast cancer treated with bilateral mastectomy reconstruction as well as chemotherapy.  June 2000 2310 years later she started having new chest wall mass which was found to have consistent with metastatic breast cancer.  She was also found to have axillary masses with infiltration and was started on chemotherapy.  She was found to have some bone lesions.  She recently just started Xgeva and has had 1 dose.  She had imaging studies which showed significant lesions especially in the left femoral neck and was sent to me for evaluation.  Currently her pain is in her left lower back posteriorly and occasionally goes to the buttock.  It does not go into the groin but it does bother her when she bears weight however mostly in the back and not in the hip.  She is not using any assistive device.  She occasionally uses over-the-counter pain medicine.  She is here for surgical consultation. [Stable] : stable [3] : currently ~his/her~ pain is 3 out of 10 [___ mths] : [unfilled] month(s) ago [Bending] : worsened by bending [Walking] : worsened by walking [Direct Pressure] : worsened by direct pressure [NSAIDs] : relieved by nonsteroidal anti-inflammatory drugs

## 2024-02-22 NOTE — DATA REVIEWED
[Imaging Present] : Present [de-identified] : X-rays hip and pelvis from February 14, 2024 shows a lucency in the left base of the neck/intertrochanteric area.  It does not seem to be affected the cortex.  There are other scattered lucencies seen throughout. PET/CT from December 6, 2023 shows: BONES/SOFT TISSUES: FDG avid bone lesion shows similar or slightly less FDG avidity when compared with prior exam, with residual disease evident. Index lesions: Right humerus marrow-based lesion, SUV 4.8, image 48; previously SUV 7. Lytic lesion in the sternum, SUV 2.3, image 94; previously SUV 3.5. Lytic lesion in the left posterior iliac, SUV 2.9, image 161; previously SUV 3.6. Lytic lesion in the right sacrum, SUV 3.3, image 170; previously SUV 3.8. Lucent lesion in the left femoral neck with surrounding sclerosis, SUV 4.3, image 203; previously SUV 5.7. No fracture. Lucent lesion in the right proximal femur, SUV 2.9, image 207; previously SUV 3.8. No fracture.  Mildly FDG avid subcutaneous injection granulomas in the flanks.  IMPRESSION:  Compared with PET/CT 10/20/2023:  1. Stable soft tissue changes in the right axilla, with no increasing FDG avidity.  2. FDG avid bone lesion shows similar to slightly less FDG avidity when compared with prior. Residual disease is evident.  3. No new FDG avid lesions.  CT of the Chest abdomen pelvis from February 16, 2024 shows: BONES: Stable lytic bone lesions for reference: 1.5 cm lytic lesion within lower sternum (series 301 image 63). 2.5 cm lesion within posterior left iliac bone (series 301 image 166). 3.2 cm lytic lesion within the right sacrum (image 174)  IMPRESSION: Stable numerous metastatic lytic bone lesions.  Status post bilateral mastectomy and breast reconstruction with stable postsurgical changes.  Stable small branching tubular opacity within basilar left lower lobe (series 301 image 78) likely impacted distal airway.

## 2024-02-22 NOTE — DISCUSSION/SUMMARY
[Interested in Proceeding] : Patient (and family) expressed understanding and interest in proceeding with the plan as outlined [All Questions Answered] : Patient (and family) had all questions answered to an agreeable level of satisfaction [de-identified] : Patient has multiple bone lesions however she seems to be most symptomatic from the posterior one at the ilium near the SI joint.  She does have 1 in the left femoral neck however this does not to be symptomatic 1.  We discussed this is somewhat at risk for fracture however the treatment for it would involve sleep.  Should it break or begins to crack the treatment would also be arthroplasty.  She just started antiresorptive medications  at this point.  I would treat the symptomatic metastasis possibly with radiation should the bone medication not help.  This will be in the posterior portion of the ileum.  Follow-up again in 3 to 4 months for reevaluation of hip pain.  Weightbearing as tolerated in the meantime  If imaging or pathology/biopsy was ordered, the patient was told to make an appointment to review findings right after all imaging is completed.  We discussed risks, benefits and alternatives. Rationale of care was reviewed and all questions were answered. Patient (and family) had all questions answered to her degree of the level of satisfaction. Patient (and family) expressed understanding and interest in proceeding with the plan as outlined.     This note was done with a voice recognition transcription software and any typos are related to this rather than medical error. Surgical risks reviewed. Patient (and family) had all questions answered to an agreeable level of satisfaction. Patient (and family) expressed understanding and interest in proceeding with the plan as outlined.

## 2024-02-22 NOTE — REVIEW OF SYSTEMS
[Feeling Tired] : not feeling tired [Joint Pain] : joint pain [Anxiety] : anxiety [Joint Stiffness] : joint stiffness [Nl] : Hematologic/Lymphatic

## 2024-03-04 ENCOUNTER — OUTPATIENT (OUTPATIENT)
Dept: OUTPATIENT SERVICES | Facility: HOSPITAL | Age: 64
LOS: 1 days | Discharge: HOME | End: 2024-03-04

## 2024-03-04 DIAGNOSIS — Z92.21 PERSONAL HISTORY OF ANTINEOPLASTIC CHEMOTHERAPY: Chronic | ICD-10-CM

## 2024-03-04 DIAGNOSIS — C50.919 MALIGNANT NEOPLASM OF UNSPECIFIED SITE OF UNSPECIFIED FEMALE BREAST: ICD-10-CM

## 2024-03-04 DIAGNOSIS — Z98.89 OTHER SPECIFIED POSTPROCEDURAL STATES: Chronic | ICD-10-CM

## 2024-03-04 DIAGNOSIS — Z90.13 ACQUIRED ABSENCE OF BILATERAL BREASTS AND NIPPLES: Chronic | ICD-10-CM

## 2024-03-12 ENCOUNTER — APPOINTMENT (OUTPATIENT)
Dept: HEMATOLOGY ONCOLOGY | Facility: CLINIC | Age: 64
End: 2024-03-12

## 2024-03-12 ENCOUNTER — RESULT REVIEW (OUTPATIENT)
Age: 64
End: 2024-03-12

## 2024-03-12 LAB
ALBUMIN SERPL ELPH-MCNC: 4.3 G/DL
ALP BLD-CCNC: 94 U/L
ALT SERPL-CCNC: 36 U/L
ANION GAP SERPL CALC-SCNC: 15 MMOL/L
AST SERPL-CCNC: 31 U/L
BASOPHILS # BLD AUTO: 0.08 K/UL — SIGNIFICANT CHANGE UP (ref 0–0.2)
BASOPHILS NFR BLD AUTO: 1.6 % — SIGNIFICANT CHANGE UP (ref 0–2)
BILIRUB SERPL-MCNC: 0.3 MG/DL
BUN SERPL-MCNC: 16 MG/DL
CALCIUM SERPL-MCNC: 9.2 MG/DL
CEA SERPL-MCNC: 31.9 NG/ML
CHLORIDE SERPL-SCNC: 105 MMOL/L
CO2 SERPL-SCNC: 22 MMOL/L
CREAT SERPL-MCNC: 0.93 MG/DL
EGFR: 69 ML/MIN/1.73M2
EOSINOPHIL # BLD AUTO: 0.05 K/UL — SIGNIFICANT CHANGE UP (ref 0–0.5)
EOSINOPHIL NFR BLD AUTO: 1 % — SIGNIFICANT CHANGE UP (ref 0–6)
GLUCOSE SERPL-MCNC: 104 MG/DL
HCT VFR BLD CALC: 40.6 % — SIGNIFICANT CHANGE UP (ref 34.5–45)
HGB BLD-MCNC: 14.2 G/DL — SIGNIFICANT CHANGE UP (ref 11.5–15.5)
IMM GRANULOCYTES NFR BLD AUTO: 0.2 % — SIGNIFICANT CHANGE UP (ref 0–0.9)
LYMPHOCYTES # BLD AUTO: 2.68 K/UL — SIGNIFICANT CHANGE UP (ref 1–3.3)
LYMPHOCYTES # BLD AUTO: 55.3 % — HIGH (ref 13–44)
MCHC RBC-ENTMCNC: 34.1 PG — HIGH (ref 27–34)
MCHC RBC-ENTMCNC: 35 G/DL — SIGNIFICANT CHANGE UP (ref 32–36)
MCV RBC AUTO: 97.6 FL — SIGNIFICANT CHANGE UP (ref 80–100)
MONOCYTES # BLD AUTO: 0.48 K/UL — SIGNIFICANT CHANGE UP (ref 0–0.9)
MONOCYTES NFR BLD AUTO: 9.9 % — SIGNIFICANT CHANGE UP (ref 2–14)
NEUTROPHILS # BLD AUTO: 1.55 K/UL — LOW (ref 1.8–7.4)
NEUTROPHILS NFR BLD AUTO: 32 % — LOW (ref 43–77)
NRBC # BLD: 0 /100 WBCS — SIGNIFICANT CHANGE UP (ref 0–0)
PLATELET # BLD AUTO: 203 K/UL — SIGNIFICANT CHANGE UP (ref 150–400)
POTASSIUM SERPL-SCNC: 3.6 MMOL/L
PROT SERPL-MCNC: 6.4 G/DL
RBC # BLD: 4.16 M/UL — SIGNIFICANT CHANGE UP (ref 3.8–5.2)
RBC # FLD: 14.6 % — HIGH (ref 10.3–14.5)
SODIUM SERPL-SCNC: 142 MMOL/L
WBC # BLD: 4.85 K/UL — SIGNIFICANT CHANGE UP (ref 3.8–10.5)
WBC # FLD AUTO: 4.85 K/UL — SIGNIFICANT CHANGE UP (ref 3.8–10.5)

## 2024-03-13 ENCOUNTER — APPOINTMENT (OUTPATIENT)
Dept: HEMATOLOGY ONCOLOGY | Facility: CLINIC | Age: 64
End: 2024-03-13
Payer: COMMERCIAL

## 2024-03-13 ENCOUNTER — APPOINTMENT (OUTPATIENT)
Dept: INFUSION THERAPY | Facility: HOSPITAL | Age: 64
End: 2024-03-13

## 2024-03-13 VITALS
TEMPERATURE: 97.1 F | BODY MASS INDEX: 30.97 KG/M2 | RESPIRATION RATE: 17 BRPM | HEART RATE: 70 BPM | OXYGEN SATURATION: 98 % | DIASTOLIC BLOOD PRESSURE: 80 MMHG | WEIGHT: 153.22 LBS | SYSTOLIC BLOOD PRESSURE: 125 MMHG

## 2024-03-13 DIAGNOSIS — M25.552 PAIN IN LEFT HIP: ICD-10-CM

## 2024-03-13 LAB — CANCER AG27-29 SERPL-ACNC: 233.9 U/ML

## 2024-03-13 PROCEDURE — 99214 OFFICE O/P EST MOD 30 MIN: CPT

## 2024-03-13 RX ORDER — DEXAMETHASONE 2 MG/1
2 TABLET ORAL DAILY
Qty: 20 | Refills: 0 | Status: DISCONTINUED | COMMUNITY
Start: 2024-02-14 | End: 2024-03-13

## 2024-03-13 NOTE — REASON FOR VISIT
[Follow-Up Visit] : a follow-up [FreeTextEntry2] : follow up for metastatic breast cancer to the bones on Faslodex/ Kisqali/ Xgeva

## 2024-03-13 NOTE — REVIEW OF SYSTEMS
[Abdominal Pain] : no abdominal pain [Vomiting] : no vomiting [Constipation] : constipation [Diarrhea: Grade 0] : Diarrhea: Grade 0 [Joint Pain] : joint pain [Joint Stiffness] : no joint stiffness [Muscle Pain] : no muscle pain [Muscle Weakness] : no muscle weakness [FreeTextEntry9] : L hip pain  [Negative] : Allergic/Immunologic

## 2024-03-13 NOTE — PHYSICAL EXAM
[Fully active, able to carry on all pre-disease performance without restriction] : Status 0 - Fully active, able to carry on all pre-disease performance without restriction [Normal] : affect appropriate [de-identified] : B mastectomy with CARLOS flap reconstruction: no abnl masses or skin changes or axillary LN  [de-identified] : point tenderness over the L hip on palpation

## 2024-03-13 NOTE — ASSESSMENT
[FreeTextEntry1] : She is a 62 y/o F with metastatic invasive lobular carcinoma to the chest wall and bones on ribociclib and fulvestrant since 6/2023. Clinically she has been asymptomatic from bone metastases and chest wall lesion has resolved. She is tolerating CDKi and endocrine therapy with ANC over 1 K/microL. Last CT of the chest/ abd/ pelvis without any progression. Will continue to monitor hip pain and symptoms. Counts are currently stable on therapy. Will continue with regimen. Questions answered to her satisfaction. She is agreeable with plan. Next follow up in 1 month but earlier if any new symptoms  L more than R hip pain: saw Dr Hernandez and will continue to monitor pain: may refer for RT if pain symptomatic.   Bone mets: started on denosumab 2/2024. Had constipation on calcium supplements: reviewed options. .

## 2024-03-13 NOTE — HISTORY OF PRESENT ILLNESS
[Disease: _____________________] : Disease: [unfilled] [T: ___] : T[unfilled] [N: ___] : N[unfilled] [M: ___] : M[unfilled] [AJCC Stage: ____] : AJCC Stage: [unfilled] [de-identified] : Age 53: screen detected: 9:00 3 cm FN ill-defined solid mass R breast: 14 x 14mm and second irregular area 9:00 6 cm FN. She had u/s guided biopsy of the irregular R breast showing 9:00 3 cm: invasive moderate differentiated lobular carcinoma SBR 6/9 measuring at least 1.2 cm; and 9:00 6 cm FN: invasive moderately differentiated carcinoma lobular carcinoma measuring at least 0.5 cm. She had B/L MRM/ [facility] (Dr. Martine Rico) for right breast cancer 1.8 cm ILC, 2+ / 8 LNs, ER+ [place]+ her 2 rafiq negative. With Dr Romero, she underwent adjuvant chemotherapy with dd AC-T 12 /2013 followed by nipple reconstruction. Began anastrozole 4/2014 with initial moderate arthralgias that gradually decreased to a "tolerable" level.  She completed a 7-year course of anastrozole in 3/21 and was monitored off therapy.   Age 63: 6/2023 seen by derm for a new R chest wall "reddish pimple" - bx returned c/w met breast cancer, invasive mod diff ILC in dermis, ER+100% [place]- Her 2 rafiq (-).  EOD w/u was done: Pet/ CT showed mildly avid R axillary mass with infiltration of the adjacent structures consistent with chest wall recurrence, diffuse mild moderate uptake throughout the skeleton: T4, T6, T7, L1, L2. With Dr Naranjo, she was started on ribociclib/fulvestrant 6/27/23.  [de-identified] : invasive lobular carcinoma %, [place] negative, Her2 negative (1) Ki67 15%  [de-identified] : Genomic testin2023 Guardant 360dx revealed no ESR1 mutation. Test showed VUS in FOREST and ARID1A genes.   Kisqali/ Faslodex 2023 to present Xgeva 2024 to present  [de-identified] : She completed course of dexamethasone and feels hip overall is a bit better: rates 4/ 10. Off dexamethasone x 1 week. Saw Dr Hernandez and no surgical recommendations currently. She tolerated the denosumab and continuing on therapy. Felt more tired lately: getting 5 to 6 hours/ sleep. She denies any new chest wall changes or pain in the back or new cough. Will plan to go on trip in April but will return in time with next evaluation.

## 2024-03-14 DIAGNOSIS — C79.51 SECONDARY MALIGNANT NEOPLASM OF BONE: ICD-10-CM

## 2024-03-14 DIAGNOSIS — Z51.11 ENCOUNTER FOR ANTINEOPLASTIC CHEMOTHERAPY: ICD-10-CM

## 2024-03-15 ENCOUNTER — APPOINTMENT (OUTPATIENT)
Dept: OBGYN | Facility: CLINIC | Age: 64
End: 2024-03-15
Payer: COMMERCIAL

## 2024-03-15 ENCOUNTER — ASOB RESULT (OUTPATIENT)
Age: 64
End: 2024-03-15

## 2024-03-15 VITALS
SYSTOLIC BLOOD PRESSURE: 146 MMHG | HEIGHT: 59 IN | DIASTOLIC BLOOD PRESSURE: 85 MMHG | BODY MASS INDEX: 30.44 KG/M2 | WEIGHT: 151 LBS

## 2024-03-15 DIAGNOSIS — Z01.419 ENCOUNTER FOR GYNECOLOGICAL EXAMINATION (GENERAL) (ROUTINE) W/OUT ABNORMAL FINDINGS: ICD-10-CM

## 2024-03-15 PROCEDURE — 76830 TRANSVAGINAL US NON-OB: CPT

## 2024-03-15 PROCEDURE — 99213 OFFICE O/P EST LOW 20 MIN: CPT | Mod: 25

## 2024-03-15 PROCEDURE — 99396 PREV VISIT EST AGE 40-64: CPT

## 2024-03-15 PROCEDURE — 82270 OCCULT BLOOD FECES: CPT

## 2024-03-15 NOTE — PLAN
[FreeTextEntry1] : Health Maintenance: Normal gyn examination. F/u with Internist for screening laboratories. Nutrition and exercised discussed.  Metastatic Breast Ca: Followed by medical onc Dr. Juan FLORESO in 6 months, or PRN.

## 2024-03-15 NOTE — PHYSICAL EXAM
[Appropriately responsive] : appropriately responsive [Chaperone Present] : A chaperone was present in the examining room during all aspects of the physical examination [Alert] : alert [No Acute Distress] : no acute distress [No Lymphadenopathy] : no lymphadenopathy [Regular Rate Rhythm] : regular rate rhythm [No Murmurs] : no murmurs [Clear to Auscultation B/L] : clear to auscultation bilaterally [Soft] : soft [Non-tender] : non-tender [No HSM] : No HSM [Non-distended] : non-distended [No Lesions] : no lesions [No Mass] : no mass [Oriented x3] : oriented x3 [Examination Of The Breasts] : a normal appearance [Breast Reconstruction Right] : breast reconstruction [Breast Reconstruction Left] : breast reconstruction [No Masses] : no breast masses were palpable [Labia Majora] : normal [Labia Minora] : normal [Normal] : normal [Uterine Adnexae] : normal [FreeTextEntry6] : b/l CARLOS flap implants, no obvious masses felt

## 2024-03-15 NOTE — SIGNATURES
[TextEntry] : This note was authored by Elvin Sprague working as a scribe for Dr. Bebeto Watson.   I, Dr. Bebeto Watson, have reviewed the content of this note and confirm it is true and accurate. I personally performed the history and physical examination and made all the decisions. 03/15/2024

## 2024-03-15 NOTE — HISTORY OF PRESENT ILLNESS
[FreeTextEntry1] : 03/15/2024 CL[place] OG 63-year-old female presents for a follow-up visit, metastatic breast ca.  Patient offers no complaints. No abnormal vaginal bleeding, pain, or vaginal discharge.  Patient followed by med onc Dr. Martinez, given metastatic breast ca.  Reviewed abd/pelvic CT from 2/24: stable numerous metastatic lytic bone lesions, status post bilateral mastectomy and breast reconstruction with stable postsurgical changes, stable small branching tubular opacity within basilar left lower lobe likely impacted distal airway.  Reviewed today's pelvic sonogram: wnl.   Denies changes in medical status, medications, serious illness, hospitalizations, and surgeries. Reviewed patient's current medications.

## 2024-03-29 ENCOUNTER — OFFICE (OUTPATIENT)
Dept: URBAN - METROPOLITAN AREA CLINIC 102 | Facility: CLINIC | Age: 64
Setting detail: OPHTHALMOLOGY
End: 2024-03-29
Payer: COMMERCIAL

## 2024-03-29 VITALS — HEIGHT: 55 IN

## 2024-03-29 DIAGNOSIS — D31.32: ICD-10-CM

## 2024-03-29 DIAGNOSIS — H01.004: ICD-10-CM

## 2024-03-29 DIAGNOSIS — H25.13: ICD-10-CM

## 2024-03-29 DIAGNOSIS — H01.002: ICD-10-CM

## 2024-03-29 DIAGNOSIS — H01.001: ICD-10-CM

## 2024-03-29 DIAGNOSIS — H43.813: ICD-10-CM

## 2024-03-29 DIAGNOSIS — H52.4: ICD-10-CM

## 2024-03-29 DIAGNOSIS — H01.005: ICD-10-CM

## 2024-03-29 PROCEDURE — 92015 DETERMINE REFRACTIVE STATE: CPT | Performed by: OPHTHALMOLOGY

## 2024-03-29 PROCEDURE — 99214 OFFICE O/P EST MOD 30 MIN: CPT | Performed by: OPHTHALMOLOGY

## 2024-03-29 ASSESSMENT — REFRACTION_MANIFEST
OS_VA1: 20/NI
OD_ADD: +2.50
OD_ADD: +2.50
OD_CYLINDER: -2.00
OD_CYLINDER: -2.50
OD_HPRISM_DIRECTION: PH
OS_ADD: +2.50
OS_ADD: +2.50
OS_CYLINDER: -2.75
OD_VA1: 20/40
OD_VA1: 20/NI
OS_HPRISM_DIRECTION: PH
OD_AXIS: 010
OS_SPHERE: -5.25
OS_CYLINDER: -2.50
OS_VA1: 20/40-
OS_AXIS: 170
OD_SPHERE: -5.50
OS_SPHERE: -4.50
OS_AXIS: 170
OD_AXIS: 010
OD_SPHERE: -5.75

## 2024-03-29 ASSESSMENT — REFRACTION_CURRENTRX
OS_SPHERE: -5.00
OD_VPRISM_DIRECTION: PROGS
OS_ADD: +2.00
OD_SPHERE: -5.50
OS_OVR_VA: 20/
OD_OVR_VA: 20/
OD_VPRISM_DIRECTION: PROGS
OS_ADD: +2.50
OS_AXIS: 159
OD_OVR_VA: 20/
OD_AXIS: 11
OD_SPHERE: -5.25
OD_AXIS: 011
OS_OVR_VA: 20/
OS_CYLINDER: -2.75
OS_ADD: +2.50
OS_CYLINDER: -2.75
OD_CYLINDER: -2.50
OS_AXIS: 170
OD_ADD: +2.50
OS_CYLINDER: -2.50
OD_AXIS: 005
OS_SPHERE: -5.25
OS_OVR_VA: 20/
OD_VPRISM_DIRECTION: PROGS
OS_VPRISM_DIRECTION: PROGS
OD_ADD: +2.00
OS_VPRISM_DIRECTION: PROGS
OD_SPHERE: -5.75
OS_VPRISM_DIRECTION: PROGS
OS_SPHERE: -5.00
OD_CYLINDER: -2.00
OS_AXIS: 169
OD_CYLINDER: -2.50
OD_OVR_VA: 20/
OD_ADD: +2.50

## 2024-03-29 ASSESSMENT — SPHEQUIV_DERIVED
OD_SPHEQUIV: -6.75
OS_SPHEQUIV: -5.75
OS_SPHEQUIV: -6.625
OD_SPHEQUIV: -6.75

## 2024-03-29 ASSESSMENT — LID EXAM ASSESSMENTS
OD_BLEPHARITIS: RLL RUL T
OS_BLEPHARITIS: LLL LUL T

## 2024-04-11 ENCOUNTER — RESULT REVIEW (OUTPATIENT)
Age: 64
End: 2024-04-11

## 2024-04-11 ENCOUNTER — NON-APPOINTMENT (OUTPATIENT)
Age: 64
End: 2024-04-11

## 2024-04-11 ENCOUNTER — APPOINTMENT (OUTPATIENT)
Dept: HEMATOLOGY ONCOLOGY | Facility: CLINIC | Age: 64
End: 2024-04-11

## 2024-04-11 LAB
ALBUMIN SERPL ELPH-MCNC: 4.4 G/DL
ALP BLD-CCNC: 91 U/L
ALT SERPL-CCNC: 33 U/L
ANION GAP SERPL CALC-SCNC: 13 MMOL/L
AST SERPL-CCNC: 34 U/L
BASOPHILS # BLD AUTO: 0.09 K/UL — SIGNIFICANT CHANGE UP (ref 0–0.2)
BASOPHILS NFR BLD AUTO: 2.1 % — HIGH (ref 0–2)
BILIRUB SERPL-MCNC: 0.3 MG/DL
BUN SERPL-MCNC: 15 MG/DL
CALCIUM SERPL-MCNC: 9.9 MG/DL
CEA SERPL-MCNC: 37.2 NG/ML
CHLORIDE SERPL-SCNC: 104 MMOL/L
CO2 SERPL-SCNC: 26 MMOL/L
CREAT SERPL-MCNC: 1.02 MG/DL
EGFR: 62 ML/MIN/1.73M2
EOSINOPHIL # BLD AUTO: 0.02 K/UL — SIGNIFICANT CHANGE UP (ref 0–0.5)
EOSINOPHIL NFR BLD AUTO: 0.5 % — SIGNIFICANT CHANGE UP (ref 0–6)
GLUCOSE SERPL-MCNC: 106 MG/DL
HCT VFR BLD CALC: 37.9 % — SIGNIFICANT CHANGE UP (ref 34.5–45)
HGB BLD-MCNC: 13.3 G/DL — SIGNIFICANT CHANGE UP (ref 11.5–15.5)
IMM GRANULOCYTES NFR BLD AUTO: 0.2 % — SIGNIFICANT CHANGE UP (ref 0–0.9)
LYMPHOCYTES # BLD AUTO: 1.83 K/UL — SIGNIFICANT CHANGE UP (ref 1–3.3)
LYMPHOCYTES # BLD AUTO: 42.9 % — SIGNIFICANT CHANGE UP (ref 13–44)
MCHC RBC-ENTMCNC: 34.7 PG — HIGH (ref 27–34)
MCHC RBC-ENTMCNC: 35.1 G/DL — SIGNIFICANT CHANGE UP (ref 32–36)
MCV RBC AUTO: 99 FL — SIGNIFICANT CHANGE UP (ref 80–100)
MONOCYTES # BLD AUTO: 0.54 K/UL — SIGNIFICANT CHANGE UP (ref 0–0.9)
MONOCYTES NFR BLD AUTO: 12.6 % — SIGNIFICANT CHANGE UP (ref 2–14)
NEUTROPHILS # BLD AUTO: 1.78 K/UL — LOW (ref 1.8–7.4)
NEUTROPHILS NFR BLD AUTO: 41.7 % — LOW (ref 43–77)
NRBC # BLD: 0 /100 WBCS — SIGNIFICANT CHANGE UP (ref 0–0)
PLATELET # BLD AUTO: 225 K/UL — SIGNIFICANT CHANGE UP (ref 150–400)
POTASSIUM SERPL-SCNC: 4.4 MMOL/L
PROT SERPL-MCNC: 6.3 G/DL
RBC # BLD: 3.83 M/UL — SIGNIFICANT CHANGE UP (ref 3.8–5.2)
RBC # FLD: 14.1 % — SIGNIFICANT CHANGE UP (ref 10.3–14.5)
SODIUM SERPL-SCNC: 143 MMOL/L
WBC # BLD: 4.27 K/UL — SIGNIFICANT CHANGE UP (ref 3.8–10.5)
WBC # FLD AUTO: 4.27 K/UL — SIGNIFICANT CHANGE UP (ref 3.8–10.5)

## 2024-04-12 ENCOUNTER — APPOINTMENT (OUTPATIENT)
Dept: INFUSION THERAPY | Facility: HOSPITAL | Age: 64
End: 2024-04-12

## 2024-04-12 ENCOUNTER — APPOINTMENT (OUTPATIENT)
Dept: HEMATOLOGY ONCOLOGY | Facility: CLINIC | Age: 64
End: 2024-04-12
Payer: COMMERCIAL

## 2024-04-12 VITALS
TEMPERATURE: 96.8 F | BODY MASS INDEX: 30.5 KG/M2 | WEIGHT: 150.99 LBS | RESPIRATION RATE: 16 BRPM | OXYGEN SATURATION: 99 % | SYSTOLIC BLOOD PRESSURE: 138 MMHG | DIASTOLIC BLOOD PRESSURE: 89 MMHG | HEART RATE: 70 BPM

## 2024-04-12 PROCEDURE — 99214 OFFICE O/P EST MOD 30 MIN: CPT

## 2024-04-12 NOTE — PHYSICAL EXAM
[Fully active, able to carry on all pre-disease performance without restriction] : Status 0 - Fully active, able to carry on all pre-disease performance without restriction [Normal] : affect appropriate [de-identified] : B mastectomy with CARLOS flap reconstruction: no abnl masses or skin changes or axillary LN  [de-identified] : point tenderness over the R hip on palpation

## 2024-04-12 NOTE — HISTORY OF PRESENT ILLNESS
[Disease: _____________________] : Disease: [unfilled] [T: ___] : T[unfilled] [N: ___] : N[unfilled] [M: ___] : M[unfilled] [AJCC Stage: ____] : AJCC Stage: [unfilled] [de-identified] : Age 53: screen detected: 9:00 3 cm FN ill-defined solid mass R breast: 14 x 14mm and second irregular area 9:00 6 cm FN. She had u/s guided biopsy of the irregular R breast showing 9:00 3 cm: invasive moderate differentiated lobular carcinoma SBR 6/9 measuring at least 1.2 cm; and 9:00 6 cm FN: invasive moderately differentiated carcinoma lobular carcinoma measuring at least 0.5 cm. She had B/L MRM/ [facility] (Dr. Martine Rico) for right breast cancer 1.8 cm ILC, 2+ / 8 LNs, ER+ [place]+ her 2 rafiq negative. With Dr Romero, she underwent adjuvant chemotherapy with dd AC-T 12 /2013 followed by nipple reconstruction. Began anastrozole 4/2014 with initial moderate arthralgias that gradually decreased to a "tolerable" level.  She completed a 7-year course of anastrozole in 3/21 and was monitored off therapy.   Age 63: 6/2023 seen by derm for a new R chest wall "reddish pimple" - bx returned c/w met breast cancer, invasive mod diff ILC in dermis, ER+100% [place]- Her 2 rafiq (-).  EOD w/u was done: Pet/ CT showed mildly avid R axillary mass with infiltration of the adjacent structures consistent with chest wall recurrence, diffuse mild moderate uptake throughout the skeleton: T4, T6, T7, L1, L2. With Dr Naranjo, she was started on ribociclib/fulvestrant 6/27/23.  [de-identified] : invasive lobular carcinoma %, [place] negative, Her2 negative (1) Ki67 15%  [de-identified] : Genomic testin2023 Guardant 360dx revealed no ESR1 mutation. Test showed VUS in FOREST and ARID1A genes.   Kisqali/ Faslodex 2023 to present Xgeva 2024 to present  [de-identified] : She has been tolerating Xgeva: taking calcium supplement and now tolerating. She went to [place] to visit dtr and was able to do all the walking/ standing without any hip/ back pain. Has not needed to take any Motrin or pain medication. She denies any new cough or chest wall pain or HA. She has been seeing eye doctor and may be considering cataract surgery in the fall. She denies any new health changes.

## 2024-04-12 NOTE — REASON FOR VISIT
[Follow-Up Visit] : a follow-up [Spouse] : spouse [FreeTextEntry2] : follow up for metastatic breast cancer on Kisqali and letrozole

## 2024-04-12 NOTE — ASSESSMENT
[FreeTextEntry1] : She is a 64 y/o F with metastatic invasive lobular carcinoma to the chest wall and bones on ribociclib and fulvestrant since 6/2023. Clinically she has been asymptomatic from bone metastases and chest wall lesion has resolved. She is tolerating CDKi and endocrine therapy with ANC over 1 K/microL.Tumor markers are continuing to uptrend despite CT done in 2/2024 without any new evidence of disease progression. We reviewed rationale for Cerianna Pet/ CT due to last year pet/ CT negative and markers consistently increase every month. Questions answered to her satisfaction. She is agreeable with plan. Will continue with current therapy: next CBC in 1 month.   L more than R hip pain: follow up with Dr Hernandez in 5/2024.   Bone mets: started on denosumab 2/2024. Currently tolerating calcium supplement.   Cataract surgery planning for the fall: reviewed optimal timing of cataract surgery at the 4th week off kisqali to allow for counts to be optimized and may hold therapy for wound healing for 1 to 2 weeks.

## 2024-04-19 LAB — CANCER AG27-29 SERPL-ACNC: 245.4 U/ML

## 2024-04-29 ENCOUNTER — OUTPATIENT (OUTPATIENT)
Dept: OUTPATIENT SERVICES | Facility: HOSPITAL | Age: 64
LOS: 1 days | Discharge: HOME | End: 2024-04-29

## 2024-04-29 DIAGNOSIS — Z90.13 ACQUIRED ABSENCE OF BILATERAL BREASTS AND NIPPLES: Chronic | ICD-10-CM

## 2024-04-29 DIAGNOSIS — C50.919 MALIGNANT NEOPLASM OF UNSPECIFIED SITE OF UNSPECIFIED FEMALE BREAST: ICD-10-CM

## 2024-04-29 DIAGNOSIS — Z98.89 OTHER SPECIFIED POSTPROCEDURAL STATES: Chronic | ICD-10-CM

## 2024-04-29 DIAGNOSIS — Z92.21 PERSONAL HISTORY OF ANTINEOPLASTIC CHEMOTHERAPY: Chronic | ICD-10-CM

## 2024-05-01 ENCOUNTER — APPOINTMENT (OUTPATIENT)
Dept: NUCLEAR MEDICINE | Facility: CLINIC | Age: 64
End: 2024-05-01
Payer: COMMERCIAL

## 2024-05-01 PROCEDURE — 78816 PET IMAGE W/CT FULL BODY: CPT | Mod: PI

## 2024-05-01 PROCEDURE — A9591: CPT

## 2024-05-07 ENCOUNTER — RESULT REVIEW (OUTPATIENT)
Age: 64
End: 2024-05-07

## 2024-05-07 ENCOUNTER — APPOINTMENT (OUTPATIENT)
Dept: HEMATOLOGY ONCOLOGY | Facility: CLINIC | Age: 64
End: 2024-05-07

## 2024-05-07 LAB
ALBUMIN SERPL ELPH-MCNC: 4.6 G/DL
ALP BLD-CCNC: 115 U/L
ALT SERPL-CCNC: 32 U/L
ANION GAP SERPL CALC-SCNC: 15 MMOL/L
AST SERPL-CCNC: 35 U/L
BASOPHILS # BLD AUTO: 0.15 K/UL — SIGNIFICANT CHANGE UP (ref 0–0.2)
BASOPHILS NFR BLD AUTO: 2.4 % — HIGH (ref 0–2)
BILIRUB SERPL-MCNC: 0.3 MG/DL
BUN SERPL-MCNC: 32 MG/DL
CALCIUM SERPL-MCNC: 10.8 MG/DL
CANCER AG27-29 SERPL-ACNC: 294 U/ML
CEA SERPL-MCNC: 36.6 NG/ML
CHLORIDE SERPL-SCNC: 102 MMOL/L
CO2 SERPL-SCNC: 25 MMOL/L
CREAT SERPL-MCNC: 1.21 MG/DL
EGFR: 50 ML/MIN/1.73M2
EOSINOPHIL # BLD AUTO: 0.05 K/UL — SIGNIFICANT CHANGE UP (ref 0–0.5)
EOSINOPHIL NFR BLD AUTO: 0.8 % — SIGNIFICANT CHANGE UP (ref 0–6)
GLUCOSE SERPL-MCNC: 112 MG/DL
HCT VFR BLD CALC: 43.2 % — SIGNIFICANT CHANGE UP (ref 34.5–45)
HGB BLD-MCNC: 15 G/DL — SIGNIFICANT CHANGE UP (ref 11.5–15.5)
IMM GRANULOCYTES NFR BLD AUTO: 0.3 % — SIGNIFICANT CHANGE UP (ref 0–0.9)
LYMPHOCYTES # BLD AUTO: 3.29 K/UL — SIGNIFICANT CHANGE UP (ref 1–3.3)
LYMPHOCYTES # BLD AUTO: 52.4 % — HIGH (ref 13–44)
MCHC RBC-ENTMCNC: 34.6 PG — HIGH (ref 27–34)
MCHC RBC-ENTMCNC: 34.7 G/DL — SIGNIFICANT CHANGE UP (ref 32–36)
MCV RBC AUTO: 99.8 FL — SIGNIFICANT CHANGE UP (ref 80–100)
MONOCYTES # BLD AUTO: 0.83 K/UL — SIGNIFICANT CHANGE UP (ref 0–0.9)
MONOCYTES NFR BLD AUTO: 13.2 % — SIGNIFICANT CHANGE UP (ref 2–14)
NEUTROPHILS # BLD AUTO: 1.94 K/UL — SIGNIFICANT CHANGE UP (ref 1.8–7.4)
NEUTROPHILS NFR BLD AUTO: 30.9 % — LOW (ref 43–77)
NRBC # BLD: 0 /100 WBCS — SIGNIFICANT CHANGE UP (ref 0–0)
PLATELET # BLD AUTO: 267 K/UL — SIGNIFICANT CHANGE UP (ref 150–400)
POTASSIUM SERPL-SCNC: 4.9 MMOL/L
PROT SERPL-MCNC: 7.1 G/DL
RBC # BLD: 4.33 M/UL — SIGNIFICANT CHANGE UP (ref 3.8–5.2)
RBC # FLD: 13.7 % — SIGNIFICANT CHANGE UP (ref 10.3–14.5)
SODIUM SERPL-SCNC: 143 MMOL/L
WBC # BLD: 6.28 K/UL — SIGNIFICANT CHANGE UP (ref 3.8–10.5)
WBC # FLD AUTO: 6.28 K/UL — SIGNIFICANT CHANGE UP (ref 3.8–10.5)

## 2024-05-08 ENCOUNTER — APPOINTMENT (OUTPATIENT)
Dept: HEMATOLOGY ONCOLOGY | Facility: CLINIC | Age: 64
End: 2024-05-08
Payer: COMMERCIAL

## 2024-05-08 ENCOUNTER — APPOINTMENT (OUTPATIENT)
Dept: INFUSION THERAPY | Facility: HOSPITAL | Age: 64
End: 2024-05-08

## 2024-05-08 VITALS
SYSTOLIC BLOOD PRESSURE: 132 MMHG | BODY MASS INDEX: 30.5 KG/M2 | TEMPERATURE: 97.9 F | WEIGHT: 150.99 LBS | RESPIRATION RATE: 16 BRPM | HEART RATE: 77 BPM | DIASTOLIC BLOOD PRESSURE: 84 MMHG | OXYGEN SATURATION: 97 %

## 2024-05-08 DIAGNOSIS — R97.8 OTHER ABNORMAL TUMOR MARKERS: ICD-10-CM

## 2024-05-08 PROCEDURE — 99214 OFFICE O/P EST MOD 30 MIN: CPT

## 2024-05-09 DIAGNOSIS — C79.51 SECONDARY MALIGNANT NEOPLASM OF BONE: ICD-10-CM

## 2024-05-09 DIAGNOSIS — Z51.11 ENCOUNTER FOR ANTINEOPLASTIC CHEMOTHERAPY: ICD-10-CM

## 2024-05-12 PROBLEM — R97.8 ABNORMAL TUMOR MARKERS: Status: ACTIVE | Noted: 2024-01-18

## 2024-05-12 NOTE — PHYSICAL EXAM
[Normal] : RRR, normal S1S2, no murmurs, rubs, gallops [de-identified] : B mastectomy with reconstruction; no new chest wall nodules or axillary LN palpable

## 2024-05-12 NOTE — ASSESSMENT
[FreeTextEntry1] : She is a 65 y/o F with metastatic invasive lobular carcinoma to the chest wall and bones on ribociclib and fulvestrant since 6/2023. Clinically she has been asymptomatic from bone metastases and chest wall lesion has resolved. She is tolerating CDKi and endocrine therapy with ANC over 1 K/microL. Tumor markers remain elevated vs. continuing to uptrend despite CT done in 2/2024 without any new evidence of disease progression. We reviewed rationale for Cerianna Pet/ CT due to last year pet/ CT negative and markers consistently increase every month; we reviewed results of the Cerianna scan performed 5/1/24 which are similar to prior, as well as recent labwork today, which still showing evidence of some tumor marker uptitration. Continue to take Calcium supplementation.  Questions answered to her satisfaction. She is agreeable with plan. Will continue with current therapy, RTC 1 month and will plan for next CBC prior to that visit.  Bilateral hip pain, today R>L: follow up with Dr Hernandez and will obtain MRI of hips in interim to further evaluate, continue to monitor at future visits.  Bone mets: started on denosumab 2/2024. Currently tolerating calcium supplement.  GERD: consider GI f/u for ongoing symptoms Cataract surgery planning for this coming fall: previously reviewed optimal timing of cataract surgery at the 4th week off kisqali to allow for counts to be optimized and may hold therapy for wound healing for 1 to 2 weeks.   James Aragon MD Hematology/Oncology Fellow, PGY-4 05/08/2024 [Palliative] : Goals of care discussed with patient: Palliative

## 2024-05-12 NOTE — REVIEW OF SYSTEMS
[Joint Pain] : joint pain [Joint Stiffness] : joint stiffness [Hot Flashes] : hot flashes [Fever] : no fever [Chills] : no chills [Night Sweats] : no night sweats [Dysphagia] : no dysphagia [Odynophagia] : no odynophagia [Chest Pain] : no chest pain [Palpitations] : no palpitations [Shortness Of Breath] : no shortness of breath [Cough] : no cough [Abdominal Pain] : no abdominal pain [Vomiting] : no vomiting [Constipation] : no constipation [Easy Bleeding] : no tendency for easy bleeding [Easy Bruising] : no tendency for easy bruising [FreeTextEntry7] : (+) acid reflux symptoms worse when doesn't eat frequently [de-identified] : BIW hot flashes [de-identified] : (+) pruritus especially in back

## 2024-05-12 NOTE — HISTORY OF PRESENT ILLNESS
[Disease: _____________________] : Disease: [unfilled] [T: ___] : T[unfilled] [N: ___] : N[unfilled] [M: ___] : M[unfilled] [AJCC Stage: ____] : AJCC Stage: [unfilled] [de-identified] : Age 53: screen detected: 9:00 3 cm FN ill-defined solid mass R breast: 14 x 14mm and second irregular area 9:00 6 cm FN. She had u/s guided biopsy of the irregular R breast showing 9:00 3 cm: invasive moderate differentiated lobular carcinoma SBR 6/9 measuring at least 1.2 cm; and 9:00 6 cm FN: invasive moderately differentiated carcinoma lobular carcinoma measuring at least 0.5 cm. She had B/L MRM/ [facility] (Dr. Martine Rico) for right breast cancer 1.8 cm ILC, 2+ / 8 LNs, ER+ [place]+ her 2 rafiq negative. With Dr Romero, she underwent adjuvant chemotherapy with dd AC-T 12 /2013 followed by nipple reconstruction. Began anastrozole 4/2014 with initial moderate arthralgias that gradually decreased to a "tolerable" level.  She completed a 7-year course of anastrozole in 3/21 and was monitored off therapy.   Age 63: 6/2023 seen by derm for a new R chest wall "reddish pimple" - bx returned c/w met breast cancer, invasive mod diff ILC in dermis, ER+100% [place]- Her 2 rafiq (-).  EOD w/u was done: Pet/ CT showed mildly avid R axillary mass with infiltration of the adjacent structures consistent with chest wall recurrence, diffuse mild moderate uptake throughout the skeleton: T4, T6, T7, L1, L2. With Dr Naranjo, she was started on ribociclib/fulvestrant 6/27/23.  [de-identified] : invasive lobular carcinoma %, [place] negative, Her2 negative (1) Ki67 15%  [de-identified] : Genomic testin2023 Guardant 360dx revealed no ESR1 mutation. Test showed VUS in FOREST and ARID1A genes.   Kisqali/ Faslodex 2023 to present Xgeva 2024 to present  [de-identified] : No trouble with kisqali and fulvestrant; has been taking motrin ~BIW for hip pain and pelvic pain, typically R>L, the pain is better with ambulation, though she also notices her hip in its joint when she walks; she gets stiff with less activity, also losing more hair. Had c scope 2 years ago, endoscopy 7-8 yrs ago (performed to further evaluate GERD related symptoms), thinks she had a hiatal hernia. She has some GI upset consistent with epigastric related discomfort that developed over last year. It's usually worse when hasn't eaten for a prolonged period of time.

## 2024-05-12 NOTE — END OF VISIT
[] : Fellow [Time Spent: ___ minutes] : I have spent [unfilled] minutes of time on the encounter. [FreeTextEntry3] : She has no new symptoms despite increasing tumor markers. On exam, no new findings. Given persistent B hip pain: will obtain B MRI of the hips. She understands we will continue to monitor her for new symptoms and will repeat imaging at intervals to find progression. Current imaging without progression. She is willing to continue with Kisqali and fulvestrant.

## 2024-05-12 NOTE — RESULTS/DATA
[FreeTextEntry1] : EXAM: [id number] - PETCT WB CERIANNA INIT  - ORDERED BY: ALIA MAST  PROCEDURE DATE:  05/01/2024  INTERPRETATION:  CLINICAL INFORMATION: 64 years-old Female with breast cancer. Last Fulvestrant 4/12/24 and Kisqali 4/29/24. Study performed to assess for worsening disease as patient reportedly has worsening biomarkers. It should be noted that being on some of these medications may decrease the sensitivity of this study. Patient's histologic profile is reportedly invasive lobular carcinoma, with 100% ER positivity, with GA and HER-2 negative.  TREATMENT STRATEGY EVALUATION: Initial RADIOPHARMACEUTICAL: 5.72 mCi F-18 Fluoroestradiol (Cerianna), I.V. I.V. SITE: antecubital left UPTAKE PERIOD: 58 min SCANNER: Siemens Biograph Vision 450 ORAL CONTRAST: Omnipaque 300  TECHNIQUE:  Following intravenous injection of radiopharmaceutical and above uptake period, PET/CT was obtained from vertex of skull to below the knees. CT was performed during shallow respiration. CT protocol was optimized for PET attenuation correction and anatomic localization and was not designed to produce and cannot replace state-of-the-art diagnostic CT images with specific imaging protocols for different body parts and indications. Images were reconstructed and reviewed in axial, coronal and sagittal views and three-dimensional MIP.  Standardized uptake values ("SUV") are normalized to patient body weight and indicate the highest activity concentration (SUVmax) in a given disease site. All image numbers refer to axial image numbers.  COMPARISON:  No prior FES-PET/CT  CORRELATION: FDG PET/CT 12/6/2023, CT chest and abdomen/pelvis with IV contrast 2/16/2024  FINDINGS:  HEAD/NECK: No abnormal FES activity.  CHEST: There is irregular infiltrative-appearing soft tissue within the RIGHT axilla, extending to the RIGHT retropectoral region, which, along with portions of the musculature of the posterior border of the RIGHT axilla, exhibits diffuse, mild estrogen receptor activity (SUV 2.1, image 105). Post surgical changes in the breasts and LEFT axilla are without increased estrogen receptor uptake. No abnormal FES activity otherwise noted. No lymphadenopathy.  LUNGS: No abnormal FES activity. No lung nodule(s).  PLEURA/PERICARDIUM: No abnormal FES activity. No effusions.  HEPATOBILIARY/PANCREAS: Physiologic hepatobiliary FES activity. For reference, liver SUV mean is 10.8, previously .  SPLEEN: Physiologic FES activity. Normal size.  ADRENAL GLANDS: No abnormal FES activity. No nodule(s).  KIDNEYS/URINARY BLADDER: Physiologic excreted FES activity.  REPRODUCTIVE ORGANS: No abnormal FES activity.  ABDOMINOPELVIC NODES: No enlarged or FES-avid lymph node.  BOWEL/PERITONEUM/MESENTERY: No abnormal FES activity.  BONES/SOFT TISSUES: No abnormal FES activity.  BONES/SOFT TISSUES: There are multiple lucent/lytic lesions involving the axial and appendicular skeleton, with moderate estrogen receptor activity. These are present in the skull, ribs, spine, pelvic bones and BILATERAL UPPER and LOWER extremities.  Representative examples include:  Right frontal bone (SUV 3.0, image 27) soft tissue lesion with cortical thinning in the RIGHT hemisacrum (2.6 x 2.4 cm, SUV 5.5, image 216) Proximal LEFT femur (SUV 3.9, image 256)  VESSELS: Normal.  IMPRESSION: Study appears abnormal. Please note that patient's current medication regimen may decrease sensitivity of lesion detection due to possible interference at estrogen receptor binding sites. 1.  Multiple osseous foci involving the axial and appendicular skeleton, including lucent and lytic lesions with soft tissue components. No gross evidence of fracture. These findings appear similar in distribution to most recent FDG study, with the exclusion of the skull which was not completely within the field of view. 2.  Infiltrative-appearing soft tissue in the RIGHT axilla and retropectoral regions, with mild uptake overlying its along with adjacent musculature. This appears similar in distribution on CT as well as on PET and correlated with prior FDG exam. Sonography and biopsy may be helpful if there is suspicion for active malignancy there. 3.  No abnormal uptake in the breasts. 4.  No additional suspicious findings.   --- End of Report ---

## 2024-05-23 ENCOUNTER — APPOINTMENT (OUTPATIENT)
Dept: CARDIOLOGY | Facility: CLINIC | Age: 64
End: 2024-05-23
Payer: COMMERCIAL

## 2024-05-23 ENCOUNTER — LABORATORY RESULT (OUTPATIENT)
Age: 64
End: 2024-05-23

## 2024-05-23 VITALS
RESPIRATION RATE: 18 BRPM | HEIGHT: 59 IN | WEIGHT: 154 LBS | TEMPERATURE: 97.9 F | OXYGEN SATURATION: 99 % | BODY MASS INDEX: 31.04 KG/M2 | DIASTOLIC BLOOD PRESSURE: 80 MMHG | SYSTOLIC BLOOD PRESSURE: 130 MMHG | HEART RATE: 74 BPM

## 2024-05-23 DIAGNOSIS — Z12.39 ENCOUNTER FOR OTHER SCREENING FOR MALIGNANT NEOPLASM OF BREAST: ICD-10-CM

## 2024-05-23 DIAGNOSIS — L81.2 FRECKLES: ICD-10-CM

## 2024-05-23 DIAGNOSIS — Z13.228 ENCOUNTER FOR SCREENING FOR OTHER METABOLIC DISORDERS: ICD-10-CM

## 2024-05-23 DIAGNOSIS — R60.0 LOCALIZED EDEMA: ICD-10-CM

## 2024-05-23 DIAGNOSIS — E78.5 HYPERLIPIDEMIA, UNSPECIFIED: ICD-10-CM

## 2024-05-23 DIAGNOSIS — Z12.11 ENCOUNTER FOR SCREENING FOR MALIGNANT NEOPLASM OF COLON: ICD-10-CM

## 2024-05-23 DIAGNOSIS — I10 ESSENTIAL (PRIMARY) HYPERTENSION: ICD-10-CM

## 2024-05-23 DIAGNOSIS — Z13.820 ENCOUNTER FOR SCREENING FOR OSTEOPOROSIS: ICD-10-CM

## 2024-05-23 DIAGNOSIS — Z83.511 FAMILY HISTORY OF GLAUCOMA: ICD-10-CM

## 2024-05-23 DIAGNOSIS — Z00.00 ENCOUNTER FOR GENERAL ADULT MEDICAL EXAMINATION W/OUT ABNORMAL FINDINGS: ICD-10-CM

## 2024-05-23 DIAGNOSIS — R42 DIZZINESS AND GIDDINESS: ICD-10-CM

## 2024-05-23 PROCEDURE — 99386 PREV VISIT NEW AGE 40-64: CPT

## 2024-05-23 PROCEDURE — 93000 ELECTROCARDIOGRAM COMPLETE: CPT

## 2024-05-23 PROCEDURE — 93970 EXTREMITY STUDY: CPT

## 2024-05-23 PROCEDURE — 93306 TTE W/DOPPLER COMPLETE: CPT

## 2024-05-23 RX ORDER — ROSUVASTATIN CALCIUM 5 MG/1
5 TABLET, FILM COATED ORAL DAILY
Qty: 90 | Refills: 3 | Status: ACTIVE | COMMUNITY
Start: 2020-11-12 | End: 1900-01-01

## 2024-05-23 NOTE — PHYSICAL EXAM

## 2024-05-24 LAB
25(OH)D3 SERPL-MCNC: 49 NG/ML
ALBUMIN SERPL ELPH-MCNC: 4.4 G/DL
ALP BLD-CCNC: 84 U/L
ALT SERPL-CCNC: 38 U/L
ANION GAP SERPL CALC-SCNC: 15 MMOL/L
APPEARANCE: CLEAR
AST SERPL-CCNC: 37 U/L
BACTERIA: NEGATIVE /HPF
BASOPHILS # BLD AUTO: 0.1 K/UL
BASOPHILS NFR BLD AUTO: 2.6 %
BILIRUB DIRECT SERPL-MCNC: 0.1 MG/DL
BILIRUB INDIRECT SERPL-MCNC: 0.3 MG/DL
BILIRUB SERPL-MCNC: 0.4 MG/DL
BILIRUBIN URINE: NEGATIVE
BLOOD URINE: NEGATIVE
BUN SERPL-MCNC: 13 MG/DL
CALCIUM SERPL-MCNC: 10.2 MG/DL
CAST: 0 /LPF
CHLORIDE SERPL-SCNC: 103 MMOL/L
CHOLEST SERPL-MCNC: 147 MG/DL
CK SERPL-CCNC: 65 U/L
CO2 SERPL-SCNC: 26 MMOL/L
COLOR: YELLOW
CREAT SERPL-MCNC: 0.93 MG/DL
EGFR: 69 ML/MIN/1.73M2
EOSINOPHIL # BLD AUTO: 0.16 K/UL
EOSINOPHIL NFR BLD AUTO: 4.4 %
EPITHELIAL CELLS: 0 /HPF
ESTIMATED AVERAGE GLUCOSE: 103 MG/DL
GLUCOSE QUALITATIVE U: NEGATIVE MG/DL
GLUCOSE SERPL-MCNC: 89 MG/DL
HBA1C MFR BLD HPLC: 5.2 %
HCT VFR BLD CALC: 37.3 %
HDLC SERPL-MCNC: 63 MG/DL
HGB BLD-MCNC: 12.6 G/DL
KETONES URINE: NEGATIVE MG/DL
LDLC SERPL CALC-MCNC: 59 MG/DL
LEUKOCYTE ESTERASE URINE: NEGATIVE
LYMPHOCYTES # BLD AUTO: 1.99 K/UL
LYMPHOCYTES NFR BLD AUTO: 53.9 %
MAGNESIUM SERPL-MCNC: 1.9 MG/DL
MAN DIFF?: NORMAL
MCHC RBC-ENTMCNC: 33.8 GM/DL
MCHC RBC-ENTMCNC: 34.3 PG
MCV RBC AUTO: 101.6 FL
MICROSCOPIC-UA: NORMAL
MONOCYTES # BLD AUTO: 0.1 K/UL
MONOCYTES NFR BLD AUTO: 2.6 %
NEUTROPHILS # BLD AUTO: 1.16 K/UL
NEUTROPHILS NFR BLD AUTO: 31.3 %
NITRITE URINE: NEGATIVE
NONHDLC SERPL-MCNC: 83 MG/DL
NT-PROBNP SERPL-MCNC: <36 PG/ML
PH URINE: 6
PHOSPHATE SERPL-MCNC: 4 MG/DL
PLATELET # BLD AUTO: 290 K/UL
POTASSIUM SERPL-SCNC: 4 MMOL/L
PROT SERPL-MCNC: 6.5 G/DL
PROTEIN URINE: NORMAL MG/DL
RBC # BLD: 3.67 M/UL
RBC # FLD: 13.8 %
RED BLOOD CELLS URINE: 1 /HPF
SODIUM SERPL-SCNC: 144 MMOL/L
SPECIFIC GRAVITY URINE: 1.02
T4 FREE SERPL-MCNC: 1.1 NG/DL
TRIGL SERPL-MCNC: 147 MG/DL
TSH SERPL-ACNC: 1.05 UIU/ML
UROBILINOGEN URINE: 0.2 MG/DL
WBC # FLD AUTO: 3.7 K/UL
WHITE BLOOD CELLS URINE: 0 /HPF

## 2024-05-24 RX ORDER — TRIAMTERENE/HYDROCHLOROTHIAZID 50 MG-25MG
1 CAPSULE ORAL
Refills: 0 | DISCHARGE
Start: 2024-05-24

## 2024-05-24 RX ORDER — TRIAMTERENE AND HYDROCHLOROTHIAZIDE 50; 75 MG/1; MG/1
75-50 TABLET ORAL DAILY
Qty: 30 | Refills: 1 | Status: ACTIVE | COMMUNITY
Start: 2024-05-24 | End: 1900-01-01

## 2024-05-26 ENCOUNTER — NON-APPOINTMENT (OUTPATIENT)
Age: 64
End: 2024-05-26

## 2024-06-04 ENCOUNTER — APPOINTMENT (OUTPATIENT)
Dept: HEMATOLOGY ONCOLOGY | Facility: CLINIC | Age: 64
End: 2024-06-04

## 2024-06-04 ENCOUNTER — RESULT REVIEW (OUTPATIENT)
Age: 64
End: 2024-06-04

## 2024-06-04 LAB
BASOPHILS # BLD AUTO: 0.1 K/UL — SIGNIFICANT CHANGE UP (ref 0–0.2)
BASOPHILS NFR BLD AUTO: 2.5 % — HIGH (ref 0–2)
EOSINOPHIL # BLD AUTO: 0.06 K/UL — SIGNIFICANT CHANGE UP (ref 0–0.5)
EOSINOPHIL NFR BLD AUTO: 1.5 % — SIGNIFICANT CHANGE UP (ref 0–6)
HCT VFR BLD CALC: 38.9 % — SIGNIFICANT CHANGE UP (ref 34.5–45)
HGB BLD-MCNC: 13.7 G/DL — SIGNIFICANT CHANGE UP (ref 11.5–15.5)
IMM GRANULOCYTES NFR BLD AUTO: 0.3 % — SIGNIFICANT CHANGE UP (ref 0–0.9)
LYMPHOCYTES # BLD AUTO: 2.15 K/UL — SIGNIFICANT CHANGE UP (ref 1–3.3)
LYMPHOCYTES # BLD AUTO: 54.7 % — HIGH (ref 13–44)
MCHC RBC-ENTMCNC: 34.7 PG — HIGH (ref 27–34)
MCHC RBC-ENTMCNC: 35.2 G/DL — SIGNIFICANT CHANGE UP (ref 32–36)
MCV RBC AUTO: 98.5 FL — SIGNIFICANT CHANGE UP (ref 80–100)
MONOCYTES # BLD AUTO: 0.5 K/UL — SIGNIFICANT CHANGE UP (ref 0–0.9)
MONOCYTES NFR BLD AUTO: 12.7 % — SIGNIFICANT CHANGE UP (ref 2–14)
NEUTROPHILS # BLD AUTO: 1.11 K/UL — LOW (ref 1.8–7.4)
NEUTROPHILS NFR BLD AUTO: 28.3 % — LOW (ref 43–77)
NRBC # BLD: 0 /100 WBCS — SIGNIFICANT CHANGE UP (ref 0–0)
PLATELET # BLD AUTO: 224 K/UL — SIGNIFICANT CHANGE UP (ref 150–400)
RBC # BLD: 3.95 M/UL — SIGNIFICANT CHANGE UP (ref 3.8–5.2)
RBC # FLD: 13.5 % — SIGNIFICANT CHANGE UP (ref 10.3–14.5)
WBC # BLD: 3.93 K/UL — SIGNIFICANT CHANGE UP (ref 3.8–10.5)
WBC # FLD AUTO: 3.93 K/UL — SIGNIFICANT CHANGE UP (ref 3.8–10.5)

## 2024-06-05 ENCOUNTER — APPOINTMENT (OUTPATIENT)
Dept: INFUSION THERAPY | Facility: HOSPITAL | Age: 64
End: 2024-06-05

## 2024-06-05 ENCOUNTER — RESULT REVIEW (OUTPATIENT)
Age: 64
End: 2024-06-05

## 2024-06-05 ENCOUNTER — APPOINTMENT (OUTPATIENT)
Dept: HEMATOLOGY ONCOLOGY | Facility: CLINIC | Age: 64
End: 2024-06-05
Payer: COMMERCIAL

## 2024-06-05 VITALS
HEART RATE: 79 BPM | SYSTOLIC BLOOD PRESSURE: 136 MMHG | BODY MASS INDEX: 30.06 KG/M2 | RESPIRATION RATE: 16 BRPM | OXYGEN SATURATION: 98 % | DIASTOLIC BLOOD PRESSURE: 85 MMHG | TEMPERATURE: 97.4 F | WEIGHT: 148.81 LBS

## 2024-06-05 DIAGNOSIS — C50.919 MALIGNANT NEOPLASM OF UNSPECIFIED SITE OF UNSPECIFIED FEMALE BREAST: ICD-10-CM

## 2024-06-05 DIAGNOSIS — C41.9 MALIGNANT NEOPLASM OF BONE AND ARTICULAR CARTILAGE, UNSPECIFIED: ICD-10-CM

## 2024-06-05 LAB
ALBUMIN SERPL ELPH-MCNC: 4.7 G/DL
ALP BLD-CCNC: 82 U/L
ALT SERPL-CCNC: 36 U/L
ANION GAP SERPL CALC-SCNC: 16 MMOL/L
AST SERPL-CCNC: 37 U/L
BILIRUB SERPL-MCNC: 0.4 MG/DL
BUN SERPL-MCNC: 19 MG/DL
CALCIUM SERPL-MCNC: 10.7 MG/DL
CANCER AG27-29 SERPL-ACNC: 251.9 U/ML
CEA SERPL-MCNC: 39.6 NG/ML
CHLORIDE SERPL-SCNC: 101 MMOL/L
CO2 SERPL-SCNC: 25 MMOL/L
CREAT SERPL-MCNC: 0.87 MG/DL
EGFR: 74 ML/MIN/1.73M2
GLUCOSE SERPL-MCNC: 106 MG/DL
POTASSIUM SERPL-SCNC: 4.7 MMOL/L
PROT SERPL-MCNC: 6.6 G/DL
SODIUM SERPL-SCNC: 142 MMOL/L

## 2024-06-05 PROCEDURE — 99214 OFFICE O/P EST MOD 30 MIN: CPT

## 2024-06-05 PROCEDURE — G2211 COMPLEX E/M VISIT ADD ON: CPT

## 2024-06-05 RX ORDER — RIBOCICLIB 200 MG/1
200 TABLET, FILM COATED ORAL
Qty: 63 | Refills: 3 | Status: ACTIVE | COMMUNITY
Start: 2023-06-23 | End: 1900-01-01

## 2024-06-05 NOTE — REVIEW OF SYSTEMS
[Diarrhea: Grade 0] : Diarrhea: Grade 0 [Joint Pain] : joint pain [Joint Stiffness] : no joint stiffness [Muscle Pain] : no muscle pain [Muscle Weakness] : no muscle weakness [Skin Rash] : no skin rash [Skin Wound] : no skin wound [Negative] : Allergic/Immunologic [FreeTextEntry9] : R hip pain occasional worse with walking more than 3 miles  [de-identified] : R chest wall twinge occasional

## 2024-06-05 NOTE — REASON FOR VISIT
[Follow-Up Visit] : a follow-up [Spouse] : spouse [FreeTextEntry2] : follow up for breast cancer on kisqali/ Faslodex and Xgeva

## 2024-06-05 NOTE — HISTORY OF PRESENT ILLNESS
[Disease: _____________________] : Disease: [unfilled] [T: ___] : T[unfilled] [N: ___] : N[unfilled] [M: ___] : M[unfilled] [AJCC Stage: ____] : AJCC Stage: [unfilled] [de-identified] : Age 53: screen detected: 9:00 3 cm FN ill-defined solid mass R breast: 14 x 14mm and second irregular area 9:00 6 cm FN. She had u/s guided biopsy of the irregular R breast showing 9:00 3 cm: invasive moderate differentiated lobular carcinoma SBR 6/9 measuring at least 1.2 cm; and 9:00 6 cm FN: invasive moderately differentiated carcinoma lobular carcinoma measuring at least 0.5 cm. She had B/L MRM/ [facility] (Dr. Martine Rico) for right breast cancer 1.8 cm ILC, 2+ / 8 LNs, ER+ [place]+ her 2 rafiq negative. With Dr Romero, she underwent adjuvant chemotherapy with dd AC-T 12 /2013 followed by nipple reconstruction. Began anastrozole 4/2014 with initial moderate arthralgias that gradually decreased to a "tolerable" level.  She completed a 7-year course of anastrozole in 3/21 and was monitored off therapy.   Age 63: 6/2023 seen by derm for a new R chest wall "reddish pimple" - bx returned c/w met breast cancer, invasive mod diff ILC in dermis, ER+100% [place]- Her 2 rafiq (-).  EOD w/u was done: Pet/ CT showed mildly avid R axillary mass with infiltration of the adjacent structures consistent with chest wall recurrence, diffuse mild moderate uptake throughout the skeleton: T4, T6, T7, L1, L2. With Dr Naranjo, she was started on ribociclib/fulvestrant 6/27/23.  [de-identified] : Genomic testin2023 Guardant 360dx revealed no ESR1 mutation. Test showed VUS in FOREST and ARID1A genes.   Kisqali/ Faslodex 2023 to present Xgeva 2024 to present  [de-identified] : invasive lobular carcinoma %, [place] negative, Her2 negative (1) Ki67 15%  [de-identified] : She continues to tolerate therapy: no fevers or chills or dental issues. She notices occasional R chest wall twinge and occasional hip pain with walking more than 3 miles. She continues to exercise avidly: swimming and walking up to 5 miles/ day. She will be getting MRI of the hip and then seeing Dr Tanner weaver. She denies any new health changes: went to see Dr Patton

## 2024-06-05 NOTE — PHYSICAL EXAM
[Fully active, able to carry on all pre-disease performance without restriction] : Status 0 - Fully active, able to carry on all pre-disease performance without restriction [Normal] : affect appropriate [de-identified] : B mastectomy with reconstruction; no new chest wall nodules or axillary LN palpable

## 2024-06-05 NOTE — ASSESSMENT
[FreeTextEntry1] : She is a 65 y/o F with metastatic invasive lobular carcinoma to the chest wall and bones on ribociclib and fulvestrant since 6/2023. Clinically she has been asymptomatic from bone metastases and chest wall lesion has resolved. She is tolerating CDKi and endocrine therapy with ANC over 1 K/microL. Currently CA27-29 downtrending and CEA overall stable. Clinically she has no new symptoms that would indicate progression. She continues to tolerate therapy well and will continue with current regimen: CBC remains stable on therapy. Questions answered to her satisfaction. She is agreeable with plan. Next follow up in 1 month but earlier if any new symptoms  Bone mets: started on denosumab 2/2024. Currently tolerating calcium supplement.   Lytic lesion over the hip: will have interval follow up MRI of the hip.   Cataract surgery planning for the fall: reviewed optimal timing of cataract surgery at the 4th week off kisqali to allow for counts to be optimized and may hold therapy for wound healing for 1 to 2 weeks.

## 2024-06-08 ENCOUNTER — RESULT REVIEW (OUTPATIENT)
Age: 64
End: 2024-06-08

## 2024-06-08 ENCOUNTER — APPOINTMENT (OUTPATIENT)
Dept: MRI IMAGING | Facility: IMAGING CENTER | Age: 64
End: 2024-06-08

## 2024-06-08 ENCOUNTER — OUTPATIENT (OUTPATIENT)
Dept: OUTPATIENT SERVICES | Facility: HOSPITAL | Age: 64
LOS: 1 days | End: 2024-06-08
Payer: COMMERCIAL

## 2024-06-08 DIAGNOSIS — Z90.13 ACQUIRED ABSENCE OF BILATERAL BREASTS AND NIPPLES: Chronic | ICD-10-CM

## 2024-06-08 DIAGNOSIS — C50.919 MALIGNANT NEOPLASM OF UNSPECIFIED SITE OF UNSPECIFIED FEMALE BREAST: ICD-10-CM

## 2024-06-08 DIAGNOSIS — Z00.8 ENCOUNTER FOR OTHER GENERAL EXAMINATION: ICD-10-CM

## 2024-06-08 DIAGNOSIS — Z92.21 PERSONAL HISTORY OF ANTINEOPLASTIC CHEMOTHERAPY: Chronic | ICD-10-CM

## 2024-06-08 DIAGNOSIS — Z98.89 OTHER SPECIFIED POSTPROCEDURAL STATES: Chronic | ICD-10-CM

## 2024-06-08 PROCEDURE — A9585: CPT

## 2024-06-08 PROCEDURE — 73723 MRI JOINT LWR EXTR W/O&W/DYE: CPT

## 2024-06-08 PROCEDURE — 73723 MRI JOINT LWR EXTR W/O&W/DYE: CPT | Mod: 26,RT

## 2024-06-13 ENCOUNTER — NON-APPOINTMENT (OUTPATIENT)
Age: 64
End: 2024-06-13

## 2024-06-17 PROBLEM — M25.551 ACUTE PAIN OF BOTH HIPS: Status: ACTIVE | Noted: 2024-05-08

## 2024-06-17 PROBLEM — C79.51 CANCER, METASTATIC TO BONE: Status: ACTIVE | Noted: 2024-06-17

## 2024-06-18 ENCOUNTER — APPOINTMENT (OUTPATIENT)
Dept: ORTHOPEDIC SURGERY | Facility: CLINIC | Age: 64
End: 2024-06-18
Payer: COMMERCIAL

## 2024-06-18 DIAGNOSIS — M25.551 PAIN IN RIGHT HIP: ICD-10-CM

## 2024-06-18 DIAGNOSIS — C79.51 SECONDARY MALIGNANT NEOPLASM OF BONE: ICD-10-CM

## 2024-06-18 DIAGNOSIS — M25.552 PAIN IN RIGHT HIP: ICD-10-CM

## 2024-06-18 PROCEDURE — 99213 OFFICE O/P EST LOW 20 MIN: CPT

## 2024-06-18 PROCEDURE — 73521 X-RAY EXAM HIPS BI 2 VIEWS: CPT

## 2024-06-18 NOTE — DISCUSSION/SUMMARY
[de-identified] : Patient has extensive metastatic disease. We will reserve surgery to help for prophylactic measures or treating any fracture, which she does not have at this time. She does not have enough pain currently to warrant total hip arthroplasty. She has some spinal and lumbar radiculopathy as well.  Regarding 5th metatarsal tenderness, she may pursue wearing a more supportive shoe.  Follow up in 3-4 months for radiographic and clinical surveillance.  If imaging or pathology/biopsy was ordered, the patient was told to make an appointment to review findings right after all imaging is completed.  We discussed risks, benefits and alternatives. Rationale of care was reviewed and all questions were answered. Patient (and family) had all questions answered to her degree of the level of satisfaction. Patient (and family) expressed understanding and interest in proceeding with the plan as outlined.

## 2024-06-18 NOTE — HISTORY OF PRESENT ILLNESS
[1] : currently ~his/her~ pain is 1 out of 10 [Sitting] : sitting [FreeTextEntry1] : Patient reports mild right hip pain today, otherwise feeling well overall. She is able to walk more than 6 miles without notable issues. She has no left hip pain.

## 2024-06-18 NOTE — END OF VISIT
[FreeTextEntry3] : All medical record entries made by the Scribe were at my, Dr. Joon Hernandez, direction and personally dictated by me on 06/18/2024. I have reviewed the chart and agree that the record accurately reflects my personal performance of the history, physical exam, assessment and plan. I have also personally directed, reviewed, and agreed with the chart.

## 2024-06-18 NOTE — PHYSICAL EXAM
[General Appearance - Well-Appearing] : Well appearing [General Appearance - Well Nourished] : well nourished [Oriented To Time, Place, And Person] : Oriented to person, place, and time [Sclera] : the sclera and conjunctiva were normal [Neck Cervical Mass (___cm)] : no neck mass was observed [Heart Rate And Rhythm] : heart rate was normal and rhythm regular [] : No respiratory distress [Abdomen Soft] : Soft [Normal Station and Gait] : gait and station were normal [FreeTextEntry1] : Patient stands in good balance. Some tenderness to palpation along the left posterior sacroiliac spine and left gluteus along the left greater trochanter, laterally more than posteriorly. Minimally tender proximal femur. Single leg stand with some pain with flexion with glutes, on the left with no pain. Some tenderness at the base of the 5th metatarsal on the right.

## 2024-06-18 NOTE — ADDENDUM
[FreeTextEntry1] : I, Richard Parisi, documented this note as a scribe on behalf of Dr. Joon Hernandez on 06/18/2024.

## 2024-06-18 NOTE — DATA REVIEWED
[de-identified] : X-rays 06/18/2024, AP pelvis and bilateral hips, show scattered lucencies about the visualized skeleton including the right cubic root at the medial acetabulum and the right proximal femur at the grater trochanter and the posterior cortex with some thinning of the cortex and no obvious fracture.  -----  MR left hip 6/8/24, right hip 6/8/24  FINDINGS: OSSEOUS STRUCTURES Fractures/Stress Reactions:  None. Marrow:  Numerous enhancing osseous metastases are present throughout the visualized pelvis and hips. On the right there is greatest involvement of the ischium and intertrochanteric involvement approaches 50% bone width. On the left there is extensive involvement of the iliac wing and complete replacement of the femoral neck and intertrochanteric region that may increase the patient's risk for pathologic fracture.  RIGHT HIP JOINT Avascular Necrosis:  None. Articular Cartilage:  There are a few cartilage fissuring is noted superomedially along the acetabulum. Effusion/Synovitis:  None.  RIGHT HIP TENDONS Gluteus Minimus Tendon:  Moderate tendinopathy is present with mild intrasubstance tearing. Gluteus Medius Tendon:  Intact. Iliopsoas Tendon:  Intact. Common Hamstring Tendon:  Mild tendinopathy is present with slight intrasubstance tearing. Bursa:  There is mild edema and enhancement involving the greater trochanteric and subgluteus minimus and medius bursa.  LEFT HIP JOINT Avascular Necrosis:  None. Articular Cartilage:  Grossly preserved given the large field of view. Effusion/Synovitis:  Small effusion is present with mild-to-moderate synovial enhancement/synovitis.  LEFT HIP TENDONS Gluteus Minimus Tendon:  Mild tendinopathy is present with mild intrasubstance tearing. Gluteus Medius Tendon:  Intact. Iliopsoas Tendon:  Intact. Common Hamstring Tendon:  Mild tendinopathy is present. Bursa:  There is mild edema and enhancement of the greater trochanteric and subgluteus minimus bursa.  SOFT TISSUES Pelvis/Abdomen:  There are postsurgical changes around both inguinal canals with susceptibility foci. Musculature:  Preserved. Edema/periostitis is present along the deep margin of the left iliacus muscle and iliac wing at the site of the metastatic disease. Subcutaneous Tissues:  Unremarkable.  NEUROVASCULAR STRUCTURES Sciatic Nerves: Unremarkable.  IMPRESSION: 1.  Extensive osseous metastases are again seen including complete replacement of the left femoral neck and intertrochanteric region. 2.  Changes may increase the patient's risk for pathologic fractures. 3.  Small left hip effusion is present with mild-to-moderate synovitis. 4.  There is edema/periostitis along the deep margin of the left iliacus muscle and iliac wing at the site of metastatic disease. 5.  Bilateral gluteus minimus tendinopathy is present with surrounding bursal edema/bursitis. 6.  There is mild bilateral common hamstring tendinopathy.  -----  PET/CT 5/1/24 IMPRESSION: Study appears abnormal. Please note that patient's current medication regimen may decrease sensitivity of lesion detection due to possible interference at estrogen receptor binding sites. 1.  Multiple osseous foci involving the axial and appendicular skeleton, including lucent and lytic lesions with soft tissue components. No gross evidence of fracture. These findings appear similar in distribution to most recent FDG study, with the exclusion of the skull which was not completely within the field of view. 2.  Infiltrative-appearing soft tissue in the RIGHT axilla and retropectoral regions, with mild uptake overlying its along with adjacent musculature. This appears similar in distribution on CT as well as on PET and correlated with prior FDG exam. Sonography and biopsy may be helpful if there is suspicion for active malignancy there. 3.  No abnormal uptake in the breasts. 4.  No additional suspicious findings.

## 2024-06-24 ENCOUNTER — APPOINTMENT (OUTPATIENT)
Dept: INTERNAL MEDICINE | Facility: CLINIC | Age: 64
End: 2024-06-24

## 2024-07-02 ENCOUNTER — APPOINTMENT (OUTPATIENT)
Dept: HEMATOLOGY ONCOLOGY | Facility: CLINIC | Age: 64
End: 2024-07-02

## 2024-07-02 ENCOUNTER — TRANSCRIPTION ENCOUNTER (OUTPATIENT)
Age: 64
End: 2024-07-02

## 2024-07-02 ENCOUNTER — RESULT REVIEW (OUTPATIENT)
Age: 64
End: 2024-07-02

## 2024-07-02 LAB
ALBUMIN SERPL ELPH-MCNC: 4.4 G/DL
ALP BLD-CCNC: 75 U/L
ALT SERPL-CCNC: 32 U/L
ANION GAP SERPL CALC-SCNC: 13 MMOL/L
AST SERPL-CCNC: 36 U/L
BILIRUB SERPL-MCNC: 0.3 MG/DL
BUN SERPL-MCNC: 21 MG/DL
CALCIUM SERPL-MCNC: 10.3 MG/DL
CEA SERPL-MCNC: 45.4 NG/ML
CHLORIDE SERPL-SCNC: 101 MMOL/L
CO2 SERPL-SCNC: 26 MMOL/L
CREAT SERPL-MCNC: 0.97 MG/DL
EGFR: 65 ML/MIN/1.73M2
GLUCOSE SERPL-MCNC: 108 MG/DL
POTASSIUM SERPL-SCNC: 4.3 MMOL/L
PROT SERPL-MCNC: 6.6 G/DL
SODIUM SERPL-SCNC: 140 MMOL/L

## 2024-07-03 ENCOUNTER — APPOINTMENT (OUTPATIENT)
Dept: INFUSION THERAPY | Facility: HOSPITAL | Age: 64
End: 2024-07-03

## 2024-07-03 ENCOUNTER — APPOINTMENT (OUTPATIENT)
Dept: HEMATOLOGY ONCOLOGY | Facility: CLINIC | Age: 64
End: 2024-07-03
Payer: COMMERCIAL

## 2024-07-03 VITALS
TEMPERATURE: 97.1 F | WEIGHT: 150.99 LBS | HEART RATE: 73 BPM | RESPIRATION RATE: 16 BRPM | DIASTOLIC BLOOD PRESSURE: 86 MMHG | BODY MASS INDEX: 30.5 KG/M2 | OXYGEN SATURATION: 98 % | SYSTOLIC BLOOD PRESSURE: 134 MMHG

## 2024-07-03 DIAGNOSIS — C79.51 SECONDARY MALIGNANT NEOPLASM OF BONE: ICD-10-CM

## 2024-07-03 DIAGNOSIS — C50.919 MALIGNANT NEOPLASM OF UNSPECIFIED SITE OF UNSPECIFIED FEMALE BREAST: ICD-10-CM

## 2024-07-03 PROCEDURE — G2211 COMPLEX E/M VISIT ADD ON: CPT

## 2024-07-03 PROCEDURE — 99214 OFFICE O/P EST MOD 30 MIN: CPT

## 2024-07-14 ENCOUNTER — OUTPATIENT (OUTPATIENT)
Dept: OUTPATIENT SERVICES | Facility: HOSPITAL | Age: 64
LOS: 1 days | End: 2024-07-14
Payer: COMMERCIAL

## 2024-07-14 DIAGNOSIS — C50.919 MALIGNANT NEOPLASM OF UNSPECIFIED SITE OF UNSPECIFIED FEMALE BREAST: ICD-10-CM

## 2024-07-14 DIAGNOSIS — Z98.89 OTHER SPECIFIED POSTPROCEDURAL STATES: Chronic | ICD-10-CM

## 2024-07-14 DIAGNOSIS — Z90.13 ACQUIRED ABSENCE OF BILATERAL BREASTS AND NIPPLES: Chronic | ICD-10-CM

## 2024-07-14 DIAGNOSIS — C79.51 SECONDARY MALIGNANT NEOPLASM OF BONE: ICD-10-CM

## 2024-07-14 DIAGNOSIS — Z92.21 PERSONAL HISTORY OF ANTINEOPLASTIC CHEMOTHERAPY: Chronic | ICD-10-CM

## 2024-07-14 PROCEDURE — 71260 CT THORAX DX C+: CPT

## 2024-07-14 PROCEDURE — 74177 CT ABD & PELVIS W/CONTRAST: CPT

## 2024-07-14 PROCEDURE — 71260 CT THORAX DX C+: CPT | Mod: 26

## 2024-07-14 PROCEDURE — 74177 CT ABD & PELVIS W/CONTRAST: CPT | Mod: 26

## 2024-07-15 ENCOUNTER — TRANSCRIPTION ENCOUNTER (OUTPATIENT)
Age: 64
End: 2024-07-15

## 2024-07-19 ENCOUNTER — NON-APPOINTMENT (OUTPATIENT)
Age: 64
End: 2024-07-19

## 2024-07-19 DIAGNOSIS — K83.8 OTHER SPECIFIED DISEASES OF BILIARY TRACT: ICD-10-CM

## 2024-07-30 ENCOUNTER — RESULT REVIEW (OUTPATIENT)
Age: 64
End: 2024-07-30

## 2024-07-30 ENCOUNTER — APPOINTMENT (OUTPATIENT)
Dept: MRI IMAGING | Facility: IMAGING CENTER | Age: 64
End: 2024-07-30
Payer: COMMERCIAL

## 2024-07-30 ENCOUNTER — APPOINTMENT (OUTPATIENT)
Dept: HEMATOLOGY ONCOLOGY | Facility: CLINIC | Age: 64
End: 2024-07-30

## 2024-07-30 ENCOUNTER — OUTPATIENT (OUTPATIENT)
Dept: OUTPATIENT SERVICES | Facility: HOSPITAL | Age: 64
LOS: 1 days | End: 2024-07-30
Payer: COMMERCIAL

## 2024-07-30 DIAGNOSIS — Z98.89 OTHER SPECIFIED POSTPROCEDURAL STATES: Chronic | ICD-10-CM

## 2024-07-30 DIAGNOSIS — C79.51 SECONDARY MALIGNANT NEOPLASM OF BONE: ICD-10-CM

## 2024-07-30 DIAGNOSIS — Z92.21 PERSONAL HISTORY OF ANTINEOPLASTIC CHEMOTHERAPY: Chronic | ICD-10-CM

## 2024-07-30 DIAGNOSIS — C50.919 MALIGNANT NEOPLASM OF UNSPECIFIED SITE OF UNSPECIFIED FEMALE BREAST: ICD-10-CM

## 2024-07-30 DIAGNOSIS — Z90.13 ACQUIRED ABSENCE OF BILATERAL BREASTS AND NIPPLES: Chronic | ICD-10-CM

## 2024-07-30 PROCEDURE — 70553 MRI BRAIN STEM W/O & W/DYE: CPT | Mod: 26

## 2024-07-30 PROCEDURE — 70553 MRI BRAIN STEM W/O & W/DYE: CPT

## 2024-07-30 PROCEDURE — A9585: CPT

## 2024-07-31 ENCOUNTER — APPOINTMENT (OUTPATIENT)
Dept: HEMATOLOGY ONCOLOGY | Facility: CLINIC | Age: 64
End: 2024-07-31
Payer: COMMERCIAL

## 2024-07-31 ENCOUNTER — APPOINTMENT (OUTPATIENT)
Dept: INFUSION THERAPY | Facility: HOSPITAL | Age: 64
End: 2024-07-31

## 2024-07-31 VITALS
DIASTOLIC BLOOD PRESSURE: 86 MMHG | SYSTOLIC BLOOD PRESSURE: 143 MMHG | OXYGEN SATURATION: 98 % | RESPIRATION RATE: 16 BRPM | BODY MASS INDEX: 29.39 KG/M2 | HEART RATE: 69 BPM | WEIGHT: 145.48 LBS | TEMPERATURE: 97 F

## 2024-07-31 DIAGNOSIS — C79.51 SECONDARY MALIGNANT NEOPLASM OF BONE: ICD-10-CM

## 2024-07-31 DIAGNOSIS — C50.919 MALIGNANT NEOPLASM OF UNSPECIFIED SITE OF UNSPECIFIED FEMALE BREAST: ICD-10-CM

## 2024-07-31 DIAGNOSIS — C41.9 MALIGNANT NEOPLASM OF BONE AND ARTICULAR CARTILAGE, UNSPECIFIED: ICD-10-CM

## 2024-07-31 DIAGNOSIS — K83.8 OTHER SPECIFIED DISEASES OF BILIARY TRACT: ICD-10-CM

## 2024-07-31 DIAGNOSIS — R10.11 RIGHT UPPER QUADRANT PAIN: ICD-10-CM

## 2024-07-31 PROCEDURE — 99214 OFFICE O/P EST MOD 30 MIN: CPT

## 2024-07-31 PROCEDURE — G2211 COMPLEX E/M VISIT ADD ON: CPT

## 2024-08-02 PROBLEM — R10.11 ABDOMINAL PAIN, ACUTE, RIGHT UPPER QUADRANT: Status: ACTIVE | Noted: 2024-08-02

## 2024-08-02 NOTE — HISTORY OF PRESENT ILLNESS
[Disease: _____________________] : Disease: [unfilled] [T: ___] : T[unfilled] [N: ___] : N[unfilled] [M: ___] : M[unfilled] [AJCC Stage: ____] : AJCC Stage: [unfilled] [de-identified] : Age 53: screen detected: 9:00 3 cm FN ill-defined solid mass R breast: 14 x 14mm and second irregular area 9:00 6 cm FN. She had u/s guided biopsy of the irregular R breast showing 9:00 3 cm: invasive moderate differentiated lobular carcinoma SBR 6/9 measuring at least 1.2 cm; and 9:00 6 cm FN: invasive moderately differentiated carcinoma lobular carcinoma measuring at least 0.5 cm. She had B/L MRM/ [facility] (Dr. Martine Rico) for right breast cancer 1.8 cm ILC, 2+ / 8 LNs, ER+ [place]+ her 2 rafiq negative. With Dr Romero, she underwent adjuvant chemotherapy with dd AC-T 12 /2013 followed by nipple reconstruction. Began anastrozole 4/2014 with initial moderate arthralgias that gradually decreased to a "tolerable" level.  She completed a 7-year course of anastrozole in 3/21 and was monitored off therapy.   Age 63: 6/2023 seen by derm for a new R chest wall "reddish pimple" - bx returned c/w met breast cancer, invasive mod diff ILC in dermis, ER+100% NC- Her 2 rafiq (-).  EOD w/u was done: Pet/ CT showed mildly avid R axillary mass with infiltration of the adjacent structures consistent with chest wall recurrence, diffuse mild moderate uptake throughout the skeleton: T4, T6, T7, L1, L2. With Dr Naranjo, she was started on ribociclib/fulvestrant 6/27/23. She had continued rise in tumor markers without any new changes on CT or Pet/ CT imaging. She had imaging done on 7/14/2024 which showed mild intrahepatic and extrahepatic dilatation.   [de-identified] : invasive lobular carcinoma %, MA negative, Her2 negative (1) Ki67 15%  [de-identified] : Genomic testin2023 Guardant 360dx revealed no ESR1 mutation. Test showed VUS in FOREST and ARID1A genes.   Kisqali/ Faslodex 2023 to present Xgeva 2024 to present  [de-identified] : She continues to feel well with swimming and exercise. She has RUQ pain: feels better today. No N/V after eating. She had MRi of the brain and will schedule MRCP to further evaluate bile ducts that had mild dilatation last evaluation.

## 2024-08-02 NOTE — PHYSICAL EXAM
[Fully active, able to carry on all pre-disease performance without restriction] : Status 0 - Fully active, able to carry on all pre-disease performance without restriction [Normal] : affect appropriate [de-identified] : B mastectomy with reconstruction; no new chest wall nodules or axillary LN palpable  [de-identified] : RUQ pain on palpation; no rebound or guarding

## 2024-08-02 NOTE — REVIEW OF SYSTEMS
[Abdominal Pain] : abdominal pain [Diarrhea: Grade 0] : Diarrhea: Grade 0 [Negative] : Allergic/Immunologic [Vomiting] : no vomiting [Constipation] : no constipation [FreeTextEntry7] : CHANEL

## 2024-08-02 NOTE — REASON FOR VISIT
[Follow-Up Visit] : a follow-up [Spouse] : spouse [FreeTextEntry2] : follow up for metastatic breast cancer to the bones on Kisqali/ letrozole and denosumab

## 2024-08-02 NOTE — HISTORY OF PRESENT ILLNESS
[Disease: _____________________] : Disease: [unfilled] [T: ___] : T[unfilled] [N: ___] : N[unfilled] [M: ___] : M[unfilled] [AJCC Stage: ____] : AJCC Stage: [unfilled] [de-identified] : Age 53: screen detected: 9:00 3 cm FN ill-defined solid mass R breast: 14 x 14mm and second irregular area 9:00 6 cm FN. She had u/s guided biopsy of the irregular R breast showing 9:00 3 cm: invasive moderate differentiated lobular carcinoma SBR 6/9 measuring at least 1.2 cm; and 9:00 6 cm FN: invasive moderately differentiated carcinoma lobular carcinoma measuring at least 0.5 cm. She had B/L MRM/ [facility] (Dr. Martine Rico) for right breast cancer 1.8 cm ILC, 2+ / 8 LNs, ER+ [place]+ her 2 rafiq negative. With Dr Romero, she underwent adjuvant chemotherapy with dd AC-T 12 /2013 followed by nipple reconstruction. Began anastrozole 4/2014 with initial moderate arthralgias that gradually decreased to a "tolerable" level.  She completed a 7-year course of anastrozole in 3/21 and was monitored off therapy.   Age 63: 6/2023 seen by derm for a new R chest wall "reddish pimple" - bx returned c/w met breast cancer, invasive mod diff ILC in dermis, ER+100% [place]- Her 2 rafiq (-).  EOD w/u was done: Pet/ CT showed mildly avid R axillary mass with infiltration of the adjacent structures consistent with chest wall recurrence, diffuse mild moderate uptake throughout the skeleton: T4, T6, T7, L1, L2. With Dr Naranjo, she was started on ribociclib/fulvestrant 6/27/23. She had continued rise in tumor markers without any new changes on CT or Pet/ CT imaging. She had imaging done on 7/14/2024 which showed mild intrahepatic and extrahepatic dilatation.   [de-identified] : invasive lobular carcinoma %, MN negative, Her2 negative (1) Ki67 15%  [de-identified] : Genomic testin2023 Guardant 360dx revealed no ESR1 mutation. Test showed VUS in FOREST and ARID1A genes.   Kisqali/ Faslodex 2023 to present Xgeva 2024 to present  [de-identified] : She continues to feel well with swimming and exercise. She has RUQ pain: feels better today. No N/V after eating. She had MRi of the brain and will schedule MRCP to further evaluate bile ducts that had mild dilatation last evaluation.

## 2024-08-02 NOTE — PHYSICAL EXAM
[Fully active, able to carry on all pre-disease performance without restriction] : Status 0 - Fully active, able to carry on all pre-disease performance without restriction [Normal] : affect appropriate [de-identified] : B mastectomy with reconstruction; no new chest wall nodules or axillary LN palpable  [de-identified] : RUQ pain on palpation; no rebound or guarding

## 2024-08-06 ENCOUNTER — APPOINTMENT (OUTPATIENT)
Dept: MRI IMAGING | Facility: CLINIC | Age: 64
End: 2024-08-06

## 2024-08-06 ENCOUNTER — NON-APPOINTMENT (OUTPATIENT)
Age: 64
End: 2024-08-06

## 2024-08-06 ENCOUNTER — TRANSCRIPTION ENCOUNTER (OUTPATIENT)
Age: 64
End: 2024-08-06

## 2024-08-06 ENCOUNTER — OUTPATIENT (OUTPATIENT)
Dept: OUTPATIENT SERVICES | Facility: HOSPITAL | Age: 64
LOS: 1 days | End: 2024-08-06
Payer: COMMERCIAL

## 2024-08-06 DIAGNOSIS — Z90.13 ACQUIRED ABSENCE OF BILATERAL BREASTS AND NIPPLES: Chronic | ICD-10-CM

## 2024-08-06 DIAGNOSIS — K83.8 OTHER SPECIFIED DISEASES OF BILIARY TRACT: ICD-10-CM

## 2024-08-06 DIAGNOSIS — Z98.89 OTHER SPECIFIED POSTPROCEDURAL STATES: Chronic | ICD-10-CM

## 2024-08-06 DIAGNOSIS — C50.919 MALIGNANT NEOPLASM OF UNSPECIFIED SITE OF UNSPECIFIED FEMALE BREAST: ICD-10-CM

## 2024-08-06 DIAGNOSIS — Z92.21 PERSONAL HISTORY OF ANTINEOPLASTIC CHEMOTHERAPY: Chronic | ICD-10-CM

## 2024-08-06 PROCEDURE — 74183 MRI ABD W/O CNTR FLWD CNTR: CPT | Mod: 26

## 2024-08-06 PROCEDURE — 74183 MRI ABD W/O CNTR FLWD CNTR: CPT

## 2024-08-06 PROCEDURE — A9585: CPT

## 2024-08-07 ENCOUNTER — TRANSCRIPTION ENCOUNTER (OUTPATIENT)
Age: 64
End: 2024-08-07

## 2024-08-09 ENCOUNTER — APPOINTMENT (OUTPATIENT)
Dept: HEMATOLOGY ONCOLOGY | Facility: CLINIC | Age: 64
End: 2024-08-09

## 2024-08-13 DIAGNOSIS — K21.9 GASTRO-ESOPHAGEAL REFLUX DISEASE W/OUT ESOPHAGITIS: ICD-10-CM

## 2024-08-13 DIAGNOSIS — K76.9 LIVER DISEASE, UNSPECIFIED: ICD-10-CM

## 2024-08-13 RX ORDER — PANTOPRAZOLE 20 MG/1
20 TABLET, DELAYED RELEASE ORAL DAILY
Qty: 30 | Refills: 1 | Status: ACTIVE | COMMUNITY
Start: 2024-08-13 | End: 1900-01-01

## 2024-08-15 ENCOUNTER — APPOINTMENT (OUTPATIENT)
Dept: GASTROENTEROLOGY | Facility: CLINIC | Age: 64
End: 2024-08-15
Payer: COMMERCIAL

## 2024-08-15 VITALS
OXYGEN SATURATION: 99 % | HEIGHT: 59 IN | WEIGHT: 146 LBS | HEART RATE: 84 BPM | DIASTOLIC BLOOD PRESSURE: 82 MMHG | BODY MASS INDEX: 29.43 KG/M2 | SYSTOLIC BLOOD PRESSURE: 145 MMHG

## 2024-08-15 DIAGNOSIS — R10.9 UNSPECIFIED ABDOMINAL PAIN: ICD-10-CM

## 2024-08-15 PROCEDURE — G2211 COMPLEX E/M VISIT ADD ON: CPT

## 2024-08-15 PROCEDURE — 99214 OFFICE O/P EST MOD 30 MIN: CPT

## 2024-08-15 NOTE — ASSESSMENT
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

## 2024-08-15 NOTE — PHYSICAL EXAM
[Normal] : alert, normal voice/communication, healthy appearing, no acute distress [de-identified] : Tender right upper quadrant and epigastrium

## 2024-08-15 NOTE — PHYSICAL EXAM
[Normal] : alert, normal voice/communication, healthy appearing, no acute distress [de-identified] : Tender right upper quadrant and epigastrium

## 2024-08-15 NOTE — REVIEW OF SYSTEMS
[Fever] : no fever [Chills] : no chills [As Noted in HPI] : as noted in HPI [Negative] : Respiratory Negative

## 2024-08-15 NOTE — PHYSICAL EXAM
[Normal] : alert, normal voice/communication, healthy appearing, no acute distress [de-identified] : Tender right upper quadrant and epigastrium

## 2024-08-15 NOTE — HISTORY OF PRESENT ILLNESS
[FreeTextEntry1] : Patient has been treated for metastatic breast cancer to bone  Most recently on Kisqali - Which she takes daily for 3 weeks and then is off for 1 week  She has been experiencing several abdominal pains For the last 2 months she has been experiencing epigastric pain immediately upon swallowing There is no dysphagia The pain lasts a few minutes Taking Pepcid in advance of meals is helpful  She has also been experiencing right upper quadrant constant pain unrelated to meals  Additionally, she has a sense of chronic nausea Her weight has remained stable (that she may have lost a pound or 2 because she is swimming more over the summertime  Bowel movements have been regular  CT abdomen pelvis (7/14/2024):Stable few subcentimeter pulmonary nodules in the left lower lobe. Mild intrahepatic and extra hepatic biliary ductal dilatation, new from the prior exam. Contrast-enhanced MRI/MRCP can be performed for further evaluation. Osseous metastatic disease, without significant change. Lytic lesion in the left femoral neck, increases risk for pathologic fracture.  MRCP  (8/6/2024): Intrahepatic and extra hepatic biliary ductal dilatation without evidence of choledocholithiasis or obstructing lesion. This is nonspecific and of uncertain etiology. Either attention on follow-up or further evaluation with ERCP is suggested.  There are 2 subtle lesions within the central liver and right hepatic lobe, which are indeterminate. Recommend either attention on follow-up imaging or further evaluation with PET/CT.  Osseous metastatic disease is noted throughout the spine and pelvis.

## 2024-08-16 ENCOUNTER — APPOINTMENT (OUTPATIENT)
Dept: NUCLEAR MEDICINE | Facility: IMAGING CENTER | Age: 64
End: 2024-08-16
Payer: COMMERCIAL

## 2024-08-16 PROCEDURE — 78306 BONE IMAGING WHOLE BODY: CPT | Mod: 26

## 2024-08-19 ENCOUNTER — TRANSCRIPTION ENCOUNTER (OUTPATIENT)
Age: 64
End: 2024-08-19

## 2024-08-19 ENCOUNTER — APPOINTMENT (OUTPATIENT)
Dept: GASTROENTEROLOGY | Facility: CLINIC | Age: 64
End: 2024-08-19

## 2024-08-22 ENCOUNTER — NON-APPOINTMENT (OUTPATIENT)
Age: 64
End: 2024-08-22

## 2024-08-23 ENCOUNTER — TRANSCRIPTION ENCOUNTER (OUTPATIENT)
Age: 64
End: 2024-08-23

## 2024-08-24 ENCOUNTER — APPOINTMENT (OUTPATIENT)
Dept: NUCLEAR MEDICINE | Facility: CLINIC | Age: 64
End: 2024-08-24

## 2024-08-24 PROCEDURE — A9552: CPT

## 2024-08-24 PROCEDURE — 78815 PET IMAGE W/CT SKULL-THIGH: CPT | Mod: PS

## 2024-08-26 ENCOUNTER — APPOINTMENT (OUTPATIENT)
Dept: GASTROENTEROLOGY | Facility: CLINIC | Age: 64
End: 2024-08-26
Payer: COMMERCIAL

## 2024-08-26 ENCOUNTER — TRANSCRIPTION ENCOUNTER (OUTPATIENT)
Age: 64
End: 2024-08-26

## 2024-08-26 ENCOUNTER — LABORATORY RESULT (OUTPATIENT)
Age: 64
End: 2024-08-26

## 2024-08-26 ENCOUNTER — APPOINTMENT (OUTPATIENT)
Dept: AFTER HOURS CARE | Facility: EMERGENCY ROOM | Age: 64
End: 2024-08-26

## 2024-08-26 PROCEDURE — 43239 EGD BIOPSY SINGLE/MULTIPLE: CPT

## 2024-08-27 ENCOUNTER — APPOINTMENT (OUTPATIENT)
Dept: HEMATOLOGY ONCOLOGY | Facility: CLINIC | Age: 64
End: 2024-08-27

## 2024-08-27 ENCOUNTER — INPATIENT (INPATIENT)
Facility: HOSPITAL | Age: 64
LOS: 3 days | Discharge: HOME | End: 2024-08-31
Attending: STUDENT IN AN ORGANIZED HEALTH CARE EDUCATION/TRAINING PROGRAM | Admitting: STUDENT IN AN ORGANIZED HEALTH CARE EDUCATION/TRAINING PROGRAM
Payer: COMMERCIAL

## 2024-08-27 ENCOUNTER — NON-APPOINTMENT (OUTPATIENT)
Age: 64
End: 2024-08-27

## 2024-08-27 ENCOUNTER — OUTPATIENT (OUTPATIENT)
Dept: OUTPATIENT SERVICES | Facility: HOSPITAL | Age: 64
LOS: 1 days | Discharge: HOME | End: 2024-08-27

## 2024-08-27 VITALS
RESPIRATION RATE: 15 BRPM | DIASTOLIC BLOOD PRESSURE: 60 MMHG | HEART RATE: 77 BPM | WEIGHT: 145.06 LBS | SYSTOLIC BLOOD PRESSURE: 124 MMHG | TEMPERATURE: 98 F | OXYGEN SATURATION: 100 %

## 2024-08-27 DIAGNOSIS — K85.90 ACUTE PANCREATITIS WITHOUT NECROSIS OR INFECTION, UNSPECIFIED: ICD-10-CM

## 2024-08-27 DIAGNOSIS — C50.919 MALIGNANT NEOPLASM OF UNSPECIFIED SITE OF UNSPECIFIED FEMALE BREAST: ICD-10-CM

## 2024-08-27 DIAGNOSIS — Z90.13 ACQUIRED ABSENCE OF BILATERAL BREASTS AND NIPPLES: Chronic | ICD-10-CM

## 2024-08-27 DIAGNOSIS — Z92.21 PERSONAL HISTORY OF ANTINEOPLASTIC CHEMOTHERAPY: Chronic | ICD-10-CM

## 2024-08-27 DIAGNOSIS — Z29.9 ENCOUNTER FOR PROPHYLACTIC MEASURES, UNSPECIFIED: ICD-10-CM

## 2024-08-27 DIAGNOSIS — Z98.89 OTHER SPECIFIED POSTPROCEDURAL STATES: Chronic | ICD-10-CM

## 2024-08-27 LAB
ADD ON TEST-SPECIMEN IN LAB: SIGNIFICANT CHANGE UP
ALBUMIN SERPL ELPH-MCNC: 3.9 G/DL — SIGNIFICANT CHANGE UP (ref 3.3–5)
ALP SERPL-CCNC: 227 U/L — HIGH (ref 40–120)
ALT FLD-CCNC: 119 U/L — HIGH (ref 4–33)
ANION GAP SERPL CALC-SCNC: 13 MMOL/L — SIGNIFICANT CHANGE UP (ref 7–14)
AST SERPL-CCNC: 57 U/L — HIGH (ref 4–32)
BASOPHILS # BLD AUTO: 0.03 K/UL — SIGNIFICANT CHANGE UP (ref 0–0.2)
BASOPHILS NFR BLD AUTO: 0.5 % — SIGNIFICANT CHANGE UP (ref 0–2)
BILIRUB SERPL-MCNC: 0.4 MG/DL — SIGNIFICANT CHANGE UP (ref 0.2–1.2)
BLOOD GAS VENOUS COMPREHENSIVE RESULT: SIGNIFICANT CHANGE UP
BUN SERPL-MCNC: 12 MG/DL — SIGNIFICANT CHANGE UP (ref 7–23)
CALCIUM SERPL-MCNC: 9.1 MG/DL — SIGNIFICANT CHANGE UP (ref 8.4–10.5)
CHLORIDE SERPL-SCNC: 103 MMOL/L — SIGNIFICANT CHANGE UP (ref 98–107)
CO2 SERPL-SCNC: 25 MMOL/L — SIGNIFICANT CHANGE UP (ref 22–31)
CREAT SERPL-MCNC: 0.84 MG/DL — SIGNIFICANT CHANGE UP (ref 0.5–1.3)
EGFR: 78 ML/MIN/1.73M2 — SIGNIFICANT CHANGE UP
EGFR: 78 ML/MIN/1.73M2 — SIGNIFICANT CHANGE UP
EOSINOPHIL # BLD AUTO: 0 K/UL — SIGNIFICANT CHANGE UP (ref 0–0.5)
EOSINOPHIL NFR BLD AUTO: 0 % — SIGNIFICANT CHANGE UP (ref 0–6)
GLUCOSE SERPL-MCNC: 164 MG/DL — HIGH (ref 70–99)
HCT VFR BLD CALC: 35.8 % — SIGNIFICANT CHANGE UP (ref 34.5–45)
HGB BLD-MCNC: 12.2 G/DL — SIGNIFICANT CHANGE UP (ref 11.5–15.5)
IANC: 4.04 K/UL — SIGNIFICANT CHANGE UP (ref 1.8–7.4)
IMM GRANULOCYTES NFR BLD AUTO: 0.4 % — SIGNIFICANT CHANGE UP (ref 0–0.9)
LACTATE SERPL-SCNC: 1.6 MMOL/L — SIGNIFICANT CHANGE UP (ref 0.5–2)
LIDOCAIN IGE QN: 2443 U/L — HIGH (ref 7–60)
LYMPHOCYTES # BLD AUTO: 1.08 K/UL — SIGNIFICANT CHANGE UP (ref 1–3.3)
LYMPHOCYTES # BLD AUTO: 19.5 % — SIGNIFICANT CHANGE UP (ref 13–44)
MCHC RBC-ENTMCNC: 33.4 PG — SIGNIFICANT CHANGE UP (ref 27–34)
MCHC RBC-ENTMCNC: 34.1 GM/DL — SIGNIFICANT CHANGE UP (ref 32–36)
MCV RBC AUTO: 98.1 FL — SIGNIFICANT CHANGE UP (ref 80–100)
MONOCYTES # BLD AUTO: 0.36 K/UL — SIGNIFICANT CHANGE UP (ref 0–0.9)
MONOCYTES NFR BLD AUTO: 6.5 % — SIGNIFICANT CHANGE UP (ref 2–14)
NEUTROPHILS # BLD AUTO: 4.04 K/UL — SIGNIFICANT CHANGE UP (ref 1.8–7.4)
NEUTROPHILS NFR BLD AUTO: 73.1 % — SIGNIFICANT CHANGE UP (ref 43–77)
NRBC # BLD AUTO: 0 K/UL — SIGNIFICANT CHANGE UP (ref 0–0)
NRBC # BLD: 0 /100 WBCS — SIGNIFICANT CHANGE UP (ref 0–0)
NRBC # FLD: 0 K/UL — SIGNIFICANT CHANGE UP (ref 0–0)
NRBC BLD-RTO: 0 /100 WBCS — SIGNIFICANT CHANGE UP (ref 0–0)
PLATELET # BLD AUTO: 224 K/UL — SIGNIFICANT CHANGE UP (ref 150–400)
POTASSIUM SERPL-MCNC: 3.6 MMOL/L — SIGNIFICANT CHANGE UP (ref 3.5–5.3)
POTASSIUM SERPL-SCNC: 3.6 MMOL/L — SIGNIFICANT CHANGE UP (ref 3.5–5.3)
PROT SERPL-MCNC: 6.1 G/DL — SIGNIFICANT CHANGE UP (ref 6–8.3)
RBC # BLD: 3.65 M/UL — LOW (ref 3.8–5.2)
RBC # FLD: 15.1 % — HIGH (ref 10.3–14.5)
SODIUM SERPL-SCNC: 141 MMOL/L — SIGNIFICANT CHANGE UP (ref 135–145)
WBC # BLD: 5.53 K/UL — SIGNIFICANT CHANGE UP (ref 3.8–10.5)
WBC # FLD AUTO: 5.53 K/UL — SIGNIFICANT CHANGE UP (ref 3.8–10.5)

## 2024-08-27 PROCEDURE — 74177 CT ABD & PELVIS W/CONTRAST: CPT | Mod: 26,MC

## 2024-08-27 PROCEDURE — 99223 1ST HOSP IP/OBS HIGH 75: CPT

## 2024-08-27 PROCEDURE — 99285 EMERGENCY DEPT VISIT HI MDM: CPT

## 2024-08-27 RX ORDER — ONDANSETRON HCL/PF 4 MG/2 ML
4 VIAL (ML) INJECTION ONCE
Refills: 0 | Status: COMPLETED | OUTPATIENT
Start: 2024-08-27 | End: 2024-08-27

## 2024-08-27 RX ORDER — BISACODYL 5 MG
5 TABLET, DELAYED RELEASE (ENTERIC COATED) ORAL DAILY
Refills: 0 | Status: DISCONTINUED | OUTPATIENT
Start: 2024-08-27 | End: 2024-08-31

## 2024-08-27 RX ORDER — ONDANSETRON HCL/PF 4 MG/2 ML
4 VIAL (ML) INJECTION EVERY 8 HOURS
Refills: 0 | Status: DISCONTINUED | OUTPATIENT
Start: 2024-08-27 | End: 2024-08-31

## 2024-08-27 RX ORDER — ENOXAPARIN SODIUM 100 MG/ML
40 INJECTION SUBCUTANEOUS EVERY 24 HOURS
Refills: 0 | Status: DISCONTINUED | OUTPATIENT
Start: 2024-08-27 | End: 2024-08-31

## 2024-08-27 RX ORDER — SENNA 187 MG
2 TABLET ORAL AT BEDTIME
Refills: 0 | Status: DISCONTINUED | OUTPATIENT
Start: 2024-08-27 | End: 2024-08-31

## 2024-08-27 RX ORDER — ACETAMINOPHEN 500 MG/5ML
1000 LIQUID (ML) ORAL ONCE
Refills: 0 | Status: COMPLETED | OUTPATIENT
Start: 2024-08-27 | End: 2024-08-27

## 2024-08-27 RX ORDER — POLYETHYLENE GLYCOL 3350 17 G/17G
17 POWDER, FOR SOLUTION ORAL DAILY
Refills: 0 | Status: DISCONTINUED | OUTPATIENT
Start: 2024-08-27 | End: 2024-08-31

## 2024-08-27 RX ORDER — MELATONIN 5 MG
3 TABLET ORAL AT BEDTIME
Refills: 0 | Status: DISCONTINUED | OUTPATIENT
Start: 2024-08-27 | End: 2024-08-31

## 2024-08-27 RX ORDER — ENOXAPARIN SODIUM 100 MG/ML
40 INJECTION SUBCUTANEOUS EVERY 24 HOURS
Refills: 0 | Status: DISCONTINUED | OUTPATIENT
Start: 2024-08-27 | End: 2024-08-27

## 2024-08-27 RX ORDER — MAGNESIUM, ALUMINUM HYDROXIDE 200-200 MG
30 TABLET,CHEWABLE ORAL EVERY 4 HOURS
Refills: 0 | Status: DISCONTINUED | OUTPATIENT
Start: 2024-08-27 | End: 2024-08-31

## 2024-08-27 RX ORDER — NALOXONE HYDROCHLORIDE 0.4 MG/ML
0.4 INJECTION, SOLUTION INTRAMUSCULAR; INTRAVENOUS; SUBCUTANEOUS ONCE
Refills: 0 | Status: DISCONTINUED | OUTPATIENT
Start: 2024-08-27 | End: 2024-08-31

## 2024-08-27 RX ORDER — SODIUM CHLORIDE 9 G/1000ML
1000 INJECTION, SOLUTION INTRAVENOUS
Refills: 0 | Status: DISCONTINUED | OUTPATIENT
Start: 2024-08-27 | End: 2024-08-29

## 2024-08-27 RX ORDER — ACETAMINOPHEN 500 MG/5ML
650 LIQUID (ML) ORAL EVERY 6 HOURS
Refills: 0 | Status: DISCONTINUED | OUTPATIENT
Start: 2024-08-27 | End: 2024-08-31

## 2024-08-27 RX ADMIN — SODIUM CHLORIDE 125 MILLILITER(S): 9 INJECTION, SOLUTION INTRAVENOUS at 14:08

## 2024-08-27 RX ADMIN — Medication 125 MILLILITER(S): at 10:56

## 2024-08-27 RX ADMIN — Medication 1000 MILLILITER(S): at 02:24

## 2024-08-27 RX ADMIN — Medication 2 MILLIGRAM(S): at 21:54

## 2024-08-27 RX ADMIN — Medication 4 MILLIGRAM(S): at 10:30

## 2024-08-27 RX ADMIN — Medication 4 MILLIGRAM(S): at 04:52

## 2024-08-27 RX ADMIN — Medication 4 MILLIGRAM(S): at 07:18

## 2024-08-27 RX ADMIN — Medication 2 TABLET(S): at 21:24

## 2024-08-27 RX ADMIN — SODIUM CHLORIDE 125 MILLILITER(S): 9 INJECTION, SOLUTION INTRAVENOUS at 23:15

## 2024-08-27 RX ADMIN — Medication 4 MILLIGRAM(S): at 09:52

## 2024-08-27 RX ADMIN — SODIUM CHLORIDE 125 MILLILITER(S): 9 INJECTION, SOLUTION INTRAVENOUS at 14:31

## 2024-08-27 RX ADMIN — Medication 400 MILLIGRAM(S): at 02:24

## 2024-08-27 RX ADMIN — Medication 4 MILLIGRAM(S): at 14:13

## 2024-08-27 RX ADMIN — Medication 1000 MILLIGRAM(S): at 07:18

## 2024-08-27 RX ADMIN — Medication 2 MILLIGRAM(S): at 21:24

## 2024-08-27 RX ADMIN — ENOXAPARIN SODIUM 40 MILLIGRAM(S): 100 INJECTION SUBCUTANEOUS at 17:34

## 2024-08-27 RX ADMIN — Medication 4 MILLIGRAM(S): at 02:24

## 2024-08-28 ENCOUNTER — APPOINTMENT (OUTPATIENT)
Dept: HEMATOLOGY ONCOLOGY | Facility: CLINIC | Age: 64
End: 2024-08-28

## 2024-08-28 LAB
ADD ON TEST-SPECIMEN IN LAB: SIGNIFICANT CHANGE UP
ALBUMIN SERPL ELPH-MCNC: 3.3 G/DL — SIGNIFICANT CHANGE UP (ref 3.3–5)
ALP SERPL-CCNC: 174 U/L — HIGH (ref 40–120)
ALT FLD-CCNC: 76 U/L — HIGH (ref 4–33)
ANION GAP SERPL CALC-SCNC: 10 MMOL/L — SIGNIFICANT CHANGE UP (ref 7–14)
AST SERPL-CCNC: 34 U/L — HIGH (ref 4–32)
BASOPHILS # BLD AUTO: 0.03 K/UL — SIGNIFICANT CHANGE UP (ref 0–0.2)
BASOPHILS NFR BLD AUTO: 0.7 % — SIGNIFICANT CHANGE UP (ref 0–2)
BILIRUB SERPL-MCNC: 0.4 MG/DL — SIGNIFICANT CHANGE UP (ref 0.2–1.2)
BUN SERPL-MCNC: 8 MG/DL — SIGNIFICANT CHANGE UP (ref 7–23)
CALCIUM SERPL-MCNC: 8 MG/DL — LOW (ref 8.4–10.5)
CHLORIDE SERPL-SCNC: 108 MMOL/L — HIGH (ref 98–107)
CO2 SERPL-SCNC: 23 MMOL/L — SIGNIFICANT CHANGE UP (ref 22–31)
CREAT SERPL-MCNC: 0.7 MG/DL — SIGNIFICANT CHANGE UP (ref 0.5–1.3)
EGFR: 97 ML/MIN/1.73M2 — SIGNIFICANT CHANGE UP
EGFR: 97 ML/MIN/1.73M2 — SIGNIFICANT CHANGE UP
EOSINOPHIL # BLD AUTO: 0.04 K/UL — SIGNIFICANT CHANGE UP (ref 0–0.5)
EOSINOPHIL NFR BLD AUTO: 0.9 % — SIGNIFICANT CHANGE UP (ref 0–6)
GLUCOSE SERPL-MCNC: 94 MG/DL — SIGNIFICANT CHANGE UP (ref 70–99)
HCT VFR BLD CALC: 31.8 % — LOW (ref 34.5–45)
HGB BLD-MCNC: 11 G/DL — LOW (ref 11.5–15.5)
IANC: 2.45 K/UL — SIGNIFICANT CHANGE UP (ref 1.8–7.4)
IMM GRANULOCYTES NFR BLD AUTO: 0.2 % — SIGNIFICANT CHANGE UP (ref 0–0.9)
LIDOCAIN IGE QN: 191 U/L — HIGH (ref 7–60)
LYMPHOCYTES # BLD AUTO: 1.58 K/UL — SIGNIFICANT CHANGE UP (ref 1–3.3)
LYMPHOCYTES # BLD AUTO: 34.6 % — SIGNIFICANT CHANGE UP (ref 13–44)
MCHC RBC-ENTMCNC: 34.2 PG — HIGH (ref 27–34)
MCHC RBC-ENTMCNC: 34.6 GM/DL — SIGNIFICANT CHANGE UP (ref 32–36)
MCV RBC AUTO: 98.8 FL — SIGNIFICANT CHANGE UP (ref 80–100)
MONOCYTES # BLD AUTO: 0.45 K/UL — SIGNIFICANT CHANGE UP (ref 0–0.9)
MONOCYTES NFR BLD AUTO: 9.9 % — SIGNIFICANT CHANGE UP (ref 2–14)
MRSA PCR RESULT.: SIGNIFICANT CHANGE UP
NEUTROPHILS # BLD AUTO: 2.45 K/UL — SIGNIFICANT CHANGE UP (ref 1.8–7.4)
NEUTROPHILS NFR BLD AUTO: 53.7 % — SIGNIFICANT CHANGE UP (ref 43–77)
NRBC # BLD AUTO: 0 K/UL — SIGNIFICANT CHANGE UP (ref 0–0)
NRBC # BLD: 0 /100 WBCS — SIGNIFICANT CHANGE UP (ref 0–0)
NRBC # FLD: 0 K/UL — SIGNIFICANT CHANGE UP (ref 0–0)
NRBC BLD-RTO: 0 /100 WBCS — SIGNIFICANT CHANGE UP (ref 0–0)
PLATELET # BLD AUTO: 184 K/UL — SIGNIFICANT CHANGE UP (ref 150–400)
POTASSIUM SERPL-MCNC: 3.4 MMOL/L — LOW (ref 3.5–5.3)
POTASSIUM SERPL-SCNC: 3.4 MMOL/L — LOW (ref 3.5–5.3)
PROT SERPL-MCNC: 5.3 G/DL — LOW (ref 6–8.3)
RBC # BLD: 3.22 M/UL — LOW (ref 3.8–5.2)
RBC # FLD: 15 % — HIGH (ref 10.3–14.5)
S AUREUS DNA NOSE QL NAA+PROBE: SIGNIFICANT CHANGE UP
SODIUM SERPL-SCNC: 141 MMOL/L — SIGNIFICANT CHANGE UP (ref 135–145)
WBC # BLD: 4.56 K/UL — SIGNIFICANT CHANGE UP (ref 3.8–10.5)
WBC # FLD AUTO: 4.56 K/UL — SIGNIFICANT CHANGE UP (ref 3.8–10.5)

## 2024-08-28 PROCEDURE — 99232 SBSQ HOSP IP/OBS MODERATE 35: CPT

## 2024-08-28 RX ORDER — CALCIUM CARBONATE 750 MG/1
1 TABLET ORAL DAILY
Refills: 0 | Status: DISCONTINUED | OUTPATIENT
Start: 2024-08-28 | End: 2024-08-29

## 2024-08-28 RX ADMIN — Medication 2 TABLET(S): at 21:18

## 2024-08-28 RX ADMIN — Medication 650 MILLIGRAM(S): at 16:41

## 2024-08-28 RX ADMIN — Medication 650 MILLIGRAM(S): at 17:41

## 2024-08-28 RX ADMIN — Medication 1 APPLICATION(S): at 11:58

## 2024-08-28 RX ADMIN — SODIUM CHLORIDE 125 MILLILITER(S): 9 INJECTION, SOLUTION INTRAVENOUS at 08:47

## 2024-08-28 RX ADMIN — Medication 650 MILLIGRAM(S): at 08:46

## 2024-08-28 RX ADMIN — POLYETHYLENE GLYCOL 3350 17 GRAM(S): 17 POWDER, FOR SOLUTION ORAL at 11:56

## 2024-08-28 RX ADMIN — Medication 650 MILLIGRAM(S): at 09:46

## 2024-08-28 RX ADMIN — Medication 40 MILLIGRAM(S): at 05:42

## 2024-08-28 RX ADMIN — ENOXAPARIN SODIUM 40 MILLIGRAM(S): 100 INJECTION SUBCUTANEOUS at 16:42

## 2024-08-29 LAB
ALBUMIN SERPL ELPH-MCNC: 3 G/DL — LOW (ref 3.3–5)
ALP SERPL-CCNC: 144 U/L — HIGH (ref 40–120)
ALT FLD-CCNC: 55 U/L — HIGH (ref 4–33)
ANION GAP SERPL CALC-SCNC: 10 MMOL/L — SIGNIFICANT CHANGE UP (ref 7–14)
AST SERPL-CCNC: 26 U/L — SIGNIFICANT CHANGE UP (ref 4–32)
BILIRUB SERPL-MCNC: 0.3 MG/DL — SIGNIFICANT CHANGE UP (ref 0.2–1.2)
BUN SERPL-MCNC: 4 MG/DL — LOW (ref 7–23)
CALCIUM SERPL-MCNC: 8.1 MG/DL — LOW (ref 8.4–10.5)
CHLORIDE SERPL-SCNC: 107 MMOL/L — SIGNIFICANT CHANGE UP (ref 98–107)
CO2 SERPL-SCNC: 25 MMOL/L — SIGNIFICANT CHANGE UP (ref 22–31)
CREAT SERPL-MCNC: 0.68 MG/DL — SIGNIFICANT CHANGE UP (ref 0.5–1.3)
EGFR: 97 ML/MIN/1.73M2 — SIGNIFICANT CHANGE UP
EGFR: 97 ML/MIN/1.73M2 — SIGNIFICANT CHANGE UP
GLUCOSE SERPL-MCNC: 87 MG/DL — SIGNIFICANT CHANGE UP (ref 70–99)
HCT VFR BLD CALC: 30.6 % — LOW (ref 34.5–45)
HGB BLD-MCNC: 10.7 G/DL — LOW (ref 11.5–15.5)
MCHC RBC-ENTMCNC: 34.7 PG — HIGH (ref 27–34)
MCHC RBC-ENTMCNC: 35 GM/DL — SIGNIFICANT CHANGE UP (ref 32–36)
MCV RBC AUTO: 99.4 FL — SIGNIFICANT CHANGE UP (ref 80–100)
NRBC # BLD AUTO: 0 K/UL — SIGNIFICANT CHANGE UP (ref 0–0)
NRBC # BLD: 0 /100 WBCS — SIGNIFICANT CHANGE UP (ref 0–0)
NRBC # FLD: 0 K/UL — SIGNIFICANT CHANGE UP (ref 0–0)
NRBC BLD-RTO: 0 /100 WBCS — SIGNIFICANT CHANGE UP (ref 0–0)
PLATELET # BLD AUTO: 182 K/UL — SIGNIFICANT CHANGE UP (ref 150–400)
POTASSIUM SERPL-MCNC: 3.3 MMOL/L — LOW (ref 3.5–5.3)
POTASSIUM SERPL-SCNC: 3.3 MMOL/L — LOW (ref 3.5–5.3)
PROT SERPL-MCNC: 5 G/DL — LOW (ref 6–8.3)
RBC # BLD: 3.08 M/UL — LOW (ref 3.8–5.2)
RBC # FLD: 14.7 % — HIGH (ref 10.3–14.5)
SODIUM SERPL-SCNC: 142 MMOL/L — SIGNIFICANT CHANGE UP (ref 135–145)
WBC # BLD: 4.83 K/UL — SIGNIFICANT CHANGE UP (ref 3.8–10.5)
WBC # FLD AUTO: 4.83 K/UL — SIGNIFICANT CHANGE UP (ref 3.8–10.5)

## 2024-08-29 PROCEDURE — 99232 SBSQ HOSP IP/OBS MODERATE 35: CPT

## 2024-08-29 RX ORDER — DENOSUMAB 60 MG/ML
120 INJECTION SUBCUTANEOUS
Refills: 0 | DISCHARGE

## 2024-08-29 RX ORDER — SODIUM CHLORIDE 9 G/1000ML
1000 INJECTION, SOLUTION INTRAVENOUS
Refills: 0 | Status: DISCONTINUED | OUTPATIENT
Start: 2024-08-29 | End: 2024-08-30

## 2024-08-29 RX ORDER — ROSUVASTATIN CALCIUM 20 MG/1
20 TABLET, FILM COATED ORAL AT BEDTIME
Refills: 0 | Status: DISCONTINUED | OUTPATIENT
Start: 2024-08-29 | End: 2024-08-29

## 2024-08-29 RX ORDER — POTASSIUM CHLORIDE, DEXTROSE MONOHYDRATE AND SODIUM CHLORIDE 150; 5; 900 MG/100ML; G/100ML; MG/100ML
1000 INJECTION, SOLUTION INTRAVENOUS
Refills: 0 | Status: DISCONTINUED | OUTPATIENT
Start: 2024-08-29 | End: 2024-08-29

## 2024-08-29 RX ORDER — TRIAMTERENE/HYDROCHLOROTHIAZID 50 MG-25MG
1 CAPSULE ORAL DAILY
Refills: 0 | Status: DISCONTINUED | OUTPATIENT
Start: 2024-08-29 | End: 2024-08-31

## 2024-08-29 RX ORDER — ROSUVASTATIN CALCIUM 20 MG/1
5 TABLET, FILM COATED ORAL AT BEDTIME
Refills: 0 | Status: DISCONTINUED | OUTPATIENT
Start: 2024-08-29 | End: 2024-08-31

## 2024-08-29 RX ORDER — ROSUVASTATIN CALCIUM 20 MG/1
1 TABLET, FILM COATED ORAL
Refills: 0 | DISCHARGE

## 2024-08-29 RX ADMIN — Medication 40 MILLIGRAM(S): at 05:07

## 2024-08-29 RX ADMIN — ROSUVASTATIN CALCIUM 5 MILLIGRAM(S): 20 TABLET, FILM COATED ORAL at 22:36

## 2024-08-29 RX ADMIN — SODIUM CHLORIDE 100 MILLILITER(S): 9 INJECTION, SOLUTION INTRAVENOUS at 16:03

## 2024-08-29 RX ADMIN — Medication 1 APPLICATION(S): at 12:38

## 2024-08-29 RX ADMIN — Medication 4 MILLIGRAM(S): at 12:33

## 2024-08-29 RX ADMIN — Medication 2 TABLET(S): at 21:53

## 2024-08-29 RX ADMIN — ENOXAPARIN SODIUM 40 MILLIGRAM(S): 100 INJECTION SUBCUTANEOUS at 18:37

## 2024-08-29 RX ADMIN — Medication 100 MILLIEQUIVALENT(S): at 12:24

## 2024-08-29 RX ADMIN — Medication 20 MILLIEQUIVALENT(S): at 14:05

## 2024-08-29 RX ADMIN — Medication 40 MILLIEQUIVALENT(S): at 09:45

## 2024-08-30 ENCOUNTER — TRANSCRIPTION ENCOUNTER (OUTPATIENT)
Age: 64
End: 2024-08-30

## 2024-08-30 LAB
ALBUMIN SERPL ELPH-MCNC: 3.3 G/DL — SIGNIFICANT CHANGE UP (ref 3.3–5)
ALP SERPL-CCNC: 156 U/L — HIGH (ref 40–120)
ALT FLD-CCNC: 50 U/L — HIGH (ref 4–33)
ANION GAP SERPL CALC-SCNC: 11 MMOL/L — SIGNIFICANT CHANGE UP (ref 7–14)
AST SERPL-CCNC: 26 U/L — SIGNIFICANT CHANGE UP (ref 4–32)
BILIRUB SERPL-MCNC: 0.3 MG/DL — SIGNIFICANT CHANGE UP (ref 0.2–1.2)
BUN SERPL-MCNC: 4 MG/DL — LOW (ref 7–23)
CALCIUM SERPL-MCNC: 9.4 MG/DL — SIGNIFICANT CHANGE UP (ref 8.4–10.5)
CANCER AG27-29 SERPL-ACNC: 187.7 U/ML — HIGH (ref 0–38.6)
CEA SERPL-MCNC: 39.8 NG/ML — HIGH (ref 0–3.8)
CHLORIDE SERPL-SCNC: 108 MMOL/L — HIGH (ref 98–107)
CO2 SERPL-SCNC: 23 MMOL/L — SIGNIFICANT CHANGE UP (ref 22–31)
CREAT SERPL-MCNC: 0.67 MG/DL — SIGNIFICANT CHANGE UP (ref 0.5–1.3)
EGFR: 98 ML/MIN/1.73M2 — SIGNIFICANT CHANGE UP
EGFR: 98 ML/MIN/1.73M2 — SIGNIFICANT CHANGE UP
GLUCOSE SERPL-MCNC: 97 MG/DL — SIGNIFICANT CHANGE UP (ref 70–99)
HCT VFR BLD CALC: 32.1 % — LOW (ref 34.5–45)
HGB BLD-MCNC: 11.1 G/DL — LOW (ref 11.5–15.5)
MAGNESIUM SERPL-MCNC: 1.6 MG/DL — SIGNIFICANT CHANGE UP (ref 1.6–2.6)
MCHC RBC-ENTMCNC: 34 PG — SIGNIFICANT CHANGE UP (ref 27–34)
MCHC RBC-ENTMCNC: 34.6 GM/DL — SIGNIFICANT CHANGE UP (ref 32–36)
MCV RBC AUTO: 98.5 FL — SIGNIFICANT CHANGE UP (ref 80–100)
NRBC # BLD AUTO: 0 K/UL — SIGNIFICANT CHANGE UP (ref 0–0)
NRBC # BLD: 0 /100 WBCS — SIGNIFICANT CHANGE UP (ref 0–0)
NRBC # FLD: 0 K/UL — SIGNIFICANT CHANGE UP (ref 0–0)
NRBC BLD-RTO: 0 /100 WBCS — SIGNIFICANT CHANGE UP (ref 0–0)
PHOSPHATE SERPL-MCNC: 2.7 MG/DL — SIGNIFICANT CHANGE UP (ref 2.5–4.5)
PLATELET # BLD AUTO: 193 K/UL — SIGNIFICANT CHANGE UP (ref 150–400)
POTASSIUM SERPL-MCNC: 4.5 MMOL/L — SIGNIFICANT CHANGE UP (ref 3.5–5.3)
POTASSIUM SERPL-SCNC: 4.5 MMOL/L — SIGNIFICANT CHANGE UP (ref 3.5–5.3)
PROT SERPL-MCNC: 5.7 G/DL — LOW (ref 6–8.3)
RBC # BLD: 3.26 M/UL — LOW (ref 3.8–5.2)
RBC # FLD: 14.1 % — SIGNIFICANT CHANGE UP (ref 10.3–14.5)
SODIUM SERPL-SCNC: 142 MMOL/L — SIGNIFICANT CHANGE UP (ref 135–145)
WBC # BLD: 4.43 K/UL — SIGNIFICANT CHANGE UP (ref 3.8–10.5)
WBC # FLD AUTO: 4.43 K/UL — SIGNIFICANT CHANGE UP (ref 3.8–10.5)

## 2024-08-30 PROCEDURE — 99232 SBSQ HOSP IP/OBS MODERATE 35: CPT

## 2024-08-30 RX ADMIN — Medication 2 TABLET(S): at 22:05

## 2024-08-30 RX ADMIN — Medication 1 TABLET(S): at 07:06

## 2024-08-30 RX ADMIN — Medication 1 APPLICATION(S): at 12:45

## 2024-08-30 RX ADMIN — Medication 40 MILLIGRAM(S): at 06:00

## 2024-08-30 RX ADMIN — ENOXAPARIN SODIUM 40 MILLIGRAM(S): 100 INJECTION SUBCUTANEOUS at 17:53

## 2024-08-30 RX ADMIN — SODIUM CHLORIDE 100 MILLILITER(S): 9 INJECTION, SOLUTION INTRAVENOUS at 01:52

## 2024-08-30 RX ADMIN — ROSUVASTATIN CALCIUM 5 MILLIGRAM(S): 20 TABLET, FILM COATED ORAL at 22:09

## 2024-08-31 ENCOUNTER — TRANSCRIPTION ENCOUNTER (OUTPATIENT)
Age: 64
End: 2024-08-31

## 2024-08-31 VITALS
HEART RATE: 55 BPM | DIASTOLIC BLOOD PRESSURE: 63 MMHG | OXYGEN SATURATION: 98 % | TEMPERATURE: 98 F | SYSTOLIC BLOOD PRESSURE: 114 MMHG | RESPIRATION RATE: 18 BRPM

## 2024-08-31 LAB
ALBUMIN SERPL ELPH-MCNC: 3.6 G/DL — SIGNIFICANT CHANGE UP (ref 3.3–5)
ALP SERPL-CCNC: 159 U/L — HIGH (ref 40–120)
ALT FLD-CCNC: 49 U/L — HIGH (ref 4–33)
ANION GAP SERPL CALC-SCNC: 13 MMOL/L — SIGNIFICANT CHANGE UP (ref 7–14)
AST SERPL-CCNC: 34 U/L — HIGH (ref 4–32)
BILIRUB SERPL-MCNC: 0.4 MG/DL — SIGNIFICANT CHANGE UP (ref 0.2–1.2)
BUN SERPL-MCNC: 16 MG/DL — SIGNIFICANT CHANGE UP (ref 7–23)
CALCIUM SERPL-MCNC: 9.9 MG/DL — SIGNIFICANT CHANGE UP (ref 8.4–10.5)
CHLORIDE SERPL-SCNC: 103 MMOL/L — SIGNIFICANT CHANGE UP (ref 98–107)
CO2 SERPL-SCNC: 24 MMOL/L — SIGNIFICANT CHANGE UP (ref 22–31)
CREAT SERPL-MCNC: 0.89 MG/DL — SIGNIFICANT CHANGE UP (ref 0.5–1.3)
EGFR: 72 ML/MIN/1.73M2 — SIGNIFICANT CHANGE UP
EGFR: 72 ML/MIN/1.73M2 — SIGNIFICANT CHANGE UP
GLUCOSE SERPL-MCNC: 118 MG/DL — HIGH (ref 70–99)
HCT VFR BLD CALC: 34.4 % — LOW (ref 34.5–45)
HGB BLD-MCNC: 12 G/DL — SIGNIFICANT CHANGE UP (ref 11.5–15.5)
MAGNESIUM SERPL-MCNC: 1.7 MG/DL — SIGNIFICANT CHANGE UP (ref 1.6–2.6)
MCHC RBC-ENTMCNC: 34.3 PG — HIGH (ref 27–34)
MCHC RBC-ENTMCNC: 34.9 GM/DL — SIGNIFICANT CHANGE UP (ref 32–36)
MCV RBC AUTO: 98.3 FL — SIGNIFICANT CHANGE UP (ref 80–100)
NRBC # BLD AUTO: 0 K/UL — SIGNIFICANT CHANGE UP (ref 0–0)
NRBC # BLD: 0 /100 WBCS — SIGNIFICANT CHANGE UP (ref 0–0)
NRBC # FLD: 0 K/UL — SIGNIFICANT CHANGE UP (ref 0–0)
NRBC BLD-RTO: 0 /100 WBCS — SIGNIFICANT CHANGE UP (ref 0–0)
PHOSPHATE SERPL-MCNC: 5.6 MG/DL — HIGH (ref 2.5–4.5)
PLATELET # BLD AUTO: 218 K/UL — SIGNIFICANT CHANGE UP (ref 150–400)
POTASSIUM SERPL-MCNC: 3.7 MMOL/L — SIGNIFICANT CHANGE UP (ref 3.5–5.3)
POTASSIUM SERPL-SCNC: 3.7 MMOL/L — SIGNIFICANT CHANGE UP (ref 3.5–5.3)
PROT SERPL-MCNC: 5.8 G/DL — LOW (ref 6–8.3)
RBC # BLD: 3.5 M/UL — LOW (ref 3.8–5.2)
RBC # FLD: 13.7 % — SIGNIFICANT CHANGE UP (ref 10.3–14.5)
SODIUM SERPL-SCNC: 140 MMOL/L — SIGNIFICANT CHANGE UP (ref 135–145)
WBC # BLD: 5.48 K/UL — SIGNIFICANT CHANGE UP (ref 3.8–10.5)
WBC # FLD AUTO: 5.48 K/UL — SIGNIFICANT CHANGE UP (ref 3.8–10.5)

## 2024-08-31 PROCEDURE — 99239 HOSP IP/OBS DSCHRG MGMT >30: CPT

## 2024-08-31 RX ORDER — SENNA 187 MG
2 TABLET ORAL
Qty: 0 | Refills: 0 | DISCHARGE
Start: 2024-08-31

## 2024-08-31 RX ORDER — ACETAMINOPHEN 500 MG/5ML
2 LIQUID (ML) ORAL
Qty: 0 | Refills: 0 | DISCHARGE
Start: 2024-08-31

## 2024-08-31 RX ORDER — DENOSUMAB 60 MG/ML
120 INJECTION SUBCUTANEOUS
Refills: 0 | DISCHARGE

## 2024-08-31 RX ORDER — POLYETHYLENE GLYCOL 3350 17 G/17G
17 POWDER, FOR SOLUTION ORAL
Qty: 0 | Refills: 0 | DISCHARGE
Start: 2024-08-31

## 2024-08-31 RX ORDER — FULVESTRANT 250 MG/5ML
500 INJECTION INTRAMUSCULAR
Refills: 0 | DISCHARGE

## 2024-08-31 RX ADMIN — Medication 40 MILLIGRAM(S): at 05:51

## 2024-08-31 RX ADMIN — Medication 1 TABLET(S): at 05:51

## 2024-09-04 ENCOUNTER — NON-APPOINTMENT (OUTPATIENT)
Age: 64
End: 2024-09-04

## 2024-09-05 ENCOUNTER — TRANSCRIPTION ENCOUNTER (OUTPATIENT)
Age: 64
End: 2024-09-05

## 2024-09-06 ENCOUNTER — APPOINTMENT (OUTPATIENT)
Dept: INFUSION THERAPY | Facility: HOSPITAL | Age: 64
End: 2024-09-06

## 2024-09-06 ENCOUNTER — APPOINTMENT (OUTPATIENT)
Dept: HEMATOLOGY ONCOLOGY | Facility: CLINIC | Age: 64
End: 2024-09-06
Payer: COMMERCIAL

## 2024-09-06 VITALS
OXYGEN SATURATION: 99 % | SYSTOLIC BLOOD PRESSURE: 148 MMHG | BODY MASS INDEX: 28.05 KG/M2 | WEIGHT: 138.89 LBS | TEMPERATURE: 97.5 F | DIASTOLIC BLOOD PRESSURE: 88 MMHG | HEART RATE: 89 BPM | RESPIRATION RATE: 16 BRPM

## 2024-09-06 DIAGNOSIS — C79.51 SECONDARY MALIGNANT NEOPLASM OF BONE: ICD-10-CM

## 2024-09-06 DIAGNOSIS — C78.89 SECONDARY MALIGNANT NEOPLASM OF OTHER DIGESTIVE ORGANS: ICD-10-CM

## 2024-09-06 DIAGNOSIS — C50.919 MALIGNANT NEOPLASM OF UNSPECIFIED SITE OF UNSPECIFIED FEMALE BREAST: ICD-10-CM

## 2024-09-06 PROCEDURE — G2211 COMPLEX E/M VISIT ADD ON: CPT | Mod: NC

## 2024-09-06 PROCEDURE — G2212 PROLONG OUTPT/OFFICE VIS: CPT

## 2024-09-06 PROCEDURE — 99215 OFFICE O/P EST HI 40 MIN: CPT

## 2024-09-06 RX ORDER — CAPECITABINE 500 MG/1
500 TABLET ORAL TWICE DAILY
Qty: 84 | Refills: 2 | Status: ACTIVE | COMMUNITY
Start: 2024-09-06 | End: 1900-01-01

## 2024-09-07 PROBLEM — C78.89: Status: ACTIVE | Noted: 2024-09-07

## 2024-09-07 NOTE — ASSESSMENT
[FreeTextEntry1] : She is a 63 y/o F with metastatic invasive lobular carcinoma to the chest wall and bones on ribociclib and fulvestrant since 6/2023. 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 We reviewed potential obstruction and potential options. We reviewed our discussion with GI for duodenal stent but reviewed pros and cons. Currently she is able to tolerate po without any additional symptoms and understands if she develops bloating or vomiting, she will go to the hospital for evaluation of obstruction. We reviewed potential for invasive lobular carcinoma to affect mucosal surfaces. We reviewed rationale for palliative chemotherapy. We reviewed chemotherapy options. She had past taxane and AC therapy. We reviewed options that include taxane where she had myalgias. We reviewed capecitabine 3 tablets twice a day. We reviewed potential AE including but not limited to: fatigue, low blood counts, diarrhea, mouth sores, dryness of the hands and feet and mild hair loss. We reviewed if capecitabine not tolerated, we would switch therapy. We reviewed her Guardant results without any ESR 1 mutation. Questions answered to her satisfaction. She is agreeable with plan. She will have CBC every 2 weeks x 3. Next follow up in 1 month but earlier if any new symptoms  Bone mets: started on denosumab 2/2024. Calcium WNL.   Lytic lesion over the hip: had follow up with Dr Hernandez. Continue to monitor hip. No new symptoms.   Cataract surgery: She understands cataract surgery may still be considered but we would make sure during week off.

## 2024-09-07 NOTE — REASON FOR VISIT
[Follow-Up Visit] : a follow-up [Spouse] : spouse [Family Member] : family member [FreeTextEntry2] : follow up for breast cancer metastatic to bone s/p EGD with duodenal biopsy positive for carcinoma

## 2024-09-07 NOTE — PHYSICAL EXAM
[Fully active, able to carry on all pre-disease performance without restriction] : Status 0 - Fully active, able to carry on all pre-disease performance without restriction [Normal] : affect appropriate [de-identified] : B mastectomy with reconstruction; no new chest wall nodules or axillary LN palpable  [de-identified] : RUQ pain on palpation; no rebound or guarding

## 2024-09-07 NOTE — REVIEW OF SYSTEMS
[Abdominal Pain] : no abdominal pain [Vomiting] : no vomiting [Constipation] : no constipation [Diarrhea: Grade 0] : Diarrhea: Grade 0 [Negative] : Allergic/Immunologic [FreeTextEntry7] : GERD

## 2024-09-07 NOTE — HISTORY OF PRESENT ILLNESS
[Disease: _____________________] : Disease: [unfilled] [T: ___] : T[unfilled] [N: ___] : N[unfilled] [M: ___] : M[unfilled] [AJCC Stage: ____] : AJCC Stage: [unfilled] [de-identified] : Age 53: screen detected: 9:00 3 cm FN ill-defined solid mass R breast: 14 x 14mm and second irregular area 9:00 6 cm FN. She had u/s guided biopsy of the irregular R breast showing 9:00 3 cm: invasive moderate differentiated lobular carcinoma SBR 6/9 measuring at least 1.2 cm; and 9:00 6 cm FN: invasive moderately differentiated carcinoma lobular carcinoma measuring at least 0.5 cm. She had B/L MRM/ [facility] (Dr. Martine Rico) for right breast cancer 1.8 cm ILC, 2+ / 8 LNs, ER+ [place]+ her 2 rafiq negative. With Dr Romero, she underwent adjuvant chemotherapy with dd AC-T 12 /2013 followed by nipple reconstruction. Began anastrozole 4/2014 with initial moderate arthralgias that gradually decreased to a "tolerable" level.  She completed a 7-year course of anastrozole in 3/21 and was monitored off therapy.   Age 63: 6/2023 seen by derm for a new R chest wall "reddish pimple" - bx returned c/w met breast cancer, invasive mod diff ILC in dermis, ER+100% [place]- Her 2 rafiq (-).  EOD w/u was done: Pet/ CT showed mildly avid R axillary mass with infiltration of the adjacent structures consistent with chest wall recurrence, diffuse mild moderate uptake throughout the skeleton: T4, T6, T7, L1, L2. With Dr Naranjo, she was started on ribociclib/fulvestrant 6/27/23. She had continued rise in tumor markers without any new changes on CT or Pet/ CT imaging. She had imaging done on 7/14/2024 which showed mild intrahepatic and extrahepatic dilatation. She underwent EGD where scope could not be passed due to duodenal mass: she had biopsy which showed focus of breast cancer.   [de-identified] : invasive lobular carcinoma %, NV negative, Her2 negative (1) Ki67 15%  [de-identified] : Genomic testin2023 Guardant 360dx revealed no ESR1 mutation. Test showed VUS in FOREST and ARID1A genes.   Kisqali/ Faslodex 2023 to present Xgeva 2024 to present  [de-identified] : She is still recovering from pancreatitis. She has been able to tolerate eating: soft foods. She denies any vomiting or bloating sensation after eating. She is present with her family to review next steps with treatment.

## 2024-09-09 DIAGNOSIS — C79.51 SECONDARY MALIGNANT NEOPLASM OF BONE: ICD-10-CM

## 2024-09-18 DIAGNOSIS — T45.1X5A NAUSEA WITH VOMITING, UNSPECIFIED: ICD-10-CM

## 2024-09-18 DIAGNOSIS — R11.2 NAUSEA WITH VOMITING, UNSPECIFIED: ICD-10-CM

## 2024-09-18 RX ORDER — ONDANSETRON 8 MG/1
8 TABLET, ORALLY DISINTEGRATING ORAL EVERY 8 HOURS
Qty: 20 | Refills: 1 | Status: ACTIVE | COMMUNITY
Start: 2024-09-18 | End: 1900-01-01

## 2024-09-19 ENCOUNTER — TRANSCRIPTION ENCOUNTER (OUTPATIENT)
Age: 64
End: 2024-09-19

## 2024-09-20 ENCOUNTER — APPOINTMENT (OUTPATIENT)
Dept: HEMATOLOGY ONCOLOGY | Facility: CLINIC | Age: 64
End: 2024-09-20

## 2024-09-23 ENCOUNTER — APPOINTMENT (OUTPATIENT)
Dept: HEMATOLOGY ONCOLOGY | Facility: CLINIC | Age: 64
End: 2024-09-23

## 2024-09-24 ENCOUNTER — NON-APPOINTMENT (OUTPATIENT)
Age: 64
End: 2024-09-24

## 2024-09-24 ENCOUNTER — RESULT REVIEW (OUTPATIENT)
Age: 64
End: 2024-09-24

## 2024-09-24 ENCOUNTER — APPOINTMENT (OUTPATIENT)
Dept: HEMATOLOGY ONCOLOGY | Facility: CLINIC | Age: 64
End: 2024-09-24

## 2024-09-24 LAB
BASOPHILS # BLD AUTO: 0.06 K/UL — SIGNIFICANT CHANGE UP (ref 0–0.2)
BASOPHILS NFR BLD AUTO: 0.8 % — SIGNIFICANT CHANGE UP (ref 0–2)
EOSINOPHIL # BLD AUTO: 0.21 K/UL — SIGNIFICANT CHANGE UP (ref 0–0.5)
EOSINOPHIL NFR BLD AUTO: 2.8 % — SIGNIFICANT CHANGE UP (ref 0–6)
HCT VFR BLD CALC: 41.2 % — SIGNIFICANT CHANGE UP (ref 34.5–45)
HGB BLD-MCNC: 14.1 G/DL — SIGNIFICANT CHANGE UP (ref 11.5–15.5)
IMM GRANULOCYTES NFR BLD AUTO: 0.4 % — SIGNIFICANT CHANGE UP (ref 0–0.9)
LYMPHOCYTES # BLD AUTO: 3 K/UL — SIGNIFICANT CHANGE UP (ref 1–3.3)
LYMPHOCYTES # BLD AUTO: 39.4 % — SIGNIFICANT CHANGE UP (ref 13–44)
MCHC RBC-ENTMCNC: 34.1 PG — HIGH (ref 27–34)
MCHC RBC-ENTMCNC: 34.2 G/DL — SIGNIFICANT CHANGE UP (ref 32–36)
MCV RBC AUTO: 99.5 FL — SIGNIFICANT CHANGE UP (ref 80–100)
MONOCYTES # BLD AUTO: 0.61 K/UL — SIGNIFICANT CHANGE UP (ref 0–0.9)
MONOCYTES NFR BLD AUTO: 8 % — SIGNIFICANT CHANGE UP (ref 2–14)
NEUTROPHILS # BLD AUTO: 3.71 K/UL — SIGNIFICANT CHANGE UP (ref 1.8–7.4)
NEUTROPHILS NFR BLD AUTO: 48.6 % — SIGNIFICANT CHANGE UP (ref 43–77)
NRBC # BLD: 0 /100 WBCS — SIGNIFICANT CHANGE UP (ref 0–0)
PLATELET # BLD AUTO: 251 K/UL — SIGNIFICANT CHANGE UP (ref 150–400)
RBC # BLD: 4.14 M/UL — SIGNIFICANT CHANGE UP (ref 3.8–5.2)
RBC # FLD: 14.7 % — HIGH (ref 10.3–14.5)
WBC # BLD: 7.62 K/UL — SIGNIFICANT CHANGE UP (ref 3.8–10.5)
WBC # FLD AUTO: 7.62 K/UL — SIGNIFICANT CHANGE UP (ref 3.8–10.5)

## 2024-10-08 ENCOUNTER — APPOINTMENT (OUTPATIENT)
Dept: INFUSION THERAPY | Facility: HOSPITAL | Age: 64
End: 2024-10-08

## 2024-10-08 ENCOUNTER — APPOINTMENT (OUTPATIENT)
Dept: HEMATOLOGY ONCOLOGY | Facility: CLINIC | Age: 64
End: 2024-10-08
Payer: COMMERCIAL

## 2024-10-08 ENCOUNTER — RESULT REVIEW (OUTPATIENT)
Age: 64
End: 2024-10-08

## 2024-10-08 VITALS
RESPIRATION RATE: 17 BRPM | TEMPERATURE: 97.2 F | SYSTOLIC BLOOD PRESSURE: 135 MMHG | BODY MASS INDEX: 29.17 KG/M2 | DIASTOLIC BLOOD PRESSURE: 85 MMHG | HEART RATE: 80 BPM | WEIGHT: 144.4 LBS | OXYGEN SATURATION: 97 %

## 2024-10-08 DIAGNOSIS — C78.89 SECONDARY MALIGNANT NEOPLASM OF OTHER DIGESTIVE ORGANS: ICD-10-CM

## 2024-10-08 DIAGNOSIS — C50.919 MALIGNANT NEOPLASM OF UNSPECIFIED SITE OF UNSPECIFIED FEMALE BREAST: ICD-10-CM

## 2024-10-08 DIAGNOSIS — C79.51 SECONDARY MALIGNANT NEOPLASM OF BONE: ICD-10-CM

## 2024-10-08 LAB
BASOPHILS # BLD AUTO: 0.05 K/UL — SIGNIFICANT CHANGE UP (ref 0–0.2)
BASOPHILS NFR BLD AUTO: 0.8 % — SIGNIFICANT CHANGE UP (ref 0–2)
EOSINOPHIL # BLD AUTO: 0.15 K/UL — SIGNIFICANT CHANGE UP (ref 0–0.5)
EOSINOPHIL NFR BLD AUTO: 2.4 % — SIGNIFICANT CHANGE UP (ref 0–6)
HCT VFR BLD CALC: 40.9 % — SIGNIFICANT CHANGE UP (ref 34.5–45)
HGB BLD-MCNC: 14.3 G/DL — SIGNIFICANT CHANGE UP (ref 11.5–15.5)
IMM GRANULOCYTES NFR BLD AUTO: 0.2 % — SIGNIFICANT CHANGE UP (ref 0–0.9)
LYMPHOCYTES # BLD AUTO: 2.73 K/UL — SIGNIFICANT CHANGE UP (ref 1–3.3)
LYMPHOCYTES # BLD AUTO: 43.2 % — SIGNIFICANT CHANGE UP (ref 13–44)
MCHC RBC-ENTMCNC: 34.1 PG — HIGH (ref 27–34)
MCHC RBC-ENTMCNC: 35 G/DL — SIGNIFICANT CHANGE UP (ref 32–36)
MCV RBC AUTO: 97.6 FL — SIGNIFICANT CHANGE UP (ref 80–100)
MONOCYTES # BLD AUTO: 0.52 K/UL — SIGNIFICANT CHANGE UP (ref 0–0.9)
MONOCYTES NFR BLD AUTO: 8.2 % — SIGNIFICANT CHANGE UP (ref 2–14)
NEUTROPHILS # BLD AUTO: 2.86 K/UL — SIGNIFICANT CHANGE UP (ref 1.8–7.4)
NEUTROPHILS NFR BLD AUTO: 45.2 % — SIGNIFICANT CHANGE UP (ref 43–77)
NRBC # BLD: 0 /100 WBCS — SIGNIFICANT CHANGE UP (ref 0–0)
PLATELET # BLD AUTO: 216 K/UL — SIGNIFICANT CHANGE UP (ref 150–400)
RBC # BLD: 4.19 M/UL — SIGNIFICANT CHANGE UP (ref 3.8–5.2)
RBC # FLD: 15.8 % — HIGH (ref 10.3–14.5)
WBC # BLD: 6.32 K/UL — SIGNIFICANT CHANGE UP (ref 3.8–10.5)
WBC # FLD AUTO: 6.32 K/UL — SIGNIFICANT CHANGE UP (ref 3.8–10.5)

## 2024-10-08 PROCEDURE — 99215 OFFICE O/P EST HI 40 MIN: CPT

## 2024-10-08 PROCEDURE — G2211 COMPLEX E/M VISIT ADD ON: CPT | Mod: NC

## 2024-10-10 LAB
ALBUMIN SERPL ELPH-MCNC: 4.4 G/DL
ALP BLD-CCNC: 158 U/L
ALT SERPL-CCNC: 70 U/L
ANION GAP SERPL CALC-SCNC: 14 MMOL/L
AST SERPL-CCNC: 38 U/L
BILIRUB SERPL-MCNC: 0.6 MG/DL
BUN SERPL-MCNC: 24 MG/DL
CALCIUM SERPL-MCNC: 10.9 MG/DL
CHLORIDE SERPL-SCNC: 100 MMOL/L
CO2 SERPL-SCNC: 26 MMOL/L
CREAT SERPL-MCNC: 0.99 MG/DL
EGFR: 64 ML/MIN/1.73M2
GLUCOSE SERPL-MCNC: 107 MG/DL
POTASSIUM SERPL-SCNC: 4.5 MMOL/L
PROT SERPL-MCNC: 6.7 G/DL
SODIUM SERPL-SCNC: 140 MMOL/L

## 2024-10-18 ENCOUNTER — APPOINTMENT (OUTPATIENT)
Dept: HEMATOLOGY ONCOLOGY | Facility: CLINIC | Age: 64
End: 2024-10-18

## 2024-10-21 ENCOUNTER — APPOINTMENT (OUTPATIENT)
Dept: HEMATOLOGY ONCOLOGY | Facility: CLINIC | Age: 64
End: 2024-10-21

## 2024-10-22 ENCOUNTER — APPOINTMENT (OUTPATIENT)
Dept: HEMATOLOGY ONCOLOGY | Facility: CLINIC | Age: 64
End: 2024-10-22

## 2024-10-22 ENCOUNTER — RESULT REVIEW (OUTPATIENT)
Age: 64
End: 2024-10-22

## 2024-10-22 LAB
ALBUMIN SERPL ELPH-MCNC: 4.4 G/DL
ALP BLD-CCNC: 141 U/L
ALT SERPL-CCNC: 21 U/L
ANION GAP SERPL CALC-SCNC: 15 MMOL/L
AST SERPL-CCNC: 26 U/L
BASOPHILS # BLD AUTO: 0.04 K/UL — SIGNIFICANT CHANGE UP (ref 0–0.2)
BASOPHILS NFR BLD AUTO: 0.6 % — SIGNIFICANT CHANGE UP (ref 0–2)
BILIRUB SERPL-MCNC: 0.6 MG/DL
BUN SERPL-MCNC: 21 MG/DL
CALCIUM SERPL-MCNC: 9.8 MG/DL
CEA SERPL-MCNC: 32.9 NG/ML
CHLORIDE SERPL-SCNC: 104 MMOL/L
CO2 SERPL-SCNC: 25 MMOL/L
CREAT SERPL-MCNC: 0.94 MG/DL
EGFR: 68 ML/MIN/1.73M2
EOSINOPHIL # BLD AUTO: 0.12 K/UL — SIGNIFICANT CHANGE UP (ref 0–0.5)
EOSINOPHIL NFR BLD AUTO: 1.7 % — SIGNIFICANT CHANGE UP (ref 0–6)
GLUCOSE SERPL-MCNC: 106 MG/DL
HCT VFR BLD CALC: 37.7 % — SIGNIFICANT CHANGE UP (ref 34.5–45)
HGB BLD-MCNC: 13.1 G/DL — SIGNIFICANT CHANGE UP (ref 11.5–15.5)
IMM GRANULOCYTES NFR BLD AUTO: 0.3 % — SIGNIFICANT CHANGE UP (ref 0–0.9)
LYMPHOCYTES # BLD AUTO: 2.64 K/UL — SIGNIFICANT CHANGE UP (ref 1–3.3)
LYMPHOCYTES # BLD AUTO: 37.3 % — SIGNIFICANT CHANGE UP (ref 13–44)
MCHC RBC-ENTMCNC: 34.4 PG — HIGH (ref 27–34)
MCHC RBC-ENTMCNC: 34.7 G/DL — SIGNIFICANT CHANGE UP (ref 32–36)
MCV RBC AUTO: 99 FL — SIGNIFICANT CHANGE UP (ref 80–100)
MONOCYTES # BLD AUTO: 0.59 K/UL — SIGNIFICANT CHANGE UP (ref 0–0.9)
MONOCYTES NFR BLD AUTO: 8.3 % — SIGNIFICANT CHANGE UP (ref 2–14)
NEUTROPHILS # BLD AUTO: 3.67 K/UL — SIGNIFICANT CHANGE UP (ref 1.8–7.4)
NEUTROPHILS NFR BLD AUTO: 51.8 % — SIGNIFICANT CHANGE UP (ref 43–77)
NRBC # BLD: 0 /100 WBCS — SIGNIFICANT CHANGE UP (ref 0–0)
PLATELET # BLD AUTO: 196 K/UL — SIGNIFICANT CHANGE UP (ref 150–400)
POTASSIUM SERPL-SCNC: 3.3 MMOL/L
PROT SERPL-MCNC: 6.4 G/DL
RBC # BLD: 3.81 M/UL — SIGNIFICANT CHANGE UP (ref 3.8–5.2)
RBC # FLD: 16.1 % — HIGH (ref 10.3–14.5)
SODIUM SERPL-SCNC: 144 MMOL/L
WBC # BLD: 7.08 K/UL — SIGNIFICANT CHANGE UP (ref 3.8–10.5)
WBC # FLD AUTO: 7.08 K/UL — SIGNIFICANT CHANGE UP (ref 3.8–10.5)

## 2024-10-23 LAB — CANCER AG27-29 SERPL-ACNC: 258.2 U/ML

## 2024-10-29 ENCOUNTER — RX RENEWAL (OUTPATIENT)
Age: 64
End: 2024-10-29

## 2024-10-31 ENCOUNTER — OUTPATIENT (OUTPATIENT)
Dept: OUTPATIENT SERVICES | Facility: HOSPITAL | Age: 64
LOS: 1 days | Discharge: HOME | End: 2024-10-31

## 2024-10-31 DIAGNOSIS — Z90.13 ACQUIRED ABSENCE OF BILATERAL BREASTS AND NIPPLES: Chronic | ICD-10-CM

## 2024-10-31 DIAGNOSIS — Z98.89 OTHER SPECIFIED POSTPROCEDURAL STATES: Chronic | ICD-10-CM

## 2024-10-31 DIAGNOSIS — Z92.21 PERSONAL HISTORY OF ANTINEOPLASTIC CHEMOTHERAPY: Chronic | ICD-10-CM

## 2024-10-31 DIAGNOSIS — C50.919 MALIGNANT NEOPLASM OF UNSPECIFIED SITE OF UNSPECIFIED FEMALE BREAST: ICD-10-CM

## 2024-11-05 ENCOUNTER — RESULT REVIEW (OUTPATIENT)
Age: 64
End: 2024-11-05

## 2024-11-05 ENCOUNTER — APPOINTMENT (OUTPATIENT)
Dept: HEMATOLOGY ONCOLOGY | Facility: CLINIC | Age: 64
End: 2024-11-05

## 2024-11-05 ENCOUNTER — APPOINTMENT (OUTPATIENT)
Dept: INFUSION THERAPY | Facility: HOSPITAL | Age: 64
End: 2024-11-05

## 2024-11-05 LAB
ALBUMIN SERPL ELPH-MCNC: 4.7 G/DL
ALP BLD-CCNC: 178 U/L
ALT SERPL-CCNC: 48 U/L
ANION GAP SERPL CALC-SCNC: 17 MMOL/L
AST SERPL-CCNC: 43 U/L
BASOPHILS # BLD AUTO: 0.04 K/UL — SIGNIFICANT CHANGE UP (ref 0–0.2)
BASOPHILS NFR BLD AUTO: 0.7 % — SIGNIFICANT CHANGE UP (ref 0–2)
BILIRUB SERPL-MCNC: 0.5 MG/DL
BUN SERPL-MCNC: 18 MG/DL
CALCIUM SERPL-MCNC: 10.6 MG/DL
CHLORIDE SERPL-SCNC: 100 MMOL/L
CO2 SERPL-SCNC: 24 MMOL/L
CREAT SERPL-MCNC: 0.87 MG/DL
EGFR: 74 ML/MIN/1.73M2
EOSINOPHIL # BLD AUTO: 0.11 K/UL — SIGNIFICANT CHANGE UP (ref 0–0.5)
EOSINOPHIL NFR BLD AUTO: 2 % — SIGNIFICANT CHANGE UP (ref 0–6)
GLUCOSE SERPL-MCNC: 115 MG/DL
HCT VFR BLD CALC: 41.9 % — SIGNIFICANT CHANGE UP (ref 34.5–45)
HGB BLD-MCNC: 14.5 G/DL — SIGNIFICANT CHANGE UP (ref 11.5–15.5)
IMM GRANULOCYTES NFR BLD AUTO: 0.4 % — SIGNIFICANT CHANGE UP (ref 0–0.9)
LYMPHOCYTES # BLD AUTO: 1.86 K/UL — SIGNIFICANT CHANGE UP (ref 1–3.3)
LYMPHOCYTES # BLD AUTO: 34.1 % — SIGNIFICANT CHANGE UP (ref 13–44)
MCHC RBC-ENTMCNC: 34.5 PG — HIGH (ref 27–34)
MCHC RBC-ENTMCNC: 34.6 G/DL — SIGNIFICANT CHANGE UP (ref 32–36)
MCV RBC AUTO: 99.8 FL — SIGNIFICANT CHANGE UP (ref 80–100)
MONOCYTES # BLD AUTO: 0.48 K/UL — SIGNIFICANT CHANGE UP (ref 0–0.9)
MONOCYTES NFR BLD AUTO: 8.8 % — SIGNIFICANT CHANGE UP (ref 2–14)
NEUTROPHILS # BLD AUTO: 2.95 K/UL — SIGNIFICANT CHANGE UP (ref 1.8–7.4)
NEUTROPHILS NFR BLD AUTO: 54 % — SIGNIFICANT CHANGE UP (ref 43–77)
NRBC # BLD: 0 /100 WBCS — SIGNIFICANT CHANGE UP (ref 0–0)
PLATELET # BLD AUTO: 205 K/UL — SIGNIFICANT CHANGE UP (ref 150–400)
POTASSIUM SERPL-SCNC: 4.6 MMOL/L
PROT SERPL-MCNC: 6.8 G/DL
RBC # BLD: 4.2 M/UL — SIGNIFICANT CHANGE UP (ref 3.8–5.2)
RBC # FLD: 15.9 % — HIGH (ref 10.3–14.5)
SODIUM SERPL-SCNC: 142 MMOL/L
WBC # BLD: 5.46 K/UL — SIGNIFICANT CHANGE UP (ref 3.8–10.5)
WBC # FLD AUTO: 5.46 K/UL — SIGNIFICANT CHANGE UP (ref 3.8–10.5)

## 2024-11-06 DIAGNOSIS — C79.51 SECONDARY MALIGNANT NEOPLASM OF BONE: ICD-10-CM

## 2024-11-12 ENCOUNTER — RESULT REVIEW (OUTPATIENT)
Age: 64
End: 2024-11-12

## 2024-11-12 ENCOUNTER — APPOINTMENT (OUTPATIENT)
Dept: HEMATOLOGY ONCOLOGY | Facility: CLINIC | Age: 64
End: 2024-11-12
Payer: COMMERCIAL

## 2024-11-12 VITALS
SYSTOLIC BLOOD PRESSURE: 127 MMHG | WEIGHT: 145.04 LBS | BODY MASS INDEX: 29.3 KG/M2 | TEMPERATURE: 97.2 F | HEART RATE: 90 BPM | DIASTOLIC BLOOD PRESSURE: 84 MMHG | RESPIRATION RATE: 17 BRPM | OXYGEN SATURATION: 98 %

## 2024-11-12 DIAGNOSIS — C50.919 MALIGNANT NEOPLASM OF UNSPECIFIED SITE OF UNSPECIFIED FEMALE BREAST: ICD-10-CM

## 2024-11-12 DIAGNOSIS — R10.9 UNSPECIFIED ABDOMINAL PAIN: ICD-10-CM

## 2024-11-12 DIAGNOSIS — Z79.899 OTHER LONG TERM (CURRENT) DRUG THERAPY: ICD-10-CM

## 2024-11-12 DIAGNOSIS — C79.51 SECONDARY MALIGNANT NEOPLASM OF BONE: ICD-10-CM

## 2024-11-12 LAB
ALBUMIN SERPL ELPH-MCNC: 4.5 G/DL
ALP BLD-CCNC: 182 U/L
ALT SERPL-CCNC: 53 U/L
ANION GAP SERPL CALC-SCNC: 17 MMOL/L
AST SERPL-CCNC: 48 U/L
BASOPHILS # BLD AUTO: 0.07 K/UL — SIGNIFICANT CHANGE UP (ref 0–0.2)
BASOPHILS NFR BLD AUTO: 1.1 % — SIGNIFICANT CHANGE UP (ref 0–2)
BILIRUB SERPL-MCNC: 0.4 MG/DL
BUN SERPL-MCNC: 27 MG/DL
CALCIUM SERPL-MCNC: 10.6 MG/DL
CHLORIDE SERPL-SCNC: 97 MMOL/L
CO2 SERPL-SCNC: 26 MMOL/L
CREAT SERPL-MCNC: 0.95 MG/DL
EGFR: 67 ML/MIN/1.73M2
EOSINOPHIL # BLD AUTO: 0.12 K/UL — SIGNIFICANT CHANGE UP (ref 0–0.5)
EOSINOPHIL NFR BLD AUTO: 1.8 % — SIGNIFICANT CHANGE UP (ref 0–6)
GLUCOSE SERPL-MCNC: 109 MG/DL
HCT VFR BLD CALC: 41.4 % — SIGNIFICANT CHANGE UP (ref 34.5–45)
HGB BLD-MCNC: 15.1 G/DL — SIGNIFICANT CHANGE UP (ref 11.5–15.5)
IMM GRANULOCYTES NFR BLD AUTO: 0.2 % — SIGNIFICANT CHANGE UP (ref 0–0.9)
LYMPHOCYTES # BLD AUTO: 2.97 K/UL — SIGNIFICANT CHANGE UP (ref 1–3.3)
LYMPHOCYTES # BLD AUTO: 45.1 % — HIGH (ref 13–44)
MCHC RBC-ENTMCNC: 35 PG — HIGH (ref 27–34)
MCHC RBC-ENTMCNC: 36.5 G/DL — HIGH (ref 32–36)
MCV RBC AUTO: 95.8 FL — SIGNIFICANT CHANGE UP (ref 80–100)
MONOCYTES # BLD AUTO: 0.42 K/UL — SIGNIFICANT CHANGE UP (ref 0–0.9)
MONOCYTES NFR BLD AUTO: 6.4 % — SIGNIFICANT CHANGE UP (ref 2–14)
NEUTROPHILS # BLD AUTO: 2.99 K/UL — SIGNIFICANT CHANGE UP (ref 1.8–7.4)
NEUTROPHILS NFR BLD AUTO: 45.4 % — SIGNIFICANT CHANGE UP (ref 43–77)
NRBC # BLD: 0 /100 WBCS — SIGNIFICANT CHANGE UP (ref 0–0)
PLATELET # BLD AUTO: 215 K/UL — SIGNIFICANT CHANGE UP (ref 150–400)
POTASSIUM SERPL-SCNC: 3.2 MMOL/L
PROT SERPL-MCNC: 6.8 G/DL
RBC # BLD: 4.32 M/UL — SIGNIFICANT CHANGE UP (ref 3.8–5.2)
RBC # FLD: 14.9 % — HIGH (ref 10.3–14.5)
SODIUM SERPL-SCNC: 139 MMOL/L
WBC # BLD: 6.58 K/UL — SIGNIFICANT CHANGE UP (ref 3.8–10.5)
WBC # FLD AUTO: 6.58 K/UL — SIGNIFICANT CHANGE UP (ref 3.8–10.5)

## 2024-11-12 PROCEDURE — G2211 COMPLEX E/M VISIT ADD ON: CPT | Mod: NC

## 2024-11-12 PROCEDURE — 99214 OFFICE O/P EST MOD 30 MIN: CPT

## 2024-11-13 PROBLEM — Z79.899 ON ANTINEOPLASTIC CHEMOTHERAPY: Status: ACTIVE | Noted: 2024-11-13

## 2024-11-15 ENCOUNTER — NON-APPOINTMENT (OUTPATIENT)
Age: 64
End: 2024-11-15

## 2024-11-18 ENCOUNTER — NON-APPOINTMENT (OUTPATIENT)
Age: 64
End: 2024-11-18

## 2024-11-18 ENCOUNTER — APPOINTMENT (OUTPATIENT)
Dept: HEMATOLOGY ONCOLOGY | Facility: CLINIC | Age: 64
End: 2024-11-18

## 2024-11-18 ENCOUNTER — APPOINTMENT (OUTPATIENT)
Dept: GASTROENTEROLOGY | Facility: CLINIC | Age: 64
End: 2024-11-18

## 2024-11-18 VITALS
HEART RATE: 88 BPM | DIASTOLIC BLOOD PRESSURE: 84 MMHG | BODY MASS INDEX: 29.23 KG/M2 | OXYGEN SATURATION: 99 % | SYSTOLIC BLOOD PRESSURE: 154 MMHG | WEIGHT: 145 LBS | HEIGHT: 59 IN

## 2024-11-18 PROCEDURE — 99214 OFFICE O/P EST MOD 30 MIN: CPT

## 2024-11-18 PROCEDURE — 99204 OFFICE O/P NEW MOD 45 MIN: CPT

## 2024-11-20 ENCOUNTER — APPOINTMENT (OUTPATIENT)
Dept: HEMATOLOGY ONCOLOGY | Facility: CLINIC | Age: 64
End: 2024-11-20

## 2024-11-20 LAB
ALBUMIN SERPL ELPH-MCNC: 4.4 G/DL
ALP BLD-CCNC: 153 U/L
ALT SERPL-CCNC: 29 U/L
ANION GAP SERPL CALC-SCNC: 14 MMOL/L
AST SERPL-CCNC: 29 U/L
BILIRUB SERPL-MCNC: 0.5 MG/DL
BUN SERPL-MCNC: 22 MG/DL
CALCIUM SERPL-MCNC: 10.3 MG/DL
CANCER AG27-29 SERPL-ACNC: 161.3 U/ML
CEA SERPL-MCNC: 22.9 NG/ML
CHLORIDE SERPL-SCNC: 105 MMOL/L
CO2 SERPL-SCNC: 26 MMOL/L
CREAT SERPL-MCNC: 0.88 MG/DL
EGFR: 73 ML/MIN/1.73M2
GLUCOSE SERPL-MCNC: 103 MG/DL
MAGNESIUM SERPL-MCNC: 2 MG/DL
POTASSIUM SERPL-SCNC: 4.1 MMOL/L
PROT SERPL-MCNC: 6.3 G/DL
SODIUM SERPL-SCNC: 145 MMOL/L

## 2024-11-27 ENCOUNTER — APPOINTMENT (OUTPATIENT)
Dept: CT IMAGING | Facility: IMAGING CENTER | Age: 64
End: 2024-11-27
Payer: COMMERCIAL

## 2024-11-27 ENCOUNTER — OUTPATIENT (OUTPATIENT)
Dept: OUTPATIENT SERVICES | Facility: HOSPITAL | Age: 64
LOS: 1 days | End: 2024-11-27
Payer: COMMERCIAL

## 2024-11-27 ENCOUNTER — RESULT REVIEW (OUTPATIENT)
Age: 64
End: 2024-11-27

## 2024-11-27 ENCOUNTER — RX RENEWAL (OUTPATIENT)
Age: 64
End: 2024-11-27

## 2024-11-27 DIAGNOSIS — Z90.13 ACQUIRED ABSENCE OF BILATERAL BREASTS AND NIPPLES: Chronic | ICD-10-CM

## 2024-11-27 DIAGNOSIS — Z92.21 PERSONAL HISTORY OF ANTINEOPLASTIC CHEMOTHERAPY: Chronic | ICD-10-CM

## 2024-11-27 DIAGNOSIS — C78.89 SECONDARY MALIGNANT NEOPLASM OF OTHER DIGESTIVE ORGANS: ICD-10-CM

## 2024-11-27 PROCEDURE — 74177 CT ABD & PELVIS W/CONTRAST: CPT | Mod: 26

## 2024-11-27 PROCEDURE — 74177 CT ABD & PELVIS W/CONTRAST: CPT

## 2024-12-03 ENCOUNTER — APPOINTMENT (OUTPATIENT)
Dept: INFUSION THERAPY | Facility: HOSPITAL | Age: 64
End: 2024-12-03

## 2024-12-03 ENCOUNTER — NON-APPOINTMENT (OUTPATIENT)
Age: 64
End: 2024-12-03

## 2024-12-03 ENCOUNTER — RESULT REVIEW (OUTPATIENT)
Age: 64
End: 2024-12-03

## 2024-12-03 ENCOUNTER — APPOINTMENT (OUTPATIENT)
Dept: HEMATOLOGY ONCOLOGY | Facility: CLINIC | Age: 64
End: 2024-12-03

## 2024-12-03 LAB
ALBUMIN SERPL ELPH-MCNC: 4.2 G/DL
ALP BLD-CCNC: 175 U/L
ALT SERPL-CCNC: 56 U/L
ANION GAP SERPL CALC-SCNC: 16 MMOL/L
AST SERPL-CCNC: 35 U/L
BASOPHILS # BLD AUTO: 0.06 K/UL — SIGNIFICANT CHANGE UP (ref 0–0.2)
BASOPHILS NFR BLD AUTO: 0.9 % — SIGNIFICANT CHANGE UP (ref 0–2)
BILIRUB SERPL-MCNC: 0.4 MG/DL
BUN SERPL-MCNC: 24 MG/DL
CALCIUM SERPL-MCNC: 10.2 MG/DL
CHLORIDE SERPL-SCNC: 103 MMOL/L
CO2 SERPL-SCNC: 26 MMOL/L
CREAT SERPL-MCNC: 0.92 MG/DL
EGFR: 70 ML/MIN/1.73M2
EOSINOPHIL # BLD AUTO: 0.15 K/UL — SIGNIFICANT CHANGE UP (ref 0–0.5)
EOSINOPHIL NFR BLD AUTO: 2.3 % — SIGNIFICANT CHANGE UP (ref 0–6)
GLUCOSE SERPL-MCNC: 98 MG/DL
HCT VFR BLD CALC: 40.3 % — SIGNIFICANT CHANGE UP (ref 34.5–45)
HGB BLD-MCNC: 14.1 G/DL — SIGNIFICANT CHANGE UP (ref 11.5–15.5)
IMM GRANULOCYTES NFR BLD AUTO: 0.3 % — SIGNIFICANT CHANGE UP (ref 0–0.9)
LYMPHOCYTES # BLD AUTO: 2.59 K/UL — SIGNIFICANT CHANGE UP (ref 1–3.3)
LYMPHOCYTES # BLD AUTO: 38.9 % — SIGNIFICANT CHANGE UP (ref 13–44)
MCHC RBC-ENTMCNC: 34.5 PG — HIGH (ref 27–34)
MCHC RBC-ENTMCNC: 35 G/DL — SIGNIFICANT CHANGE UP (ref 32–36)
MCV RBC AUTO: 98.5 FL — SIGNIFICANT CHANGE UP (ref 80–100)
MONOCYTES # BLD AUTO: 0.49 K/UL — SIGNIFICANT CHANGE UP (ref 0–0.9)
MONOCYTES NFR BLD AUTO: 7.4 % — SIGNIFICANT CHANGE UP (ref 2–14)
NEUTROPHILS # BLD AUTO: 3.34 K/UL — SIGNIFICANT CHANGE UP (ref 1.8–7.4)
NEUTROPHILS NFR BLD AUTO: 50.2 % — SIGNIFICANT CHANGE UP (ref 43–77)
NRBC # BLD: 0 /100 WBCS — SIGNIFICANT CHANGE UP (ref 0–0)
PLATELET # BLD AUTO: 216 K/UL — SIGNIFICANT CHANGE UP (ref 150–400)
POTASSIUM SERPL-SCNC: 4.1 MMOL/L
PROT SERPL-MCNC: 6.2 G/DL
RBC # BLD: 4.09 M/UL — SIGNIFICANT CHANGE UP (ref 3.8–5.2)
RBC # FLD: 16 % — HIGH (ref 10.3–14.5)
SODIUM SERPL-SCNC: 144 MMOL/L
WBC # BLD: 6.65 K/UL — SIGNIFICANT CHANGE UP (ref 3.8–10.5)
WBC # FLD AUTO: 6.65 K/UL — SIGNIFICANT CHANGE UP (ref 3.8–10.5)

## 2024-12-06 ENCOUNTER — TRANSCRIPTION ENCOUNTER (OUTPATIENT)
Age: 64
End: 2024-12-06

## 2024-12-11 NOTE — HISTORY OF PRESENT ILLNESS
[FreeTextEntry1] : Patient has been treated for metastatic breast cancer to bone  Most recently on Kisqali - Which she takes daily for 3 weeks and then is off for 1 week  She has been experiencing several abdominal pains For the last 2 months she has been experiencing epigastric pain immediately upon swallowing There is no dysphagia The pain lasts a few minutes Taking Pepcid in advance of meals is helpful  She has also been experiencing right upper quadrant constant pain unrelated to meals  Additionally, she has a sense of chronic nausea Her weight has remained stable (that she may have lost a pound or 2 because she is swimming more over the summertime  Bowel movements have been regular  CT abdomen pelvis (7/14/2024):Stable few subcentimeter pulmonary nodules in the left lower lobe. Mild intrahepatic and extra hepatic biliary ductal dilatation, new from the prior exam. Contrast-enhanced MRI/MRCP can be performed for further evaluation. Osseous metastatic disease, without significant change. Lytic lesion in the left femoral neck, increases risk for pathologic fracture.  MRCP  (8/6/2024): Intrahepatic and extra hepatic biliary ductal dilatation without evidence of choledocholithiasis or obstructing lesion. This is nonspecific and of uncertain etiology. Either attention on follow-up or further evaluation with ERCP is suggested.  There are 2 subtle lesions within the central liver and right hepatic lobe, which are indeterminate. Recommend either attention on follow-up imaging or further evaluation with PET/CT.  Osseous metastatic disease is noted throughout the spine and pelvis. As pewr H and P:  83 YO F w/pmhx of Alzheimer's disease, hypothyroidism, DM2, MSSA bacteremia, diverticulitis s/p sigmoidectomy, HLD presents to ED w/fever and cough. Patient found to have pneumonia. UA negative. Received sepsis fluid bolus and additional fluid but remains hypotensive. Patient is requiring Levophed. Lactate trended up to 3.0. Mild GAYLA based on previous labs. Received Azithromycin and Zosyn.  at bedside. DNR/DNI .    Service is consulted for evaluation of oral-pharyngeal capacity for po intake, and to determine the level of diet texture.   Pt was seen by Service in September of this year, at which initial evaluation she was cleared for puree and mildly thick liquids.

## 2024-12-17 ENCOUNTER — RESULT REVIEW (OUTPATIENT)
Age: 64
End: 2024-12-17

## 2024-12-17 ENCOUNTER — APPOINTMENT (OUTPATIENT)
Dept: HEMATOLOGY ONCOLOGY | Facility: CLINIC | Age: 64
End: 2024-12-17
Payer: COMMERCIAL

## 2024-12-17 VITALS
RESPIRATION RATE: 16 BRPM | OXYGEN SATURATION: 99 % | WEIGHT: 148.37 LBS | BODY MASS INDEX: 29.97 KG/M2 | SYSTOLIC BLOOD PRESSURE: 127 MMHG | DIASTOLIC BLOOD PRESSURE: 85 MMHG | HEART RATE: 90 BPM | TEMPERATURE: 97.1 F

## 2024-12-17 DIAGNOSIS — C41.9 MALIGNANT NEOPLASM OF BONE AND ARTICULAR CARTILAGE, UNSPECIFIED: ICD-10-CM

## 2024-12-17 DIAGNOSIS — Z79.899 OTHER LONG TERM (CURRENT) DRUG THERAPY: ICD-10-CM

## 2024-12-17 DIAGNOSIS — R53.83 OTHER FATIGUE: ICD-10-CM

## 2024-12-17 DIAGNOSIS — Z79.811 LONG TERM (CURRENT) USE OF AROMATASE INHIBITORS: ICD-10-CM

## 2024-12-17 DIAGNOSIS — L27.1 LOCALIZED SKIN ERUPTION DUE TO DRUGS AND MEDICAMENTS TAKEN INTERNALLY: ICD-10-CM

## 2024-12-17 DIAGNOSIS — C50.919 MALIGNANT NEOPLASM OF UNSPECIFIED SITE OF UNSPECIFIED FEMALE BREAST: ICD-10-CM

## 2024-12-17 DIAGNOSIS — C79.51 SECONDARY MALIGNANT NEOPLASM OF BONE: ICD-10-CM

## 2024-12-17 LAB
ALBUMIN SERPL ELPH-MCNC: 4.4 G/DL
ALP BLD-CCNC: 191 U/L
ALT SERPL-CCNC: 49 U/L
ANION GAP SERPL CALC-SCNC: 13 MMOL/L
AST SERPL-CCNC: 29 U/L
BASOPHILS # BLD AUTO: 0.04 K/UL — SIGNIFICANT CHANGE UP (ref 0–0.2)
BASOPHILS NFR BLD AUTO: 0.7 % — SIGNIFICANT CHANGE UP (ref 0–2)
BILIRUB SERPL-MCNC: 0.5 MG/DL
BUN SERPL-MCNC: 18 MG/DL
CALCIUM SERPL-MCNC: 9.6 MG/DL
CEA SERPL-MCNC: 22.9 NG/ML
CHLORIDE SERPL-SCNC: 101 MMOL/L
CO2 SERPL-SCNC: 27 MMOL/L
CREAT SERPL-MCNC: 0.96 MG/DL
EGFR: 66 ML/MIN/1.73M2
EOSINOPHIL # BLD AUTO: 0.13 K/UL — SIGNIFICANT CHANGE UP (ref 0–0.5)
EOSINOPHIL NFR BLD AUTO: 2.4 % — SIGNIFICANT CHANGE UP (ref 0–6)
GLUCOSE SERPL-MCNC: 100 MG/DL
HCT VFR BLD CALC: 41.4 % — SIGNIFICANT CHANGE UP (ref 34.5–45)
HGB BLD-MCNC: 14.3 G/DL — SIGNIFICANT CHANGE UP (ref 11.5–15.5)
IMM GRANULOCYTES NFR BLD AUTO: 0.2 % — SIGNIFICANT CHANGE UP (ref 0–0.9)
LYMPHOCYTES # BLD AUTO: 2.08 K/UL — SIGNIFICANT CHANGE UP (ref 1–3.3)
LYMPHOCYTES # BLD AUTO: 38 % — SIGNIFICANT CHANGE UP (ref 13–44)
MCHC RBC-ENTMCNC: 34.5 G/DL — SIGNIFICANT CHANGE UP (ref 32–36)
MCHC RBC-ENTMCNC: 34.6 PG — HIGH (ref 27–34)
MCV RBC AUTO: 100.2 FL — HIGH (ref 80–100)
MONOCYTES # BLD AUTO: 0.57 K/UL — SIGNIFICANT CHANGE UP (ref 0–0.9)
MONOCYTES NFR BLD AUTO: 10.4 % — SIGNIFICANT CHANGE UP (ref 2–14)
NEUTROPHILS # BLD AUTO: 2.64 K/UL — SIGNIFICANT CHANGE UP (ref 1.8–7.4)
NEUTROPHILS NFR BLD AUTO: 48.3 % — SIGNIFICANT CHANGE UP (ref 43–77)
NRBC # BLD: 0 /100 WBCS — SIGNIFICANT CHANGE UP (ref 0–0)
PLATELET # BLD AUTO: 227 K/UL — SIGNIFICANT CHANGE UP (ref 150–400)
POTASSIUM SERPL-SCNC: 3.9 MMOL/L
PROT SERPL-MCNC: 6.5 G/DL
RBC # BLD: 4.13 M/UL — SIGNIFICANT CHANGE UP (ref 3.8–5.2)
RBC # FLD: 16 % — HIGH (ref 10.3–14.5)
SODIUM SERPL-SCNC: 141 MMOL/L
WBC # BLD: 5.47 K/UL — SIGNIFICANT CHANGE UP (ref 3.8–10.5)
WBC # FLD AUTO: 5.47 K/UL — SIGNIFICANT CHANGE UP (ref 3.8–10.5)

## 2024-12-17 PROCEDURE — G2211 COMPLEX E/M VISIT ADD ON: CPT | Mod: NC

## 2024-12-17 PROCEDURE — 99214 OFFICE O/P EST MOD 30 MIN: CPT

## 2024-12-18 PROBLEM — L27.1 HAND FOOT SYNDROME: Status: ACTIVE | Noted: 2024-12-18

## 2024-12-18 PROBLEM — Z79.811 AROMATASE INHIBITOR USE: Status: RESOLVED | Noted: 2020-11-12 | Resolved: 2024-12-18

## 2024-12-18 LAB — CANCER AG27-29 SERPL-ACNC: 152.7 U/ML

## 2024-12-27 ENCOUNTER — OUTPATIENT (OUTPATIENT)
Dept: OUTPATIENT SERVICES | Facility: HOSPITAL | Age: 64
LOS: 1 days | Discharge: HOME | End: 2024-12-27

## 2024-12-27 DIAGNOSIS — Z92.21 PERSONAL HISTORY OF ANTINEOPLASTIC CHEMOTHERAPY: Chronic | ICD-10-CM

## 2024-12-27 DIAGNOSIS — C50.919 MALIGNANT NEOPLASM OF UNSPECIFIED SITE OF UNSPECIFIED FEMALE BREAST: ICD-10-CM

## 2024-12-27 DIAGNOSIS — Z90.13 ACQUIRED ABSENCE OF BILATERAL BREASTS AND NIPPLES: Chronic | ICD-10-CM

## 2024-12-27 DIAGNOSIS — Z98.89 OTHER SPECIFIED POSTPROCEDURAL STATES: Chronic | ICD-10-CM

## 2024-12-31 ENCOUNTER — RESULT REVIEW (OUTPATIENT)
Age: 64
End: 2024-12-31

## 2024-12-31 ENCOUNTER — APPOINTMENT (OUTPATIENT)
Dept: INFUSION THERAPY | Facility: HOSPITAL | Age: 64
End: 2024-12-31

## 2024-12-31 ENCOUNTER — APPOINTMENT (OUTPATIENT)
Dept: HEMATOLOGY ONCOLOGY | Facility: CLINIC | Age: 64
End: 2024-12-31

## 2024-12-31 LAB
ALBUMIN SERPL ELPH-MCNC: 4.5 G/DL
ALP BLD-CCNC: 161 U/L
ALT SERPL-CCNC: 51 U/L
ANION GAP SERPL CALC-SCNC: 14 MMOL/L
AST SERPL-CCNC: 33 U/L
BASOPHILS # BLD AUTO: 0.05 K/UL — SIGNIFICANT CHANGE UP (ref 0–0.2)
BASOPHILS NFR BLD AUTO: 0.7 % — SIGNIFICANT CHANGE UP (ref 0–2)
BILIRUB SERPL-MCNC: 0.6 MG/DL
BUN SERPL-MCNC: 20 MG/DL
CALCIUM SERPL-MCNC: 10.5 MG/DL
CHLORIDE SERPL-SCNC: 102 MMOL/L
CO2 SERPL-SCNC: 28 MMOL/L
CREAT SERPL-MCNC: 1.27 MG/DL
EGFR: 47 ML/MIN/1.73M2
EOSINOPHIL # BLD AUTO: 0.1 K/UL — SIGNIFICANT CHANGE UP (ref 0–0.5)
EOSINOPHIL NFR BLD AUTO: 1.3 % — SIGNIFICANT CHANGE UP (ref 0–6)
GLUCOSE SERPL-MCNC: 92 MG/DL
HCT VFR BLD CALC: 40.1 % — SIGNIFICANT CHANGE UP (ref 34.5–45)
HGB BLD-MCNC: 14.6 G/DL — SIGNIFICANT CHANGE UP (ref 11.5–15.5)
IMM GRANULOCYTES NFR BLD AUTO: 0.5 % — SIGNIFICANT CHANGE UP (ref 0–0.9)
LYMPHOCYTES # BLD AUTO: 3.44 K/UL — HIGH (ref 1–3.3)
LYMPHOCYTES # BLD AUTO: 45 % — HIGH (ref 13–44)
MCHC RBC-ENTMCNC: 35.2 PG — HIGH (ref 27–34)
MCHC RBC-ENTMCNC: 36.4 G/DL — HIGH (ref 32–36)
MCV RBC AUTO: 96.6 FL — SIGNIFICANT CHANGE UP (ref 80–100)
MONOCYTES # BLD AUTO: 0.58 K/UL — SIGNIFICANT CHANGE UP (ref 0–0.9)
MONOCYTES NFR BLD AUTO: 7.6 % — SIGNIFICANT CHANGE UP (ref 2–14)
NEUTROPHILS # BLD AUTO: 3.43 K/UL — SIGNIFICANT CHANGE UP (ref 1.8–7.4)
NEUTROPHILS NFR BLD AUTO: 44.9 % — SIGNIFICANT CHANGE UP (ref 43–77)
NRBC # BLD: 0 /100 WBCS — SIGNIFICANT CHANGE UP (ref 0–0)
NRBC BLD-RTO: 0 /100 WBCS — SIGNIFICANT CHANGE UP (ref 0–0)
PLATELET # BLD AUTO: 232 K/UL — SIGNIFICANT CHANGE UP (ref 150–400)
POTASSIUM SERPL-SCNC: 3.4 MMOL/L
PROT SERPL-MCNC: 6.5 G/DL
RBC # BLD: 4.15 M/UL — SIGNIFICANT CHANGE UP (ref 3.8–5.2)
RBC # FLD: 16.4 % — HIGH (ref 10.3–14.5)
SODIUM SERPL-SCNC: 144 MMOL/L
WBC # BLD: 7.64 K/UL — SIGNIFICANT CHANGE UP (ref 3.8–10.5)
WBC # FLD AUTO: 7.64 K/UL — SIGNIFICANT CHANGE UP (ref 3.8–10.5)

## 2025-01-02 DIAGNOSIS — C79.51 SECONDARY MALIGNANT NEOPLASM OF BONE: ICD-10-CM

## 2025-01-22 ENCOUNTER — RX RENEWAL (OUTPATIENT)
Age: 65
End: 2025-01-22

## 2025-01-22 RX ORDER — SUCRALFATE 1 G/10ML
1 SUSPENSION ORAL 3 TIMES DAILY
Qty: 240 | Refills: 1 | Status: ACTIVE | COMMUNITY
Start: 2025-01-22 | End: 1900-01-01

## 2025-01-23 DIAGNOSIS — R11.2 NAUSEA WITH VOMITING, UNSPECIFIED: ICD-10-CM

## 2025-01-24 ENCOUNTER — NON-APPOINTMENT (OUTPATIENT)
Age: 65
End: 2025-01-24

## 2025-01-24 ENCOUNTER — RESULT REVIEW (OUTPATIENT)
Age: 65
End: 2025-01-24

## 2025-01-24 DIAGNOSIS — K31.5 OBSTRUCTION OF DUODENUM: ICD-10-CM

## 2025-01-25 ENCOUNTER — APPOINTMENT (OUTPATIENT)
Dept: RADIOLOGY | Facility: IMAGING CENTER | Age: 65
End: 2025-01-25
Payer: COMMERCIAL

## 2025-01-25 ENCOUNTER — APPOINTMENT (OUTPATIENT)
Dept: CT IMAGING | Facility: IMAGING CENTER | Age: 65
End: 2025-01-25
Payer: COMMERCIAL

## 2025-01-25 ENCOUNTER — OUTPATIENT (OUTPATIENT)
Dept: OUTPATIENT SERVICES | Facility: HOSPITAL | Age: 65
LOS: 1 days | End: 2025-01-25
Payer: COMMERCIAL

## 2025-01-25 DIAGNOSIS — Z98.89 OTHER SPECIFIED POSTPROCEDURAL STATES: Chronic | ICD-10-CM

## 2025-01-25 DIAGNOSIS — C41.9 MALIGNANT NEOPLASM OF BONE AND ARTICULAR CARTILAGE, UNSPECIFIED: ICD-10-CM

## 2025-01-25 DIAGNOSIS — Z90.13 ACQUIRED ABSENCE OF BILATERAL BREASTS AND NIPPLES: Chronic | ICD-10-CM

## 2025-01-25 DIAGNOSIS — C78.89 SECONDARY MALIGNANT NEOPLASM OF OTHER DIGESTIVE ORGANS: ICD-10-CM

## 2025-01-25 DIAGNOSIS — Z92.21 PERSONAL HISTORY OF ANTINEOPLASTIC CHEMOTHERAPY: Chronic | ICD-10-CM

## 2025-01-25 DIAGNOSIS — R11.2 NAUSEA WITH VOMITING, UNSPECIFIED: ICD-10-CM

## 2025-01-25 PROCEDURE — 74177 CT ABD & PELVIS W/CONTRAST: CPT | Mod: 26

## 2025-01-25 PROCEDURE — 74019 RADEX ABDOMEN 2 VIEWS: CPT | Mod: 26

## 2025-01-25 PROCEDURE — 74019 RADEX ABDOMEN 2 VIEWS: CPT

## 2025-01-25 PROCEDURE — 74177 CT ABD & PELVIS W/CONTRAST: CPT

## 2025-01-27 ENCOUNTER — NON-APPOINTMENT (OUTPATIENT)
Age: 65
End: 2025-01-27

## 2025-01-28 ENCOUNTER — TRANSCRIPTION ENCOUNTER (OUTPATIENT)
Age: 65
End: 2025-01-28

## 2025-01-28 ENCOUNTER — INPATIENT (INPATIENT)
Facility: HOSPITAL | Age: 65
LOS: 8 days | Discharge: HOME | DRG: 438 | End: 2025-02-06
Attending: STUDENT IN AN ORGANIZED HEALTH CARE EDUCATION/TRAINING PROGRAM | Admitting: INTERNAL MEDICINE
Payer: COMMERCIAL

## 2025-01-28 ENCOUNTER — APPOINTMENT (OUTPATIENT)
Dept: GASTROENTEROLOGY | Facility: HOSPITAL | Age: 65
End: 2025-01-28

## 2025-01-28 VITALS — HEIGHT: 59 IN | WEIGHT: 145.06 LBS

## 2025-01-28 DIAGNOSIS — K31.5 OBSTRUCTION OF DUODENUM: ICD-10-CM

## 2025-01-28 DIAGNOSIS — Z92.21 PERSONAL HISTORY OF ANTINEOPLASTIC CHEMOTHERAPY: Chronic | ICD-10-CM

## 2025-01-28 DIAGNOSIS — Z90.13 ACQUIRED ABSENCE OF BILATERAL BREASTS AND NIPPLES: Chronic | ICD-10-CM

## 2025-01-28 DIAGNOSIS — R11.2 NAUSEA WITH VOMITING, UNSPECIFIED: ICD-10-CM

## 2025-01-28 DIAGNOSIS — Z98.89 OTHER SPECIFIED POSTPROCEDURAL STATES: Chronic | ICD-10-CM

## 2025-01-28 DIAGNOSIS — K86.89 OTHER SPECIFIED DISEASES OF PANCREAS: ICD-10-CM

## 2025-01-28 DIAGNOSIS — Z29.9 ENCOUNTER FOR PROPHYLACTIC MEASURES, UNSPECIFIED: ICD-10-CM

## 2025-01-28 PROCEDURE — 93010 ELECTROCARDIOGRAM REPORT: CPT

## 2025-01-28 PROCEDURE — 74018 RADEX ABDOMEN 1 VIEW: CPT | Mod: 26

## 2025-01-28 PROCEDURE — 99222 1ST HOSP IP/OBS MODERATE 55: CPT | Mod: GC

## 2025-01-28 DEVICE — DREAMWIRE STD .035IN STRT: Type: IMPLANTABLE DEVICE | Status: FUNCTIONAL

## 2025-01-28 DEVICE — CATH BLLN EXTRACT DIST GUIDE WIRE 15MM 3LUM: Type: IMPLANTABLE DEVICE | Status: FUNCTIONAL

## 2025-01-28 DEVICE — STENT DUOD WALL 27X22X12X230 TTS OTW: Type: IMPLANTABLE DEVICE | Status: FUNCTIONAL

## 2025-01-28 RX ORDER — METOCLOPRAMIDE 10 MG/1
10 TABLET ORAL ONCE
Refills: 0 | Status: COMPLETED | OUTPATIENT
Start: 2025-01-28 | End: 2025-01-28

## 2025-01-28 RX ORDER — SODIUM CHLORIDE 9 G/ML
500 INJECTION, SOLUTION INTRAVENOUS
Refills: 0 | Status: COMPLETED | OUTPATIENT
Start: 2025-01-28 | End: 2025-01-28

## 2025-01-28 RX ORDER — HYDROMORPHONE HYDROCHLORIDE 4 MG/ML
0.5 INJECTION, SOLUTION INTRAMUSCULAR; INTRAVENOUS; SUBCUTANEOUS ONCE
Refills: 0 | Status: DISCONTINUED | OUTPATIENT
Start: 2025-01-28 | End: 2025-01-28

## 2025-01-28 RX ORDER — ACETAMINOPHEN 160 MG/5ML
1000 SUSPENSION ORAL ONCE
Refills: 0 | Status: COMPLETED | OUTPATIENT
Start: 2025-01-28 | End: 2025-01-28

## 2025-01-28 RX ORDER — ONDANSETRON 4 MG/1
4 TABLET, ORALLY DISINTEGRATING ORAL ONCE
Refills: 0 | Status: COMPLETED | OUTPATIENT
Start: 2025-01-28 | End: 2025-01-28

## 2025-01-28 RX ORDER — ENOXAPARIN SODIUM 100 MG/ML
40 INJECTION SUBCUTANEOUS EVERY 24 HOURS
Refills: 0 | Status: DISCONTINUED | OUTPATIENT
Start: 2025-01-28 | End: 2025-02-03

## 2025-01-28 RX ORDER — HYDROMORPHONE HYDROCHLORIDE 4 MG/ML
2 INJECTION, SOLUTION INTRAMUSCULAR; INTRAVENOUS; SUBCUTANEOUS ONCE
Refills: 0 | Status: DISCONTINUED | OUTPATIENT
Start: 2025-01-28 | End: 2025-01-28

## 2025-01-28 RX ORDER — TRIAMTERENE AND HYDROCHLOROTHIAZIDE 37.5; 25 MG/1; MG/1
1 TABLET ORAL DAILY
Refills: 0 | Status: DISCONTINUED | OUTPATIENT
Start: 2025-01-29 | End: 2025-02-06

## 2025-01-28 RX ORDER — PANCRELIPASE 36000; 180000; 114000 [USP'U]/1; [USP'U]/1; [USP'U]/1
36000-114000 CAPSULE, DELAYED RELEASE PELLETS ORAL
Qty: 200 | Refills: 3 | Status: ACTIVE | COMMUNITY
Start: 2025-01-28 | End: 1900-01-01

## 2025-01-28 RX ORDER — ONDANSETRON 4 MG/1
4 TABLET, ORALLY DISINTEGRATING ORAL EVERY 8 HOURS
Refills: 0 | Status: DISCONTINUED | OUTPATIENT
Start: 2025-01-28 | End: 2025-01-29

## 2025-01-28 RX ORDER — MAGNESIUM, ALUMINUM HYDROXIDE 200-225/5
30 SUSPENSION, ORAL (FINAL DOSE FORM) ORAL EVERY 4 HOURS
Refills: 0 | Status: DISCONTINUED | OUTPATIENT
Start: 2025-01-28 | End: 2025-02-06

## 2025-01-28 RX ORDER — ACETAMINOPHEN, DIPHENHYDRAMINE HCL, PHENYLEPHRINE HCL 325; 25; 5 MG/1; MG/1; MG/1
3 TABLET ORAL AT BEDTIME
Refills: 0 | Status: DISCONTINUED | OUTPATIENT
Start: 2025-01-28 | End: 2025-02-06

## 2025-01-28 RX ORDER — ACETAMINOPHEN 160 MG/5ML
650 SUSPENSION ORAL EVERY 6 HOURS
Refills: 0 | Status: DISCONTINUED | OUTPATIENT
Start: 2025-01-28 | End: 2025-02-02

## 2025-01-28 RX ORDER — INDOMETHACIN 50 MG/1
100 SUPPOSITORY RECTAL ONCE
Refills: 0 | Status: COMPLETED | OUTPATIENT
Start: 2025-01-28 | End: 2025-01-28

## 2025-01-28 RX ORDER — ACETAMINOPHEN 160 MG/5ML
1000 SUSPENSION ORAL ONCE
Refills: 0 | Status: DISCONTINUED | OUTPATIENT
Start: 2025-01-28 | End: 2025-01-28

## 2025-01-28 RX ADMIN — METOCLOPRAMIDE 10 MILLIGRAM(S): 10 TABLET ORAL at 18:07

## 2025-01-28 RX ADMIN — Medication 0.25 MILLIGRAM(S): at 23:02

## 2025-01-28 RX ADMIN — ACETAMINOPHEN 400 MILLIGRAM(S): 160 SUSPENSION ORAL at 23:28

## 2025-01-28 RX ADMIN — METOCLOPRAMIDE 10 MILLIGRAM(S): 10 TABLET ORAL at 14:05

## 2025-01-28 RX ADMIN — SODIUM CHLORIDE 30 MILLILITER(S): 9 INJECTION, SOLUTION INTRAVENOUS at 09:48

## 2025-01-28 RX ADMIN — HYDROMORPHONE HYDROCHLORIDE 0.5 MILLIGRAM(S): 4 INJECTION, SOLUTION INTRAMUSCULAR; INTRAVENOUS; SUBCUTANEOUS at 14:00

## 2025-01-28 RX ADMIN — ACETAMINOPHEN 400 MILLIGRAM(S): 160 SUSPENSION ORAL at 12:15

## 2025-01-28 RX ADMIN — INDOMETHACIN 100 MILLIGRAM(S): 50 SUPPOSITORY RECTAL at 13:23

## 2025-01-28 RX ADMIN — ACETAMINOPHEN 1000 MILLIGRAM(S): 160 SUSPENSION ORAL at 12:24

## 2025-01-28 RX ADMIN — ONDANSETRON 4 MILLIGRAM(S): 4 TABLET, ORALLY DISINTEGRATING ORAL at 12:15

## 2025-01-28 NOTE — H&P ADULT - ATTENDING COMMENTS
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    Patient is s/p duodenal stent placement 1/28 with advanced GI. Experiencing nausea/vomiting after procedure. Will monitor patient post-procedure. Supportive care with zofran/reglan PRN. Holding breast cancer meds while inpatient. Advance diet as tolerated. [facility] to follow up in AM. Abdominal XR ordered with lipase/amylase as per GI.    Rest as above.

## 2025-01-28 NOTE — H&P ADULT - ASSESSMENT
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

## 2025-01-28 NOTE — ASU PATIENT PROFILE, ADULT - NSICDXPASTMEDICALHX_GEN_ALL_CORE_FT
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

## 2025-01-28 NOTE — ASU PATIENT PROFILE, ADULT - NSICDXPASTSURGICALHX_GEN_ALL_CORE_FT
PAST SURGICAL HISTORY:  History of cancer chemotherapy Infusaport insertion 2013; removal 03/2014    History of hysterectomy partial - 1996 - uterus removed    S/P mastectomy, bilateral     Status post bilateral mastectomy     Status post transverse rectus abdominis muscle flap breast reconstruction 2013    Uterine fibroid myomectomy - 1993

## 2025-01-28 NOTE — PRE-ANESTHESIA EVALUATION ADULT - NSANTHPMHFT_GEN_ALL_CORE
Ms. Ricketts is s a 64-year-old woman with history of breast cancer, initially treated in 2011, now with mets to bone. In August 2024, she underwent an upper endoscopy with Dr. Rodriguez where he was unable to pass scope through narrowed 2nd portion of duodenum due to questionable mass vs duodenitis. Following the procedure, she experienced symptoms of pancreatitis and was hospitalized for 5 days. Biopsy confirmed the recurrence of breast CA. She reports maintaining a relatively normal quality of life, working, cleaning her own house, and taking 3-mile walks daily.

## 2025-01-28 NOTE — H&P ADULT - NSHPLABSRESULTS_GEN_ALL_CORE
LABS:            CAPILLARY BLOOD GLUCOSE        BLOOD CULTURE    RADIOLOGY & ADDITIONAL TESTS:    Imaging Personally Reviewed:  [ ] YES

## 2025-01-28 NOTE — H&P ADULT - PROBLEM SELECTOR PLAN 2
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 Follows with Dr. Chicho Martinez.     Plan:  - holding Capecitabine/Xeloda 500 mg BID (1 week on/1 week off)  - holding Fulvestrant 500 mg every 4 weeks  - holding Kisqali 600 mg daily (on 21 days, 7 days off)  - holding Arimidex 1 mg daily

## 2025-01-28 NOTE — ASU DISCHARGE PLAN (ADULT/PEDIATRIC) - FINANCIAL ASSISTANCE
[facility] provides services at a reduced cost to those who are determined to be eligible through [facility]’s financial assistance program. Information regarding [facility]’s financial assistance program can be found by going to [email or web address] or by calling [phone].

## 2025-01-28 NOTE — PATIENT PROFILE ADULT - FALL HARM RISK - HARM RISK INTERVENTIONS

## 2025-01-28 NOTE — H&P ADULT - NSHPPHYSICALEXAM_GEN_ALL_CORE
Vital Signs Last 24 Hrs  T(C): 35.9 (28 Jan 2025 11:54), Max: 36.7 (28 Jan 2025 10:02)  T(F): 96.6 (28 Jan 2025 11:54), Max: 98 (28 Jan 2025 10:02)  HR: 50 (28 Jan 2025 18:00) (45 - 70)  BP: 157/72 (28 Jan 2025 18:00) (136/98 - 181/85)  BP(mean): --  RR: 18 (28 Jan 2025 18:00) (16 - 20)  SpO2: 98% (28 Jan 2025 18:00) (96% - 100%)    Parameters below as of 28 Jan 2025 18:00  Patient On (Oxygen Delivery Method): room air      PHYSICAL EXAM:  GENERAL: NAD, well-developed  HEAD:  Atraumatic, Normocephalic  EYES: EOMI, conjunctiva and sclera clear  NECK: Supple  NERVOUS SYSTEM: AOX3, motor and sensation grossly intact in b/l UE and b/l LE  PSYCHIATRIC: Appropriate affect and mood  CHEST/LUNG: No increased WOB  HEART: well perfused, No LE edema  ABDOMEN: deferred in setting of nausea/vomiting  EXTREMITIES: No clubbing, cyanosis  SKIN: No rashes or lesions

## 2025-01-28 NOTE — ASU DISCHARGE PLAN (ADULT/PEDIATRIC) - NS MD DC FALL RISK RISK
For information on Fall & Injury Prevention, visit: [email or web address] OR  [email or web address] OR  https://www.cdc.gov/steadi/patient.html

## 2025-01-28 NOTE — H&P ADULT - NSHPSOCIALHISTORY_GEN_ALL_CORE
Lives with boyfriend (recently underwent spinal surgery), denies tobacco/alcohol/recreational drug use. Ambulates without assistance at baseline.

## 2025-01-28 NOTE — H&P ADULT - PROBLEM SELECTOR PLAN 1
Presenting with nausea/vomiting post-procedure. Admitted for observation. History of pancreatitis post-EGD.     Plan:  - Abdominal x-ray  - AM amylase, lipase  - Zofran prn  - Metoclopramide prn  - CTM for abdominal pain or intractable vomiting Presenting with nausea/vomiting post-procedure. Admitted for observation. History of pancreatitis post-EGD. QTC WNL.    Plan:  - Abdominal x-ray  - AM amylase, lipase  - Zofran prn  - Metoclopramide prn  - CTM for abdominal pain or intractable vomiting

## 2025-01-28 NOTE — H&P ADULT - HISTORY OF PRESENT ILLNESS
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    Patient Last had an endoscopy in 08/24, where the duodenal stricture was seen and biopsy was consistent with metastatic breast cancer. EGD at the time complicated by pancreatitis. Since then has been monitoring the stricture and has had to only consume liquids. Since 01/18/24, patient has been unable to tolerate even the jello and broth without regurgitation. Arrived for scheduled duodenal stent placement 01/28 and post-procedure began having nausea and vomiting.     Post-procedure: vitals 98F, 70, 136/98, 18, 99% RA.

## 2025-01-29 ENCOUNTER — APPOINTMENT (OUTPATIENT)
Dept: HEMATOLOGY ONCOLOGY | Facility: CLINIC | Age: 65
End: 2025-01-29

## 2025-01-29 ENCOUNTER — APPOINTMENT (OUTPATIENT)
Dept: INFUSION THERAPY | Facility: HOSPITAL | Age: 65
End: 2025-01-29

## 2025-01-29 LAB
ALBUMIN SERPL ELPH-MCNC: 3.4 G/DL — SIGNIFICANT CHANGE UP (ref 3.3–5)
ALBUMIN SERPL ELPH-MCNC: 3.9 G/DL — SIGNIFICANT CHANGE UP (ref 3.3–5)
ALP SERPL-CCNC: 163 U/L — HIGH (ref 40–120)
ALP SERPL-CCNC: 200 U/L — HIGH (ref 40–120)
ALT FLD-CCNC: 115 U/L — HIGH (ref 10–45)
ALT FLD-CCNC: 87 U/L — HIGH (ref 10–45)
AMYLASE P1 CFR SERPL: 1691 U/L — HIGH (ref 25–125)
ANION GAP SERPL CALC-SCNC: 11 MMOL/L — SIGNIFICANT CHANGE UP (ref 5–17)
ANION GAP SERPL CALC-SCNC: 11 MMOL/L — SIGNIFICANT CHANGE UP (ref 5–17)
AST SERPL-CCNC: 53 U/L — HIGH (ref 10–40)
AST SERPL-CCNC: 63 U/L — HIGH (ref 10–40)
BILIRUB SERPL-MCNC: 1.9 MG/DL — HIGH (ref 0.2–1.2)
BILIRUB SERPL-MCNC: 2.2 MG/DL — HIGH (ref 0.2–1.2)
BUN SERPL-MCNC: 10 MG/DL — SIGNIFICANT CHANGE UP (ref 7–23)
BUN SERPL-MCNC: 11 MG/DL — SIGNIFICANT CHANGE UP (ref 7–23)
CALCIUM SERPL-MCNC: 7.9 MG/DL — LOW (ref 8.4–10.5)
CALCIUM SERPL-MCNC: 8.2 MG/DL — LOW (ref 8.4–10.5)
CHLORIDE SERPL-SCNC: 104 MMOL/L — SIGNIFICANT CHANGE UP (ref 96–108)
CHLORIDE SERPL-SCNC: 105 MMOL/L — SIGNIFICANT CHANGE UP (ref 96–108)
CO2 SERPL-SCNC: 23 MMOL/L — SIGNIFICANT CHANGE UP (ref 22–31)
CO2 SERPL-SCNC: 25 MMOL/L — SIGNIFICANT CHANGE UP (ref 22–31)
CREAT SERPL-MCNC: 0.64 MG/DL — SIGNIFICANT CHANGE UP (ref 0.5–1.3)
CREAT SERPL-MCNC: 0.73 MG/DL — SIGNIFICANT CHANGE UP (ref 0.5–1.3)
CRP SERPL-MCNC: 11 MG/L — HIGH (ref 0–4)
EGFR: 92 ML/MIN/1.73M2 — SIGNIFICANT CHANGE UP
EGFR: 99 ML/MIN/1.73M2 — SIGNIFICANT CHANGE UP
GAS PNL BLDV: SIGNIFICANT CHANGE UP
GLUCOSE BLDC GLUCOMTR-MCNC: 102 MG/DL — HIGH (ref 70–99)
GLUCOSE SERPL-MCNC: 104 MG/DL — HIGH (ref 70–99)
GLUCOSE SERPL-MCNC: 131 MG/DL — HIGH (ref 70–99)
HCT VFR BLD CALC: 35.3 % — SIGNIFICANT CHANGE UP (ref 34.5–45)
HCT VFR BLD CALC: 37.2 % — SIGNIFICANT CHANGE UP (ref 34.5–45)
HGB BLD-MCNC: 12.4 G/DL — SIGNIFICANT CHANGE UP (ref 11.5–15.5)
HGB BLD-MCNC: 13.1 G/DL — SIGNIFICANT CHANGE UP (ref 11.5–15.5)
LIDOCAIN IGE QN: >3000 U/L — HIGH (ref 7–60)
MAGNESIUM SERPL-MCNC: 2 MG/DL — SIGNIFICANT CHANGE UP (ref 1.6–2.6)
MCHC RBC-ENTMCNC: 35.1 G/DL — SIGNIFICANT CHANGE UP (ref 32–36)
MCHC RBC-ENTMCNC: 35.1 PG — HIGH (ref 27–34)
MCHC RBC-ENTMCNC: 35.2 G/DL — SIGNIFICANT CHANGE UP (ref 32–36)
MCHC RBC-ENTMCNC: 35.4 PG — HIGH (ref 27–34)
MCV RBC AUTO: 100.9 FL — HIGH (ref 80–100)
MCV RBC AUTO: 99.7 FL — SIGNIFICANT CHANGE UP (ref 80–100)
NRBC # BLD: 0 /100 WBCS — SIGNIFICANT CHANGE UP (ref 0–0)
NRBC # BLD: 0 /100 WBCS — SIGNIFICANT CHANGE UP (ref 0–0)
NRBC BLD-RTO: 0 /100 WBCS — SIGNIFICANT CHANGE UP (ref 0–0)
NRBC BLD-RTO: 0 /100 WBCS — SIGNIFICANT CHANGE UP (ref 0–0)
PHOSPHATE SERPL-MCNC: 2.8 MG/DL — SIGNIFICANT CHANGE UP (ref 2.5–4.5)
PLATELET # BLD AUTO: 146 K/UL — LOW (ref 150–400)
PLATELET # BLD AUTO: 199 K/UL — SIGNIFICANT CHANGE UP (ref 150–400)
POTASSIUM SERPL-MCNC: 3.3 MMOL/L — LOW (ref 3.5–5.3)
POTASSIUM SERPL-MCNC: 3.4 MMOL/L — LOW (ref 3.5–5.3)
POTASSIUM SERPL-SCNC: 3.3 MMOL/L — LOW (ref 3.5–5.3)
POTASSIUM SERPL-SCNC: 3.4 MMOL/L — LOW (ref 3.5–5.3)
PROT SERPL-MCNC: 5.3 G/DL — LOW (ref 6–8.3)
PROT SERPL-MCNC: 5.9 G/DL — LOW (ref 6–8.3)
RBC # BLD: 3.5 M/UL — LOW (ref 3.8–5.2)
RBC # BLD: 3.73 M/UL — LOW (ref 3.8–5.2)
RBC # FLD: 13.8 % — SIGNIFICANT CHANGE UP (ref 10.3–14.5)
RBC # FLD: 14.2 % — SIGNIFICANT CHANGE UP (ref 10.3–14.5)
SODIUM SERPL-SCNC: 139 MMOL/L — SIGNIFICANT CHANGE UP (ref 135–145)
SODIUM SERPL-SCNC: 140 MMOL/L — SIGNIFICANT CHANGE UP (ref 135–145)
WBC # BLD: 11.1 K/UL — HIGH (ref 3.8–10.5)
WBC # BLD: 9.06 K/UL — SIGNIFICANT CHANGE UP (ref 3.8–10.5)
WBC # FLD AUTO: 11.1 K/UL — HIGH (ref 3.8–10.5)
WBC # FLD AUTO: 9.06 K/UL — SIGNIFICANT CHANGE UP (ref 3.8–10.5)

## 2025-01-29 PROCEDURE — 99232 SBSQ HOSP IP/OBS MODERATE 35: CPT | Mod: GC

## 2025-01-29 PROCEDURE — 99222 1ST HOSP IP/OBS MODERATE 55: CPT

## 2025-01-29 PROCEDURE — 71045 X-RAY EXAM CHEST 1 VIEW: CPT | Mod: 26

## 2025-01-29 RX ORDER — OXYCODONE HYDROCHLORIDE 30 MG/1
5 TABLET ORAL EVERY 6 HOURS
Refills: 0 | Status: DISCONTINUED | OUTPATIENT
Start: 2025-01-29 | End: 2025-01-29

## 2025-01-29 RX ORDER — KETOROLAC TROMETHAMINE 10 MG
15 TABLET ORAL ONCE
Refills: 0 | Status: DISCONTINUED | OUTPATIENT
Start: 2025-01-29 | End: 2025-01-29

## 2025-01-29 RX ORDER — OXYCODONE HYDROCHLORIDE 30 MG/1
2.5 TABLET ORAL EVERY 6 HOURS
Refills: 0 | Status: DISCONTINUED | OUTPATIENT
Start: 2025-01-29 | End: 2025-01-29

## 2025-01-29 RX ORDER — SODIUM CHLORIDE 9 G/ML
1000 INJECTION, SOLUTION INTRAVENOUS
Refills: 0 | Status: DISCONTINUED | OUTPATIENT
Start: 2025-01-29 | End: 2025-01-30

## 2025-01-29 RX ORDER — ACETAMINOPHEN 160 MG/5ML
1000 SUSPENSION ORAL ONCE
Refills: 0 | Status: COMPLETED | OUTPATIENT
Start: 2025-01-29 | End: 2025-01-29

## 2025-01-29 RX ORDER — POTASSIUM CHLORIDE 750 MG/1
40 TABLET, EXTENDED RELEASE ORAL ONCE
Refills: 0 | Status: DISCONTINUED | OUTPATIENT
Start: 2025-01-29 | End: 2025-01-29

## 2025-01-29 RX ORDER — METOCLOPRAMIDE 10 MG/1
10 TABLET ORAL ONCE
Refills: 0 | Status: COMPLETED | OUTPATIENT
Start: 2025-01-29 | End: 2025-01-29

## 2025-01-29 RX ORDER — POTASSIUM CHLORIDE 750 MG/1
10 TABLET, EXTENDED RELEASE ORAL
Refills: 0 | Status: COMPLETED | OUTPATIENT
Start: 2025-01-29 | End: 2025-01-29

## 2025-01-29 RX ORDER — MORPHINE SULFATE 60 MG/1
2 TABLET, FILM COATED, EXTENDED RELEASE ORAL EVERY 4 HOURS
Refills: 0 | Status: DISCONTINUED | OUTPATIENT
Start: 2025-01-29 | End: 2025-02-05

## 2025-01-29 RX ORDER — MORPHINE SULFATE 60 MG/1
4 TABLET, FILM COATED, EXTENDED RELEASE ORAL EVERY 4 HOURS
Refills: 0 | Status: DISCONTINUED | OUTPATIENT
Start: 2025-01-29 | End: 2025-01-29

## 2025-01-29 RX ORDER — ACETAMINOPHEN 160 MG/5ML
1000 SUSPENSION ORAL ONCE
Refills: 0 | Status: DISCONTINUED | OUTPATIENT
Start: 2025-01-29 | End: 2025-01-29

## 2025-01-29 RX ADMIN — MORPHINE SULFATE 4 MILLIGRAM(S): 60 TABLET, FILM COATED, EXTENDED RELEASE ORAL at 07:45

## 2025-01-29 RX ADMIN — POTASSIUM CHLORIDE 100 MILLIEQUIVALENT(S): 750 TABLET, EXTENDED RELEASE ORAL at 06:57

## 2025-01-29 RX ADMIN — ENOXAPARIN SODIUM 40 MILLIGRAM(S): 100 INJECTION SUBCUTANEOUS at 05:32

## 2025-01-29 RX ADMIN — ACETAMINOPHEN 400 MILLIGRAM(S): 160 SUSPENSION ORAL at 22:13

## 2025-01-29 RX ADMIN — POTASSIUM CHLORIDE 100 MILLIEQUIVALENT(S): 750 TABLET, EXTENDED RELEASE ORAL at 09:04

## 2025-01-29 RX ADMIN — Medication 15 MILLIGRAM(S): at 06:05

## 2025-01-29 RX ADMIN — MORPHINE SULFATE 2 MILLIGRAM(S): 60 TABLET, FILM COATED, EXTENDED RELEASE ORAL at 17:21

## 2025-01-29 RX ADMIN — ACETAMINOPHEN 1000 MILLIGRAM(S): 160 SUSPENSION ORAL at 03:04

## 2025-01-29 RX ADMIN — METOCLOPRAMIDE 10 MILLIGRAM(S): 10 TABLET ORAL at 14:04

## 2025-01-29 RX ADMIN — POTASSIUM CHLORIDE 100 MILLIEQUIVALENT(S): 750 TABLET, EXTENDED RELEASE ORAL at 10:29

## 2025-01-29 RX ADMIN — Medication 15 MILLIGRAM(S): at 07:02

## 2025-01-29 RX ADMIN — SODIUM CHLORIDE 100 MILLILITER(S): 9 INJECTION, SOLUTION INTRAVENOUS at 15:05

## 2025-01-29 RX ADMIN — ACETAMINOPHEN 1000 MILLIGRAM(S): 160 SUSPENSION ORAL at 23:13

## 2025-01-29 RX ADMIN — MORPHINE SULFATE 2 MILLIGRAM(S): 60 TABLET, FILM COATED, EXTENDED RELEASE ORAL at 18:21

## 2025-01-29 RX ADMIN — MORPHINE SULFATE 4 MILLIGRAM(S): 60 TABLET, FILM COATED, EXTENDED RELEASE ORAL at 06:51

## 2025-01-29 RX ADMIN — SODIUM CHLORIDE 125 MILLILITER(S): 9 INJECTION, SOLUTION INTRAVENOUS at 06:23

## 2025-01-29 RX ADMIN — ONDANSETRON 4 MILLIGRAM(S): 4 TABLET, ORALLY DISINTEGRATING ORAL at 05:32

## 2025-01-29 NOTE — PROGRESS NOTE ADULT - PROBLEM SELECTOR PLAN 3
DVT: lovenox  Diet: clear liquid diet  Dispo: anticipate home DVT: lovenox  Diet: clear liquid diet - advancing as tolerating  Dispo: anticipate home

## 2025-01-29 NOTE — PROGRESS NOTE ADULT - ASSESSMENT
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 Concern for pancreatitis post-procedure.

## 2025-01-29 NOTE — CONSULT NOTE ADULT - SUBJECTIVE AND OBJECTIVE BOX
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     Patient Last had an endoscopy in , where the duodenal stricture was seen and biopsy was consistent with metastatic breast cancer. EGD at the time complicated by pancreatitis. Subsequently underwent duodenal stent placement on  in setting poor PO intake from duodenal stricture.     Post procedure had N/V and was admitted. Has remained HD stable. Labs w/ white count 11, Hb 13.1, Plts 199, BUMp unremarkable outside of mild hypokalemia. TB 1.9, AST/ALT 63/115,  and lipase >3000.              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 1000 milliLiter(s) (125 mL/Hr) IV Continuous <Continuous>  potassium chloride  10 mEq/100 mL IVPB 10 milliEquivalent(s) IV Intermittent every 1 hour  triamterene 75 mG/hydrochlorothiazide 50 mG Tablet 1 Tablet(s) Oral daily    MEDICATIONS  (PRN):  acetaminophen     Tablet .. 650 milliGRAM(s) Oral every 6 hours PRN Temp greater or equal to 38C (100.4F), Mild Pain (1 - 3)  aluminum hydroxide/magnesium hydroxide/simethicone Suspension 30 milliLiter(s) Oral every 4 hours PRN Dyspepsia  melatonin 3 milliGRAM(s) Oral at bedtime PRN Insomnia  morphine  - Injectable 2 milliGRAM(s) IV Push every 4 hours PRN Moderate Pain (4 - 6)  morphine  - Injectable 4 milliGRAM(s) IV Push every 4 hours PRN Severe Pain (7 - 10)    Allergies    No Known Allergies    Intolerances          ______________________________________________________________________  PHYSICAL EXAM:  T(C): 37.4 (25 @ 04:08), Max: 37.4 (25 @ 04:08)  HR: 49 (25 @ 04:08)  BP: 120/62 (25 @ 04:08)  RR: 18 (25 @ 04:08)  SpO2: 95% (25 @ 04:08)  Wt(kg): --    GEN: NAD, normocephalic  CVS: Normal rate, HD stable  CHEST: No signs of respiratory distress, breathing comfortably, no accessory muscle usage  ABD: soft , nontender, nondistended, bowel sounds present  EXTR: no cyanosis, no clubbing, no edema  NEURO: Awake and alert, conversant  SKIN:  warm;  non icteric      Labs/Imaging reviewed.                        13.1   11.10 )-----------( 199      ( 2025 05:02 )             37.2     Last Hb:Hemoglobin: 13.1 g/dL (25 @ 05:02)               140   |  104   |  10                 Ca: 8.2    BMP:   ----------------------------< 131    M.0   (25 @ 05:02)             3.3    |  25    | 0.73               Ph: 2.8      LFT:     TPro: 5.9 / Alb: 3.9 / TBili: 1.9 / DBili: x / AST: 63 / ALT: 115 / AlkPhos: 200   (25 @ 05:02)    Creatinine: 0.73 mg/dL      BUN/Cr: Blood Urea Nitrogen: 10 mg/dL (25 @ 05:02)  /Creatinine: 0.73 mg/dL (25 @ 05:02)    AST/ALTAspartate Aminotransferase (AST/SGOT): 63 U/L (25 @ 05:02)  /Alanine Aminotransferase (ALT/SGPT): 115 U/L (25 @ 05:02)    ALP Alkaline Phosphatase: 200 U/L (25 @ 05:02)    T/Dbili /  INR:     EGD/Wilson:  Upper Endoscopy:  (25 @ 09:43)      Imaging:  CT Abdomen and Pelvis w/ Oral Cont and w/ IV Cont:   EXAM: [id number] - CT ABDOMEN AND PELVIS OC IC  - ORDERED BY: ALIA MAST      PROCEDURE DATE:  2025           INTERPRETATION:  CLINICAL INFORMATION: Invasive lobular carcinoma on EGD    COMPARISON: 2024    CONTRAST/COMPLICATIONS:  IV Contrast: Omnipaque 350  90 cc administered   10 cc discarded  Oral Contrast: Omnipaque 300    PROCEDURE:  CT of the Abdomen and Pelvis was performed.  Sagittal and coronal reformats were performed.    FINDINGS:  LOWER CHEST: 5 mm nodule in the left lungbase (2/16), unchanged.   Bilateral breast reconstruction.    LIVER: Within normal limits.  BILE DUCTS: Mild to moderate intrahepatic and extrahepatic biliary ductal   dilation, similar to the prior exam. No obstructive lesion or stone seen.  GALLBLADDER: Within normal limits.  SPLEEN: Within normal limits.  PANCREAS: Within normal limits.  ADRENALS: Within normal limits.  KIDNEYS/URETERS: No hydronephrosis. Left renal cysts with additional   hypodensity too small to characterize.    BLADDER: Within normal limits.  REPRODUCTIVE ORGANS: Supracervical hysterectomy.    BOWEL: No bowel obstruction. Appendix is normal. Resolved bowel and   rectal wall thickening. Third portion of the duodenum is not well   distended.  PERITONEUM/RETROPERITONEUM: Within normal limits. No pneumoperitoneum.  VESSELS: Within normal limits.  LYMPH NODES: No lymphadenopathy.  ABDOMINAL WALL: Within normal limits.  BONES: Scattered mixed lytic/sclerotic osseous lesions, representing   known osseous metastatic disease. No significant change since the prior   exam.      IMPRESSION:      Resolved small bowel and colonic findings    No significant change in metastatic osseous disease.    No definite mass seen in the stomach. Third portion of the duodenum is   not well distended.      --- End of Report ---               AURORA FAJARDO MD; Attending Radiologist   This document has been electronically signed. 2025  8:04PM (01-25-25 @ 13:51)  CT Abdomen and Pelvis w/ Oral Cont and w/ IV Cont:   EXAM: [id number] - CT ABDOMEN AND PELVIS OC IC  - ORDERED BY:  DIANNE BANKS      PROCEDURE DATE:  2024           INTERPRETATION:  CLINICAL INFORMATION: History of metastatic right breast   cancer.    COMPARISON: 2024    CONTRAST/COMPLICATIONS:  IV Contrast: Omnipaque 350  90 cc administered   10 cc discarded  Oral Contrast: Breeza Lemon Lime    PROCEDURE:  CT of the Abdomen and Pelvis was performed.  Sagittal and coronal reformats were performed.    FINDINGS:  LOWER CHEST: 5 mmnodule in the left lung base (), unchanged    LIVER: Within normal limits.  BILE DUCTS: Mild to moderate intrahepatic and extrahepatic biliary ductal   dilation, similar to the prior exam. No obstructive lesion or stone seen.  GALLBLADDER: Withinnormal limits.  SPLEEN: Within normal limits.  PANCREAS: Resolved peripancreatic edema.  ADRENALS: Within normal limits.  KIDNEYS/URETERS: No hydronephrosis. Left renal cysts with additional   hypodensity too small to characterize.    BLADDER: Withinnormal limits.  REPRODUCTIVE ORGANS: Supracervical hysterectomy.    BOWEL: No bowel obstruction.  Appendix is normal. Several small bowel   loops demonstrate circumferential wall thickening with fecal-like   material within. The findings may reflect sequela of enteritis.   Circumferential wall thickening of the rectum compatible with proctitis.  PERITONEUM/RETROPERITONEUM: Within normal limits. No pneumoperitoneum.  VESSELS: Within normal limits.  LYMPH NODES: No lymphadenopathy.  ABDOMINAL WALL:Within normal limits.  BONES: Scattered lytic/sclerotic osseous lesions, representing known   osseous metastatic disease. No significant change since the prior exam.      IMPRESSION:      Resolved peripancreatic inflammation    Several small bowel loops demonstrate circumferential wall thickening   with fecal-like material within. The findings may reflect sequela of   enteritis and delayed transition. Circumferential wall thickening of the   rectum compatible with proctitis.    Similar-appearing osseous metastatic disease    < from: Upper Endoscopy (25 @ 09:43) >  Findings:       The esophagus was normal.       The stomach was normal.       An acquired malignant-appearing, intrinsic severestenosis was found extending from the sweep        to the second portion of the duodenum and was partially traversed allowing for passage of the        guidewire into the third portion of the duodenum. Contrast was injected to confirm location   of the wire, a second stenosis was seen in the distal third portion of the duodenum, this was        not stented. The proximal stenosis was stented with a 22 mm x 12 cm WallFlex stent under        fluoroscopic guidance.                                                                          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                       - A second stenosis was seen in the distal duodenum/proximal jejunum; not                        reached.  Recommendation:      - If minimal improvement of symptoms will need evaluation for second stent        across the second stricture in the distal duodenum.                       - Patient has a contact number available for emergencies. The signs and                        symptoms of potential delayed complications were discussed with the patient.                       Return to normal activities tomorrow. Written discharge instructions were                        provided to the patient.    < end of copied text >

## 2025-01-29 NOTE — CONSULT NOTE ADULT - NS ATTEND AMEND GEN_ALL_CORE FT
Patient seen and examined. Admitted post duodenal stenting with abd pain, n/v. Found to have pancreatitis, likely ampullary involvement of her breast ca. Recommend conservative mgmt, LR IV, pain control.     Total time spent to complete patient's bedside assessment, physical examination, review medical chart including labs & imaging, discuss medical plan of care with housestaff was more than 50 minutes.

## 2025-01-29 NOTE — CONSULT NOTE ADULT - ASSESSMENT
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     #Metastatic breast cancer on immunotherapy c/b duodenal stricture  #Duodenal stricture s/p stent placement 1/28  Hx of metastatic breast cancer to duodenum recently w/ worsening sxs of poor PO intake. Elective EGD w/ duodenal stent placement on 1/28 w/ EGD showing malignant duodenal stenosis extending from bulb to second portion duodenum that was stented with 22mm x 12cm wallflex duodenal stent A second stenosis was seen in distal duodenum/prox jeujunum which was not reached.  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 A second stenosis was seen in distal duodenum/prox jeujunum which was not reached.  Admitted for N/V found to have pancreatitis, likely related to malignant infiltration into papilla given hx of pancreatitis after biopsy. Still requiring morphine for pain, not tolerating diet. On LR.  Recommendations:  -C/w IVF  -Pain control per primary team   -Advance diet as tolerated  -If no improvement will need eval for second stent across second stricture in distal duodenum vs. GJ stent (anatomy may limit this option)    Note incomplete until finalized by attending signature/attestation.    Kaia Michaud  GI/Hepatology Fellow PGY5    NON-URGENT CONSULTS:  Please email [email or web address]  AT NIGHT AND ON WEEKENDS:  Available on [employer] Teams  [phone] (Long Range Pager)    For urgent consults, please contact on call GI team. 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 A second stenosis was seen in distal duodenum/prox jeujunum which was not reached.  Admitted for N/V found to have pancreatitis, likely related to malignant infiltration into papilla given hx of pancreatitis after biopsy. Still requiring morphine for pain, not tolerating diet. On LR.  Recommendations:  -C/w IVF  -Pain control per primary team   -Advance diet as tolerated  -If no improvement, plan for UGI series to evaluate potential distal stricture    Note incomplete until finalized by attending signature/attestation.    Kaia Michaud  GI/Hepatology Fellow PGY5    NON-URGENT CONSULTS:  Please email [email or web address]  AT NIGHT AND ON WEEKENDS:  Available on [employer] Teams  [phone] ([employer] Range Pager)    For urgent consults, please contact on call GI team. See Amion schedule ([facility]) or [employer] system ([facility])

## 2025-01-29 NOTE — PROGRESS NOTE ADULT - PROBLEM SELECTOR PLAN 1
Presenting with nausea/vomiting post-procedure. Admitted for observation. History of pancreatitis post-EGD. QTC WNL.    Plan:  - Abdominal x-ray  - AM amylase, lipase  - Zofran prn  - Metoclopramide prn  - CTM for abdominal pain or intractable vomiting Presenting with nausea/vomiting post-procedure. Admitted for observation. History of pancreatitis post-EGD. QTC WNL.    Plan:  - GI consulted, recs appreciated  - Nausea: Zofran, Metoclopramide prn  - Pain: acetaminophen, morphine 2 mg q4h, morphine 4 mg q4h prn  - continue mIVF at 125 cc/hr

## 2025-01-29 NOTE — PROGRESS NOTE ADULT - SUBJECTIVE AND OBJECTIVE BOX
CLOVER OG  64y  MRN: [id number]    Patient is a 64y old  Female who presents with a chief complaint of Nausea/vomiting post procedure (28 Jan 2025 17:43)      Interval/Overnight Events: no events ON.     Subjective: Pt seen and examined at bedside.     MEDICATIONS  (STANDING):  enoxaparin Injectable 40 milliGRAM(s) SubCutaneous every 24 hours  lactated ringers. 1000 milliLiter(s) (125 mL/Hr) IV Continuous <Continuous>  potassium chloride  10 mEq/100 mL IVPB 10 milliEquivalent(s) IV Intermittent every 1 hour  triamterene 75 mG/hydrochlorothiazide 50 mG Tablet 1 Tablet(s) Oral daily    MEDICATIONS  (PRN):  acetaminophen     Tablet .. 650 milliGRAM(s) Oral every 6 hours PRN Temp greater or equal to 38C (100.4F), Mild Pain (1 - 3)  aluminum hydroxide/magnesium hydroxide/simethicone Suspension 30 milliLiter(s) Oral every 4 hours PRN Dyspepsia  melatonin 3 milliGRAM(s) Oral at bedtime PRN Insomnia  morphine  - Injectable 2 milliGRAM(s) IV Push every 4 hours PRN Moderate Pain (4 - 6)  morphine  - Injectable 4 milliGRAM(s) IV Push every 4 hours PRN Severe Pain (7 - 10)      Objective:    Vitals: Vital Signs Last 24 Hrs  T(C): 36.9 (01-28-25 @ 19:50), Max: 36.9 (01-28-25 @ 19:50)  T(F): 98.4 (01-28-25 @ 19:50), Max: 98.4 (01-28-25 @ 19:50)  HR: 58 (01-28-25 @ 19:50) (45 - 70)  BP: 119/64 (01-28-25 @ 19:50) (119/64 - 181/85)  BP(mean): --  RR: 17 (01-28-25 @ 19:50) (16 - 20)  SpO2: 98% (01-28-25 @ 19:50) (96% - 100%)                I&O's Summary      PHYSICAL EXAM:  GENERAL: NAD  HEAD:  Atraumatic, Normocephalic  EYES: EOMI, conjunctiva and sclera clear  CHEST/LUNG: Clear to auscultation bilaterally; No rales, rhonchi, wheezing, or rubs  HEART: Regular rate and rhythm; No murmurs, rubs, or gallops  ABDOMEN: Soft, Nontender, Nondistended;   SKIN: No rashes or lesions  NERVOUS SYSTEM:  Alert & Oriented X3, no focal deficits    LABS:                        13.1   11.10 )-----------( 199      ( 29 Jan 2025 05:02 )             37.2     01-29    140  |  104  |  10  ----------------------------<  131[H]  3.3[L]   |  25  |  0.73    Ca    8.2[L]      29 Jan 2025 05:02  Phos  2.8     01-29  Mg     2.0     01-29    TPro  5.9[L]  /  Alb  3.9  /  TBili  1.9[H]  /  DBili  x   /  AST  63[H]  /  ALT  115[H]  /  AlkPhos  200[H]  01-29    CAPILLARY BLOOD GLUCOSE            Urinalysis Basic - ( 29 Jan 2025 05:02 )    Color: x / Appearance: x / SG: x / pH: x  Gluc: 131 mg/dL / Ketone: x  / Bili: x / Urobili: x   Blood: x / Protein: x / Nitrite: x   Leuk Esterase: x / RBC: x / WBC x   Sq Epi: x / Non Sq Epi: x / Bacteria: x          RADIOLOGY & ADDITIONAL TESTS:    Imaging Personally Reviewed:  [x ] YES  [ ] NO    Consultants involved in case:   Consultant(s) Notes Reviewed:  [ x] YES  [ ] NO:   Care Discussed with Consultants/Other Providers [x ] YES  [ ] NO           CLOVER OG  64y  MRN: [id number]    Patient is a 64y old  Female who presents with a chief complaint of Nausea/vomiting post procedure (28 Jan 2025 17:43)      Interval/Overnight Events: Underwent stent placement 01/28. Overnight, had increasing abdominal pain and nausea/vomiting. Lipase >3000 concerning for pancreatitis post-procedure. Given zofran/reglan, IV fluids, morphine, tylenol, toradol, and ativan.    Subjective: Pt seen and examined at bedside. Still endorsing abdominal pain, nausea, vomiting. Pain greatly improved from overnight. Did not trial diet, only wet her mouth with water. No other concerns this AM.    MEDICATIONS  (STANDING):  enoxaparin Injectable 40 milliGRAM(s) SubCutaneous every 24 hours  lactated ringers. 1000 milliLiter(s) (125 mL/Hr) IV Continuous <Continuous>  potassium chloride  10 mEq/100 mL IVPB 10 milliEquivalent(s) IV Intermittent every 1 hour  triamterene 75 mG/hydrochlorothiazide 50 mG Tablet 1 Tablet(s) Oral daily    MEDICATIONS  (PRN):  acetaminophen     Tablet .. 650 milliGRAM(s) Oral every 6 hours PRN Temp greater or equal to 38C (100.4F), Mild Pain (1 - 3)  aluminum hydroxide/magnesium hydroxide/simethicone Suspension 30 milliLiter(s) Oral every 4 hours PRN Dyspepsia  melatonin 3 milliGRAM(s) Oral at bedtime PRN Insomnia  morphine  - Injectable 2 milliGRAM(s) IV Push every 4 hours PRN Moderate Pain (4 - 6)  morphine  - Injectable 4 milliGRAM(s) IV Push every 4 hours PRN Severe Pain (7 - 10)      Objective:    Vitals: Vital Signs Last 24 Hrs  T(C): 36.9 (01-28-25 @ 19:50), Max: 36.9 (01-28-25 @ 19:50)  T(F): 98.4 (01-28-25 @ 19:50), Max: 98.4 (01-28-25 @ 19:50)  HR: 58 (01-28-25 @ 19:50) (45 - 70)  BP: 119/64 (01-28-25 @ 19:50) (119/64 - 181/85)  BP(mean): --  RR: 17 (01-28-25 @ 19:50) (16 - 20)  SpO2: 98% (01-28-25 @ 19:50) (96% - 100%)                I&O's Summary      PHYSICAL EXAM:  GENERAL: NAD  HEAD:  Atraumatic, Normocephalic  EYES: EOMI, conjunctiva and sclera clear  CHEST/LUNG: Clear to auscultation bilaterally; No rales, rhonchi, wheezing, or rubs  HEART: Regular rate and rhythm; No murmurs, rubs, or gallops  ABDOMEN: Soft, very tender to palpation over epigastric region, non-tender to palpation in other quadrants.   SKIN: No rashes or lesions  NERVOUS SYSTEM:  Alert & Oriented X3, no focal deficits    LABS:                        13.1   11.10 )-----------( 199      ( 29 Jan 2025 05:02 )             37.2     01-29    140  |  104  |  10  ----------------------------<  131[H]  3.3[L]   |  25  |  0.73    Ca    8.2[L]      29 Jan 2025 05:02  Phos  2.8     01-29  Mg     2.0     01-29    TPro  5.9[L]  /  Alb  3.9  /  TBili  1.9[H]  /  DBili  x   /  AST  63[H]  /  ALT  115[H]  /  AlkPhos  200[H]  01-29    CAPILLARY BLOOD GLUCOSE            Urinalysis Basic - ( 29 Jan 2025 05:02 )    Color: x / Appearance: x / SG: x / pH: x  Gluc: 131 mg/dL / Ketone: x  / Bili: x / Urobili: x   Blood: x / Protein: x / Nitrite: x   Leuk Esterase: x / RBC: x / WBC x   Sq Epi: x / Non Sq Epi: x / Bacteria: x          RADIOLOGY & ADDITIONAL TESTS:    Imaging Personally Reviewed:  [x ] YES  [ ] NO    Consultants involved in case:   Consultant(s) Notes Reviewed:  [ x] YES  [ ] NO:   Care Discussed with Consultants/Other Providers [x ] YES  [ ] NO

## 2025-01-30 DIAGNOSIS — J96.01 ACUTE RESPIRATORY FAILURE WITH HYPOXIA: ICD-10-CM

## 2025-01-30 DIAGNOSIS — R50.9 FEVER, UNSPECIFIED: ICD-10-CM

## 2025-01-30 LAB
ALBUMIN SERPL ELPH-MCNC: 3.4 G/DL — SIGNIFICANT CHANGE UP (ref 3.3–5)
ALP SERPL-CCNC: 159 U/L — HIGH (ref 40–120)
ALT FLD-CCNC: 81 U/L — HIGH (ref 10–45)
ANION GAP SERPL CALC-SCNC: 10 MMOL/L — SIGNIFICANT CHANGE UP (ref 5–17)
AST SERPL-CCNC: 50 U/L — HIGH (ref 10–40)
BILIRUB SERPL-MCNC: 3.5 MG/DL — HIGH (ref 0.2–1.2)
BUN SERPL-MCNC: 10 MG/DL — SIGNIFICANT CHANGE UP (ref 7–23)
CALCIUM SERPL-MCNC: 7.8 MG/DL — LOW (ref 8.4–10.5)
CHLORIDE SERPL-SCNC: 105 MMOL/L — SIGNIFICANT CHANGE UP (ref 96–108)
CO2 SERPL-SCNC: 24 MMOL/L — SIGNIFICANT CHANGE UP (ref 22–31)
CREAT SERPL-MCNC: 0.61 MG/DL — SIGNIFICANT CHANGE UP (ref 0.5–1.3)
EGFR: 100 ML/MIN/1.73M2 — SIGNIFICANT CHANGE UP
FLUAV AG NPH QL: SIGNIFICANT CHANGE UP
FLUBV AG NPH QL: SIGNIFICANT CHANGE UP
GLUCOSE BLDC GLUCOMTR-MCNC: 96 MG/DL — SIGNIFICANT CHANGE UP (ref 70–99)
GLUCOSE SERPL-MCNC: 108 MG/DL — HIGH (ref 70–99)
HCT VFR BLD CALC: 36.4 % — SIGNIFICANT CHANGE UP (ref 34.5–45)
HGB BLD-MCNC: 12.5 G/DL — SIGNIFICANT CHANGE UP (ref 11.5–15.5)
LIDOCAIN IGE QN: 1332 U/L — HIGH (ref 7–60)
MAGNESIUM SERPL-MCNC: 2.1 MG/DL — SIGNIFICANT CHANGE UP (ref 1.6–2.6)
MCHC RBC-ENTMCNC: 34.3 G/DL — SIGNIFICANT CHANGE UP (ref 32–36)
MCHC RBC-ENTMCNC: 35.1 PG — HIGH (ref 27–34)
MCV RBC AUTO: 102.2 FL — HIGH (ref 80–100)
NRBC # BLD: 0 /100 WBCS — SIGNIFICANT CHANGE UP (ref 0–0)
NRBC BLD-RTO: 0 /100 WBCS — SIGNIFICANT CHANGE UP (ref 0–0)
PHOSPHATE SERPL-MCNC: 1.5 MG/DL — LOW (ref 2.5–4.5)
PLATELET # BLD AUTO: 138 K/UL — LOW (ref 150–400)
POTASSIUM SERPL-MCNC: 3.4 MMOL/L — LOW (ref 3.5–5.3)
POTASSIUM SERPL-SCNC: 3.4 MMOL/L — LOW (ref 3.5–5.3)
PROT SERPL-MCNC: 5.3 G/DL — LOW (ref 6–8.3)
RAPID RVP RESULT: SIGNIFICANT CHANGE UP
RBC # BLD: 3.56 M/UL — LOW (ref 3.8–5.2)
RBC # FLD: 14.1 % — SIGNIFICANT CHANGE UP (ref 10.3–14.5)
RSV RNA NPH QL NAA+NON-PROBE: SIGNIFICANT CHANGE UP
SARS-COV-2 RNA SPEC QL NAA+PROBE: SIGNIFICANT CHANGE UP
SARS-COV-2 RNA SPEC QL NAA+PROBE: SIGNIFICANT CHANGE UP
SODIUM SERPL-SCNC: 139 MMOL/L — SIGNIFICANT CHANGE UP (ref 135–145)
WBC # BLD: 8.24 K/UL — SIGNIFICANT CHANGE UP (ref 3.8–10.5)
WBC # FLD AUTO: 8.24 K/UL — SIGNIFICANT CHANGE UP (ref 3.8–10.5)

## 2025-01-30 PROCEDURE — 99232 SBSQ HOSP IP/OBS MODERATE 35: CPT | Mod: GC

## 2025-01-30 PROCEDURE — 99233 SBSQ HOSP IP/OBS HIGH 50: CPT | Mod: GC

## 2025-01-30 RX ORDER — PIPERACILLIN SODIUM AND TAZOBACTAM SODIUM 2; 250 G/50ML; MG/50ML
3.38 INJECTION, POWDER, FOR SOLUTION INTRAVENOUS ONCE
Refills: 0 | Status: COMPLETED | OUTPATIENT
Start: 2025-01-30 | End: 2025-01-30

## 2025-01-30 RX ORDER — POTASSIUM CHLORIDE 750 MG/1
10 TABLET, EXTENDED RELEASE ORAL ONCE
Refills: 0 | Status: COMPLETED | OUTPATIENT
Start: 2025-01-30 | End: 2025-01-30

## 2025-01-30 RX ORDER — SODIUM CHLORIDE 9 G/ML
1000 INJECTION, SOLUTION INTRAVENOUS
Refills: 0 | Status: DISCONTINUED | OUTPATIENT
Start: 2025-01-30 | End: 2025-01-31

## 2025-01-30 RX ORDER — METOCLOPRAMIDE 10 MG/1
10 TABLET ORAL EVERY 8 HOURS
Refills: 0 | Status: DISCONTINUED | OUTPATIENT
Start: 2025-01-30 | End: 2025-02-01

## 2025-01-30 RX ORDER — PIPERACILLIN SODIUM AND TAZOBACTAM SODIUM 2; 250 G/50ML; MG/50ML
3.38 INJECTION, POWDER, FOR SOLUTION INTRAVENOUS EVERY 8 HOURS
Refills: 0 | Status: COMPLETED | OUTPATIENT
Start: 2025-01-30 | End: 2025-02-03

## 2025-01-30 RX ORDER — METOCLOPRAMIDE 10 MG/1
10 TABLET ORAL ONCE
Refills: 0 | Status: COMPLETED | OUTPATIENT
Start: 2025-01-30 | End: 2025-01-30

## 2025-01-30 RX ORDER — POTASSIUM PHOSPHATE, MONOBASIC POTASSIUM PHOSPHATE, DIBASIC 236; 224 MG/ML; MG/ML
30 INJECTION, SOLUTION INTRAVENOUS ONCE
Refills: 0 | Status: COMPLETED | OUTPATIENT
Start: 2025-01-30 | End: 2025-01-30

## 2025-01-30 RX ADMIN — PIPERACILLIN SODIUM AND TAZOBACTAM SODIUM 25 GRAM(S): 2; 250 INJECTION, POWDER, FOR SOLUTION INTRAVENOUS at 15:24

## 2025-01-30 RX ADMIN — MORPHINE SULFATE 2 MILLIGRAM(S): 60 TABLET, FILM COATED, EXTENDED RELEASE ORAL at 23:00

## 2025-01-30 RX ADMIN — METOCLOPRAMIDE 10 MILLIGRAM(S): 10 TABLET ORAL at 19:37

## 2025-01-30 RX ADMIN — PIPERACILLIN SODIUM AND TAZOBACTAM SODIUM 25 GRAM(S): 2; 250 INJECTION, POWDER, FOR SOLUTION INTRAVENOUS at 05:30

## 2025-01-30 RX ADMIN — SODIUM CHLORIDE 75 MILLILITER(S): 9 INJECTION, SOLUTION INTRAVENOUS at 16:48

## 2025-01-30 RX ADMIN — POTASSIUM CHLORIDE 100 MILLIEQUIVALENT(S): 750 TABLET, EXTENDED RELEASE ORAL at 11:33

## 2025-01-30 RX ADMIN — ACETAMINOPHEN 650 MILLIGRAM(S): 160 SUSPENSION ORAL at 11:36

## 2025-01-30 RX ADMIN — METOCLOPRAMIDE 10 MILLIGRAM(S): 10 TABLET ORAL at 08:52

## 2025-01-30 RX ADMIN — ACETAMINOPHEN 650 MILLIGRAM(S): 160 SUSPENSION ORAL at 12:06

## 2025-01-30 RX ADMIN — PIPERACILLIN SODIUM AND TAZOBACTAM SODIUM 25 GRAM(S): 2; 250 INJECTION, POWDER, FOR SOLUTION INTRAVENOUS at 22:58

## 2025-01-30 RX ADMIN — POTASSIUM PHOSPHATE, MONOBASIC POTASSIUM PHOSPHATE, DIBASIC 83.33 MILLIMOLE(S): 236; 224 INJECTION, SOLUTION INTRAVENOUS at 13:36

## 2025-01-30 RX ADMIN — PIPERACILLIN SODIUM AND TAZOBACTAM SODIUM 200 GRAM(S): 2; 250 INJECTION, POWDER, FOR SOLUTION INTRAVENOUS at 01:20

## 2025-01-30 RX ADMIN — ENOXAPARIN SODIUM 40 MILLIGRAM(S): 100 INJECTION SUBCUTANEOUS at 05:30

## 2025-01-30 RX ADMIN — MORPHINE SULFATE 2 MILLIGRAM(S): 60 TABLET, FILM COATED, EXTENDED RELEASE ORAL at 23:30

## 2025-01-30 RX ADMIN — SODIUM CHLORIDE 100 MILLILITER(S): 9 INJECTION, SOLUTION INTRAVENOUS at 01:33

## 2025-01-30 NOTE — PROGRESS NOTE ADULT - ASSESSMENT
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 A second stenosis was seen in distal duodenum/prox jeujunum which was not reached.  Admitted for N/V found to have pancreatitis, likely related to malignant infiltration into papilla. Pancreatitis likely from compression of bile duct worsened in setting new stent. Still requiring morphine for pain, improving nausea.  Recommendations:  -C/w IVF  -Pain control per primary team   -Trend lipase  -Advance diet as tolerated  -If lack of improvement in bili elevation will consider further imaging with CT w/ IV and oral contrast to better assess biliary duct  -If no improvement after pancreatitis resolved, plan for UGI series to evaluate potential distal stricture    Note incomplete until finalized by attending signature/attestation.    Kaia Michaud  GI/Hepatology Fellow PGY5    NON-URGENT CONSULTS:  Please email [email or web address]  AT NIGHT AND ON WEEKENDS:  Available on [employer] Teams  [phone] (Long Range Pager)    For urgent consults, please contact on call GI team. See Am[employer] schedule ([facility]) or [employer] system ([facility])

## 2025-01-30 NOTE — PROVIDER CONTACT NOTE (OTHER) - BACKGROUND
Patient was admitted for obstruction of the duodenum.
Patient was admitted for obstruction of the duodenum.

## 2025-01-30 NOTE — PROGRESS NOTE ADULT - PROBLEM SELECTOR PLAN 5
O2 sat 85% O/N.  Placed on 2L NC, weaned off in the morning.  O2 sat 95% on room air in AM.    Plan:  - CXR pending  - Continue to monitor DVT: lovenox  Diet: clear liquid diet - advancing as tolerating  Dispo: anticipate home

## 2025-01-30 NOTE — PROGRESS NOTE ADULT - PROBLEM SELECTOR PLAN 4
Rectal temp 101 F O/N.  Reduced to 99.4 in the morning, continue to monitor.    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 Follows with Dr. Chicho Martinez.     Plan:  - holding Capecitabine/Xeloda 500 mg BID (1 week on/1 week off)  - holding Fulvestrant 500 mg every 4 weeks  - holding Kisqali 600 mg daily (on 21 days, 7 days off)  - holding Arimidex 1 mg daily

## 2025-01-30 NOTE — PROGRESS NOTE ADULT - ASSESSMENT
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 Concern for pancreatitis post-procedure.

## 2025-01-30 NOTE — PROGRESS NOTE ADULT - PROBLEM SELECTOR PLAN 3
DVT: lovenox  Diet: clear liquid diet - advancing as tolerating  Dispo: anticipate home Resolved  O2 sat 85% O/N.  Placed on 2L NC, weaned off in the morning.  O2 sat 95% on room air in AM. Likely 2/2 volume overload in setting of IV fluids. Resolved following holding of fluid.    Plan:  - CXR pending  - Continue to monitor

## 2025-01-30 NOTE — PROGRESS NOTE ADULT - PROBLEM SELECTOR PLAN 1
Presenting with nausea/vomiting post-procedure. Admitted for observation. History of pancreatitis post-EGD. QTC WNL.    Plan:  - GI consulted, recs appreciated  - Nausea: Zofran, Metoclopramide prn  - Pain: acetaminophen, morphine 2 mg q4h, morphine 4 mg q4h prn  - continue mIVF at 125 cc/hr Presenting with nausea/vomiting post-procedure. Admitted for observation. History of pancreatitis post-EGD. QTC WNL.    Plan:  - GI consulted, recs appreciated  - Nausea: Zofran, Metoclopramide prn  - Pain: acetaminophen, morphine 2 mg q4h, morphine 4 mg q4h prn  - holding fluids in setting of increased oxygen requirement and crackles on exam, will reassess in afternoon pending PO intake and will restart at 75 cc/hr if poor PO intake

## 2025-01-30 NOTE — PROGRESS NOTE ADULT - SUBJECTIVE AND OBJECTIVE BOX
Interval Events:   -Still w/ abdominal pain. Tried a little bit of fluids earlier. no more vomiting    Hospital Medications:  acetaminophen     Tablet .. 650 milliGRAM(s) Oral every 6 hours PRN  aluminum hydroxide/magnesium hydroxide/simethicone Suspension 30 milliLiter(s) Oral every 4 hours PRN  enoxaparin Injectable 40 milliGRAM(s) SubCutaneous every 24 hours  lactated ringers. 1000 milliLiter(s) (75 mL/Hr) IV Continuous <Continuous>  melatonin 3 milliGRAM(s) Oral at bedtime PRN  morphine  - Injectable 2 milliGRAM(s) IV Push every 4 hours PRN  morphine  - Injectable 4 milliGRAM(s) IV Push every 4 hours PRN  piperacillin/tazobactam IVPB.. 3.375 Gram(s) IV Intermittent every 8 hours  triamterene 75 mG/hydrochlorothiazide 50 mG Tablet 1 Tablet(s) Oral daily      25 @ 07:01  -  25 @ 07:00  --------------------------------------------------------  IN: 220 mL / OUT: 0 mL / NET: 220 mL          PHYSICAL EXAM:   Vital Signs:  Vital Signs Last 24 Hrs  T(C): 38.7 (2025 11:44), Max: 38.7 (2025 11:44)  T(F): 101.7 (2025 11:44), Max: 101.7 (2025 11:44)  HR: 67 (2025 12:00) (67 - 76)  BP: 144/74 (2025 12:00) (126/63 - 144/74)  BP(mean): --  RR: 18 (2025 12:00) (18 - 18)  SpO2: 91% (2025 12:00) (85% - 95%)    Parameters below as of 2025 12:00  Patient On (Oxygen Delivery Method): nasal cannula      Daily     Daily   GENERAL:  NAD, Appears stated age  HEENT:  NC/AT,  conjunctivae clear and pink, sclera -anicteric  CHEST:  Normal Effort, no signs of resp distress  HEART:  RRR, HD stable  ABDOMEN:  Soft, tender epigastric region  EXTREMITIES:  no cyanosis or edema  SKIN:  Warm & Dry. No rash or erythema  NEURO:  Alert, oriented, no focal deficit  LABS:                        12.5   8.24  )-----------( 138      ( 2025 07:10 )             36.4     Last Hb:Hemoglobin: 12.5 g/dL (25 @ 07:10)  Hemoglobin: 12.4 g/dL (25 @ 22:27)  Hemoglobin: 13.1 g/dL (25 @ 05:02)               139   |  105   |  10                 Ca: 7.8    BMP:   ----------------------------< 108    M.1   (25 @ 07:12)             3.4    |  24    | 0.61               Ph: 1.5      LFT:     TPro: 5.3 / Alb: 3.4 / TBili: 3.5 / DBili: x / AST: 50 / ALT: 81 / AlkPhos: 159   (25 @ 07:12)    Creatinine: 0.61 mg/dL  Creatinine: 0.64 mg/dL  Creatinine: 0.73 mg/dL      LIVER FUNCTIONS - ( 2025 07:12 )  Alb: 3.4 g/dL / Pro: 5.3 g/dL / ALK PHOS: 159 U/L / ALT: 81 U/L / AST: 50 U/L / GGT: x             Urinalysis Basic - ( 2025 07:12 )    Color: x / Appearance: x / SG: x / pH: x  Gluc: 108 mg/dL / Ketone: x  / Bili: x / Urobili: x   Blood: x / Protein: x / Nitrite: x   Leuk Esterase: x / RBC: x / WBC x   Sq Epi: x / Non Sq Epi: x / Bacteria: x        Amylase Serum--      Lipase [id number]       Ammonia--

## 2025-01-30 NOTE — PROVIDER CONTACT NOTE (OTHER) - ACTION/TREATMENT ORDERED:
MD made aware. Pt put on 2L NC. continuous pulse ox ordered. MD made aware. Pt put on 2L NC. CXR ordered and done. continuous pulse ox ordered.

## 2025-01-30 NOTE — PROGRESS NOTE ADULT - PROBLEM SELECTOR PLAN 2
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 Follows with Dr. Chicho Martinez.     Plan:  - holding Capecitabine/Xeloda 500 mg BID (1 week on/1 week off)  - holding Fulvestrant 500 mg every 4 weeks  - holding Kisqali 600 mg daily (on 21 days, 7 days off)  - holding Arimidex 1 mg daily Rectal temp 101 F O/N.  Reduced to 99.4 in the morning following tylenol, continue to monitor. Likely 2/2 viral etiology. Less concern for UTI without dysuria, pneumonia given acute onset and lack of CXR findings, and no areas of skin breakdown.     Plan:  - Zosyn 3.375 g in dextrose 5% 100 mL, q8hr (01/30 - )  - Blood cultures pending  - f/u CXR  - If viral panel positive, will d/c zosyn

## 2025-01-30 NOTE — PROGRESS NOTE ADULT - SUBJECTIVE AND OBJECTIVE BOX
CLOVER OG  64y  MRN: [id number]    Patient is a 64y old  Female who presents with a chief complaint of Nausea/vomiting post procedure (29 Jan 2025 08:45)      Interval/Overnight Events: no events ON.     Subjective: Pt seen and examined at bedside.     MEDICATIONS  (STANDING):  enoxaparin Injectable 40 milliGRAM(s) SubCutaneous every 24 hours  lactated ringers. 1000 milliLiter(s) (100 mL/Hr) IV Continuous <Continuous>  piperacillin/tazobactam IVPB.. 3.375 Gram(s) IV Intermittent every 8 hours  triamterene 75 mG/hydrochlorothiazide 50 mG Tablet 1 Tablet(s) Oral daily    MEDICATIONS  (PRN):  acetaminophen     Tablet .. 650 milliGRAM(s) Oral every 6 hours PRN Temp greater or equal to 38C (100.4F), Mild Pain (1 - 3)  aluminum hydroxide/magnesium hydroxide/simethicone Suspension 30 milliLiter(s) Oral every 4 hours PRN Dyspepsia  melatonin 3 milliGRAM(s) Oral at bedtime PRN Insomnia  morphine  - Injectable 2 milliGRAM(s) IV Push every 4 hours PRN Moderate Pain (4 - 6)  morphine  - Injectable 4 milliGRAM(s) IV Push every 4 hours PRN Severe Pain (7 - 10)      Objective:    Vitals: Vital Signs Last 24 Hrs  T(C): 37.4 (01-30-25 @ 05:06), Max: 38.3 (01-29-25 @ 21:08)  T(F): 99.4 (01-30-25 @ 05:06), Max: 101 (01-29-25 @ 21:08)  HR: 75 (01-30-25 @ 05:06) (71 - 76)  BP: 134/73 (01-30-25 @ 05:06) (125/70 - 134/73)  BP(mean): --  RR: 18 (01-30-25 @ 05:06) (18 - 18)  SpO2: 95% (01-30-25 @ 05:06) (85% - 98%)                I&O's Summary    29 Jan 2025 07:01  -  30 Jan 2025 07:00  --------------------------------------------------------  IN: 320 mL / OUT: 0 mL / NET: 320 mL        PHYSICAL EXAM:  GENERAL: NAD  HEAD:  Atraumatic, Normocephalic  EYES: EOMI, conjunctiva and sclera clear  CHEST/LUNG: Clear to auscultation bilaterally; No rales, rhonchi, wheezing, or rubs  HEART: Regular rate and rhythm; No murmurs, rubs, or gallops  ABDOMEN: Soft, Nontender, Nondistended;   SKIN: No rashes or lesions  NERVOUS SYSTEM:  Alert & Oriented X3, no focal deficits    LABS:                        12.4   9.06  )-----------( 146      ( 29 Jan 2025 22:27 )             35.3                         13.1   11.10 )-----------( 199      ( 29 Jan 2025 05:02 )             37.2     01-29    139  |  105  |  11  ----------------------------<  104[H]  3.4[L]   |  23  |  0.64  01-29    140  |  104  |  10  ----------------------------<  131[H]  3.3[L]   |  25  |  0.73    Ca    7.9[L]      29 Jan 2025 22:27  Ca    8.2[L]      29 Jan 2025 05:02  Phos  2.8     01-29  Mg     2.0     01-29    TPro  5.3[L]  /  Alb  3.4  /  TBili  2.2[H]  /  DBili  x   /  AST  53[H]  /  ALT  87[H]  /  AlkPhos  163[H]  01-29  TPro  5.9[L]  /  Alb  3.9  /  TBili  1.9[H]  /  DBili  x   /  AST  63[H]  /  ALT  115[H]  /  AlkPhos  200[H]  01-29    CAPILLARY BLOOD GLUCOSE      POCT Blood Glucose.: 102 mg/dL (29 Jan 2025 21:47)        Urinalysis Basic - ( 29 Jan 2025 22:27 )    Color: x / Appearance: x / SG: x / pH: x  Gluc: 104 mg/dL / Ketone: x  / Bili: x / Urobili: x   Blood: x / Protein: x / Nitrite: x   Leuk Esterase: x / RBC: x / WBC x   Sq Epi: x / Non Sq Epi: x / Bacteria: x          RADIOLOGY & ADDITIONAL TESTS:    Imaging Personally Reviewed:  [x ] YES  [ ] NO    Consultants involved in case:   Consultant(s) Notes Reviewed:  [ x] YES  [ ] NO:   Care Discussed with Consultants/Other Providers [x ] YES  [ ] NO           CLOVER OG  64y  MRN: [id number]    Patient is a 64y old  Female who presents with a chief complaint of Nausea/vomiting post procedure (29 Jan 2025 08:45)      Interval/Overnight Events: O2 sat at 85.  Pt placed on 2L NC, CXR performed.  Rectal temp 101F, IV tylenol given.  Blood cultures ordered.  Abx ordered and given.    Subjective: Pt seen and examined at bedside.  Pt endorses nausea and epigastric pain that is restricting her from eating.  Pt can only tolerate small sips of liquid, no food.  Pt acknowledges fever but says that the nausea/epigastric pain is her main concern.  Pt denies any shortness of breath at rest or with exertion.  Denies headache or cough.    MEDICATIONS  (STANDING):  enoxaparin Injectable 40 milliGRAM(s) SubCutaneous every 24 hours  lactated ringers. 1000 milliLiter(s) (100 mL/Hr) IV Continuous <Continuous>  piperacillin/tazobactam IVPB.. 3.375 Gram(s) IV Intermittent every 8 hours  triamterene 75 mG/hydrochlorothiazide 50 mG Tablet 1 Tablet(s) Oral daily    MEDICATIONS  (PRN):  acetaminophen     Tablet .. 650 milliGRAM(s) Oral every 6 hours PRN Temp greater or equal to 38C (100.4F), Mild Pain (1 - 3)  aluminum hydroxide/magnesium hydroxide/simethicone Suspension 30 milliLiter(s) Oral every 4 hours PRN Dyspepsia  melatonin 3 milliGRAM(s) Oral at bedtime PRN Insomnia  morphine  - Injectable 2 milliGRAM(s) IV Push every 4 hours PRN Moderate Pain (4 - 6)  morphine  - Injectable 4 milliGRAM(s) IV Push every 4 hours PRN Severe Pain (7 - 10)      Objective:    Vitals: Vital Signs Last 24 Hrs  T(C): 37.4 (01-30-25 @ 05:06), Max: 38.3 (01-29-25 @ 21:08)  T(F): 99.4 (01-30-25 @ 05:06), Max: 101 (01-29-25 @ 21:08)  HR: 75 (01-30-25 @ 05:06) (71 - 76)  BP: 134/73 (01-30-25 @ 05:06) (125/70 - 134/73)  BP(mean): --  RR: 18 (01-30-25 @ 05:06) (18 - 18)  SpO2: 95% (01-30-25 @ 05:06) (85% - 98%)                I&O's Summary    29 Jan 2025 07:01  -  30 Jan 2025 07:00  --------------------------------------------------------  IN: 320 mL / OUT: 0 mL / NET: 320 mL        PHYSICAL EXAM:  GENERAL: NAD  HEAD:  Atraumatic, Normocephalic  EYES: EOMI, conjunctiva and sclera clear  CHEST/LUNG: Clear to auscultation bilaterally; No rales, rhonchi, wheezing, or rubs  HEART: Regular rate and rhythm; No murmurs, rubs, or gallops  ABDOMEN: Tender to palpation; Soft, Nondistended;   SKIN: No rashes or lesions  NERVOUS SYSTEM:  Alert & Oriented X3, no focal deficits    LABS:                        12.4   9.06  )-----------( 146      ( 29 Jan 2025 22:27 )             35.3                         13.1   11.10 )-----------( 199      ( 29 Jan 2025 05:02 )             37.2     01-29    139  |  105  |  11  ----------------------------<  104[H]  3.4[L]   |  23  |  0.64  01-29    140  |  104  |  10  ----------------------------<  131[H]  3.3[L]   |  25  |  0.73    Ca    7.9[L]      29 Jan 2025 22:27  Ca    8.2[L]      29 Jan 2025 05:02  Phos  2.8     01-29  Mg     2.0     01-29    TPro  5.3[L]  /  Alb  3.4  /  TBili  2.2[H]  /  DBili  x   /  AST  53[H]  /  ALT  87[H]  /  AlkPhos  163[H]  01-29  TPro  5.9[L]  /  Alb  3.9  /  TBili  1.9[H]  /  DBili  x   /  AST  63[H]  /  ALT  115[H]  /  AlkPhos  200[H]  01-29    CAPILLARY BLOOD GLUCOSE      POCT Blood Glucose.: 102 mg/dL (29 Jan 2025 21:47)        Urinalysis Basic - ( 29 Jan 2025 22:27 )    Color: x / Appearance: x / SG: x / pH: x  Gluc: 104 mg/dL / Ketone: x  / Bili: x / Urobili: x   Blood: x / Protein: x / Nitrite: x   Leuk Esterase: x / RBC: x / WBC x   Sq Epi: x / Non Sq Epi: x / Bacteria: x          RADIOLOGY & ADDITIONAL TESTS:    Imaging Personally Reviewed:  [x ] YES  [ ] NO    Consultants involved in case:   Consultant(s) Notes Reviewed:  [ x] YES  [ ] NO:   Care Discussed with Consultants/Other Providers [x ] YES  [ ] NO

## 2025-01-31 LAB
ADD ON TEST-SPECIMEN IN LAB: SIGNIFICANT CHANGE UP
ALBUMIN SERPL ELPH-MCNC: 3.6 G/DL — SIGNIFICANT CHANGE UP (ref 3.3–5)
ALP SERPL-CCNC: 158 U/L — HIGH (ref 40–120)
ALT FLD-CCNC: 90 U/L — HIGH (ref 10–45)
ANION GAP SERPL CALC-SCNC: 14 MMOL/L — SIGNIFICANT CHANGE UP (ref 5–17)
AST SERPL-CCNC: 76 U/L — HIGH (ref 10–40)
BILIRUB SERPL-MCNC: 5.6 MG/DL — HIGH (ref 0.2–1.2)
BUN SERPL-MCNC: 8 MG/DL — SIGNIFICANT CHANGE UP (ref 7–23)
CALCIUM SERPL-MCNC: 7.7 MG/DL — LOW (ref 8.4–10.5)
CHLORIDE SERPL-SCNC: 105 MMOL/L — SIGNIFICANT CHANGE UP (ref 96–108)
CO2 SERPL-SCNC: 20 MMOL/L — LOW (ref 22–31)
CREAT SERPL-MCNC: 0.53 MG/DL — SIGNIFICANT CHANGE UP (ref 0.5–1.3)
EGFR: 103 ML/MIN/1.73M2 — SIGNIFICANT CHANGE UP
GLUCOSE BLDC GLUCOMTR-MCNC: 89 MG/DL — SIGNIFICANT CHANGE UP (ref 70–99)
GLUCOSE SERPL-MCNC: 94 MG/DL — SIGNIFICANT CHANGE UP (ref 70–99)
LIDOCAIN IGE QN: 682 U/L — HIGH (ref 7–60)
POTASSIUM SERPL-MCNC: 3.4 MMOL/L — LOW (ref 3.5–5.3)
POTASSIUM SERPL-SCNC: 3.4 MMOL/L — LOW (ref 3.5–5.3)
PROT SERPL-MCNC: 5.6 G/DL — LOW (ref 6–8.3)
SODIUM SERPL-SCNC: 139 MMOL/L — SIGNIFICANT CHANGE UP (ref 135–145)

## 2025-01-31 PROCEDURE — 99233 SBSQ HOSP IP/OBS HIGH 50: CPT | Mod: GC

## 2025-01-31 PROCEDURE — 99232 SBSQ HOSP IP/OBS MODERATE 35: CPT | Mod: GC

## 2025-01-31 RX ORDER — POTASSIUM CHLORIDE 750 MG/1
10 TABLET, EXTENDED RELEASE ORAL
Refills: 0 | Status: COMPLETED | OUTPATIENT
Start: 2025-01-31 | End: 2025-01-31

## 2025-01-31 RX ORDER — SODIUM CHLORIDE 9 G/ML
1000 INJECTION, SOLUTION INTRAVENOUS
Refills: 0 | Status: DISCONTINUED | OUTPATIENT
Start: 2025-01-31 | End: 2025-02-01

## 2025-01-31 RX ADMIN — METOCLOPRAMIDE 10 MILLIGRAM(S): 10 TABLET ORAL at 06:21

## 2025-01-31 RX ADMIN — PIPERACILLIN SODIUM AND TAZOBACTAM SODIUM 25 GRAM(S): 2; 250 INJECTION, POWDER, FOR SOLUTION INTRAVENOUS at 21:41

## 2025-01-31 RX ADMIN — METOCLOPRAMIDE 10 MILLIGRAM(S): 10 TABLET ORAL at 13:55

## 2025-01-31 RX ADMIN — POTASSIUM CHLORIDE 100 MILLIEQUIVALENT(S): 750 TABLET, EXTENDED RELEASE ORAL at 14:59

## 2025-01-31 RX ADMIN — METOCLOPRAMIDE 10 MILLIGRAM(S): 10 TABLET ORAL at 21:43

## 2025-01-31 RX ADMIN — PIPERACILLIN SODIUM AND TAZOBACTAM SODIUM 25 GRAM(S): 2; 250 INJECTION, POWDER, FOR SOLUTION INTRAVENOUS at 06:03

## 2025-01-31 RX ADMIN — SODIUM CHLORIDE 75 MILLILITER(S): 9 INJECTION, SOLUTION INTRAVENOUS at 20:31

## 2025-01-31 RX ADMIN — ENOXAPARIN SODIUM 40 MILLIGRAM(S): 100 INJECTION SUBCUTANEOUS at 06:05

## 2025-01-31 RX ADMIN — POTASSIUM CHLORIDE 100 MILLIEQUIVALENT(S): 750 TABLET, EXTENDED RELEASE ORAL at 13:55

## 2025-01-31 RX ADMIN — SODIUM CHLORIDE 75 MILLILITER(S): 9 INJECTION, SOLUTION INTRAVENOUS at 06:02

## 2025-01-31 RX ADMIN — PIPERACILLIN SODIUM AND TAZOBACTAM SODIUM 25 GRAM(S): 2; 250 INJECTION, POWDER, FOR SOLUTION INTRAVENOUS at 13:55

## 2025-01-31 RX ADMIN — POTASSIUM CHLORIDE 100 MILLIEQUIVALENT(S): 750 TABLET, EXTENDED RELEASE ORAL at 17:51

## 2025-01-31 RX ADMIN — TRIAMTERENE AND HYDROCHLOROTHIAZIDE 1 TABLET(S): 37.5; 25 TABLET ORAL at 06:06

## 2025-01-31 NOTE — PROGRESS NOTE ADULT - PROBLEM SELECTOR PLAN 4
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 Follows with Dr. Chicho Martinez.     Plan:  - holding Capecitabine/Xeloda 500 mg BID (1 week on/1 week off)  - holding Fulvestrant 500 mg every 4 weeks  - holding Kisqali 600 mg daily (on 21 days, 7 days off)  - holding Arimidex 1 mg daily Resolved.  95% sat room air 1/31.  O2 sat 85% O/N on 1/30.  Placed on 2L NC, weaned off in the morning.  O2 sat 95% on room air in AM. Likely 2/2 volume overload in setting of IV fluids. Resolved following holding of fluid.    Plan:  - Continue to monitor

## 2025-01-31 NOTE — PROGRESS NOTE ADULT - PROBLEM SELECTOR PLAN 3
Resolved  O2 sat 85% O/N.  Placed on 2L NC, weaned off in the morning.  O2 sat 95% on room air in AM. Likely 2/2 volume overload in setting of IV fluids. Resolved following holding of fluid.    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 Follows with Dr. Chicho Martinez.     Plan:  - holding Capecitabine/Xeloda 500 mg BID (1 week on/1 week off)  - holding Fulvestrant 500 mg every 4 weeks  - holding Kisqali 600 mg daily (on 21 days, 7 days off)  - holding Arimidex 1 mg daily

## 2025-01-31 NOTE — PROGRESS NOTE ADULT - SUBJECTIVE AND OBJECTIVE BOX
Interval Events:       Hospital Medications:  acetaminophen     Tablet .. 650 milliGRAM(s) Oral every 6 hours PRN  aluminum hydroxide/magnesium hydroxide/simethicone Suspension 30 milliLiter(s) Oral every 4 hours PRN  enoxaparin Injectable 40 milliGRAM(s) SubCutaneous every 24 hours  lactated ringers. 1000 milliLiter(s) (75 mL/Hr) IV Continuous <Continuous>  melatonin 3 milliGRAM(s) Oral at bedtime PRN  metoclopramide Injectable 10 milliGRAM(s) IV Push every 8 hours PRN  morphine  - Injectable 2 milliGRAM(s) IV Push every 4 hours PRN  morphine  - Injectable 4 milliGRAM(s) IV Push every 4 hours PRN  piperacillin/tazobactam IVPB.. 3.375 Gram(s) IV Intermittent every 8 hours  triamterene 75 mG/hydrochlorothiazide 50 mG Tablet 1 Tablet(s) Oral daily            25 @ 07:01  -  25 @ 07:00  --------------------------------------------------------  IN: 360 mL / OUT: 0 mL / NET: 360 mL          PHYSICAL EXAM:   Vital Signs:  Vital Signs Last 24 Hrs  T(C): 36.2 (2025 06:54), Max: 38.7 (2025 11:44)  T(F): 97.2 (2025 06:54), Max: 101.7 (2025 11:44)  HR: 66 (2025 06:54) (65 - 67)  BP: 153/80 (2025 06:54) (144/74 - 153/80)  BP(mean): --  RR: 18 (2025 06:54) (18 - 18)  SpO2: 95% (2025 06:54) (91% - 96%)    Parameters below as of 2025 06:54  Patient On (Oxygen Delivery Method): room air      Daily     Daily   GENERAL:  NAD, Appears stated age  HEENT:  NC/AT,  conjunctivae clear and pink, sclera -anicteric  CHEST:  Normal Effort, no signs of resp distress  HEART:  RRR, HD stable  ABDOMEN:  Soft, non-tender, non-distended  EXTREMITIES:  no cyanosis or edema  SKIN:  Warm & Dry. No rash or erythema  NEURO:  Alert, oriented, no focal deficit  LABS:    Last Hb:Hemoglobin: 12.5 g/dL (25 @ 07:10)  Hemoglobin: 12.4 g/dL (25 @ 22:27)  Hemoglobin: 13.1 g/dL (25 @ 05:02)               139   |  105   |  10                 Ca: 7.8    BMP:   ----------------------------< 108    M.1   (25 @ 07:12)             3.4    |  24    | 0.61               Ph: 1.5      LFT:     TPro: 5.3 / Alb: 3.4 / TBili: 3.5 / DBili: x / AST: 50 / ALT: 81 / AlkPhos: 159   (25 @ 07:12)    Creatinine: 0.61 mg/dL  Creatinine: 0.64 mg/dL  Creatinine: 0.73 mg/dL      LIVER FUNCTIONS - ( 2025 07:12 )  Alb: 3.4 g/dL / Pro: 5.3 g/dL / ALK PHOS: 159 U/L / ALT: 81 U/L / AST: 50 U/L / GGT: x             Urinalysis Basic - ( 2025 07:12 )    Color: x / Appearance: x / SG: x / pH: x  Gluc: 108 mg/dL / Ketone: x  / Bili: x / Urobili: x   Blood: x / Protein: x / Nitrite: x   Leuk Esterase: x / RBC: x / WBC x   Sq Epi: x / Non Sq Epi: x / Bacteria: x        Culture - Blood (collected 25 @ 22:20)  Source: .Blood BLOOD  Preliminary Report (25 @ 02:03):    No growth at 24 hours    Culture - Blood (collected 25 @ 22:10)  Source: .Blood BLOOD  Preliminary Report (25 @ 02:03):    No growth at 24 hours               Interval Events:   -nausea improved  -Limited opioid needs  -Still feeling nauseous  -Tmax 101.7  -TB up, lipase down    Hospital Medications:  acetaminophen     Tablet .. 650 milliGRAM(s) Oral every 6 hours PRN  aluminum hydroxide/magnesium hydroxide/simethicone Suspension 30 milliLiter(s) Oral every 4 hours PRN  enoxaparin Injectable 40 milliGRAM(s) SubCutaneous every 24 hours  lactated ringers. 1000 milliLiter(s) (75 mL/Hr) IV Continuous <Continuous>  melatonin 3 milliGRAM(s) Oral at bedtime PRN  metoclopramide Injectable 10 milliGRAM(s) IV Push every 8 hours PRN  morphine  - Injectable 2 milliGRAM(s) IV Push every 4 hours PRN  morphine  - Injectable 4 milliGRAM(s) IV Push every 4 hours PRN  piperacillin/tazobactam IVPB.. 3.375 Gram(s) IV Intermittent every 8 hours  triamterene 75 mG/hydrochlorothiazide 50 mG Tablet 1 Tablet(s) Oral daily        25 @ 07:01  -  25 @ 07:00  --------------------------------------------------------  IN: 360 mL / OUT: 0 mL / NET: 360 mL          PHYSICAL EXAM:   Vital Signs:  Vital Signs Last 24 Hrs  T(C): 36.2 (2025 06:54), Max: 38.7 (2025 11:44)  T(F): 97.2 (2025 06:54), Max: 101.7 (2025 11:44)  HR: 66 (2025 06:54) (65 - 67)  BP: 153/80 (2025 06:54) (144/74 - 153/80)  BP(mean): --  RR: 18 (2025 06:54) (18 - 18)  SpO2: 95% (2025 06:54) (91% - 96%)    Parameters below as of 2025 06:54  Patient On (Oxygen Delivery Method): room air      Daily     Daily   GENERAL:  NAD, Appears stated age  HEENT:  NC/AT,  conjunctivae clear and pink, sclera -anicteric  CHEST:  Normal Effort, no signs of resp distress  HEART:  RRR, HD stable  ABDOMEN:  Soft, tender epigastric region  EXTREMITIES:  no cyanosis or edema  SKIN:  Warm & Dry. No rash or erythema  NEURO:  Alert, oriented, no focal deficit  LABS:    Last Hb:Hemoglobin: 12.5 g/dL (25 @ 07:10)  Hemoglobin: 12.4 g/dL (25 @ 22:27)  Hemoglobin: 13.1 g/dL (25 @ 05:02)               139   |  105   |  10                 Ca: 7.8    BMP:   ----------------------------< 108    M.1   (25 @ 07:12)             3.4    |  24    | 0.61               Ph: 1.5      LFT:     TPro: 5.3 / Alb: 3.4 / TBili: 3.5 / DBili: x / AST: 50 / ALT: 81 / AlkPhos: 159   (25 @ 07:12)    Creatinine: 0.61 mg/dL  Creatinine: 0.64 mg/dL  Creatinine: 0.73 mg/dL      LIVER FUNCTIONS - ( 2025 07:12 )  Alb: 3.4 g/dL / Pro: 5.3 g/dL / ALK PHOS: 159 U/L / ALT: 81 U/L / AST: 50 U/L / GGT: x             Urinalysis Basic - ( 2025 07:12 )    Color: x / Appearance: x / SG: x / pH: x  Gluc: 108 mg/dL / Ketone: x  / Bili: x / Urobili: x   Blood: x / Protein: x / Nitrite: x   Leuk Esterase: x / RBC: x / WBC x   Sq Epi: x / Non Sq Epi: x / Bacteria: x        Culture - Blood (collected 25 @ 22:20)  Source: .Blood BLOOD  Preliminary Report (25 @ 02:03):    No growth at 24 hours    Culture - Blood (collected 25 @ 22:10)  Source: .Blood BLOOD  Preliminary Report (25 @ 02:03):    No growth at 24 hours

## 2025-01-31 NOTE — PROGRESS NOTE ADULT - SUBJECTIVE AND OBJECTIVE BOX
CLOVER OG  64y  MRN: [id number]    Patient is a 64y old  Female who presents with a chief complaint of Nausea/vomiting post procedure (30 Jan 2025 14:40)      Interval/Overnight Events: no events ON.     Subjective: Pt seen and examined at bedside.     MEDICATIONS  (STANDING):  enoxaparin Injectable 40 milliGRAM(s) SubCutaneous every 24 hours  lactated ringers. 1000 milliLiter(s) (75 mL/Hr) IV Continuous <Continuous>  piperacillin/tazobactam IVPB.. 3.375 Gram(s) IV Intermittent every 8 hours  triamterene 75 mG/hydrochlorothiazide 50 mG Tablet 1 Tablet(s) Oral daily    MEDICATIONS  (PRN):  acetaminophen     Tablet .. 650 milliGRAM(s) Oral every 6 hours PRN Temp greater or equal to 38C (100.4F), Mild Pain (1 - 3)  aluminum hydroxide/magnesium hydroxide/simethicone Suspension 30 milliLiter(s) Oral every 4 hours PRN Dyspepsia  melatonin 3 milliGRAM(s) Oral at bedtime PRN Insomnia  metoclopramide Injectable 10 milliGRAM(s) IV Push every 8 hours PRN Nausea  morphine  - Injectable 2 milliGRAM(s) IV Push every 4 hours PRN Moderate Pain (4 - 6)  morphine  - Injectable 4 milliGRAM(s) IV Push every 4 hours PRN Severe Pain (7 - 10)      Objective:    Vitals: Vital Signs Last 24 Hrs  T(C): 36.2 (01-31-25 @ 06:54), Max: 38.7 (01-30-25 @ 11:44)  T(F): 97.2 (01-31-25 @ 06:54), Max: 101.7 (01-30-25 @ 11:44)  HR: 66 (01-31-25 @ 06:54) (65 - 67)  BP: 153/80 (01-31-25 @ 06:54) (144/74 - 153/80)  BP(mean): --  RR: 18 (01-31-25 @ 06:54) (18 - 18)  SpO2: 95% (01-31-25 @ 06:54) (91% - 96%)                I&O's Summary    30 Jan 2025 07:01  -  31 Jan 2025 07:00  --------------------------------------------------------  IN: 790 mL / OUT: 0 mL / NET: 790 mL        PHYSICAL EXAM:  GENERAL: NAD  HEAD:  Atraumatic, Normocephalic  EYES: EOMI, conjunctiva and sclera clear  CHEST/LUNG: Clear to auscultation bilaterally; No rales, rhonchi, wheezing, or rubs  HEART: Regular rate and rhythm; No murmurs, rubs, or gallops  ABDOMEN: Soft, Nontender, Nondistended;   SKIN: No rashes or lesions  NERVOUS SYSTEM:  Alert & Oriented X3, no focal deficits    LABS:                        12.5   8.24  )-----------( 138      ( 30 Jan 2025 07:10 )             36.4                         12.4   9.06  )-----------( 146      ( 29 Jan 2025 22:27 )             35.3                         13.1   11.10 )-----------( 199      ( 29 Jan 2025 05:02 )             37.2     01-30    139  |  105  |  10  ----------------------------<  108[H]  3.4[L]   |  24  |  0.61  01-29    139  |  105  |  11  ----------------------------<  104[H]  3.4[L]   |  23  |  0.64  01-29    140  |  104  |  10  ----------------------------<  131[H]  3.3[L]   |  25  |  0.73    Ca    7.8[L]      30 Jan 2025 07:12  Ca    7.9[L]      29 Jan 2025 22:27  Ca    8.2[L]      29 Jan 2025 05:02  Phos  1.5     01-30  Mg     2.1     01-30    TPro  5.3[L]  /  Alb  3.4  /  TBili  3.5[H]  /  DBili  x   /  AST  50[H]  /  ALT  81[H]  /  AlkPhos  159[H]  01-30  TPro  5.3[L]  /  Alb  3.4  /  TBili  2.2[H]  /  DBili  x   /  AST  53[H]  /  ALT  87[H]  /  AlkPhos  163[H]  01-29  TPro  5.9[L]  /  Alb  3.9  /  TBili  1.9[H]  /  DBili  x   /  AST  63[H]  /  ALT  115[H]  /  AlkPhos  200[H]  01-29    CAPILLARY BLOOD GLUCOSE      POCT Blood Glucose.: 96 mg/dL (30 Jan 2025 17:44)        Urinalysis Basic - ( 30 Jan 2025 07:12 )    Color: x / Appearance: x / SG: x / pH: x  Gluc: 108 mg/dL / Ketone: x  / Bili: x / Urobili: x   Blood: x / Protein: x / Nitrite: x   Leuk Esterase: x / RBC: x / WBC x   Sq Epi: x / Non Sq Epi: x / Bacteria: x          RADIOLOGY & ADDITIONAL TESTS:    Imaging Personally Reviewed:  [x ] YES  [ ] NO    Consultants involved in case:   Consultant(s) Notes Reviewed:  [ x] YES  [ ] NO:   Care Discussed with Consultants/Other Providers [x ] YES  [ ] NO           CLOVER OG  64y  MRN: [id number]    Patient is a 64y old  Female who presents with a chief complaint of Nausea/vomiting post procedure (30 Jan 2025 14:40)      Interval/Overnight Events: no events ON.     Subjective: Pt seen and examined at bedside.  Pt still unable to eat 2/2 nausea and epigastric pain.  Pt drinking small sips of liquid only.  Pt reports that yesterday afternoon she felt feverish but no longer feels that way.  Denies HA, SOB, leg swelling, dizziness.    MEDICATIONS  (STANDING):  enoxaparin Injectable 40 milliGRAM(s) SubCutaneous every 24 hours  lactated ringers. 1000 milliLiter(s) (75 mL/Hr) IV Continuous <Continuous>  piperacillin/tazobactam IVPB.. 3.375 Gram(s) IV Intermittent every 8 hours  triamterene 75 mG/hydrochlorothiazide 50 mG Tablet 1 Tablet(s) Oral daily    MEDICATIONS  (PRN):  acetaminophen     Tablet .. 650 milliGRAM(s) Oral every 6 hours PRN Temp greater or equal to 38C (100.4F), Mild Pain (1 - 3)  aluminum hydroxide/magnesium hydroxide/simethicone Suspension 30 milliLiter(s) Oral every 4 hours PRN Dyspepsia  melatonin 3 milliGRAM(s) Oral at bedtime PRN Insomnia  metoclopramide Injectable 10 milliGRAM(s) IV Push every 8 hours PRN Nausea  morphine  - Injectable 2 milliGRAM(s) IV Push every 4 hours PRN Moderate Pain (4 - 6)  morphine  - Injectable 4 milliGRAM(s) IV Push every 4 hours PRN Severe Pain (7 - 10)      Objective:    Vitals: Vital Signs Last 24 Hrs  T(C): 36.2 (01-31-25 @ 06:54), Max: 38.7 (01-30-25 @ 11:44)  T(F): 97.2 (01-31-25 @ 06:54), Max: 101.7 (01-30-25 @ 11:44)  HR: 66 (01-31-25 @ 06:54) (65 - 67)  BP: 153/80 (01-31-25 @ 06:54) (144/74 - 153/80)  BP(mean): --  RR: 18 (01-31-25 @ 06:54) (18 - 18)  SpO2: 95% (01-31-25 @ 06:54) (91% - 96%)                I&O's Summary    30 Jan 2025 07:01  -  31 Jan 2025 07:00  --------------------------------------------------------  IN: 790 mL / OUT: 0 mL / NET: 790 mL        PHYSICAL EXAM:  GENERAL: NAD  HEAD:  Atraumatic, Normocephalic  EYES: EOMI, conjunctiva and sclera clear  CHEST/LUNG: Clear to auscultation bilaterally; No rales, rhonchi, wheezing, or rubs  HEART: Regular rate and rhythm; No murmurs, rubs, or gallops  ABDOMEN: Tender to palpation, Soft, Nondistended;   SKIN: No rashes or lesions  NERVOUS SYSTEM:  Alert & Oriented X3, no focal deficits    LABS:                        12.5   8.24  )-----------( 138      ( 30 Jan 2025 07:10 )             36.4                         12.4   9.06  )-----------( 146      ( 29 Jan 2025 22:27 )             35.3                         13.1   11.10 )-----------( 199      ( 29 Jan 2025 05:02 )             37.2     01-30    139  |  105  |  10  ----------------------------<  108[H]  3.4[L]   |  24  |  0.61  01-29    139  |  105  |  11  ----------------------------<  104[H]  3.4[L]   |  23  |  0.64  01-29    140  |  104  |  10  ----------------------------<  131[H]  3.3[L]   |  25  |  0.73    Ca    7.8[L]      30 Jan 2025 07:12  Ca    7.9[L]      29 Jan 2025 22:27  Ca    8.2[L]      29 Jan 2025 05:02  Phos  1.5     01-30  Mg     2.1     01-30    TPro  5.3[L]  /  Alb  3.4  /  TBili  3.5[H]  /  DBili  x   /  AST  50[H]  /  ALT  81[H]  /  AlkPhos  159[H]  01-30  TPro  5.3[L]  /  Alb  3.4  /  TBili  2.2[H]  /  DBili  x   /  AST  53[H]  /  ALT  87[H]  /  AlkPhos  163[H]  01-29  TPro  5.9[L]  /  Alb  3.9  /  TBili  1.9[H]  /  DBili  x   /  AST  63[H]  /  ALT  115[H]  /  AlkPhos  200[H]  01-29    CAPILLARY BLOOD GLUCOSE      POCT Blood Glucose.: 96 mg/dL (30 Jan 2025 17:44)        Urinalysis Basic - ( 30 Jan 2025 07:12 )    Color: x / Appearance: x / SG: x / pH: x  Gluc: 108 mg/dL / Ketone: x  / Bili: x / Urobili: x   Blood: x / Protein: x / Nitrite: x   Leuk Esterase: x / RBC: x / WBC x   Sq Epi: x / Non Sq Epi: x / Bacteria: x          RADIOLOGY & ADDITIONAL TESTS:    Imaging Personally Reviewed:  [x ] YES  [ ] NO    Consultants involved in case:   Consultant(s) Notes Reviewed:  [ x] YES  [ ] NO:   Care Discussed with Consultants/Other Providers [x ] YES  [ ] NO

## 2025-01-31 NOTE — PROGRESS NOTE ADULT - ASSESSMENT
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 A second stenosis was seen in distal duodenum/prox jeujunum which was not reached.  Admitted for N/V found to have pancreatitis, likely related to malignant infiltration into papilla. Pancreatitis likely from compression of bile duct worsened in setting new stent. Pain and pancreatitis appear to be improving however w/ uptrending TB. Febrile today, no white count.  Recommendations:  -C/w IVF  -Pain control per primary team   -Please keep NPO for now  -C/w zosyn  -Advance diet as tolerated  - CT w/ IV and oral contrast to better assess biliary duct  -If no improvement after pancreatitis resolved, plan for UGI series to evaluate potential distal stricture    Note incomplete until finalized by attending signature/attestation.    Kaia Michaud  GI/Hepatology Fellow PGY5    NON-URGENT CONSULTS:  Please email [email or web address] OR 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 A second stenosis was seen in distal duodenum/prox jeujunum which was not reached.  Admitted for N/V found to have pancreatitis, likely related to malignant infiltration into papilla. Pancreatitis likely from compression of bile duct worsened in setting new stent. Pain and pancreatitis appear to be improving however w/ uptrending TB. Febrile yesterday, possibly secondary to pancreatitis and w/o white count which is reassuring but need close monitoring for developing cholangitis.   Recommendations:  -C/w IVF  -please culture. UA, blood cultures, cxr  -Pain control per primary team   -Please keep NPO for now  -C/w zosyn  -Advance diet as tolerated  - CT w/ IV and oral contrast to better assess biliary duct igven upternding bili  -If no improvement after pancreatitis resolved, plan for UGI series to evaluate potential distal stricture    Note incomplete until finalized by attending signature/attestation.    Kaia Michaud  GI/Hepatology Fellow PGY5    NON-URGENT CONSULTS:  Please email [email or web address]  AT NIGHT AND ON WEEKENDS:  Available on [employer] Teams  [phone] (Long Range Pager)

## 2025-01-31 NOTE — PROGRESS NOTE ADULT - PROBLEM SELECTOR PLAN 1
Presenting with nausea/vomiting post-procedure. Admitted for observation. History of pancreatitis post-EGD. QTC WNL.    Plan:  - GI consulted, recs appreciated  - Nausea: Zofran, Metoclopramide prn  - Pain: acetaminophen, morphine 2 mg q4h, morphine 4 mg q4h prn  - holding fluids in setting of increased oxygen requirement and crackles on exam, will reassess in afternoon pending PO intake and will restart at 75 cc/hr if poor PO intake Presenting with nausea/vomiting post-procedure. Admitted for observation. History of pancreatitis post-EGD. QTC WNL.    Plan:  - GI consulted, recs appreciated  - Nausea: Zofran, Metoclopramide prn  - Pain: acetaminophen, morphine 2 mg q4h, morphine 4 mg q4h prn  - Lactated ringers 75 cc/hr, will hold if hypoxia returns

## 2025-01-31 NOTE — PROGRESS NOTE ADULT - PROBLEM SELECTOR PLAN 2
Rectal temp 101 F O/N.  Reduced to 99.4 in the morning following tylenol, continue to monitor. Likely 2/2 viral etiology. Less concern for UTI without dysuria, pneumonia given acute onset and lack of CXR findings, and no areas of skin breakdown.     Plan:  - Zosyn 3.375 g in dextrose 5% 100 mL, q8hr (01/30 - )  - Blood cultures pending  - f/u CXR  - If viral panel positive, will d/c zosyn Oral temp 101.7 on 1/30.  Down to 97.2 on 1/31 AM.  Blood cultures no growth after 24 hr, expanded viral panel negative.  Unclear origin of fever.  CXR showed bibasilar atelectasis.  Low concern for UTI without dysuria, pneumonia given acute onset and lack of CXR findings, and no areas of skin breakdown.     Plan:  - Zosyn 3.375 g in dextrose 5% 100 mL, q8hr (01/30 - )  - PRN Tylenol for fever management Oral temp 101.7 on 1/30.  Down to 97.2 on 1/31 AM.  Blood cultures no growth after 24 hr, expanded viral panel negative.  Unclear origin of fever.  CXR showed bibasilar atelectasis.  Low concern for UTI without dysuria, pneumonia given acute onset and lack of CXR findings, and no areas of skin breakdown.     Plan:  - Zosyn 3.375 g in dextrose 5% 100 mL, q8hr (01/30 - )  - PRN Tylenol for fever management  - CT A/P w/oral and IV contrast

## 2025-02-01 LAB
ALBUMIN SERPL ELPH-MCNC: 3.6 G/DL — SIGNIFICANT CHANGE UP (ref 3.3–5)
ALP SERPL-CCNC: 173 U/L — HIGH (ref 40–120)
ALT FLD-CCNC: 102 U/L — HIGH (ref 10–45)
ANION GAP SERPL CALC-SCNC: 16 MMOL/L — SIGNIFICANT CHANGE UP (ref 5–17)
AST SERPL-CCNC: 100 U/L — HIGH (ref 10–40)
BILIRUB SERPL-MCNC: 7.5 MG/DL — HIGH (ref 0.2–1.2)
BUN SERPL-MCNC: 10 MG/DL — SIGNIFICANT CHANGE UP (ref 7–23)
CALCIUM SERPL-MCNC: 8.6 MG/DL — SIGNIFICANT CHANGE UP (ref 8.4–10.5)
CHLORIDE SERPL-SCNC: 98 MMOL/L — SIGNIFICANT CHANGE UP (ref 96–108)
CO2 SERPL-SCNC: 21 MMOL/L — LOW (ref 22–31)
CREAT SERPL-MCNC: 0.46 MG/DL — LOW (ref 0.5–1.3)
EGFR: 107 ML/MIN/1.73M2 — SIGNIFICANT CHANGE UP
GLUCOSE SERPL-MCNC: 97 MG/DL — SIGNIFICANT CHANGE UP (ref 70–99)
HCT VFR BLD CALC: 37 % — SIGNIFICANT CHANGE UP (ref 34.5–45)
HGB BLD-MCNC: 13 G/DL — SIGNIFICANT CHANGE UP (ref 11.5–15.5)
LIDOCAIN IGE QN: 401 U/L — HIGH (ref 7–60)
MAGNESIUM SERPL-MCNC: 2 MG/DL — SIGNIFICANT CHANGE UP (ref 1.6–2.6)
MCHC RBC-ENTMCNC: 34.1 PG — HIGH (ref 27–34)
MCHC RBC-ENTMCNC: 35.1 G/DL — SIGNIFICANT CHANGE UP (ref 32–36)
MCV RBC AUTO: 97.1 FL — SIGNIFICANT CHANGE UP (ref 80–100)
NRBC # BLD: 0 /100 WBCS — SIGNIFICANT CHANGE UP (ref 0–0)
NRBC BLD-RTO: 0 /100 WBCS — SIGNIFICANT CHANGE UP (ref 0–0)
PHOSPHATE SERPL-MCNC: 1.9 MG/DL — LOW (ref 2.5–4.5)
PLATELET # BLD AUTO: 166 K/UL — SIGNIFICANT CHANGE UP (ref 150–400)
POTASSIUM SERPL-MCNC: 3.3 MMOL/L — LOW (ref 3.5–5.3)
POTASSIUM SERPL-SCNC: 3.3 MMOL/L — LOW (ref 3.5–5.3)
PROT SERPL-MCNC: 5.8 G/DL — LOW (ref 6–8.3)
RBC # BLD: 3.81 M/UL — SIGNIFICANT CHANGE UP (ref 3.8–5.2)
RBC # FLD: 13.4 % — SIGNIFICANT CHANGE UP (ref 10.3–14.5)
SODIUM SERPL-SCNC: 135 MMOL/L — SIGNIFICANT CHANGE UP (ref 135–145)
WBC # BLD: 10.6 K/UL — HIGH (ref 3.8–10.5)
WBC # FLD AUTO: 10.6 K/UL — HIGH (ref 3.8–10.5)

## 2025-02-01 PROCEDURE — 99232 SBSQ HOSP IP/OBS MODERATE 35: CPT | Mod: GC

## 2025-02-01 PROCEDURE — 93010 ELECTROCARDIOGRAM REPORT: CPT

## 2025-02-01 PROCEDURE — 74177 CT ABD & PELVIS W/CONTRAST: CPT | Mod: 26

## 2025-02-01 RX ORDER — ONDANSETRON 4 MG/1
4 TABLET, ORALLY DISINTEGRATING ORAL EVERY 4 HOURS
Refills: 0 | Status: DISCONTINUED | OUTPATIENT
Start: 2025-02-01 | End: 2025-02-02

## 2025-02-01 RX ORDER — POTASSIUM PHOSPHATE, MONOBASIC POTASSIUM PHOSPHATE, DIBASIC 236; 224 MG/ML; MG/ML
30 INJECTION, SOLUTION INTRAVENOUS ONCE
Refills: 0 | Status: COMPLETED | OUTPATIENT
Start: 2025-02-01 | End: 2025-02-01

## 2025-02-01 RX ORDER — METOCLOPRAMIDE 10 MG/1
10 TABLET ORAL EVERY 6 HOURS
Refills: 0 | Status: DISCONTINUED | OUTPATIENT
Start: 2025-02-01 | End: 2025-02-02

## 2025-02-01 RX ORDER — POTASSIUM CHLORIDE 750 MG/1
10 TABLET, EXTENDED RELEASE ORAL
Refills: 0 | Status: COMPLETED | OUTPATIENT
Start: 2025-02-01 | End: 2025-02-01

## 2025-02-01 RX ORDER — SODIUM CHLORIDE 9 G/ML
1000 INJECTION, SOLUTION INTRAVENOUS
Refills: 0 | Status: DISCONTINUED | OUTPATIENT
Start: 2025-02-01 | End: 2025-02-02

## 2025-02-01 RX ADMIN — SODIUM CHLORIDE 75 MILLILITER(S): 9 INJECTION, SOLUTION INTRAVENOUS at 09:07

## 2025-02-01 RX ADMIN — PIPERACILLIN SODIUM AND TAZOBACTAM SODIUM 25 GRAM(S): 2; 250 INJECTION, POWDER, FOR SOLUTION INTRAVENOUS at 21:39

## 2025-02-01 RX ADMIN — ONDANSETRON 4 MILLIGRAM(S): 4 TABLET, ORALLY DISINTEGRATING ORAL at 18:07

## 2025-02-01 RX ADMIN — METOCLOPRAMIDE 10 MILLIGRAM(S): 10 TABLET ORAL at 16:05

## 2025-02-01 RX ADMIN — PIPERACILLIN SODIUM AND TAZOBACTAM SODIUM 25 GRAM(S): 2; 250 INJECTION, POWDER, FOR SOLUTION INTRAVENOUS at 05:36

## 2025-02-01 RX ADMIN — POTASSIUM CHLORIDE 100 MILLIEQUIVALENT(S): 750 TABLET, EXTENDED RELEASE ORAL at 12:03

## 2025-02-01 RX ADMIN — METOCLOPRAMIDE 10 MILLIGRAM(S): 10 TABLET ORAL at 07:04

## 2025-02-01 RX ADMIN — ENOXAPARIN SODIUM 40 MILLIGRAM(S): 100 INJECTION SUBCUTANEOUS at 09:07

## 2025-02-01 RX ADMIN — PIPERACILLIN SODIUM AND TAZOBACTAM SODIUM 25 GRAM(S): 2; 250 INJECTION, POWDER, FOR SOLUTION INTRAVENOUS at 13:02

## 2025-02-01 RX ADMIN — POTASSIUM PHOSPHATE, MONOBASIC POTASSIUM PHOSPHATE, DIBASIC 83.33 MILLIMOLE(S): 236; 224 INJECTION, SOLUTION INTRAVENOUS at 12:03

## 2025-02-01 RX ADMIN — POTASSIUM CHLORIDE 100 MILLIEQUIVALENT(S): 750 TABLET, EXTENDED RELEASE ORAL at 09:07

## 2025-02-01 RX ADMIN — POTASSIUM CHLORIDE 100 MILLIEQUIVALENT(S): 750 TABLET, EXTENDED RELEASE ORAL at 10:51

## 2025-02-01 NOTE — PROGRESS NOTE ADULT - PROBLEM SELECTOR PLAN 1
Presenting with nausea/vomiting post-procedure. Admitted for observation. History of pancreatitis post-EGD. QTC WNL.    Plan:  - GI consulted, recs appreciated  - Nausea: Zofran, Metoclopramide prn  - Pain: acetaminophen, morphine 2 mg q4h, morphine 4 mg q4h prn  - Lactated ringers 75 cc/hr, will hold if hypoxia returns

## 2025-02-01 NOTE — PROGRESS NOTE ADULT - PROBLEM SELECTOR PLAN 4
Resolved.  95% sat room air 1/31.  O2 sat 85% O/N on 1/30.  Placed on 2L NC, weaned off in the morning.  O2 sat 95% on room air in AM. Likely 2/2 volume overload in setting of IV fluids. Resolved following holding of fluid.    Plan:  - Continue to monitor

## 2025-02-01 NOTE — PROGRESS NOTE ADULT - PROBLEM SELECTOR PLAN 2
Oral temp 101.7 on 1/30.  Down to 97.2 on 1/31 AM.  Blood cultures no growth after 24 hr, expanded viral panel negative.  Unclear origin of fever.  CXR showed bibasilar atelectasis.  Low concern for UTI without dysuria, pneumonia given acute onset and lack of CXR findings, and no areas of skin breakdown.     Plan:  - Zosyn 3.375 g in dextrose 5% 100 mL, q8hr (01/30 - )  - PRN Tylenol for fever management  - CT A/P w/oral and IV contrast

## 2025-02-01 NOTE — PROGRESS NOTE ADULT - PROBLEM SELECTOR PLAN 5
DVT: lovenox  Diet: clear liquid diet - advancing as tolerating  Dispo: anticipate home DVT: lovenox  Diet: NPO as per GI  Dispo: anticipate home

## 2025-02-01 NOTE — PROGRESS NOTE ADULT - SUBJECTIVE AND OBJECTIVE BOX
Interval Events:   -reports nausea  -vomiting episode last night  -last PO intake yesterday  -LFT uptrend  -awaiting [facility] Medications:  acetaminophen     Tablet .. 650 milliGRAM(s) Oral every 6 hours PRN  aluminum hydroxide/magnesium hydroxide/simethicone Suspension 30 milliLiter(s) Oral every 4 hours PRN  enoxaparin Injectable 40 milliGRAM(s) SubCutaneous every 24 hours  lactated ringers. 1000 milliLiter(s) IV Continuous <Continuous>  melatonin 3 milliGRAM(s) Oral at bedtime PRN  metoclopramide Injectable 10 milliGRAM(s) IV Push every 6 hours PRN  morphine  - Injectable 2 milliGRAM(s) IV Push every 4 hours PRN  morphine  - Injectable 4 milliGRAM(s) IV Push every 4 hours PRN  ondansetron Injectable 4 milliGRAM(s) IV Push every 4 hours PRN  piperacillin/tazobactam IVPB.. 3.375 Gram(s) IV Intermittent every 8 hours  triamterene 75 mG/hydrochlorothiazide 50 mG Tablet 1 Tablet(s) Oral daily      PHYSICAL EXAM:   Vital Signs:  Vital Signs Last 24 Hrs  T(C): 37.4 (01 Feb 2025 14:40), Max: 37.6 (31 Jan 2025 21:19)  T(F): 99.3 (01 Feb 2025 14:40), Max: 99.7 (31 Jan 2025 21:19)  HR: 64 (01 Feb 2025 14:40) (57 - 64)  BP: 155/81 (01 Feb 2025 14:40) (150/74 - 157/79)  BP(mean): --  RR: 18 (01 Feb 2025 14:40) (18 - 18)  SpO2: 95% (01 Feb 2025 14:40) (94% - 95%)    Parameters below as of 01 Feb 2025 14:40  Patient On (Oxygen Delivery Method): room air      Daily     Daily     GENERAL: no acute distress  NEURO: alert  HEENT: NCAT, no conjunctival pallor appreciated  CHEST: no respiratory distress, no accessory muscle use  CARDIAC: regular rate, +S1/S2  ABDOMEN: soft, tenderness to palpation of epigastric region, nondistended  EXTREMITIES: warm, well perfused  SKIN: +jaundice                            13.0   10.60 )-----------( 166      ( 01 Feb 2025 07:18 )             37.0     02-01    135  |  98  |  10  ----------------------------<  97  3.3[L]   |  21[L]  |  0.46[L]    Ca    8.6      01 Feb 2025 07:18  Phos  1.9     02-01  Mg     2.0     02-01    TPro  5.8[L]  /  Alb  3.6  /  TBili  7.5[H]  /  DBili  x   /  AST  100[H]  /  ALT  102[H]  /  AlkPhos  173[H]  02-01    LIVER FUNCTIONS - ( 01 Feb 2025 07:18 )  Alb: 3.6 g/dL / Pro: 5.8 g/dL / ALK PHOS: 173 U/L / ALT: 102 U/L / AST: 100 U/L / GGT: x

## 2025-02-01 NOTE — PROGRESS NOTE ADULT - SUBJECTIVE AND OBJECTIVE BOX
CLOVER OG  64y  MRN: [id number]    Patient is a 64y old  Female who presents with a chief complaint of Nausea/vomiting post procedure (31 Jan 2025 08:01)      Interval/Overnight Events: no events ON.     Subjective: Pt seen and examined at bedside.     MEDICATIONS  (STANDING):  enoxaparin Injectable 40 milliGRAM(s) SubCutaneous every 24 hours  lactated ringers. 1000 milliLiter(s) (75 mL/Hr) IV Continuous <Continuous>  piperacillin/tazobactam IVPB.. 3.375 Gram(s) IV Intermittent every 8 hours  potassium chloride  10 mEq/100 mL IVPB 10 milliEquivalent(s) IV Intermittent every 1 hour  potassium phosphate IVPB 30 milliMole(s) IV Intermittent once  triamterene 75 mG/hydrochlorothiazide 50 mG Tablet 1 Tablet(s) Oral daily    MEDICATIONS  (PRN):  acetaminophen     Tablet .. 650 milliGRAM(s) Oral every 6 hours PRN Temp greater or equal to 38C (100.4F), Mild Pain (1 - 3)  aluminum hydroxide/magnesium hydroxide/simethicone Suspension 30 milliLiter(s) Oral every 4 hours PRN Dyspepsia  melatonin 3 milliGRAM(s) Oral at bedtime PRN Insomnia  metoclopramide Injectable 10 milliGRAM(s) IV Push every 8 hours PRN Nausea  morphine  - Injectable 2 milliGRAM(s) IV Push every 4 hours PRN Moderate Pain (4 - 6)  morphine  - Injectable 4 milliGRAM(s) IV Push every 4 hours PRN Severe Pain (7 - 10)      Objective:    Vitals: Vital Signs Last 24 Hrs  T(C): 37.4 (02-01-25 @ 06:50), Max: 37.6 (01-31-25 @ 21:19)  T(F): 99.4 (02-01-25 @ 06:50), Max: 99.7 (01-31-25 @ 21:19)  HR: 58 (02-01-25 @ 06:50) (57 - 60)  BP: 150/74 (02-01-25 @ 06:50) (150/74 - 157/79)  BP(mean): --  RR: 18 (02-01-25 @ 06:50) (18 - 18)  SpO2: 94% (02-01-25 @ 06:50) (94% - 96%)                I&O's Summary      PHYSICAL EXAM:  GENERAL: NAD  HEAD:  Atraumatic, Normocephalic  EYES: EOMI, conjunctiva and sclera clear  CHEST/LUNG: Clear to auscultation bilaterally; No rales, rhonchi, wheezing, or rubs  HEART: Regular rate and rhythm; No murmurs, rubs, or gallops  ABDOMEN: Soft, Nontender, Nondistended;   SKIN: No rashes or lesions  NERVOUS SYSTEM:  Alert & Oriented X3, no focal deficits    LABS:                        13.0   10.60 )-----------( 166      ( 01 Feb 2025 07:18 )             37.0                         12.5   8.24  )-----------( 138      ( 30 Jan 2025 07:10 )             36.4                         12.4   9.06  )-----------( 146      ( 29 Jan 2025 22:27 )             35.3     02-01    135  |  98  |  10  ----------------------------<  97  3.3[L]   |  21[L]  |  0.46[L]  01-31    139  |  105  |  8   ----------------------------<  94  3.4[L]   |  20[L]  |  0.53  01-30    139  |  105  |  10  ----------------------------<  108[H]  3.4[L]   |  24  |  0.61    Ca    8.6      01 Feb 2025 07:18  Ca    7.7[L]      31 Jan 2025 07:11  Ca    7.8[L]      30 Jan 2025 07:12  Phos  1.9     02-01  Mg     2.0     02-01    TPro  5.8[L]  /  Alb  3.6  /  TBili  7.5[H]  /  DBili  x   /  AST  100[H]  /  ALT  102[H]  /  AlkPhos  173[H]  02-01  TPro  5.6[L]  /  Alb  3.6  /  TBili  5.6[H]  /  DBili  x   /  AST  76[H]  /  ALT  90[H]  /  AlkPhos  158[H]  01-31  TPro  5.3[L]  /  Alb  3.4  /  TBili  3.5[H]  /  DBili  x   /  AST  50[H]  /  ALT  81[H]  /  AlkPhos  159[H]  01-30    CAPILLARY BLOOD GLUCOSE      POCT Blood Glucose.: 89 mg/dL (31 Jan 2025 17:15)        Urinalysis Basic - ( 01 Feb 2025 07:18 )    Color: x / Appearance: x / SG: x / pH: x  Gluc: 97 mg/dL / Ketone: x  / Bili: x / Urobili: x   Blood: x / Protein: x / Nitrite: x   Leuk Esterase: x / RBC: x / WBC x   Sq Epi: x / Non Sq Epi: x / Bacteria: x          RADIOLOGY & ADDITIONAL TESTS:    Imaging Personally Reviewed:  [x ] YES  [ ] NO    Consultants involved in case:   Consultant(s) Notes Reviewed:  [ x] YES  [ ] NO:   Care Discussed with Consultants/Other Providers [x ] YES  [ ] NO           CLOVER OG  64y  MRN: [id number]    Patient is a 64y old  Female who presents with a chief complaint of Nausea/vomiting post procedure (31 Jan 2025 08:01)      Interval/Overnight Events: no events ON.     Subjective: Pt seen and examined at bedside. Still endorsing pain (improving) and nausea/vomiting. urinating/bowel movements without issue. no new concerns    MEDICATIONS  (STANDING):  enoxaparin Injectable 40 milliGRAM(s) SubCutaneous every 24 hours  lactated ringers. 1000 milliLiter(s) (75 mL/Hr) IV Continuous <Continuous>  piperacillin/tazobactam IVPB.. 3.375 Gram(s) IV Intermittent every 8 hours  potassium chloride  10 mEq/100 mL IVPB 10 milliEquivalent(s) IV Intermittent every 1 hour  potassium phosphate IVPB 30 milliMole(s) IV Intermittent once  triamterene 75 mG/hydrochlorothiazide 50 mG Tablet 1 Tablet(s) Oral daily    MEDICATIONS  (PRN):  acetaminophen     Tablet .. 650 milliGRAM(s) Oral every 6 hours PRN Temp greater or equal to 38C (100.4F), Mild Pain (1 - 3)  aluminum hydroxide/magnesium hydroxide/simethicone Suspension 30 milliLiter(s) Oral every 4 hours PRN Dyspepsia  melatonin 3 milliGRAM(s) Oral at bedtime PRN Insomnia  metoclopramide Injectable 10 milliGRAM(s) IV Push every 8 hours PRN Nausea  morphine  - Injectable 2 milliGRAM(s) IV Push every 4 hours PRN Moderate Pain (4 - 6)  morphine  - Injectable 4 milliGRAM(s) IV Push every 4 hours PRN Severe Pain (7 - 10)      Objective:    Vitals: Vital Signs Last 24 Hrs  T(C): 37.4 (02-01-25 @ 06:50), Max: 37.6 (01-31-25 @ 21:19)  T(F): 99.4 (02-01-25 @ 06:50), Max: 99.7 (01-31-25 @ 21:19)  HR: 58 (02-01-25 @ 06:50) (57 - 60)  BP: 150/74 (02-01-25 @ 06:50) (150/74 - 157/79)  BP(mean): --  RR: 18 (02-01-25 @ 06:50) (18 - 18)  SpO2: 94% (02-01-25 @ 06:50) (94% - 96%)                I&O's Summary      PHYSICAL EXAM:  GENERAL: NAD  HEAD:  Atraumatic, Normocephalic  EYES: EOMI, conjunctiva and sclera clear  CHEST/LUNG: Clear to auscultation bilaterally; No rales, rhonchi, wheezing, or rubs  HEART: Regular rate and rhythm; No murmurs, rubs, or gallops  ABDOMEN: tender in epigastric region to palpation  SKIN: No rashes or lesions  NERVOUS SYSTEM:  Alert & Oriented X3, no focal deficits    LABS:                        13.0   10.60 )-----------( 166      ( 01 Feb 2025 07:18 )             37.0                         12.5   8.24  )-----------( 138      ( 30 Jan 2025 07:10 )             36.4                         12.4   9.06  )-----------( 146      ( 29 Jan 2025 22:27 )             35.3     02-01    135  |  98  |  10  ----------------------------<  97  3.3[L]   |  21[L]  |  0.46[L]  01-31    139  |  105  |  8   ----------------------------<  94  3.4[L]   |  20[L]  |  0.53  01-30    139  |  105  |  10  ----------------------------<  108[H]  3.4[L]   |  24  |  0.61    Ca    8.6      01 Feb 2025 07:18  Ca    7.7[L]      31 Jan 2025 07:11  Ca    7.8[L]      30 Jan 2025 07:12  Phos  1.9     02-01  Mg     2.0     02-01    TPro  5.8[L]  /  Alb  3.6  /  TBili  7.5[H]  /  DBili  x   /  AST  100[H]  /  ALT  102[H]  /  AlkPhos  173[H]  02-01  TPro  5.6[L]  /  Alb  3.6  /  TBili  5.6[H]  /  DBili  x   /  AST  76[H]  /  ALT  90[H]  /  AlkPhos  158[H]  01-31  TPro  5.3[L]  /  Alb  3.4  /  TBili  3.5[H]  /  DBili  x   /  AST  50[H]  /  ALT  81[H]  /  AlkPhos  159[H]  01-30    CAPILLARY BLOOD GLUCOSE      POCT Blood Glucose.: 89 mg/dL (31 Jan 2025 17:15)        Urinalysis Basic - ( 01 Feb 2025 07:18 )    Color: x / Appearance: x / SG: x / pH: x  Gluc: 97 mg/dL / Ketone: x  / Bili: x / Urobili: x   Blood: x / Protein: x / Nitrite: x   Leuk Esterase: x / RBC: x / WBC x   Sq Epi: x / Non Sq Epi: x / Bacteria: x          RADIOLOGY & ADDITIONAL TESTS:    Imaging Personally Reviewed:  [x ] YES  [ ] NO    Consultants involved in case:   Consultant(s) Notes Reviewed:  [ x] YES  [ ] NO:   Care Discussed with Consultants/Other Providers [x ] YES  [ ] NO

## 2025-02-01 NOTE — PROGRESS NOTE ADULT - ASSESSMENT
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 A second stenosis was seen in distal duodenum/prox jeujunum which was not reached.  Admitted for N/V found to have pancreatitis, likely related to malignant infiltration into papilla. Pancreatitis likely from compression of bile duct worsened in setting new stent. Pain and pancreatitis appear to be improving however w/ uptrending TB. Febrile yesterday, possibly secondary to pancreatitis and w/o white count which is reassuring but need close monitoring for developing cholangitis.     Recommendations:  -C/w IVF  -f/u inf w/u  -Pain control per primary team   -Please keep NPO for now  -C/w zosyn  -Advance diet as tolerated  - f/u CT w/ IV and oral contrast to better assess biliary duct igven upternding bili  -If no improvement after pancreatitis resolved, plan for UGI series to evaluate potential distal stricture     64F PMH of metastatic breast cancer s/p bilateral mastectomy with reconstruction, on immunotherapy c/b duodenal stricture s/p duodenal stent placement on 1/28 admitted for nausea/vomiting after procedure.     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 A second stenosis was seen in distal duodenum/prox jeujunum which was not reached.  Admitted for N/V found to have pancreatitis, likely related to malignant infiltration into papilla. Pancreatitis likely from compression of bile duct worsened in setting new stent. Pain and pancreatitis appear to be improving however w/ uptrending TB. Febrile yesterday, possibly secondary to pancreatitis and w/o white count which is reassuring but need close monitoring for developing cholangitis.     Recommendations:  -C/w IVF  -f/u inf w/u  -Pain control per primary team   -Please keep NPO for now  -C/w zosyn  - f/u CT w/ IV and oral contrast to better assess biliary duct igven upternding bili  -If no improvement after pancreatitis resolved, plan for UGI series to evaluate potential distal stricture

## 2025-02-02 DIAGNOSIS — E80.6 OTHER DISORDERS OF BILIRUBIN METABOLISM: ICD-10-CM

## 2025-02-02 LAB
ALBUMIN SERPL ELPH-MCNC: 3.9 G/DL — SIGNIFICANT CHANGE UP (ref 3.3–5)
ALP SERPL-CCNC: 216 U/L — HIGH (ref 40–120)
ALT FLD-CCNC: 176 U/L — HIGH (ref 10–45)
ANION GAP SERPL CALC-SCNC: 19 MMOL/L — HIGH (ref 5–17)
APTT BLD: 35 SEC — SIGNIFICANT CHANGE UP (ref 24.5–35.6)
AST SERPL-CCNC: 179 U/L — HIGH (ref 10–40)
BILIRUB SERPL-MCNC: 10.1 MG/DL — HIGH (ref 0.2–1.2)
BUN SERPL-MCNC: 9 MG/DL — SIGNIFICANT CHANGE UP (ref 7–23)
CALCIUM SERPL-MCNC: 8.6 MG/DL — SIGNIFICANT CHANGE UP (ref 8.4–10.5)
CHLORIDE SERPL-SCNC: 98 MMOL/L — SIGNIFICANT CHANGE UP (ref 96–108)
CO2 SERPL-SCNC: 20 MMOL/L — LOW (ref 22–31)
CREAT SERPL-MCNC: 0.47 MG/DL — LOW (ref 0.5–1.3)
EGFR: 106 ML/MIN/1.73M2 — SIGNIFICANT CHANGE UP
GLUCOSE SERPL-MCNC: 83 MG/DL — SIGNIFICANT CHANGE UP (ref 70–99)
HCT VFR BLD CALC: 41.6 % — SIGNIFICANT CHANGE UP (ref 34.5–45)
HGB BLD-MCNC: 14.4 G/DL — SIGNIFICANT CHANGE UP (ref 11.5–15.5)
INR BLD: 0.96 RATIO — SIGNIFICANT CHANGE UP (ref 0.85–1.16)
LIDOCAIN IGE QN: 485 U/L — HIGH (ref 7–60)
MAGNESIUM SERPL-MCNC: 2 MG/DL — SIGNIFICANT CHANGE UP (ref 1.6–2.6)
MCHC RBC-ENTMCNC: 34.4 PG — HIGH (ref 27–34)
MCHC RBC-ENTMCNC: 34.6 G/DL — SIGNIFICANT CHANGE UP (ref 32–36)
MCV RBC AUTO: 99.3 FL — SIGNIFICANT CHANGE UP (ref 80–100)
NRBC # BLD: 0 /100 WBCS — SIGNIFICANT CHANGE UP (ref 0–0)
NRBC BLD-RTO: 0 /100 WBCS — SIGNIFICANT CHANGE UP (ref 0–0)
PHOSPHATE SERPL-MCNC: 1.9 MG/DL — LOW (ref 2.5–4.5)
PLATELET # BLD AUTO: 219 K/UL — SIGNIFICANT CHANGE UP (ref 150–400)
POTASSIUM SERPL-MCNC: 3.3 MMOL/L — LOW (ref 3.5–5.3)
POTASSIUM SERPL-SCNC: 3.3 MMOL/L — LOW (ref 3.5–5.3)
PROT SERPL-MCNC: 6.5 G/DL — SIGNIFICANT CHANGE UP (ref 6–8.3)
PROTHROM AB SERPL-ACNC: 10.9 SEC — SIGNIFICANT CHANGE UP (ref 9.9–13.4)
RBC # BLD: 4.19 M/UL — SIGNIFICANT CHANGE UP (ref 3.8–5.2)
RBC # FLD: 13.4 % — SIGNIFICANT CHANGE UP (ref 10.3–14.5)
SODIUM SERPL-SCNC: 137 MMOL/L — SIGNIFICANT CHANGE UP (ref 135–145)
WBC # BLD: 12.49 K/UL — HIGH (ref 3.8–10.5)
WBC # FLD AUTO: 12.49 K/UL — HIGH (ref 3.8–10.5)

## 2025-02-02 PROCEDURE — 93010 ELECTROCARDIOGRAM REPORT: CPT

## 2025-02-02 PROCEDURE — 99232 SBSQ HOSP IP/OBS MODERATE 35: CPT | Mod: GC

## 2025-02-02 RX ORDER — ONDANSETRON 4 MG/1
4 TABLET, ORALLY DISINTEGRATING ORAL EVERY 6 HOURS
Refills: 0 | Status: DISCONTINUED | OUTPATIENT
Start: 2025-02-02 | End: 2025-02-02

## 2025-02-02 RX ORDER — POTASSIUM PHOSPHATE, MONOBASIC POTASSIUM PHOSPHATE, DIBASIC 236; 224 MG/ML; MG/ML
30 INJECTION, SOLUTION INTRAVENOUS ONCE
Refills: 0 | Status: DISCONTINUED | OUTPATIENT
Start: 2025-02-02 | End: 2025-02-02

## 2025-02-02 RX ORDER — POTASSIUM CHLORIDE 750 MG/1
20 TABLET, EXTENDED RELEASE ORAL
Refills: 0 | Status: COMPLETED | OUTPATIENT
Start: 2025-02-02 | End: 2025-02-02

## 2025-02-02 RX ORDER — ACETAMINOPHEN 160 MG/5ML
650 SUSPENSION ORAL EVERY 6 HOURS
Refills: 0 | Status: DISCONTINUED | OUTPATIENT
Start: 2025-02-02 | End: 2025-02-03

## 2025-02-02 RX ORDER — SODIUM CHLORIDE 9 G/ML
1000 INJECTION, SOLUTION INTRAVENOUS
Refills: 0 | Status: DISCONTINUED | OUTPATIENT
Start: 2025-02-02 | End: 2025-02-03

## 2025-02-02 RX ORDER — ONDANSETRON 4 MG/1
4 TABLET, ORALLY DISINTEGRATING ORAL EVERY 6 HOURS
Refills: 0 | Status: DISCONTINUED | OUTPATIENT
Start: 2025-02-02 | End: 2025-02-05

## 2025-02-02 RX ORDER — SODIUM PHOSPHATE, DIBASIC, ANHYDROUS, POTASSIUM PHOSPHATE, MONOBASIC, AND SODIUM PHOSPHATE, MONOBASIC, MONOHYDRATE 852; 155; 130 MG/1; MG/1; MG/1
1 TABLET, COATED ORAL EVERY 6 HOURS
Refills: 0 | Status: COMPLETED | OUTPATIENT
Start: 2025-02-02 | End: 2025-02-03

## 2025-02-02 RX ORDER — METOCLOPRAMIDE 10 MG/1
10 TABLET ORAL EVERY 6 HOURS
Refills: 0 | Status: DISCONTINUED | OUTPATIENT
Start: 2025-02-02 | End: 2025-02-05

## 2025-02-02 RX ADMIN — PIPERACILLIN SODIUM AND TAZOBACTAM SODIUM 25 GRAM(S): 2; 250 INJECTION, POWDER, FOR SOLUTION INTRAVENOUS at 22:22

## 2025-02-02 RX ADMIN — ONDANSETRON 4 MILLIGRAM(S): 4 TABLET, ORALLY DISINTEGRATING ORAL at 22:22

## 2025-02-02 RX ADMIN — ACETAMINOPHEN 650 MILLIGRAM(S): 160 SUSPENSION ORAL at 11:59

## 2025-02-02 RX ADMIN — SODIUM CHLORIDE 75 MILLILITER(S): 9 INJECTION, SOLUTION INTRAVENOUS at 18:16

## 2025-02-02 RX ADMIN — ONDANSETRON 4 MILLIGRAM(S): 4 TABLET, ORALLY DISINTEGRATING ORAL at 00:02

## 2025-02-02 RX ADMIN — ONDANSETRON 4 MILLIGRAM(S): 4 TABLET, ORALLY DISINTEGRATING ORAL at 16:19

## 2025-02-02 RX ADMIN — POTASSIUM CHLORIDE 20 MILLIEQUIVALENT(S): 750 TABLET, EXTENDED RELEASE ORAL at 14:10

## 2025-02-02 RX ADMIN — ACETAMINOPHEN 650 MILLIGRAM(S): 160 SUSPENSION ORAL at 13:00

## 2025-02-02 RX ADMIN — PIPERACILLIN SODIUM AND TAZOBACTAM SODIUM 25 GRAM(S): 2; 250 INJECTION, POWDER, FOR SOLUTION INTRAVENOUS at 14:11

## 2025-02-02 RX ADMIN — ENOXAPARIN SODIUM 40 MILLIGRAM(S): 100 INJECTION SUBCUTANEOUS at 05:56

## 2025-02-02 RX ADMIN — POTASSIUM CHLORIDE 20 MILLIEQUIVALENT(S): 750 TABLET, EXTENDED RELEASE ORAL at 18:15

## 2025-02-02 RX ADMIN — ACETAMINOPHEN 650 MILLIGRAM(S): 160 SUSPENSION ORAL at 18:55

## 2025-02-02 RX ADMIN — SODIUM PHOSPHATE, DIBASIC, ANHYDROUS, POTASSIUM PHOSPHATE, MONOBASIC, AND SODIUM PHOSPHATE, MONOBASIC, MONOHYDRATE 1 PACKET(S): 852; 155; 130 TABLET, COATED ORAL at 12:42

## 2025-02-02 RX ADMIN — POTASSIUM CHLORIDE 20 MILLIEQUIVALENT(S): 750 TABLET, EXTENDED RELEASE ORAL at 12:42

## 2025-02-02 RX ADMIN — SODIUM PHOSPHATE, DIBASIC, ANHYDROUS, POTASSIUM PHOSPHATE, MONOBASIC, AND SODIUM PHOSPHATE, MONOBASIC, MONOHYDRATE 1 PACKET(S): 852; 155; 130 TABLET, COATED ORAL at 18:15

## 2025-02-02 RX ADMIN — ACETAMINOPHEN 650 MILLIGRAM(S): 160 SUSPENSION ORAL at 18:16

## 2025-02-02 RX ADMIN — ONDANSETRON 4 MILLIGRAM(S): 4 TABLET, ORALLY DISINTEGRATING ORAL at 10:37

## 2025-02-02 RX ADMIN — METOCLOPRAMIDE 10 MILLIGRAM(S): 10 TABLET ORAL at 14:10

## 2025-02-02 RX ADMIN — METOCLOPRAMIDE 10 MILLIGRAM(S): 10 TABLET ORAL at 18:16

## 2025-02-02 RX ADMIN — METOCLOPRAMIDE 10 MILLIGRAM(S): 10 TABLET ORAL at 03:39

## 2025-02-02 NOTE — ADVANCED PRACTICE NURSE CONSULT - ASSESSMENT
Midline Catheter Insertion Note    Catheter type: 4F  : Bard  Power injectable: Yes  LOT#  [id number]                                                                                                                                                                                                                   Procedure assisted by:  LENNOX Al RN  Time out was preformed, confirming the patient's first and last name, date of birth, procedure, and correct site prior to state of procedure.    Patient was placed with HOB 30 degrees. Patient placement site was prepped with chlorhexidine solution, then draped using maximum sterile barrier protection. The area was injected with 2 ml of 1% lidocaine. Using the Bard Site Rite 8, the catheter was placed using the Modified Seldinger Technique. Strict adherence to outline aseptic technique including handwashing, glove and gown, utilizing mask and cap, plus draping the patient with a sterile drape was observed. Upon completion of line placement, the insertion site was covered with a sterile occlusive CHG dressing. Pt tolerated procedure well.     All materials used for catheter insertion, including the intact guide wires, were accounted for at the end of the procedure.  Number of attempts: 1  Complications/Comments: None    Emergency Placement: No    Site: New  Anatomical Site of insertion: Left Basilic   Catheter size/length: 4F, 20cm  US guided Bard single lumen power midline placed

## 2025-02-02 NOTE — PROGRESS NOTE ADULT - PROBLEM SELECTOR PLAN 3
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 Follows with Dr. Chicho Martinez.     Plan:  - holding Capecitabine/Xeloda 500 mg BID (1 week on/1 week off)  - holding Fulvestrant 500 mg every 4 weeks  - holding Kisqali 600 mg daily (on 21 days, 7 days off)  - holding Arimidex 1 mg daily Oral temp 101.7 on 1/30.  Down to 97.2 on 1/31 AM.  Blood cultures no growth after 24 hr, expanded viral panel negative.  Unclear origin of fever.  CXR showed bibasilar atelectasis.  Low concern for UTI without dysuria, pneumonia given acute onset and lack of CXR findings, and no areas of skin breakdown.     Plan:  - Zosyn 3.375 g in dextrose 5% 100 mL, q8hr (01/30 -02/03)  - scheduled Tylenol for fever management

## 2025-02-02 NOTE — PROGRESS NOTE ADULT - PROBLEM SELECTOR PLAN 1
Presenting with nausea/vomiting post-procedure. Admitted for observation. History of pancreatitis post-EGD. QTC WNL.    Plan:  - GI consulted, recs appreciated  - Nausea: Zofran, Metoclopramide prn  - Pain: acetaminophen, morphine 2 mg q4h, morphine 4 mg q4h prn  - Lactated ringers 75 cc/hr, will hold if hypoxia returns Presenting with nausea/vomiting post-procedure. Admitted for observation. History of pancreatitis post-EGD. QTC WNL.  - CT a/p with pancreatitis    Plan:  - GI consulted, recs appreciated  - Nausea: Zofran, Metoclopramide scheduled  - Pain: acetaminophen scheduled, morphine 2 mg q4h, morphine 4 mg q4h prn  - Lactated ringers 75 cc/hr

## 2025-02-02 NOTE — PROGRESS NOTE ADULT - ASSESSMENT
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 A second stenosis was seen in distal duodenum/prox jeujunum which was not reached.  Admitted for N/V found to have pancreatitis, likely related to malignant infiltration into papilla. Pancreatitis likely from compression of bile duct worsened in setting new stent. Pain and pancreatitis appear to be improving however w/ uptrending TB. Febrile yesterday, possibly secondary to pancreatitis and w/o white count which is reassuring but need close monitoring for developing cholangitis.     Recommendations:  -ok for clear liquid diet now  -IR consult Monday for evaluation for external biliary drain placement (ERCP high risk for pancreatitis worsening and concern that biliary catheterization may not be possible given tumor spread and duodenal stent in place)  -c/w supportive measures  -Pain control per primary team   -can make NPO at midnight. case will be discussed by advanced GI team in AM, though for reasons outlined above, endoscopic approach may not be the safest course of action  -C/w zosyn  -If no improvement after pancreatitis resolved, plan for UGI series to evaluate potential distal stricture

## 2025-02-02 NOTE — PROGRESS NOTE ADULT - PROBLEM SELECTOR PLAN 4
Resolved.  95% sat room air 1/31.  O2 sat 85% O/N on 1/30.  Placed on 2L NC, weaned off in the morning.  O2 sat 95% on room air in AM. Likely 2/2 volume overload in setting of IV fluids. Resolved following holding of fluid.    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 Follows with Dr. Chicho Martinez.     Plan:  - holding Capecitabine/Xeloda 500 mg BID (1 week on/1 week off)  - holding Fulvestrant 500 mg every 4 weeks  - holding Kisqali 600 mg daily (on 21 days, 7 days off)  - holding Arimidex 1 mg daily

## 2025-02-02 NOTE — PROGRESS NOTE ADULT - PROBLEM SELECTOR PLAN 2
Oral temp 101.7 on 1/30.  Down to 97.2 on 1/31 AM.  Blood cultures no growth after 24 hr, expanded viral panel negative.  Unclear origin of fever.  CXR showed bibasilar atelectasis.  Low concern for UTI without dysuria, pneumonia given acute onset and lack of CXR findings, and no areas of skin breakdown.     Plan:  - Zosyn 3.375 g in dextrose 5% 100 mL, q8hr (01/30 - )  - PRN Tylenol for fever management  - CT A/P w/oral and IV contrast CT a/p with intrahepatic biliary ductal dilation, increased from 01/25 CT. Rising T bili.    Plan:  - GI consulted, recs appreciated

## 2025-02-02 NOTE — PROGRESS NOTE ADULT - TIME BILLING
The submitted E/M billing level for this visit reflects the total time spent on the day reviewing documentation in EMR, face-to-face time spent with the patient, non-face-to-face review of medical records and relevant information, review of laboratory results and any relevant imaging. Patient was counseled on their diagnostic tests and treatment plan.

## 2025-02-02 NOTE — PROGRESS NOTE ADULT - SUBJECTIVE AND OBJECTIVE BOX
Interval Events:   -NAEON  -appears comfortable today  -CTAP reviewed with [facility] Dr. Nj  -patient with multiple reasons for biliary obstruction - duodenal spread likely compressing biliary system + duodenal stent in place  -additionally patient high risk for worsening of pancreatitis with repeat ERCP/instrumentation and no guarantee that accessing biliary tree technically possibly given those factors if ERCP attempted.  -above discussed with patient and recommendation for IR evaluation for possible percutaneous cholecystostomy drain placement  -patient and daughter understand need for additional evaluation this week    Hospital Medications:  acetaminophen     Tablet .. 650 milliGRAM(s) Oral every 6 hours  aluminum hydroxide/magnesium hydroxide/simethicone Suspension 30 milliLiter(s) Oral every 4 hours PRN  enoxaparin Injectable 40 milliGRAM(s) SubCutaneous every 24 hours  lactated ringers. 1000 milliLiter(s) IV Continuous <Continuous>  melatonin 3 milliGRAM(s) Oral at bedtime PRN  metoclopramide Injectable 10 milliGRAM(s) IV Push every 6 hours  morphine  - Injectable 2 milliGRAM(s) IV Push every 4 hours PRN  morphine  - Injectable 4 milliGRAM(s) IV Push every 4 hours PRN  ondansetron Injectable 4 milliGRAM(s) IV Push every 6 hours  piperacillin/tazobactam IVPB.. 3.375 Gram(s) IV Intermittent every 8 hours  potassium chloride    Tablet ER 20 milliEquivalent(s) Oral every 2 hours  potassium phosphate / sodium phosphate Powder (PHOS-NaK) 1 Packet(s) Oral every 6 hours  triamterene 75 mG/hydrochlorothiazide 50 mG Tablet 1 Tablet(s) Oral daily      PHYSICAL EXAM:   Vital Signs:  Vital Signs Last 24 Hrs  T(C): 37.4 (02 Feb 2025 12:57), Max: 37.4 (02 Feb 2025 12:57)  T(F): 99.3 (02 Feb 2025 12:57), Max: 99.3 (02 Feb 2025 12:57)  HR: 60 (02 Feb 2025 12:57) (55 - 60)  BP: 151/78 (02 Feb 2025 12:57) (151/78 - 161/80)  BP(mean): --  RR: 18 (02 Feb 2025 12:57) (18 - 18)  SpO2: 100% (02 Feb 2025 12:57) (100% - 100%)    Parameters below as of 02 Feb 2025 12:57  Patient On (Oxygen Delivery Method): room air      Daily     Daily     GENERAL: no acute distress  NEURO: alert  HEENT: NCAT, no conjunctival pallor appreciated  CHEST: no respiratory distress, no accessory muscle use  CARDIAC: regular rate, +S1/S2  ABDOMEN: soft, tenderness to palpation of epigastric region, nondistended  EXTREMITIES: warm, well perfused  SKIN: +jaundice                          14.4   12.49 )-----------( 219      ( 02 Feb 2025 11:07 )             41.6     02-02    137  |  98  |  9   ----------------------------<  83  3.3[L]   |  20[L]  |  0.47[L]    Ca    8.6      02 Feb 2025 11:08  Phos  1.9     02-02  Mg     2.0     02-02    TPro  6.5  /  Alb  3.9  /  TBili  10.1[H]  /  DBili  x   /  AST  179[H]  /  ALT  176[H]  /  AlkPhos  216[H]  02-02    LIVER FUNCTIONS - ( 02 Feb 2025 11:08 )  Alb: 3.9 g/dL / Pro: 6.5 g/dL / ALK PHOS: 216 U/L / ALT: 176 U/L / AST: 179 U/L / GGT: x             Interval Diagnostic Studies:   < from: CT Abdomen and Pelvis w/ Oral Cont and w/ IV Cont (02.01.25 @ 21:12) >  FINDINGS:  LOWER CHEST: There are small bilateral pleural effusions. Status post   mastectomy with TRAM flap reconstruction.    LIVER: Within normal limits.  BILE DUCTS: Intrahepatic biliary ductal dilatation has increased when   compared to 1/25/2025 exam. Stable extra hepatic biliary ductal   dilatation.  GALLBLADDER: Within normal limits.  SPLEEN: Within normal limits.  PANCREAS: The pancreas is edematous with mild peripancreatic fat   stranding. There is no hypoenhancement. There are no peripancreatic   collections.  ADRENALS: Within normal limits.  KIDNEYS/URETERS: Left renal cyst. Subcentimeter hypodensities too small   to characterize. No hydronephrosis.    BLADDER: Within normal limits.  REPRODUCTIVE ORGANS: Supracervical hysterectomy.    BOWEL: Enteric contrast has progressed to the level of the rectum.   Duodenal nonspecific thickening of the duodenum with duodenal stent in   place. There is no bowel obstruction. The appendix is not visualized.  PERITONEUM/RETROPERITONEUM: Within normal limits.  VESSELS: Within normal limits.  LYMPH NODES: No lymphadenopathy.  ABDOMINAL WALL: Postsurgical changes.  BONES: Stable scattered mixed lytic and sclerotic osseous lesions.    IMPRESSION:    Pancreatic edema with peripancreatic fat stranding suggestive of acute   interstitial edematous pancreatitis. No evidence of necrosis or   peripancreatic collection.    Intrahepatic biliary ductal dilatation has increased when compared to   1/25/2025 exam. Stable extra hepatic biliary ductal dilatation.    Duodenal stent in place. No evidence of bowel obstruction.    < end of copied text >

## 2025-02-02 NOTE — PROGRESS NOTE ADULT - SUBJECTIVE AND OBJECTIVE BOX
CLOVER OG  64y  MRN: [id number]    Patient is a 64y old  Female who presents with a chief complaint of Nausea/vomiting post procedure (01 Feb 2025 17:20)      Interval/Overnight Events: no events ON.     Subjective: Pt seen and examined at bedside.     MEDICATIONS  (STANDING):  enoxaparin Injectable 40 milliGRAM(s) SubCutaneous every 24 hours  lactated ringers. 1000 milliLiter(s) (75 mL/Hr) IV Continuous <Continuous>  piperacillin/tazobactam IVPB.. 3.375 Gram(s) IV Intermittent every 8 hours  triamterene 75 mG/hydrochlorothiazide 50 mG Tablet 1 Tablet(s) Oral daily    MEDICATIONS  (PRN):  acetaminophen     Tablet .. 650 milliGRAM(s) Oral every 6 hours PRN Temp greater or equal to 38C (100.4F), Mild Pain (1 - 3)  aluminum hydroxide/magnesium hydroxide/simethicone Suspension 30 milliLiter(s) Oral every 4 hours PRN Dyspepsia  melatonin 3 milliGRAM(s) Oral at bedtime PRN Insomnia  metoclopramide Injectable 10 milliGRAM(s) IV Push every 6 hours PRN Nausea  morphine  - Injectable 2 milliGRAM(s) IV Push every 4 hours PRN Moderate Pain (4 - 6)  morphine  - Injectable 4 milliGRAM(s) IV Push every 4 hours PRN Severe Pain (7 - 10)  ondansetron Injectable 4 milliGRAM(s) IV Push every 4 hours PRN Nausea and/or Vomiting      Objective:    Vitals: Vital Signs Last 24 Hrs  T(C): 37.3 (02-01-25 @ 20:38), Max: 37.4 (02-01-25 @ 14:40)  T(F): 99.1 (02-01-25 @ 20:38), Max: 99.3 (02-01-25 @ 14:40)  HR: 55 (02-01-25 @ 20:38) (55 - 64)  BP: 161/80 (02-01-25 @ 20:38) (155/81 - 161/80)  BP(mean): --  RR: 18 (02-01-25 @ 20:38) (18 - 18)  SpO2: 100% (02-01-25 @ 20:38) (95% - 100%)                I&O's Summary      PHYSICAL EXAM:  GENERAL: NAD  HEAD:  Atraumatic, Normocephalic  EYES: EOMI, conjunctiva and sclera clear  CHEST/LUNG: Clear to auscultation bilaterally; No rales, rhonchi, wheezing, or rubs  HEART: Regular rate and rhythm; No murmurs, rubs, or gallops  ABDOMEN: Soft, Nontender, Nondistended;   SKIN: No rashes or lesions  NERVOUS SYSTEM:  Alert & Oriented X3, no focal deficits    LABS:                        13.0   10.60 )-----------( 166      ( 01 Feb 2025 07:18 )             37.0     02-01    135  |  98  |  10  ----------------------------<  97  3.3[L]   |  21[L]  |  0.46[L]  01-31    139  |  105  |  8   ----------------------------<  94  3.4[L]   |  20[L]  |  0.53    Ca    8.6      01 Feb 2025 07:18  Ca    7.7[L]      31 Jan 2025 07:11  Phos  1.9     02-01  Mg     2.0     02-01    TPro  5.8[L]  /  Alb  3.6  /  TBili  7.5[H]  /  DBili  x   /  AST  100[H]  /  ALT  102[H]  /  AlkPhos  173[H]  02-01  TPro  5.6[L]  /  Alb  3.6  /  TBili  5.6[H]  /  DBili  x   /  AST  76[H]  /  ALT  90[H]  /  AlkPhos  158[H]  01-31    CAPILLARY BLOOD GLUCOSE            Urinalysis Basic - ( 01 Feb 2025 07:18 )    Color: x / Appearance: x / SG: x / pH: x  Gluc: 97 mg/dL / Ketone: x  / Bili: x / Urobili: x   Blood: x / Protein: x / Nitrite: x   Leuk Esterase: x / RBC: x / WBC x   Sq Epi: x / Non Sq Epi: x / Bacteria: x          RADIOLOGY & ADDITIONAL TESTS:    Imaging Personally Reviewed:  [x ] YES  [ ] NO    Consultants involved in case:   Consultant(s) Notes Reviewed:  [ x] YES  [ ] NO:   Care Discussed with Consultants/Other Providers [x ] YES  [ ] NO           CLOVER OG  64y  MRN: [id number]    Patient is a 64y old  Female who presents with a chief complaint of Nausea/vomiting post procedure (01 Feb 2025 17:20)      Interval/Overnight Events: no events ON. Lost IV access.    Subjective: Pt seen and examined at bedside. Endorsing nausea/vomiting, pain in abdomen with increased nausea/vomiting. Endorses dark stools and urine. No new symptoms, no cough/rhinorrhea/sore throat.    MEDICATIONS  (STANDING):  enoxaparin Injectable 40 milliGRAM(s) SubCutaneous every 24 hours  lactated ringers. 1000 milliLiter(s) (75 mL/Hr) IV Continuous <Continuous>  piperacillin/tazobactam IVPB.. 3.375 Gram(s) IV Intermittent every 8 hours  triamterene 75 mG/hydrochlorothiazide 50 mG Tablet 1 Tablet(s) Oral daily    MEDICATIONS  (PRN):  acetaminophen     Tablet .. 650 milliGRAM(s) Oral every 6 hours PRN Temp greater or equal to 38C (100.4F), Mild Pain (1 - 3)  aluminum hydroxide/magnesium hydroxide/simethicone Suspension 30 milliLiter(s) Oral every 4 hours PRN Dyspepsia  melatonin 3 milliGRAM(s) Oral at bedtime PRN Insomnia  metoclopramide Injectable 10 milliGRAM(s) IV Push every 6 hours PRN Nausea  morphine  - Injectable 2 milliGRAM(s) IV Push every 4 hours PRN Moderate Pain (4 - 6)  morphine  - Injectable 4 milliGRAM(s) IV Push every 4 hours PRN Severe Pain (7 - 10)  ondansetron Injectable 4 milliGRAM(s) IV Push every 4 hours PRN Nausea and/or Vomiting      Objective:    Vitals: Vital Signs Last 24 Hrs  T(C): 37.3 (02-01-25 @ 20:38), Max: 37.4 (02-01-25 @ 14:40)  T(F): 99.1 (02-01-25 @ 20:38), Max: 99.3 (02-01-25 @ 14:40)  HR: 55 (02-01-25 @ 20:38) (55 - 64)  BP: 161/80 (02-01-25 @ 20:38) (155/81 - 161/80)  BP(mean): --  RR: 18 (02-01-25 @ 20:38) (18 - 18)  SpO2: 100% (02-01-25 @ 20:38) (95% - 100%)                I&O's Summary      PHYSICAL EXAM:  GENERAL: NAD  HEAD:  Atraumatic, Normocephalic  EYES: EOMI, conjunctiva and sclera clear, dry mucous membranes  CHEST/LUNG: Clear to auscultation bilaterally; No rales, rhonchi, wheezing, or rubs  HEART: Regular rate and rhythm; No murmurs, rubs, or gallops  ABDOMEN: light distention, Tender to light palpation in RUQ and epigastric region  SKIN: No rashes or lesions  NERVOUS SYSTEM:  Alert & Oriented X3, no focal deficits    LABS:                        13.0   10.60 )-----------( 166      ( 01 Feb 2025 07:18 )             37.0     02-01    135  |  98  |  10  ----------------------------<  97  3.3[L]   |  21[L]  |  0.46[L]  01-31    139  |  105  |  8   ----------------------------<  94  3.4[L]   |  20[L]  |  0.53    Ca    8.6      01 Feb 2025 07:18  Ca    7.7[L]      31 Jan 2025 07:11  Phos  1.9     02-01  Mg     2.0     02-01    TPro  5.8[L]  /  Alb  3.6  /  TBili  7.5[H]  /  DBili  x   /  AST  100[H]  /  ALT  102[H]  /  AlkPhos  173[H]  02-01  TPro  5.6[L]  /  Alb  3.6  /  TBili  5.6[H]  /  DBili  x   /  AST  76[H]  /  ALT  90[H]  /  AlkPhos  158[H]  01-31    CAPILLARY BLOOD GLUCOSE            Urinalysis Basic - ( 01 Feb 2025 07:18 )    Color: x / Appearance: x / SG: x / pH: x  Gluc: 97 mg/dL / Ketone: x  / Bili: x / Urobili: x   Blood: x / Protein: x / Nitrite: x   Leuk Esterase: x / RBC: x / WBC x   Sq Epi: x / Non Sq Epi: x / Bacteria: x          RADIOLOGY & ADDITIONAL TESTS:    Imaging Personally Reviewed:  [x ] YES  [ ] NO    Consultants involved in case:   Consultant(s) Notes Reviewed:  [ x] YES  [ ] NO:   Care Discussed with Consultants/Other Providers [x ] YES  [ ] NO

## 2025-02-02 NOTE — PROGRESS NOTE ADULT - PROBLEM SELECTOR PLAN 5
DVT: lovenox  Diet: NPO as per GI  Dispo: anticipate home Resolved.  95% sat room air 1/31.  O2 sat 85% O/N on 1/30.  Placed on 2L NC, weaned off in the morning.  O2 sat 95% on room air in AM. Likely 2/2 volume overload in setting of IV fluids. Resolved following holding of fluid.    Plan:  - Continue to monitor

## 2025-02-02 NOTE — PROGRESS NOTE ADULT - ASSESSMENT
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 Concern for pancreatitis post-procedure. 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

## 2025-02-03 LAB
ALBUMIN SERPL ELPH-MCNC: 3.3 G/DL — SIGNIFICANT CHANGE UP (ref 3.3–5)
ALP SERPL-CCNC: 176 U/L — HIGH (ref 40–120)
ALT FLD-CCNC: 170 U/L — HIGH (ref 10–45)
ANION GAP SERPL CALC-SCNC: 12 MMOL/L — SIGNIFICANT CHANGE UP (ref 5–17)
AST SERPL-CCNC: 165 U/L — HIGH (ref 10–40)
BILIRUB SERPL-MCNC: 8.5 MG/DL — HIGH (ref 0.2–1.2)
BUN SERPL-MCNC: 6 MG/DL — LOW (ref 7–23)
CALCIUM SERPL-MCNC: 8 MG/DL — LOW (ref 8.4–10.5)
CHLORIDE SERPL-SCNC: 103 MMOL/L — SIGNIFICANT CHANGE UP (ref 96–108)
CO2 SERPL-SCNC: 22 MMOL/L — SIGNIFICANT CHANGE UP (ref 22–31)
CREAT SERPL-MCNC: 0.46 MG/DL — LOW (ref 0.5–1.3)
EGFR: 107 ML/MIN/1.73M2 — SIGNIFICANT CHANGE UP
GLUCOSE SERPL-MCNC: 105 MG/DL — HIGH (ref 70–99)
HCT VFR BLD CALC: 35.4 % — SIGNIFICANT CHANGE UP (ref 34.5–45)
HGB BLD-MCNC: 12.3 G/DL — SIGNIFICANT CHANGE UP (ref 11.5–15.5)
MAGNESIUM SERPL-MCNC: 1.9 MG/DL — SIGNIFICANT CHANGE UP (ref 1.6–2.6)
MCHC RBC-ENTMCNC: 33.9 PG — SIGNIFICANT CHANGE UP (ref 27–34)
MCHC RBC-ENTMCNC: 34.7 G/DL — SIGNIFICANT CHANGE UP (ref 32–36)
MCV RBC AUTO: 97.5 FL — SIGNIFICANT CHANGE UP (ref 80–100)
NRBC # BLD: 0 /100 WBCS — SIGNIFICANT CHANGE UP (ref 0–0)
NRBC BLD-RTO: 0 /100 WBCS — SIGNIFICANT CHANGE UP (ref 0–0)
PHOSPHATE SERPL-MCNC: 2 MG/DL — LOW (ref 2.5–4.5)
PLATELET # BLD AUTO: 187 K/UL — SIGNIFICANT CHANGE UP (ref 150–400)
POTASSIUM SERPL-MCNC: 3.6 MMOL/L — SIGNIFICANT CHANGE UP (ref 3.5–5.3)
POTASSIUM SERPL-SCNC: 3.6 MMOL/L — SIGNIFICANT CHANGE UP (ref 3.5–5.3)
PROT SERPL-MCNC: 5.4 G/DL — LOW (ref 6–8.3)
RBC # BLD: 3.63 M/UL — LOW (ref 3.8–5.2)
RBC # FLD: 13.4 % — SIGNIFICANT CHANGE UP (ref 10.3–14.5)
SODIUM SERPL-SCNC: 137 MMOL/L — SIGNIFICANT CHANGE UP (ref 135–145)
WBC # BLD: 8.9 K/UL — SIGNIFICANT CHANGE UP (ref 3.8–10.5)
WBC # FLD AUTO: 8.9 K/UL — SIGNIFICANT CHANGE UP (ref 3.8–10.5)

## 2025-02-03 PROCEDURE — 99232 SBSQ HOSP IP/OBS MODERATE 35: CPT | Mod: GC

## 2025-02-03 PROCEDURE — 93010 ELECTROCARDIOGRAM REPORT: CPT

## 2025-02-03 PROCEDURE — 99233 SBSQ HOSP IP/OBS HIGH 50: CPT

## 2025-02-03 RX ORDER — POTASSIUM PHOSPHATE, MONOBASIC POTASSIUM PHOSPHATE, DIBASIC 236; 224 MG/ML; MG/ML
15 INJECTION, SOLUTION INTRAVENOUS ONCE
Refills: 0 | Status: COMPLETED | OUTPATIENT
Start: 2025-02-03 | End: 2025-02-03

## 2025-02-03 RX ORDER — ACETAMINOPHEN 160 MG/5ML
650 SUSPENSION ORAL EVERY 6 HOURS
Refills: 0 | Status: DISCONTINUED | OUTPATIENT
Start: 2025-02-03 | End: 2025-02-06

## 2025-02-03 RX ORDER — ANTISEPTIC SURGICAL SCRUB 0.04 MG/ML
1 SOLUTION TOPICAL
Refills: 0 | Status: DISCONTINUED | OUTPATIENT
Start: 2025-02-03 | End: 2025-02-04

## 2025-02-03 RX ORDER — MECOBAL/LEVOMEFOLAT CA/B6 PHOS 2-3-35 MG
1 TABLET ORAL DAILY
Refills: 0 | Status: DISCONTINUED | OUTPATIENT
Start: 2025-02-03 | End: 2025-02-06

## 2025-02-03 RX ORDER — BACTERIOSTATIC SODIUM CHLORIDE 0.9 %
1000 VIAL (ML) INJECTION
Refills: 0 | Status: DISCONTINUED | OUTPATIENT
Start: 2025-02-03 | End: 2025-02-06

## 2025-02-03 RX ORDER — THIAMINE HCL 100 MG
100 TABLET ORAL DAILY
Refills: 0 | Status: DISCONTINUED | OUTPATIENT
Start: 2025-02-03 | End: 2025-02-06

## 2025-02-03 RX ORDER — CALAMINE/PHENOL LIQUID
1 LOTION (ML) TOPICAL
Refills: 0 | Status: DISCONTINUED | OUTPATIENT
Start: 2025-02-03 | End: 2025-02-06

## 2025-02-03 RX ORDER — ACETAMINOPHEN 160 MG/5ML
650 SUSPENSION ORAL EVERY 6 HOURS
Refills: 0 | Status: DISCONTINUED | OUTPATIENT
Start: 2025-02-03 | End: 2025-02-03

## 2025-02-03 RX ORDER — ACETAMINOPHEN 160 MG/5ML
1000 SUSPENSION ORAL ONCE
Refills: 0 | Status: COMPLETED | OUTPATIENT
Start: 2025-02-03 | End: 2025-02-03

## 2025-02-03 RX ADMIN — PIPERACILLIN SODIUM AND TAZOBACTAM SODIUM 25 GRAM(S): 2; 250 INJECTION, POWDER, FOR SOLUTION INTRAVENOUS at 15:09

## 2025-02-03 RX ADMIN — ACETAMINOPHEN 400 MILLIGRAM(S): 160 SUSPENSION ORAL at 10:46

## 2025-02-03 RX ADMIN — METOCLOPRAMIDE 10 MILLIGRAM(S): 10 TABLET ORAL at 06:38

## 2025-02-03 RX ADMIN — POTASSIUM PHOSPHATE, MONOBASIC POTASSIUM PHOSPHATE, DIBASIC 62.5 MILLIMOLE(S): 236; 224 INJECTION, SOLUTION INTRAVENOUS at 10:43

## 2025-02-03 RX ADMIN — ACETAMINOPHEN 650 MILLIGRAM(S): 160 SUSPENSION ORAL at 18:37

## 2025-02-03 RX ADMIN — METOCLOPRAMIDE 10 MILLIGRAM(S): 10 TABLET ORAL at 12:08

## 2025-02-03 RX ADMIN — ACETAMINOPHEN 650 MILLIGRAM(S): 160 SUSPENSION ORAL at 17:56

## 2025-02-03 RX ADMIN — ONDANSETRON 4 MILLIGRAM(S): 4 TABLET, ORALLY DISINTEGRATING ORAL at 10:17

## 2025-02-03 RX ADMIN — METOCLOPRAMIDE 10 MILLIGRAM(S): 10 TABLET ORAL at 00:03

## 2025-02-03 RX ADMIN — PIPERACILLIN SODIUM AND TAZOBACTAM SODIUM 25 GRAM(S): 2; 250 INJECTION, POWDER, FOR SOLUTION INTRAVENOUS at 22:17

## 2025-02-03 RX ADMIN — ACETAMINOPHEN 650 MILLIGRAM(S): 160 SUSPENSION ORAL at 06:38

## 2025-02-03 RX ADMIN — ENOXAPARIN SODIUM 40 MILLIGRAM(S): 100 INJECTION SUBCUTANEOUS at 06:37

## 2025-02-03 RX ADMIN — ACETAMINOPHEN 650 MILLIGRAM(S): 160 SUSPENSION ORAL at 12:28

## 2025-02-03 RX ADMIN — SODIUM PHOSPHATE, DIBASIC, ANHYDROUS, POTASSIUM PHOSPHATE, MONOBASIC, AND SODIUM PHOSPHATE, MONOBASIC, MONOHYDRATE 1 PACKET(S): 852; 155; 130 TABLET, COATED ORAL at 00:03

## 2025-02-03 RX ADMIN — PIPERACILLIN SODIUM AND TAZOBACTAM SODIUM 25 GRAM(S): 2; 250 INJECTION, POWDER, FOR SOLUTION INTRAVENOUS at 06:38

## 2025-02-03 RX ADMIN — ACETAMINOPHEN 650 MILLIGRAM(S): 160 SUSPENSION ORAL at 12:08

## 2025-02-03 RX ADMIN — ACETAMINOPHEN 1000 MILLIGRAM(S): 160 SUSPENSION ORAL at 12:28

## 2025-02-03 RX ADMIN — ONDANSETRON 4 MILLIGRAM(S): 4 TABLET, ORALLY DISINTEGRATING ORAL at 04:47

## 2025-02-03 RX ADMIN — SODIUM PHOSPHATE, DIBASIC, ANHYDROUS, POTASSIUM PHOSPHATE, MONOBASIC, AND SODIUM PHOSPHATE, MONOBASIC, MONOHYDRATE 1 PACKET(S): 852; 155; 130 TABLET, COATED ORAL at 06:37

## 2025-02-03 RX ADMIN — TRIAMTERENE AND HYDROCHLOROTHIAZIDE 1 TABLET(S): 37.5; 25 TABLET ORAL at 06:38

## 2025-02-03 RX ADMIN — Medication 75 MILLILITER(S): at 15:09

## 2025-02-03 RX ADMIN — ONDANSETRON 4 MILLIGRAM(S): 4 TABLET, ORALLY DISINTEGRATING ORAL at 22:17

## 2025-02-03 RX ADMIN — ACETAMINOPHEN 650 MILLIGRAM(S): 160 SUSPENSION ORAL at 00:03

## 2025-02-03 RX ADMIN — ONDANSETRON 4 MILLIGRAM(S): 4 TABLET, ORALLY DISINTEGRATING ORAL at 15:57

## 2025-02-03 RX ADMIN — METOCLOPRAMIDE 10 MILLIGRAM(S): 10 TABLET ORAL at 17:55

## 2025-02-03 NOTE — DIETITIAN INITIAL EVALUATION ADULT - NUTRITION DIAGNOSITC TERMINOLOGY #1
Complex Repair And Flap Additional Text (Will Appearing After The Standard Complex Repair Text): The complex repair was not sufficient to completely close the primary defect. The remaining additional defect was repaired with the flap mentioned below. Malnutrition...

## 2025-02-03 NOTE — PROGRESS NOTE ADULT - PROBLEM SELECTOR PLAN 6
DVT: lovenox  Diet: NPO as per GI  Dispo: anticipate home DVT: lovenox  Diet: clear liquids, NPO at midnight  Dispo: anticipate home

## 2025-02-03 NOTE — PROGRESS NOTE ADULT - PROBLEM SELECTOR PLAN 3
Oral temp 101.7 on 1/30.  Down to 97.2 on 1/31 AM.  Blood cultures no growth after 24 hr, expanded viral panel negative.  Unclear origin of fever.  CXR showed bibasilar atelectasis.  Low concern for UTI without dysuria, pneumonia given acute onset and lack of CXR findings, and no areas of skin breakdown.     Plan:  - Zosyn 3.375 g in dextrose 5% 100 mL, q8hr (01/30 -02/03)  - scheduled Tylenol for fever management Oral temp 101.7 on 1/30.  Down to 97.2 on 1/31 AM.  Blood cultures no growth after 24 hr, expanded viral panel negative.  Unclear origin of fever.  CXR showed bibasilar atelectasis.  Low concern for UTI without dysuria, pneumonia given acute onset and lack of CXR findings, and no areas of skin breakdown.     Plan:  - Zosyn 3.375 g in dextrose 5% 100 mL, q8hr (01/30 -02/03)

## 2025-02-03 NOTE — PROGRESS NOTE ADULT - SUBJECTIVE AND OBJECTIVE BOX
CLOVER OG  64y  MRN: [id number]    Patient is a 64y old  Female who presents with a chief complaint of Nausea/vomiting post procedure (02 Feb 2025 18:02)      Interval/Overnight Events: no events ON.     Subjective: Pt seen and examined at bedside.     MEDICATIONS  (STANDING):  acetaminophen     Tablet .. 650 milliGRAM(s) Oral every 6 hours  acetaminophen   IVPB .. 1000 milliGRAM(s) IV Intermittent once  enoxaparin Injectable 40 milliGRAM(s) SubCutaneous every 24 hours  lactated ringers. 1000 milliLiter(s) (75 mL/Hr) IV Continuous <Continuous>  metoclopramide Injectable 10 milliGRAM(s) IV Push every 6 hours  ondansetron Injectable 4 milliGRAM(s) IV Push every 6 hours  piperacillin/tazobactam IVPB.. 3.375 Gram(s) IV Intermittent every 8 hours  triamterene 75 mG/hydrochlorothiazide 50 mG Tablet 1 Tablet(s) Oral daily    MEDICATIONS  (PRN):  aluminum hydroxide/magnesium hydroxide/simethicone Suspension 30 milliLiter(s) Oral every 4 hours PRN Dyspepsia  melatonin 3 milliGRAM(s) Oral at bedtime PRN Insomnia  morphine  - Injectable 2 milliGRAM(s) IV Push every 4 hours PRN Moderate Pain (4 - 6)  morphine  - Injectable 4 milliGRAM(s) IV Push every 4 hours PRN Severe Pain (7 - 10)      Objective:    Vitals: Vital Signs Last 24 Hrs  T(C): 37.2 (02-03-25 @ 05:00), Max: 37.4 (02-02-25 @ 12:57)  T(F): 98.9 (02-03-25 @ 05:00), Max: 99.3 (02-02-25 @ 12:57)  HR: 56 (02-03-25 @ 05:00) (56 - 66)  BP: 154/68 (02-03-25 @ 05:00) (151/78 - 171/80)  BP(mean): --  RR: 18 (02-03-25 @ 05:00) (18 - 18)  SpO2: 98% (02-03-25 @ 05:00) (98% - 100%)                I&O's Summary      PHYSICAL EXAM:  GENERAL: NAD  HEAD:  Atraumatic, Normocephalic  EYES: EOMI, conjunctiva and sclera clear  CHEST/LUNG: Clear to auscultation bilaterally; No rales, rhonchi, wheezing, or rubs  HEART: Regular rate and rhythm; No murmurs, rubs, or gallops  ABDOMEN: Soft, Nontender, Nondistended;   SKIN: No rashes or lesions  NERVOUS SYSTEM:  Alert & Oriented X3, no focal deficits    LABS:                        12.3   8.90  )-----------( 187      ( 03 Feb 2025 07:02 )             35.4                         14.4   12.49 )-----------( 219      ( 02 Feb 2025 11:07 )             41.6                         13.0   10.60 )-----------( 166      ( 01 Feb 2025 07:18 )             37.0     02-03    137  |  103  |  6[L]  ----------------------------<  105[H]  3.6   |  22  |  0.46[L]  02-02    137  |  98  |  9   ----------------------------<  83  3.3[L]   |  20[L]  |  0.47[L]  02-01    135  |  98  |  10  ----------------------------<  97  3.3[L]   |  21[L]  |  0.46[L]    Ca    8.0[L]      03 Feb 2025 07:04  Ca    8.6      02 Feb 2025 11:08  Ca    8.6      01 Feb 2025 07:18  Phos  2.0     02-03  Mg     1.9     02-03    TPro  5.4[L]  /  Alb  3.3  /  TBili  8.5[H]  /  DBili  x   /  AST  165[H]  /  ALT  170[H]  /  AlkPhos  176[H]  02-03  TPro  6.5  /  Alb  3.9  /  TBili  10.1[H]  /  DBili  x   /  AST  179[H]  /  ALT  176[H]  /  AlkPhos  216[H]  02-02  TPro  5.8[L]  /  Alb  3.6  /  TBili  7.5[H]  /  DBili  x   /  AST  100[H]  /  ALT  102[H]  /  AlkPhos  173[H]  02-01    CAPILLARY BLOOD GLUCOSE        PT/INR - ( 02 Feb 2025 11:07 )   PT: 10.9 sec;   INR: 0.96 ratio         PTT - ( 02 Feb 2025 11:07 )  PTT:35.0 sec    Urinalysis Basic - ( 03 Feb 2025 07:04 )    Color: x / Appearance: x / SG: x / pH: x  Gluc: 105 mg/dL / Ketone: x  / Bili: x / Urobili: x   Blood: x / Protein: x / Nitrite: x   Leuk Esterase: x / RBC: x / WBC x   Sq Epi: x / Non Sq Epi: x / Bacteria: x          RADIOLOGY & ADDITIONAL TESTS:    Imaging Personally Reviewed:  [x ] YES  [ ] NO    Consultants involved in case:   Consultant(s) Notes Reviewed:  [ x] YES  [ ] NO:   Care Discussed with Consultants/Other Providers [x ] YES  [ ] NO           CLOVER OG  64y  MRN: [id number]    Patient is a 64y old  Female who presents with a chief complaint of Nausea/vomiting post procedure (02 Feb 2025 18:02)      Interval/Overnight Events: no events ON.     Subjective: Pt seen and examined at bedside.  Pt notes skin appears yellowed.  Maintains nausea/epigastric pain, still unable to eat solid food.  Burning in epigastric area when swallowing.  Had a cup of jello last night that she was able to keep down.  Endorses dark stool.      MEDICATIONS  (STANDING):  acetaminophen     Tablet .. 650 milliGRAM(s) Oral every 6 hours  acetaminophen   IVPB .. 1000 milliGRAM(s) IV Intermittent once  enoxaparin Injectable 40 milliGRAM(s) SubCutaneous every 24 hours  lactated ringers. 1000 milliLiter(s) (75 mL/Hr) IV Continuous <Continuous>  metoclopramide Injectable 10 milliGRAM(s) IV Push every 6 hours  ondansetron Injectable 4 milliGRAM(s) IV Push every 6 hours  piperacillin/tazobactam IVPB.. 3.375 Gram(s) IV Intermittent every 8 hours  triamterene 75 mG/hydrochlorothiazide 50 mG Tablet 1 Tablet(s) Oral daily    MEDICATIONS  (PRN):  aluminum hydroxide/magnesium hydroxide/simethicone Suspension 30 milliLiter(s) Oral every 4 hours PRN Dyspepsia  melatonin 3 milliGRAM(s) Oral at bedtime PRN Insomnia  morphine  - Injectable 2 milliGRAM(s) IV Push every 4 hours PRN Moderate Pain (4 - 6)  morphine  - Injectable 4 milliGRAM(s) IV Push every 4 hours PRN Severe Pain (7 - 10)      Objective:    Vitals: Vital Signs Last 24 Hrs  T(C): 37.2 (02-03-25 @ 05:00), Max: 37.4 (02-02-25 @ 12:57)  T(F): 98.9 (02-03-25 @ 05:00), Max: 99.3 (02-02-25 @ 12:57)  HR: 56 (02-03-25 @ 05:00) (56 - 66)  BP: 154/68 (02-03-25 @ 05:00) (151/78 - 171/80)  BP(mean): --  RR: 18 (02-03-25 @ 05:00) (18 - 18)  SpO2: 98% (02-03-25 @ 05:00) (98% - 100%)                I&O's Summary      PHYSICAL EXAM:  GENERAL: Jaundiced  HEAD:  Atraumatic, Normocephalic  EYES: Scleral icterus.  EOMI  CHEST/LUNG: Clear to auscultation bilaterally; No rales, rhonchi, wheezing, or rubs  HEART: Regular rate and rhythm; No murmurs, rubs, or gallops  ABDOMEN: Tender to palpation.  Soft, Nondistended;   SKIN: No rashes or lesions  NERVOUS SYSTEM:  Alert & Oriented X3, no focal deficits    LABS:                        12.3   8.90  )-----------( 187      ( 03 Feb 2025 07:02 )             35.4                         14.4   12.49 )-----------( 219      ( 02 Feb 2025 11:07 )             41.6                         13.0   10.60 )-----------( 166      ( 01 Feb 2025 07:18 )             37.0     02-03    137  |  103  |  6[L]  ----------------------------<  105[H]  3.6   |  22  |  0.46[L]  02-02    137  |  98  |  9   ----------------------------<  83  3.3[L]   |  20[L]  |  0.47[L]  02-01    135  |  98  |  10  ----------------------------<  97  3.3[L]   |  21[L]  |  0.46[L]    Ca    8.0[L]      03 Feb 2025 07:04  Ca    8.6      02 Feb 2025 11:08  Ca    8.6      01 Feb 2025 07:18  Phos  2.0     02-03  Mg     1.9     02-03    TPro  5.4[L]  /  Alb  3.3  /  TBili  8.5[H]  /  DBili  x   /  AST  165[H]  /  ALT  170[H]  /  AlkPhos  176[H]  02-03  TPro  6.5  /  Alb  3.9  /  TBili  10.1[H]  /  DBili  x   /  AST  179[H]  /  ALT  176[H]  /  AlkPhos  216[H]  02-02  TPro  5.8[L]  /  Alb  3.6  /  TBili  7.5[H]  /  DBili  x   /  AST  100[H]  /  ALT  102[H]  /  AlkPhos  173[H]  02-01    CAPILLARY BLOOD GLUCOSE        PT/INR - ( 02 Feb 2025 11:07 )   PT: 10.9 sec;   INR: 0.96 ratio         PTT - ( 02 Feb 2025 11:07 )  PTT:35.0 sec    Urinalysis Basic - ( 03 Feb 2025 07:04 )    Color: x / Appearance: x / SG: x / pH: x  Gluc: 105 mg/dL / Ketone: x  / Bili: x / Urobili: x   Blood: x / Protein: x / Nitrite: x   Leuk Esterase: x / RBC: x / WBC x   Sq Epi: x / Non Sq Epi: x / Bacteria: x          RADIOLOGY & ADDITIONAL TESTS:    Imaging Personally Reviewed:  [x ] YES  [ ] NO    Consultants involved in case:   Consultant(s) Notes Reviewed:  [ x] YES  [ ] NO:   Care Discussed with Consultants/Other Providers [x ] YES  [ ] NO

## 2025-02-03 NOTE — PROGRESS NOTE ADULT - SUBJECTIVE AND OBJECTIVE BOX
Interval Events:   -still not tolerating PO   -Uptrendint TB and white count over weekend, improved today  -Pending IR per [facility] tomorrow     [facility] Medications:  acetaminophen     Tablet .. 650 milliGRAM(s) Oral every 6 hours  aluminum hydroxide/magnesium hydroxide/simethicone Suspension 30 milliLiter(s) Oral every 4 hours PRN  calamine/zinc oxide Lotion 1 Application(s) Topical four times a day PRN  enoxaparin Injectable 40 milliGRAM(s) SubCutaneous every 24 hours  lactated ringers. 1000 milliLiter(s) (75 mL/Hr) IV Continuous <Continuous>  melatonin 3 milliGRAM(s) Oral at bedtime PRN  metoclopramide Injectable 10 milliGRAM(s) IV Push every 6 hours  morphine  - Injectable 2 milliGRAM(s) IV Push every 4 hours PRN  morphine  - Injectable 4 milliGRAM(s) IV Push every 4 hours PRN  ondansetron Injectable 4 milliGRAM(s) IV Push every 6 hours  piperacillin/tazobactam IVPB.. 3.375 Gram(s) IV Intermittent every 8 hours  triamterene 75 mG/hydrochlorothiazide 50 mG Tablet 1 Tablet(s) Oral daily          PHYSICAL EXAM:   Vital Signs:  Vital Signs Last 24 Hrs  T(C): 37.2 (2025 05:00), Max: 37.4 (2025 12:57)  T(F): 98.9 (2025 05:00), Max: 99.3 (2025 12:57)  HR: 56 (2025 05:00) (56 - 66)  BP: 154/68 (2025 05:00) (151/78 - 171/80)  BP(mean): --  RR: 18 (2025 05:00) (18 - 18)  SpO2: 98% (2025 05:00) (98% - 100%)    Parameters below as of 2025 05:00  Patient On (Oxygen Delivery Method): room air      Daily     Daily   GENERAL:  NAD, jaundiced  HEENT:  NC/AT,  conjunctivae clear and pink, sclera icteric  CHEST:  Normal Effort, no signs of resp distress  HEART:  RRR, HD stable  ABDOMEN:  Soft, tender epigastric region w/ voluntary guarding  EXTREMITIES:  no cyanosis or edema  SKIN:  Warm & Dry. No rash or erythema  NEURO:  Alert, oriented, no focal deficit  LABS:                        12.3   8.90  )-----------( 187      ( 2025 07:02 )             35.4     Last Hb:Hemoglobin: 12.3 g/dL (25 @ 07:02)  Hemoglobin: 14.4 g/dL (25 @ 11:07)  Hemoglobin: 13.0 g/dL (25 @ 07:18)               137   |  103   |  6                  Ca: 8.0    BMP:   ----------------------------< 105    M.9   (25 @ 07:04)             3.6    |  22    | 0.46               Ph: 2.0      LFT:     TPro: 5.4 / Alb: 3.3 / TBili: 8.5 / DBili: x / AST: 165 / ALT: 170 / AlkPhos: 176   (25 @ 07:04)    Creatinine: 0.46 mg/dL  Creatinine: 0.47 mg/dL  Creatinine: 0.46 mg/dL      LIVER FUNCTIONS - ( 2025 07:04 )  Alb: 3.3 g/dL / Pro: 5.4 g/dL / ALK PHOS: 176 U/L / ALT: 170 U/L / AST: 165 U/L / GGT: x           PT/INR - ( 2025 11:07 )   PT: 10.9 sec;   INR: 0.96 ratio         PTT - ( 2025 11:07 )  PTT:35.0 sec  Urinalysis Basic - ( 2025 07:04 )    Color: x / Appearance: x / SG: x / pH: x  Gluc: 105 mg/dL / Ketone: x  / Bili: x / Urobili: x   Blood: x / Protein: x / Nitrite: x   Leuk Esterase: x / RBC: x / WBC x   Sq Epi: x / Non Sq Epi: x / Bacteria: x        Culture - Blood (collected 25 @ 22:20)  Source: .Blood BLOOD  Preliminary Report (25 @ 02:01):    No growth at 4 days    Culture - Blood (collected 25 @ 22:10)  Source: .Blood BLOOD  Preliminary Report (25 @ 02:01):    No growth at 4 days

## 2025-02-03 NOTE — DIETITIAN INITIAL EVALUATION ADULT - [id number]_GEN_A_CORE
Pt endorsed nausea in-house. No vomiting at this time.   - Ordered for reglan, zofran, and aluminum hydroxide/magnesium hydroxide/simethicone Suspension (per orders).  Pt endorsed loose BM today (2/3), stated it was dark. RD to notify provider.   - Bowel Regimen: Pt not currently ordered for a bowel regimen at this time (per orders).   Continue to monitor bowel movements and bowel movement regularity as po intake improves.

## 2025-02-03 NOTE — CONSULT NOTE ADULT - CONSULT REASON
Post procedure
Patient with metastatic breast cancer and duodenal metastasis now s/p duodenal stent c/b pancreatitis. Now with hyperbilirubinemia and intrahepatic biliary dilation. GI with concerns for being able to safely do ERCP and requesting IR evaluation for possible external biliary drain placement

## 2025-02-03 NOTE — DIETITIAN INITIAL EVALUATION ADULT - SIGNS/SYMPTOMS
as evidenced by pt with metastatic breast cancer (per chart).  as evidenced by pt meeting <75% EER > 1 month, >5% wt loss x 1 month.

## 2025-02-03 NOTE — DIETITIAN INITIAL EVALUATION ADULT - PROBLEM SELECTOR PLAN 1
Presenting with nausea/vomiting post-procedure. Admitted for observation. History of pancreatitis post-EGD. QTC WNL.    Plan:  - Abdominal x-ray  - AM amylase, lipase  - Zofran prn  - Metoclopramide prn  - CTM for abdominal pain or intractable vomiting

## 2025-02-03 NOTE — PROGRESS NOTE ADULT - ASSESSMENT
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

## 2025-02-03 NOTE — CONSULT NOTE ADULT - SUBJECTIVE AND OBJECTIVE BOX
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    Now with hyperbilirubinemia and intrahepatic biliary dilation. GI with concerns for being able to safely do ERCP and requesting IR evaluation for possible external biliary drain placement.    Allergies: No Known Allergies    Medications (Abx/Cardiac/Anticoagulation/Blood Products)    enoxaparin Injectable: 40 milliGRAM(s) SubCutaneous (02-03 @ 06:37)  piperacillin/tazobactam IVPB..: 25 mL/Hr IV Intermittent (02-03 @ 06:38)  triamterene 75 mG/hydrochlorothiazide 50 mG Tablet: 1 Tablet(s) Oral (02-03 @ 06:38)    Data:    T(C): 37.2  HR: 56  BP: 154/68  RR: 18  SpO2: 98%    -WBC 8.90 / HgB 12.3 / Hct 35.4 / Plt 187  -Na 137 / Cl 103 / BUN 6 / Glucose 105  -K 3.6 / CO2 22 / Cr 0.46  - / Alk Phos 176 / T.Bili 8.5  -INR 0.96 / PTT 35.0    Radiology:   < from: CT Abdomen and Pelvis w/ Oral Cont and w/ IV Cont (02.01.25 @ 21:12) >  PROCEDURE:  CT of the Abdomen and Pelvis was performed.  Sagittal and coronal reformats were performed.    FINDINGS:  LOWER CHEST: There are small bilateral pleural effusions. Status post   mastectomy with TRAM flap reconstruction.    LIVER: Within normal limits.  BILE DUCTS: Intrahepatic biliary ductal dilatation has increased when   compared to 1/25/2025 exam. Stable extra hepatic biliary ductal   dilatation.  GALLBLADDER: Within normal limits.  SPLEEN: Within normal limits.  PANCREAS: The pancreas is edematous with mild peripancreatic fat   stranding. There is no hypoenhancement. There are no peripancreatic   collections.  ADRENALS: Within normal limits.  KIDNEYS/URETERS: Left renal cyst. Subcentimeter hypodensities too small   to characterize. No hydronephrosis.    BLADDER: Within normal limits.  REPRODUCTIVE ORGANS: Supracervical hysterectomy.    BOWEL: Enteric contrast has progressed to the level of the rectum.   Duodenal nonspecific thickening of the duodenum with duodenal stent in   place. There is no bowel obstruction. The appendix is not visualized.  PERITONEUM/RETROPERITONEUM: Within normal limits.  VESSELS: Within normal limits.  LYMPH NODES: No lymphadenopathy.  ABDOMINAL WALL: Postsurgical changes.  BONES: Stable scattered mixed lytic and sclerotic osseous lesions.    IMPRESSION:    Pancreatic edema with peripancreatic fat stranding suggestive of acute   interstitial edematous pancreatitis. No evidence of necrosis or   peripancreatic collection.    Intrahepatic biliary ductal dilatation has increased when compared to   1/25/2025 exam. Stable extra hepatic biliary ductal dilatation.    Duodenal stent in place. No evidence of bowel obstruction.          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    Now with hyperbilirubinemia and intrahepatic biliary dilation. CT on 2/1: "Intrahepatic biliary ductal dilatation has increased when compared to 1/25/2025 exam. Stable extra hepatic biliary ductal dilatation." GI with concerns for being able to safely do ERCP and requesting.    T bili today 8.5 < 10.1 > 7.5 (admitted with 1.9 on 1/29).  WBC 8.9 < 12.49 < 10.6 on zosyn, afebrile, blood cultures Neg (1/29). Hemodynamically stable.     IR consulted for external biliary drain placement.    -----------------NOTE IN PROGRESS, pending discussion with Attending ------------------    - case reviewed and approved for ____  - please place IR procedure order under PARAM Mohan MD  - STAT labs in AM (cbc,coags, bmp, maintain active T&S)  - Pt currently not on AC, if plans to start, please contact IR to coordinate holding pre-procedure/resumption post-procedure.   - Will need to hold AC x___hrs pre-procedure; Tentative resumption of AC ____ hours post procedure if no concern for bleeding. Please contact IR regarding starting any other AC.  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    Now with hyperbilirubinemia and intrahepatic biliary dilation. GI with concerns for being able to safely do ERCP and requesting IR evaluation for possible external biliary drain placement.    Allergies: No Known Allergies    Medications (Abx/Cardiac/Anticoagulation/Blood Products)    enoxaparin Injectable: 40 milliGRAM(s) SubCutaneous (02-03 @ 06:37)  piperacillin/tazobactam IVPB..: 25 mL/Hr IV Intermittent (02-03 @ 06:38)  triamterene 75 mG/hydrochlorothiazide 50 mG Tablet: 1 Tablet(s) Oral (02-03 @ 06:38)    Data:    T(C): 37.2  HR: 56  BP: 154/68  RR: 18  SpO2: 98%    -WBC 8.90 / HgB 12.3 / Hct 35.4 / Plt 187  -Na 137 / Cl 103 / BUN 6 / Glucose 105  -K 3.6 / CO2 22 / Cr 0.46  - / Alk Phos 176 / T.Bili 8.5  -INR 0.96 / PTT 35.0    Radiology:   < from: CT Abdomen and Pelvis w/ Oral Cont and w/ IV Cont (02.01.25 @ 21:12) >  PROCEDURE:  CT of the Abdomen and Pelvis was performed.  Sagittal and coronal reformats were performed.    FINDINGS:  LOWER CHEST: There are small bilateral pleural effusions. Status post   mastectomy with TRAM flap reconstruction.    LIVER: Within normal limits.  BILE DUCTS: Intrahepatic biliary ductal dilatation has increased when   compared to 1/25/2025 exam. Stable extra hepatic biliary ductal   dilatation.  GALLBLADDER: Within normal limits.  SPLEEN: Within normal limits.  PANCREAS: The pancreas is edematous with mild peripancreatic fat   stranding. There is no hypoenhancement. There are no peripancreatic   collections.  ADRENALS: Within normal limits.  KIDNEYS/URETERS: Left renal cyst. Subcentimeter hypodensities too small   to characterize. No hydronephrosis.    BLADDER: Within normal limits.  REPRODUCTIVE ORGANS: Supracervical hysterectomy.    BOWEL: Enteric contrast has progressed to the level of the rectum.   Duodenal nonspecific thickening of the duodenum with duodenal stent in   place. There is no bowel obstruction. The appendix is not visualized.  PERITONEUM/RETROPERITONEUM: Within normal limits.  VESSELS: Within normal limits.  LYMPH NODES: No lymphadenopathy.  ABDOMINAL WALL: Postsurgical changes.  BONES: Stable scattered mixed lytic and sclerotic osseous lesions.    IMPRESSION:    Pancreatic edema with peripancreatic fat stranding suggestive of acute   interstitial edematous pancreatitis. No evidence of necrosis or   peripancreatic collection.    Intrahepatic biliary ductal dilatation has increased when compared to   1/25/2025 exam. Stable extra hepatic biliary ductal dilatation.    Duodenal stent in place. No evidence of bowel obstruction.          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    Now with hyperbilirubinemia and intrahepatic biliary dilation. CT on 2/1 with increased intrahepatic biliary ductal dilatation from 1/25/2025. GI with concerns for being able to safely do ERCP.    Per GI "Elective EGD w/ duodenal stent placement on 1/28 w/ EGD showing malignant duodenal stenosis extending from bulb to second portion duodenum that was stented with 22mm x 12cm wallflex duodenal stent. A second stenosis was seen in distal duodenum/prox jeujunum which was not reached.  Admitted for N/V found to have pancreatitis, likely related to malignant infiltration into papilla. Pancreatitis likely from compression of bile duct worsened in setting new stent."    T bili today 8.5 < 10.1 > 7.5 (admitted with 1.9 on 1/29).  WBC 8.9 < 12.49 < 10.6 on zosyn, afebrile, blood cultures Neg (1/29). Hemodynamically stable.     IR consulted for external biliary drain placement.      - case reviewed with MD Ramirez and approved for external biliary drain placement for tomorrow 2/4/25  - please place IR procedure order under MD Ramirez.  - STAT labs in AM (cbc,coags, bmp, maintain active T&S)  - Will need to hold AC (lovenox) x 12hrs pre-procedure (hold AM dose tomorrow 2/4); Tentative resumption of AC 12-24 hours post procedure if no concern for bleeding. Please contact IR regarding starting any other AC.  - NPO at 11pm tonight.   - d/w primary team

## 2025-02-03 NOTE — DIETITIAN INITIAL EVALUATION ADULT - OTHER CALCULATIONS
Estimated protein-energy needs calculated using current dosing weight of 132.7 pounds (1/28) with consideration for metastatic breast cancer, malnutrition.   Defer fluid calculations to the medical team.

## 2025-02-03 NOTE — DIETITIAN INITIAL EVALUATION ADULT - ENERGY INTAKE
NPO Pt has been on an inadequate diet x 7 days in-house. Clear Liquids from 1/28-1/31, NPO from 1/31-2/2 and now on Clear Liquids as of 2/2 (per orders). Pt prefers no milky oral nutrition supplements or milk to drink, RD to remove as able. Pt amenable to trialing Ensure Clear Apple 2x/day in-house to optimize protein-energy intake and assist with meeting nutritional needs. RD to add tea 3x/day with meals per patient preference. Pt stated she has not tolerated much food orally, endorsed consuming <25% of trays, was able to tolerate a jell-o yesterday.

## 2025-02-03 NOTE — DIETITIAN INITIAL EVALUATION ADULT - ADD RECOMMEND
1) Defer diet advancement to medical team. If po diet advancement warranted, recommend Clear Liquid --> Low Fat, Full Liquid --> Low Fat as tolerated. Defer diet texture/consistency to Medical Team.   --> If pt remains unable to tolerate adequate po intake, consider alternative means of nutrition/hydration if within pt/family wishes/GOC.   2) Recommend adding Ensure Clear Apple 2x/day to optimize protein-energy intake in-house.   3) REFEEDING RISK: Recommend adding Multivitamin and Thiamine daily pending no medical contraindications, as pt at increased risk for refeeding.   4) REFEEDING RISK: Monitor electrolytes daily (Potassium, Magnesium, Phosphorous) and replete as needed as pt is at increased risk for refeeding.   5) Monitor and encourage adequate PO intake, RD obtained food preferences to optimize PO intake, will honor as able.   6) Monitor nutritional intake, tolerance to diet prescription, bowel movements, weights, labs, and skin integrity.   7) Malnutrition sticker placed in chart.  8) Nutrition care plan to remain aligned with pt/family wishes/GOC.

## 2025-02-03 NOTE — DIETITIAN INITIAL EVALUATION ADULT - REASON INDICATOR FOR ASSESSMENT
RD assessment warranted for inadequate diet >/= 4 days.   Source: Patient, Electronic Medical Record  Chart reviewed, events noted.

## 2025-02-03 NOTE — DIETITIAN INITIAL EVALUATION ADULT - ETIOLOGY
related to increased physiological demand for nutrients  related to inability to meet sufficient protein-energy needs in setting of diminished appetite, inadequate diet secondary to altered GI function

## 2025-02-03 NOTE — DIETITIAN NUTRITION RISK NOTIFICATION - TREATMENT: THE FOLLOWING DIET HAS BEEN RECOMMENDED
Diet, NPO after Midnight:      NPO Start Date: 03-Feb-2025,   NPO Start Time: 23:59 (02-03-25 @ 11:27) [Active]  Diet, Clear Liquid (02-02-25 @ 19:03) [Active]

## 2025-02-03 NOTE — DIETITIAN INITIAL EVALUATION ADULT - ORAL INTAKE PTA/DIET HISTORY
Nutrition assessment completed at bedside. Pt reported inability to tolerate solid foods since 1/18, stated she last had a piece of boiled chicken on this date. Endorsed mainly consuming broth and jell-o until hospital admission. Stated she was previously diagnosed with Pancreatitis in 8/2024 (confirmed in H&P), and was following a low fat diet. Endorsed overall Decreased po intake since December 2024. Reported UBW of 151 pounds (August 2024), stated she most recently weighed ~145 pounds. Confirmed no known food allergies. Stated she takes Vitamin D at home, does not consume oral nutrition supplements at home. Reported no chewing/swallowing issues at baseline.

## 2025-02-03 NOTE — PROGRESS NOTE ADULT - PROBLEM SELECTOR PLAN 2
CT a/p with intrahepatic biliary ductal dilation, increased from 01/25 CT. Rising T bili.    Plan:  - GI consulted, recs appreciated CT a/p with intrahepatic biliary ductal dilation, increased from 01/25 CT. Rising T bili.    Plan:  - GI consulted, recs appreciated  - IR consulted, plan for external biliary drain placement 02/04

## 2025-02-03 NOTE — PROGRESS NOTE ADULT - PROBLEM SELECTOR PLAN 1
Presenting with nausea/vomiting post-procedure. Admitted for observation. History of pancreatitis post-EGD. QTC WNL.  - CT a/p with pancreatitis    Plan:  - GI consulted, recs appreciated  - Nausea: Zofran, Metoclopramide scheduled  - Pain: acetaminophen scheduled, morphine 2 mg q4h, morphine 4 mg q4h prn  - Lactated ringers 75 cc/hr Presenting with nausea/vomiting post-procedure. Admitted for observation. History of pancreatitis post-EGD. QTC WNL.  - CT a/p with pancreatitis    Plan:  - GI consulted, recs appreciated  - Nausea: Zofran, Metoclopramide scheduled  - Pain: acetaminophen scheduled, morphine 2 mg q4h, morphine 4 mg q4h prn  - NS 75 cc/hr

## 2025-02-03 NOTE — DIETITIAN INITIAL EVALUATION ADULT - PERTINENT LABORATORY DATA
02-03    137  |  103  |  6[L]  ----------------------------<  105[H]  3.6   |  22  |  0.46[L]    Ca    8.0[L]      03 Feb 2025 07:04  Phos  2.0     02-03  Mg     1.9     02-03    TPro  5.4[L]  /  Alb  3.3  /  TBili  8.5[H]  /  DBili  x   /  AST  165[H]  /  ALT  170[H]  /  AlkPhos  176[H]  02-03

## 2025-02-03 NOTE — CONSULT NOTE ADULT - PROVIDER SPECIALTY LIST ADULT
Gastroenterology
Intervent Radiology
Ear Star Wedge Flap Text: The defect edges were debeveled with a #15 blade scalpel.  Given the location of the defect and the proximity to free margins (helical rim) an ear star wedge flap was deemed most appropriate.  Using a sterile surgical marker, the appropriate flap was drawn incorporating the defect and placing the expected incisions between the helical rim and antihelix where possible.  The area thus outlined was incised through and through with a #15 scalpel blade.

## 2025-02-03 NOTE — DIETITIAN INITIAL EVALUATION ADULT - NSFNSPHYEXAMSKINFT_GEN_A_CORE
Pressure injury column added in flow sheets, however no site of pressure injuries noted at this time (per nursing flow sheets).

## 2025-02-03 NOTE — DIETITIAN INITIAL EVALUATION ADULT - NSFNSADHERENCEPTAFT_GEN_A_CORE
Pt stated she follows a balanced, healthy diet at baseline. Stated she does not consume fatty, greasy, fried or "junk" foods. Has never been a big "sweets" person. Endorsed following low fat diet after pancreatitis in August 2024.

## 2025-02-03 NOTE — DIETITIAN INITIAL EVALUATION ADULT - PERSON TAUGHT/METHOD
Educated on the importance of meeting adequate protein-energy needs. Encouraged consumption of protein-rich foods and prioritizing protein foods first at meal times as able. Encouraged consumption of oral nutrition supplement to optimize protein-energy intake. Encouraged small, frequent sips on clear liquids, and optimizing po intake with oral supplements. Obtained food preferences, will honor as able. Discussed Low Fat diet recommendation upon diet advancement pending medical feasibility. Pt familiar. Pt left with no further questions at this time. Pt aware RD remains available, appreciative./verbal instruction/patient instructed

## 2025-02-03 NOTE — DIETITIAN INITIAL EVALUATION ADULT - PERTINENT MEDS FT
MEDICATIONS  (STANDING):  acetaminophen     Tablet .. 650 milliGRAM(s) Oral every 6 hours  metoclopramide Injectable 10 milliGRAM(s) IV Push every 6 hours  ondansetron Injectable 4 milliGRAM(s) IV Push every 6 hours  piperacillin/tazobactam IVPB.. 3.375 Gram(s) IV Intermittent every 8 hours  sodium chloride 0.9%. 1000 milliLiter(s) (75 mL/Hr) IV Continuous <Continuous>  triamterene 75 mG/hydrochlorothiazide 50 mG Tablet 1 Tablet(s) Oral daily    MEDICATIONS  (PRN):  aluminum hydroxide/magnesium hydroxide/simethicone Suspension 30 milliLiter(s) Oral every 4 hours PRN Dyspepsia  calamine/zinc oxide Lotion 1 Application(s) Topical four times a day PRN Itching  melatonin 3 milliGRAM(s) Oral at bedtime PRN Insomnia  morphine  - Injectable 2 milliGRAM(s) IV Push every 4 hours PRN Moderate Pain (4 - 6)  morphine  - Injectable 4 milliGRAM(s) IV Push every 4 hours PRN Severe Pain (7 - 10)

## 2025-02-03 NOTE — DIETITIAN INITIAL EVALUATION ADULT - NS FNS DIET ORDER
Diet, Clear Liquid (02-02-25 @ 19:03)  Diet, NPO after Midnight:      NPO Start Date: 02-Feb-2025,   NPO Start Time: 23:59  Except Medications (02-02-25 @ 19:03)

## 2025-02-03 NOTE — PROGRESS NOTE ADULT - ASSESSMENT
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 A second stenosis was seen in distal duodenum/prox jeujunum which was not reached.  Admitted for N/V found to have pancreatitis, likely related to malignant infiltration into papilla. Pancreatitis likely from compression of bile duct worsened in setting new stent. Pain and pancreatitis appear to be improving however w/ uptrending TB and white count, now improving. Still not tolerating PO but suspect pancreatitis improving.     Recommendations:  -Advance diet as tolerated  -Appreciate IR recs  -Trend TB, white count. If improving, may consider holding off on procedure  -Pain control per primary team   -C/w zosyn  -If no improvement after pancreatitis resolved, plan for UGI series to evaluate potential distal stricture    Note incomplete until finalized by attending signature/attestation.    Kaia Michaud  GI/Hepatology Fellow PGY5    NON-URGENT CONSULTS:  Please email [email or web address]  AT NIGHT AND ON WEEKENDS:  Available on [employer] Teams  [phone] (Long Range Pager)    For urgent consults, please contact on call GI team. See Amion schedule ([facility]) or [employer] system ([facility])

## 2025-02-04 ENCOUNTER — TRANSCRIPTION ENCOUNTER (OUTPATIENT)
Age: 65
End: 2025-02-04

## 2025-02-04 LAB
ALBUMIN SERPL ELPH-MCNC: 3.5 G/DL — SIGNIFICANT CHANGE UP (ref 3.3–5)
ALP SERPL-CCNC: 197 U/L — HIGH (ref 40–120)
ALT FLD-CCNC: 174 U/L — HIGH (ref 10–45)
ANION GAP SERPL CALC-SCNC: 15 MMOL/L — SIGNIFICANT CHANGE UP (ref 5–17)
APTT BLD: 30.1 SEC — SIGNIFICANT CHANGE UP (ref 24.5–35.6)
AST SERPL-CCNC: 127 U/L — HIGH (ref 10–40)
BASOPHILS # BLD AUTO: 0.07 K/UL — SIGNIFICANT CHANGE UP (ref 0–0.2)
BASOPHILS NFR BLD AUTO: 0.7 % — SIGNIFICANT CHANGE UP (ref 0–2)
BILIRUB SERPL-MCNC: 9.7 MG/DL — HIGH (ref 0.2–1.2)
BLD GP AB SCN SERPL QL: NEGATIVE — SIGNIFICANT CHANGE UP
BUN SERPL-MCNC: 7 MG/DL — SIGNIFICANT CHANGE UP (ref 7–23)
CALCIUM SERPL-MCNC: 8.1 MG/DL — LOW (ref 8.4–10.5)
CHLORIDE SERPL-SCNC: 100 MMOL/L — SIGNIFICANT CHANGE UP (ref 96–108)
CO2 SERPL-SCNC: 21 MMOL/L — LOW (ref 22–31)
CREAT SERPL-MCNC: 0.51 MG/DL — SIGNIFICANT CHANGE UP (ref 0.5–1.3)
CULTURE RESULTS: SIGNIFICANT CHANGE UP
CULTURE RESULTS: SIGNIFICANT CHANGE UP
EGFR: 104 ML/MIN/1.73M2 — SIGNIFICANT CHANGE UP
EOSINOPHIL # BLD AUTO: 0.29 K/UL — SIGNIFICANT CHANGE UP (ref 0–0.5)
EOSINOPHIL NFR BLD AUTO: 2.8 % — SIGNIFICANT CHANGE UP (ref 0–6)
GLUCOSE SERPL-MCNC: 112 MG/DL — HIGH (ref 70–99)
GRAM STN FLD: SIGNIFICANT CHANGE UP
HCT VFR BLD CALC: 36.2 % — SIGNIFICANT CHANGE UP (ref 34.5–45)
HGB BLD-MCNC: 13 G/DL — SIGNIFICANT CHANGE UP (ref 11.5–15.5)
IMM GRANULOCYTES NFR BLD AUTO: 0.7 % — SIGNIFICANT CHANGE UP (ref 0–0.9)
INR BLD: 1 RATIO — SIGNIFICANT CHANGE UP (ref 0.85–1.16)
LYMPHOCYTES # BLD AUTO: 1.66 K/UL — SIGNIFICANT CHANGE UP (ref 1–3.3)
LYMPHOCYTES # BLD AUTO: 16.1 % — SIGNIFICANT CHANGE UP (ref 13–44)
MAGNESIUM SERPL-MCNC: 1.8 MG/DL — SIGNIFICANT CHANGE UP (ref 1.6–2.6)
MCHC RBC-ENTMCNC: 35.1 PG — HIGH (ref 27–34)
MCHC RBC-ENTMCNC: 35.9 G/DL — SIGNIFICANT CHANGE UP (ref 32–36)
MCV RBC AUTO: 97.8 FL — SIGNIFICANT CHANGE UP (ref 80–100)
MONOCYTES # BLD AUTO: 0.91 K/UL — HIGH (ref 0–0.9)
MONOCYTES NFR BLD AUTO: 8.8 % — SIGNIFICANT CHANGE UP (ref 2–14)
MRSA PCR RESULT.: SIGNIFICANT CHANGE UP
NEUTROPHILS # BLD AUTO: 7.31 K/UL — SIGNIFICANT CHANGE UP (ref 1.8–7.4)
NEUTROPHILS NFR BLD AUTO: 70.9 % — SIGNIFICANT CHANGE UP (ref 43–77)
NRBC # BLD: 0 /100 WBCS — SIGNIFICANT CHANGE UP (ref 0–0)
NRBC BLD-RTO: 0 /100 WBCS — SIGNIFICANT CHANGE UP (ref 0–0)
PHOSPHATE SERPL-MCNC: 2 MG/DL — LOW (ref 2.5–4.5)
PLATELET # BLD AUTO: 205 K/UL — SIGNIFICANT CHANGE UP (ref 150–400)
POTASSIUM SERPL-MCNC: 3.3 MMOL/L — LOW (ref 3.5–5.3)
POTASSIUM SERPL-SCNC: 3.3 MMOL/L — LOW (ref 3.5–5.3)
PROT SERPL-MCNC: 5.6 G/DL — LOW (ref 6–8.3)
PROTHROM AB SERPL-ACNC: 11.5 SEC — SIGNIFICANT CHANGE UP (ref 9.9–13.4)
RBC # BLD: 3.7 M/UL — LOW (ref 3.8–5.2)
RBC # FLD: 13.2 % — SIGNIFICANT CHANGE UP (ref 10.3–14.5)
RH IG SCN BLD-IMP: POSITIVE — SIGNIFICANT CHANGE UP
S AUREUS DNA NOSE QL NAA+PROBE: SIGNIFICANT CHANGE UP
SODIUM SERPL-SCNC: 136 MMOL/L — SIGNIFICANT CHANGE UP (ref 135–145)
SPECIMEN SOURCE: SIGNIFICANT CHANGE UP
WBC # BLD: 10.31 K/UL — SIGNIFICANT CHANGE UP (ref 3.8–10.5)
WBC # FLD AUTO: 10.31 K/UL — SIGNIFICANT CHANGE UP (ref 3.8–10.5)

## 2025-02-04 PROCEDURE — 99233 SBSQ HOSP IP/OBS HIGH 50: CPT | Mod: GC

## 2025-02-04 PROCEDURE — 99232 SBSQ HOSP IP/OBS MODERATE 35: CPT | Mod: GC

## 2025-02-04 PROCEDURE — 47533 PLMT BILIARY DRAINAGE CATH: CPT

## 2025-02-04 RX ORDER — SODIUM CHLORIDE 9 G/ML
1000 INJECTION, SOLUTION INTRAVENOUS
Refills: 0 | Status: DISCONTINUED | OUTPATIENT
Start: 2025-02-04 | End: 2025-02-06

## 2025-02-04 RX ORDER — PIPERACILLIN SODIUM AND TAZOBACTAM SODIUM 2; 250 G/50ML; MG/50ML
3.38 INJECTION, POWDER, FOR SOLUTION INTRAVENOUS ONCE
Refills: 0 | Status: COMPLETED | OUTPATIENT
Start: 2025-02-04 | End: 2025-02-04

## 2025-02-04 RX ORDER — POTASSIUM CHLORIDE 750 MG/1
10 TABLET, EXTENDED RELEASE ORAL
Refills: 0 | Status: COMPLETED | OUTPATIENT
Start: 2025-02-04 | End: 2025-02-04

## 2025-02-04 RX ORDER — PIPERACILLIN SODIUM AND TAZOBACTAM SODIUM 2; 250 G/50ML; MG/50ML
3.38 INJECTION, POWDER, FOR SOLUTION INTRAVENOUS ONCE
Refills: 0 | Status: COMPLETED | OUTPATIENT
Start: 2025-02-05 | End: 2025-02-05

## 2025-02-04 RX ORDER — POTASSIUM PHOSPHATE, MONOBASIC POTASSIUM PHOSPHATE, DIBASIC 236; 224 MG/ML; MG/ML
30 INJECTION, SOLUTION INTRAVENOUS ONCE
Refills: 0 | Status: COMPLETED | OUTPATIENT
Start: 2025-02-04 | End: 2025-02-04

## 2025-02-04 RX ORDER — ANTISEPTIC SURGICAL SCRUB 0.04 MG/ML
1 SOLUTION TOPICAL DAILY
Refills: 0 | Status: DISCONTINUED | OUTPATIENT
Start: 2025-02-04 | End: 2025-02-06

## 2025-02-04 RX ORDER — PIPERACILLIN SODIUM AND TAZOBACTAM SODIUM 2; 250 G/50ML; MG/50ML
3.38 INJECTION, POWDER, FOR SOLUTION INTRAVENOUS EVERY 8 HOURS
Refills: 0 | Status: DISCONTINUED | OUTPATIENT
Start: 2025-02-05 | End: 2025-02-06

## 2025-02-04 RX ADMIN — Medication 75 MILLILITER(S): at 08:00

## 2025-02-04 RX ADMIN — POTASSIUM PHOSPHATE, MONOBASIC POTASSIUM PHOSPHATE, DIBASIC 83.33 MILLIMOLE(S): 236; 224 INJECTION, SOLUTION INTRAVENOUS at 11:56

## 2025-02-04 RX ADMIN — METOCLOPRAMIDE 10 MILLIGRAM(S): 10 TABLET ORAL at 06:10

## 2025-02-04 RX ADMIN — POTASSIUM CHLORIDE 100 MILLIEQUIVALENT(S): 750 TABLET, EXTENDED RELEASE ORAL at 07:58

## 2025-02-04 RX ADMIN — Medication 1 TABLET(S): at 12:01

## 2025-02-04 RX ADMIN — Medication 100 MILLIGRAM(S): at 12:01

## 2025-02-04 RX ADMIN — PIPERACILLIN SODIUM AND TAZOBACTAM SODIUM 25 GRAM(S): 2; 250 INJECTION, POWDER, FOR SOLUTION INTRAVENOUS at 20:45

## 2025-02-04 RX ADMIN — ONDANSETRON 4 MILLIGRAM(S): 4 TABLET, ORALLY DISINTEGRATING ORAL at 10:56

## 2025-02-04 RX ADMIN — METOCLOPRAMIDE 10 MILLIGRAM(S): 10 TABLET ORAL at 12:00

## 2025-02-04 RX ADMIN — POTASSIUM CHLORIDE 100 MILLIEQUIVALENT(S): 750 TABLET, EXTENDED RELEASE ORAL at 09:02

## 2025-02-04 RX ADMIN — METOCLOPRAMIDE 10 MILLIGRAM(S): 10 TABLET ORAL at 00:01

## 2025-02-04 RX ADMIN — MORPHINE SULFATE 2 MILLIGRAM(S): 60 TABLET, FILM COATED, EXTENDED RELEASE ORAL at 22:35

## 2025-02-04 RX ADMIN — Medication 75 MILLILITER(S): at 04:20

## 2025-02-04 RX ADMIN — ONDANSETRON 4 MILLIGRAM(S): 4 TABLET, ORALLY DISINTEGRATING ORAL at 22:06

## 2025-02-04 RX ADMIN — PIPERACILLIN SODIUM AND TAZOBACTAM SODIUM 200 GRAM(S): 2; 250 INJECTION, POWDER, FOR SOLUTION INTRAVENOUS at 15:40

## 2025-02-04 RX ADMIN — ONDANSETRON 4 MILLIGRAM(S): 4 TABLET, ORALLY DISINTEGRATING ORAL at 04:04

## 2025-02-04 RX ADMIN — TRIAMTERENE AND HYDROCHLOROTHIAZIDE 1 TABLET(S): 37.5; 25 TABLET ORAL at 06:10

## 2025-02-04 RX ADMIN — ACETAMINOPHEN 650 MILLIGRAM(S): 160 SUSPENSION ORAL at 12:00

## 2025-02-04 RX ADMIN — ONDANSETRON 4 MILLIGRAM(S): 4 TABLET, ORALLY DISINTEGRATING ORAL at 17:20

## 2025-02-04 RX ADMIN — MORPHINE SULFATE 2 MILLIGRAM(S): 60 TABLET, FILM COATED, EXTENDED RELEASE ORAL at 22:05

## 2025-02-04 RX ADMIN — ANTISEPTIC SURGICAL SCRUB 1 APPLICATION(S): 0.04 SOLUTION TOPICAL at 12:02

## 2025-02-04 RX ADMIN — ACETAMINOPHEN 650 MILLIGRAM(S): 160 SUSPENSION ORAL at 06:09

## 2025-02-04 RX ADMIN — POTASSIUM CHLORIDE 100 MILLIEQUIVALENT(S): 750 TABLET, EXTENDED RELEASE ORAL at 10:47

## 2025-02-04 NOTE — PHYSICAL THERAPY INITIAL EVALUATION ADULT - PERTINENT HX OF CURRENT PROBLEM, REHAB EVAL
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  X-Ray ABD: Narrowed appearing stent in the region of the duodenum.   CXR: Bibasilar patchy opacities, likely atelectasis.  CT A/P: Pancreatic edema with peripancreatic fat stranding suggestive of acute interstitial edematous pancreatitis. No evidence of necrosis or peripancreatic collection.

## 2025-02-04 NOTE — DISCHARGE NOTE PROVIDER - ATTENDING DISCHARGE PHYSICAL EXAMINATION:
CONSTITUTIONAL: NAD, well-developed   EYES: PERRLA; conjunctiva and sclera clear  ENMT: Moist oral mucosa, no pharyngeal injection or exudates  NECK: Supple   RESPIRATORY: Normal respiratory effort; lungs are clear to auscultation bilaterally  CARDIOVASCULAR: Regular rate and rhythm, normal S1 and S2   ABDOMEN: mild tenderness to palpation, normoactive bowel sounds    MUSCULOSKELETAL: no clubbing or cyanosis of digits; no joint swelling or tenderness to palpation  PSYCH: A+O to person, place, and time; affect appropriate  NEUROLOGY: no gross sensory deficits   SKIN: No rashes

## 2025-02-04 NOTE — PROGRESS NOTE ADULT - PROBLEM SELECTOR PLAN 3
Oral temp 101.7 on 1/30.  Down to 97.2 on 1/31 AM.  Blood cultures no growth after 24 hr, expanded viral panel negative.  Unclear origin of fever.  CXR showed bibasilar atelectasis.  Low concern for UTI without dysuria, pneumonia given acute onset and lack of CXR findings, and no areas of skin breakdown.     Plan:  - Zosyn 3.375 g in dextrose 5% 100 mL, q8hr (01/30 -02/03) Resolved, pt finished course of Zosyn and hasn't been febrile in several days.    Oral temp 101.7 on 1/30.  Down to 97.2 on 1/31 AM.  Blood cultures no growth after 24 hr, expanded viral panel negative.  Unclear origin of fever.  CXR showed bibasilar atelectasis.  Low concern for UTI without dysuria, pneumonia given acute onset and lack of CXR findings, and no areas of skin breakdown.     Plan:  - Zosyn 3.375 g in dextrose 5% 100 mL, q8hr (01/30 -02/03)

## 2025-02-04 NOTE — DISCHARGE NOTE PROVIDER - CARE PROVIDERS DIRECT ADDRESSES
,DirectAddress_Unknown ,[email or web address],DirectAddress_Unknown,[email or web address],DirectAddress_Unknown,[email or web address]

## 2025-02-04 NOTE — PRE PROCEDURE NOTE - PRE PROCEDURE EVALUATION
Interventional Radiology    HPI: 64y Female with w malignant biliary obstruction and hyperbilirubinemia presents for external biliary drain placement.    Allergies: No Known Allergies    Medications (Abx/Cardiac/Anticoagulation/Blood Products)  enoxaparin Injectable: 40 milliGRAM(s) SubCutaneous (02-03 @ 06:37)  piperacillin/tazobactam IVPB..: 25 mL/Hr IV Intermittent (02-03 @ 22:17)  triamterene 75 mG/hydrochlorothiazide 50 mG Tablet: 1 Tablet(s) Oral (02-04 @ 06:10)    Data:  149.9  60.2  T(C): 36.8  HR: 66  BP: 166/74  RR: 18  SpO2: 100%    -WBC 10.31 / HgB 13.0 / Hct 36.2 / Plt 205  -Na 136 / Cl 100 / BUN 7 / Glucose 112  -K 3.3 / CO2 21 / Cr 0.51  - / Alk Phos 197 / T.Bili 9.7  -INR1.00    Imaging: Reviewed    Plan:   64y Female with w malignant biliary obstruction and hyperbilirubinemia presents for external biliary drain placement.  -- Relevant imaging and labs were reviewed.   -- Risks, benefits, and alternatives were explained to the patient and informed consent was obtained.  
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]      Ganglion of left wrist      Uterine fibroid  myomectomy - 1993      History of hysterectomy  partial - 1996 - uterus removed      S/P mastectomy, bilateral      Status post bilateral mastectomy      Status post transverse rectus abdominis muscle flap breast reconstruction  2013      History of cancer chemotherapy  Infusaport insertion 2013; removal 03/2014        ALLERGIES:  No Known Allergies    HOME MEDICATIONS:  acetaminophen 325 mg oral tablet: 2 tab(s) orally every 6 hours As needed Temp greater or equal to 38C (100.4F), Mild Pain (1 - 3)  Crestor 5 mg oral tablet: 1 tab(s) orally once a day (at bedtime)  Pantoprazole Sodium 20 MG Oral Tablet Delayed Release: 1 tab(s) orally once a day  polyethylene glycol 3350 oral powder for reconstitution: 17 gram(s) orally once a day  senna leaf extract oral tablet: 2 tab(s) orally once a day (at bedtime)  triamterene-hydrochlorothiazide 75mg-50mg oral tablet: 1 tab(s) orally once a day    AICD/PPM: [ ] yes   [x ] no    PERTINENT LAB DATA: reviewed                      PHYSICAL EXAMINATION:    vitals wnl     Constitutional: NAD  Neck:  supple  Respiratory: no respiratory stress, no audible wheezes  Cardiovascular: RR  Gastrointestinal: soft, NT/ND  Extremities: No peripheral edema  Neurological: Alert, no focal deficits  Psychiatric: Normal mood, normal affect  Skin: No rashes      COMMENTS:    The patient is a suitable candidate for the planned procedure unless box checked [ ]  No, explain:

## 2025-02-04 NOTE — PHYSICAL THERAPY INITIAL EVALUATION ADULT - GENERAL OBSERVATIONS, REHAB EVAL
Pt received supine in bed, A&0x4, +IV, following 100% multi-step commands. 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

## 2025-02-04 NOTE — DISCHARGE NOTE PROVIDER - NSDCFUSCHEDAPPT_GEN_ALL_CORE_FT
Chicho Martinez  [facility] Physician [facility]  [facility]  Scheduled Appointment: 03/05/2025    Chicho Martinez  [facility]  [facility]  Scheduled Appointment: 03/05/2025    Chicho Martinez  [facility]  [facility]  Scheduled Appointment: 04/04/2025    Chicho Martinez  [facility]  [facility]  Scheduled Appointment: 04/04/2025    [facility]  [facility]  Scheduled Appointment: 04/04/2025    Chicho Martinez  [facility]  [facility]  Scheduled Appointment: 05/02/2025    Chicho Martinez  [facility]  [facility]  Scheduled Appointment: 05/02/2025    [facility]  [facility]  Scheduled Appointment: 05/02/2025

## 2025-02-04 NOTE — DISCHARGE NOTE PROVIDER - CARE PROVIDER_API CALL
Fabrice Silva  [address] [place] [address]  Phone: [phone]  Fax: (   )    -  Follow Up Time:    John Reyes  Gastroenterology  [address] [place] [address]  Phone: [phone]  Fax: [phone]  Established Patient  Follow Up Time: 1 week    Beto Palmer  Gastroenterology  [address] [place] [address]  Phone: [phone]  Fax: [phone]  Established Patient  Follow Up Time: 1 week    Tino Patton  Cardiology  [address] [facility], [address]  [place] [address]  Phone: [phone]  Fax: [phone]  Established Patient  Follow Up Time: 1 week    Fabrice Silva  [address] [place] [address]  Phone: [phone]  Fax: (   )    -  Follow Up Time:     Vick Johnson  Gastroenterology  [address] [place] [address], Division of Gastroenterology - [address] [place] [address]  Phone: [phone]  Fax: [phone]  Follow Up Time: 1 week

## 2025-02-04 NOTE — DISCHARGE NOTE PROVIDER - DETAILS OF MALNUTRITION DIAGNOSIS/DIAGNOSES
This patient has been assessed with a concern for Malnutrition and was treated during this hospitalization for the following Nutrition diagnosis/diagnoses:     -  02/03/2025: Severe protein-calorie malnutrition

## 2025-02-04 NOTE — PHYSICAL THERAPY INITIAL EVALUATION ADULT - ADDITIONAL COMMENTS
Pt resides in a private house with . Pt has 5 stairs to enter and 5 stairs inside. Pt was independent prior to admission with no use of DME.

## 2025-02-04 NOTE — PROGRESS NOTE ADULT - PROBLEM SELECTOR PLAN 1
Presenting with nausea/vomiting post-procedure. Admitted for observation. History of pancreatitis post-EGD. QTC WNL.  - CT a/p with pancreatitis    Plan:  - GI consulted, recs appreciated  - Nausea: Zofran, Metoclopramide scheduled  - Pain: acetaminophen scheduled, morphine 2 mg q4h, morphine 4 mg q4h prn  - NS 75 cc/hr

## 2025-02-04 NOTE — DISCHARGE NOTE PROVIDER - HOSPITAL COURSE
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    Patient Last had an endoscopy in 08/24, where the duodenal stricture was seen and biopsy was consistent with metastatic breast cancer. EGD at the time complicated by pancreatitis. Since then has been monitoring the stricture and has had to only consume liquids. Since 01/18/24, patient has been unable to tolerate even the jello and broth without regurgitation. Arrived for scheduled duodenal stent placement 01/28 and post-procedure began having nausea and vomiting.     Post-procedure: vitals 98F, 70, 136/98, 18, 99% RA. (28 Jan 2025 17:43)    Hospital Course:  Patient was admitted after having increased nausea/vomiting following a scheduled procedure for a duodenal stent in the setting of malignant breast cancer. Through the night, patient continued to having increasing nausea, vomiting, and abdominal pain. Lipase >3000 concerning for post-procedure pancreatitis. Treated symptomatically with fluids, tylenol and morphine for pain control, and zofran and reglan for nausea control. Patient's bilirubin continued to rise, peaking at 10.1 with jaundice on exam. CT A/P with intrahepatic biliary ductal dilatation increased from previous and evidence of pancreatitis. 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    Patient Last had an endoscopy in 08/24, where the duodenal stricture was seen and biopsy was consistent with metastatic breast cancer. EGD at the time complicated by pancreatitis. Since then has been monitoring the stricture and has had to only consume liquids. Since 01/18/24, patient has been unable to tolerate even the jello and broth without regurgitation. Arrived for scheduled duodenal stent placement 01/28 and post-procedure began having nausea and vomiting.     Post-procedure: vitals 98F, 70, 136/98, 18, 99% RA. (28 Jan 2025 17:43)    Hospital Course:  Patient was admitted after having increased nausea/vomiting following a scheduled procedure for a duodenal stent in the setting of malignant breast cancer. Through the night, patient continued to having increasing nausea, vomiting, and abdominal pain. Lipase >3000 concerning for post-procedure pancreatitis. Treated symptomatically with fluids, tylenol and morphine for pain control, and zofran and reglan for nausea control. Patient's bilirubin continued to rise, peaking at 10.1 with jaundice on exam. CT A/P with intrahepatic biliary ductal dilatation increased from previous and evidence of pancreatitis. Interventional radiology was consulted and placed an external biliary drain on 02/04/25. Patient continued to tolerate more diet and controlled nausea.    Important Medication Changes and Reason:  - continue Zofran 4 mg every 6 hours as needed for nausea    Active or Pending Issues Requiring Follow-up:  - Please follow up with your primary care doctor within 1 week to monitor your bilirubin levels  - Please follow up with interventional radiology within 3 months to evaluate your drain  - Please follow up with gastroenterology within 1-2 weeks.       Advanced Directives:   [X] Full code  [ ] DNR  [ ] Hospice    Discharge Diagnoses:  - Duodenal stent  - pancreatitis  - nausea/vomiting  - hyperbilirubinemia

## 2025-02-04 NOTE — PROGRESS NOTE ADULT - ASSESSMENT
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 A second stenosis was seen in distal duodenum/prox jeujunum which was not reached.  Admitted for N/V found to have pancreatitis, likely related to malignant infiltration into papilla vs. compression bile duct worsened in setting new stent. Pain and pancreatitis appear to be improving however w/ uptrending TB and white count. Pending procedure with IR. REcent CT without persistent obstruction downstream of stent.    Recommendations:  -C/w zosyn  -IR for biliary drain placement  -Trend liver enzymes  -Diet per primary team ; ok to advance from GI perspective    Note incomplete until finalized by attending signature/attestation.    Kaia Michaud  GI/Hepatology Fellow PGY5    NON-URGENT CONSULTS:  Please email [email or web address] OR [email or web address]  AT NIGHT AND ON WEEKENDS:  Available on [employer] Teams  [phone] (Long Range Pager)    For urgent consults, please contact on call GI team. See Amion schedule ([facility]) or Copyteleing system ([facility])

## 2025-02-04 NOTE — DISCHARGE NOTE PROVIDER - NSDCMRMEDTOKEN_GEN_ALL_CORE_FT
Arimidex 1 mg oral tablet: 1 tab(s) orally  Crestor 5 mg oral tablet: 1 tab(s) orally once a day (at bedtime)  denosumab: 1 milligram(s) once a month  Fulvestrant: 500 milligram(s) once a month  Skipsqali Femara Co-Pack 600 mg-2.5 mg Dose oral tablet: 1 tablet orally daily for 21 days, 7 days off  Pantoprazole Sodium 20 MG Oral Tablet Delayed Release: 1 tab(s) orally once a day  triamterene-hydrochlorothiazide 75mg-50mg oral tablet: 1 tab(s) orally once a day  Xeloda 500 mg oral tablet: 3 tab(s) orally 2 times a day 1 week on 1 week off   Arimidex 1 mg oral tablet: 1 tab(s) orally  Crestor 5 mg oral tablet: 1 tab(s) orally once a day (at bedtime)  denosumab: 1 milligram(s) once a month  Fulvestrant: 500 milligram(s) once a month  Skipsqali Femara Co-Pack 600 mg-2.5 mg Dose oral tablet: 1 tablet orally daily for 21 days, 7 days off  Multiple Vitamins oral tablet: 1 tab(s) orally once a day  Pantoprazole Sodium 20 MG Oral Tablet Delayed Release: 1 tab(s) orally once a day  thiamine 100 mg oral tablet: 1 tab(s) orally once a day  triamterene-hydrochlorothiazide 75mg-50mg oral tablet: 1 tab(s) orally once a day  Xeloda 500 mg oral tablet: 3 tab(s) orally 2 times a day 1 week on 1 week off   Arimidex 1 mg oral tablet: 1 tab(s) orally  Crestor 5 mg oral tablet: 1 tab(s) orally once a day (at bedtime)  denosumab: 1 milligram(s) once a month  Fulvestrant: 500 milligram(s) once a month  Skipsqali Femara Co-Pack 600 mg-2.5 mg Dose oral tablet: 1 tablet orally daily for 21 days, 7 days off  Multiple Vitamins oral tablet: 1 tab(s) orally once a day  thiamine 100 mg oral tablet: 1 tab(s) orally once a day  triamterene-hydrochlorothiazide 75mg-50mg oral tablet: 1 tab(s) orally once a day  Xeloda 500 mg oral tablet: 3 tab(s) orally 2 times a day 1 week on 1 week off   acetaminophen 160 mg/5 mL oral suspension: 20.31 milliliter(s) orally every 6 hours as needed for  mild pain  aluminum hydroxide-magnesium hydroxide 200 mg-200 mg/5 mL oral suspension: 30 milliliter(s) orally every 4 hours as needed for Dyspepsia  Arimidex 1 mg oral tablet: 1 tab(s) orally  Crestor 5 mg oral tablet: 1 tab(s) orally once a day (at bedtime)  denosumab: 1 milligram(s) once a month  Fulvestrant: 500 milligram(s) once a month  Kisqali Femara Co-Pack 600 mg-2.5 mg Dose oral tablet: 1 tablet orally daily for 21 days, 7 days off  Multiple Vitamins oral tablet: 1 tab(s) orally once a day  ondansetron 4 mg oral tablet, disintegratin tab(s) orally every 6 hours as needed for Nausea and/or Vomiting  thiamine 100 mg oral tablet: 1 tab(s) orally once a day  triamterene-hydrochlorothiazide 75mg-50mg oral tablet: 1 tab(s) orally once a day  Xeloda 500 mg oral tablet: 3 tab(s) orally 2 times a day 1 week on 1 week off   Amoxicillin: per pt  Protonix: per pt  Sucralfate: per pt

## 2025-02-04 NOTE — DISCHARGE NOTE PROVIDER - NSFOLLOWUPCLINICS_GEN_ALL_ED_FT
Gastroenterology at [facility]  Gastroenterology  [address] [place] [address]  Phone: [phone]  Fax:      Gastroenterology at [facility]  Gastroenterology  [address] [place] [address]  Phone: [phone]  Fax:     [facility]  Hematology/Oncology  [address] [place] [address]  Phone: [phone]  Fax:   Follow Up Time: 1 week

## 2025-02-04 NOTE — DISCHARGE NOTE PROVIDER - NSDCCPTREATMENT_GEN_ALL_CORE_FT
PRINCIPAL PROCEDURE  Procedure: Percutaneous external biliary drainage  Findings and Treatment: You have had a External biliary drain placed by Dr. Silva on 2/4 [facility] (IR).   Monitor site for any sign or symptoms of infection (painful red skin, green/ yellow foul smelling discharge from the insertion site, fever, chills, leakage around drain site).   Flush drain with 5-10mL NS daily. If you meet resistance upon flushing, STOP and contact IR.   Empty drainage bag or bulb daily and record output.   please contact IR to schedule  an appointment for routine drain evaluation and exchange by calling [phone].  Drain care:  -Disconnect tubing (tube attached to bag/ bulb)  from the catheter (catheter going into skin)  -Clean catheter with alcohol swab  -Twist on the flush syringe to the catheter (be sure to push the air out of syringe)  -Flush catheter with 5-10mL (DO NOT pull back on syringe). If you meet resistance upon flushing, STOP and contact IR.   -Disconnect syringe from catheter and reconnect drainage bag or bulb.  Dressing care:  -Wash hands thoroughly with water and soap for at least 20 seconds.   -take off old dressing and clean around drain site with gauze soaked with warm water  -After cleaning the site, dry and replace dressing with a new gauze and tegaderm.   -Place one piece of gauze underneath the drain and another piece of gauze on top of drain.   -Apply tegaderm (clear dressing) on top.  -Change dressing every 3 days or when soiled  Please feel free to contact us at [phone] if any problems arise. After 6PM, Monday through Friday, on weekends and on holidays, please call [phone] and ask for the radiology resident on call to be paged.

## 2025-02-04 NOTE — PHYSICAL THERAPY INITIAL EVALUATION ADULT - ACTIVE RANGE OF MOTION EXAMINATION, REHAB EVAL
scooby. upper extremity Active ROM was WNL (within normal limits)/bilateral lower extremity Active ROM was WNL (within normal limits)

## 2025-02-04 NOTE — PRE-OP CHECKLIST - DNR CLARIFICATION FORM COMPLETED
[NL] : no acute distress, alert [de-identified] : +ttp r little and ring finger mcp. decreased rom r little finger and ring finger; no echymosis, mild edema mcp,  n/a Hemigard Intro: Due to skin fragility and wound tension, it was decided to use HEMIGARD adhesive retention suture devices to permit a linear closure. The skin was cleaned and dried for a 6cm distance away from the wound. Excessive hair, if present, was removed to allow for adhesion.

## 2025-02-04 NOTE — PROGRESS NOTE ADULT - PROBLEM SELECTOR PLAN 2
CT a/p with intrahepatic biliary ductal dilation, increased from 01/25 CT. Rising T bili.    Plan:  - GI consulted, recs appreciated  - IR consulted, plan for external biliary drain placement 02/04

## 2025-02-04 NOTE — PROGRESS NOTE ADULT - SUBJECTIVE AND OBJECTIVE BOX
CLOVER OG  64y  MRN: [id number]    Patient is a 64y old  Female who presents with a chief complaint of Obstruction of duodenum     (03 Feb 2025 11:21)      Interval/Overnight Events: no events ON.     Subjective: Pt seen and examined at bedside.     MEDICATIONS  (STANDING):  acetaminophen   Oral Liquid .. 650 milliGRAM(s) Oral every 6 hours  chlorhexidine 4% Liquid 1 Application(s) Topical daily  metoclopramide Injectable 10 milliGRAM(s) IV Push every 6 hours  multivitamin 1 Tablet(s) Oral daily  ondansetron Injectable 4 milliGRAM(s) IV Push every 6 hours  potassium chloride  10 mEq/100 mL IVPB 10 milliEquivalent(s) IV Intermittent every 1 hour  potassium phosphate IVPB 30 milliMole(s) IV Intermittent once  sodium chloride 0.9%. 1000 milliLiter(s) (75 mL/Hr) IV Continuous <Continuous>  thiamine 100 milliGRAM(s) Oral daily  triamterene 75 mG/hydrochlorothiazide 50 mG Tablet 1 Tablet(s) Oral daily    MEDICATIONS  (PRN):  aluminum hydroxide/magnesium hydroxide/simethicone Suspension 30 milliLiter(s) Oral every 4 hours PRN Dyspepsia  calamine/zinc oxide Lotion 1 Application(s) Topical four times a day PRN Itching  melatonin 3 milliGRAM(s) Oral at bedtime PRN Insomnia  morphine  - Injectable 2 milliGRAM(s) IV Push every 4 hours PRN Moderate Pain (4 - 6)  morphine  - Injectable 4 milliGRAM(s) IV Push every 4 hours PRN Severe Pain (7 - 10)      Objective:    Vitals: Vital Signs Last 24 Hrs  T(C): 37.2 (02-04-25 @ 04:34), Max: 37.4 (02-03-25 @ 20:34)  T(F): 99 (02-04-25 @ 04:34), Max: 99.3 (02-03-25 @ 20:34)  HR: 61 (02-04-25 @ 04:34) (61 - 64)  BP: 158/74 (02-04-25 @ 04:34) (152/67 - 158/74)  BP(mean): --  RR: 18 (02-04-25 @ 04:34) (18 - 18)  SpO2: 95% (02-04-25 @ 04:34) (95% - 98%)                I&O's Summary    03 Feb 2025 07:01  -  04 Feb 2025 07:00  --------------------------------------------------------  IN: 1350 mL / OUT: 0 mL / NET: 1350 mL        PHYSICAL EXAM:  GENERAL: NAD  HEAD:  Atraumatic, Normocephalic  EYES: EOMI, conjunctiva and sclera clear  CHEST/LUNG: Clear to auscultation bilaterally; No rales, rhonchi, wheezing, or rubs  HEART: Regular rate and rhythm; No murmurs, rubs, or gallops  ABDOMEN: Soft, Nontender, Nondistended;   SKIN: No rashes or lesions  NERVOUS SYSTEM:  Alert & Oriented X3, no focal deficits    LABS:                        13.0   10.31 )-----------( 205      ( 04 Feb 2025 04:36 )             36.2                         12.3   8.90  )-----------( 187      ( 03 Feb 2025 07:02 )             35.4                         14.4   12.49 )-----------( 219      ( 02 Feb 2025 11:07 )             41.6     02-04    136  |  100  |  7   ----------------------------<  112[H]  3.3[L]   |  21[L]  |  0.51  02-03    137  |  103  |  6[L]  ----------------------------<  105[H]  3.6   |  22  |  0.46[L]  02-02    137  |  98  |  9   ----------------------------<  83  3.3[L]   |  20[L]  |  0.47[L]    Ca    8.1[L]      04 Feb 2025 04:36  Ca    8.0[L]      03 Feb 2025 07:04  Ca    8.6      02 Feb 2025 11:08  Phos  2.0     02-04  Mg     1.8     02-04    TPro  5.6[L]  /  Alb  3.5  /  TBili  9.7[H]  /  DBili  x   /  AST  127[H]  /  ALT  174[H]  /  AlkPhos  197[H]  02-04  TPro  5.4[L]  /  Alb  3.3  /  TBili  8.5[H]  /  DBili  x   /  AST  165[H]  /  ALT  170[H]  /  AlkPhos  176[H]  02-03  TPro  6.5  /  Alb  3.9  /  TBili  10.1[H]  /  DBili  x   /  AST  179[H]  /  ALT  176[H]  /  AlkPhos  216[H]  02-02    CAPILLARY BLOOD GLUCOSE        PT/INR - ( 04 Feb 2025 04:36 )   PT: 11.5 sec;   INR: 1.00 ratio         PTT - ( 04 Feb 2025 04:36 )  PTT:30.1 sec    Urinalysis Basic - ( 04 Feb 2025 04:36 )    Color: x / Appearance: x / SG: x / pH: x  Gluc: 112 mg/dL / Ketone: x  / Bili: x / Urobili: x   Blood: x / Protein: x / Nitrite: x   Leuk Esterase: x / RBC: x / WBC x   Sq Epi: x / Non Sq Epi: x / Bacteria: x          RADIOLOGY & ADDITIONAL TESTS:    Imaging Personally Reviewed:  [x ] YES  [ ] NO    Consultants involved in case:   Consultant(s) Notes Reviewed:  [ x] YES  [ ] NO:   Care Discussed with Consultants/Other Providers [x ] YES  [ ] NO           CLOVER OG  64y  MRN: [id number]    Patient is a 64y old  Female who presents with a chief complaint of Obstruction of duodenum     (03 Feb 2025 11:21)      Interval/Overnight Events: no events ON.     Subjective: Pt seen and examined at bedside.  Pt endorses N/V, but improved after morning meds.  Pt denies SOB, HA.  Pt feels restless having been in bed for so long.    MEDICATIONS  (STANDING):  acetaminophen   Oral Liquid .. 650 milliGRAM(s) Oral every 6 hours  chlorhexidine 4% Liquid 1 Application(s) Topical daily  metoclopramide Injectable 10 milliGRAM(s) IV Push every 6 hours  multivitamin 1 Tablet(s) Oral daily  ondansetron Injectable 4 milliGRAM(s) IV Push every 6 hours  potassium chloride  10 mEq/100 mL IVPB 10 milliEquivalent(s) IV Intermittent every 1 hour  potassium phosphate IVPB 30 milliMole(s) IV Intermittent once  sodium chloride 0.9%. 1000 milliLiter(s) (75 mL/Hr) IV Continuous <Continuous>  thiamine 100 milliGRAM(s) Oral daily  triamterene 75 mG/hydrochlorothiazide 50 mG Tablet 1 Tablet(s) Oral daily    MEDICATIONS  (PRN):  aluminum hydroxide/magnesium hydroxide/simethicone Suspension 30 milliLiter(s) Oral every 4 hours PRN Dyspepsia  calamine/zinc oxide Lotion 1 Application(s) Topical four times a day PRN Itching  melatonin 3 milliGRAM(s) Oral at bedtime PRN Insomnia  morphine  - Injectable 2 milliGRAM(s) IV Push every 4 hours PRN Moderate Pain (4 - 6)  morphine  - Injectable 4 milliGRAM(s) IV Push every 4 hours PRN Severe Pain (7 - 10)      Objective:    Vitals: Vital Signs Last 24 Hrs  T(C): 37.2 (02-04-25 @ 04:34), Max: 37.4 (02-03-25 @ 20:34)  T(F): 99 (02-04-25 @ 04:34), Max: 99.3 (02-03-25 @ 20:34)  HR: 61 (02-04-25 @ 04:34) (61 - 64)  BP: 158/74 (02-04-25 @ 04:34) (152/67 - 158/74)  BP(mean): --  RR: 18 (02-04-25 @ 04:34) (18 - 18)  SpO2: 95% (02-04-25 @ 04:34) (95% - 98%)                I&O's Summary    03 Feb 2025 07:01  -  04 Feb 2025 07:00  --------------------------------------------------------  IN: 1350 mL / OUT: 0 mL / NET: 1350 mL        PHYSICAL EXAM:  GENERAL: NAD  HEAD:  Atraumatic, Normocephalic  EYES: Scleral icterus.  EOMI  CHEST/LUNG: Clear to auscultation bilaterally; No rales, rhonchi, wheezing, or rubs  HEART: Regular rate and rhythm; No murmurs, rubs, or gallops  ABDOMEN: Tender to palpation. Soft, Nondistended;   SKIN: Jaundiced.  No rashes or lesions  NERVOUS SYSTEM:  Alert & Oriented X3, no focal deficits    LABS:                        13.0   10.31 )-----------( 205      ( 04 Feb 2025 04:36 )             36.2                         12.3   8.90  )-----------( 187      ( 03 Feb 2025 07:02 )             35.4                         14.4   12.49 )-----------( 219      ( 02 Feb 2025 11:07 )             41.6     02-04    136  |  100  |  7   ----------------------------<  112[H]  3.3[L]   |  21[L]  |  0.51  02-03    137  |  103  |  6[L]  ----------------------------<  105[H]  3.6   |  22  |  0.46[L]  02-02    137  |  98  |  9   ----------------------------<  83  3.3[L]   |  20[L]  |  0.47[L]    Ca    8.1[L]      04 Feb 2025 04:36  Ca    8.0[L]      03 Feb 2025 07:04  Ca    8.6      02 Feb 2025 11:08  Phos  2.0     02-04  Mg     1.8     02-04    TPro  5.6[L]  /  Alb  3.5  /  TBili  9.7[H]  /  DBili  x   /  AST  127[H]  /  ALT  174[H]  /  AlkPhos  197[H]  02-04  TPro  5.4[L]  /  Alb  3.3  /  TBili  8.5[H]  /  DBili  x   /  AST  165[H]  /  ALT  170[H]  /  AlkPhos  176[H]  02-03  TPro  6.5  /  Alb  3.9  /  TBili  10.1[H]  /  DBili  x   /  AST  179[H]  /  ALT  176[H]  /  AlkPhos  216[H]  02-02    CAPILLARY BLOOD GLUCOSE        PT/INR - ( 04 Feb 2025 04:36 )   PT: 11.5 sec;   INR: 1.00 ratio         PTT - ( 04 Feb 2025 04:36 )  PTT:30.1 sec    Urinalysis Basic - ( 04 Feb 2025 04:36 )    Color: x / Appearance: x / SG: x / pH: x  Gluc: 112 mg/dL / Ketone: x  / Bili: x / Urobili: x   Blood: x / Protein: x / Nitrite: x   Leuk Esterase: x / RBC: x / WBC x   Sq Epi: x / Non Sq Epi: x / Bacteria: x          RADIOLOGY & ADDITIONAL TESTS:    Imaging Personally Reviewed:  [x ] YES  [ ] NO    Consultants involved in case:   Consultant(s) Notes Reviewed:  [ x] YES  [ ] NO:   Care Discussed with Consultants/Other Providers [x ] YES  [ ] NO

## 2025-02-04 NOTE — PRE-ANESTHESIA EVALUATION ADULT - NSANTHOSAYNRD_GEN_A_CORE
No. VIVIANA screening performed.  STOP BANG Legend: 0-2 = LOW Risk; 3-4 = INTERMEDIATE Risk; 5-8 = HIGH Risk
No. VIVIANA screening performed.  STOP BANG Legend: 0-2 = LOW Risk; 3-4 = INTERMEDIATE Risk; 5-8 = HIGH Risk

## 2025-02-04 NOTE — DISCHARGE NOTE PROVIDER - PROVIDER TOKENS
FREE:[LAST:[Ricardo],FIRST:[Fabrice],PHONE:[phone]],FAX:[(   )    -],ADDRESS:[[address] [place], [place] [address]]] PROVIDER:[TOKEN:[[id number]],FOLLOWUP:[1 week],ESTABLISHEDPATIENT:[T]],PROVIDER:[TOKEN:[[id number]],FOLLOWUP:[1 week],ESTABLISHEDPATIENT:[T]],PROVIDER:[TOKEN:[[id number]],FOLLOWUP:[1 week],ESTABLISHEDPATIENT:[T]],FREE:[LAST:[Silva],FIRST:[Fabrice],PHONE:[phone]],FAX:[(   )    -],ADDRESS:[[address] [place] [address]],PROVIDER:[TOKEN:[[id number]],FOLLOWUP:[1 week]]

## 2025-02-04 NOTE — PRE-ANESTHESIA EVALUATION ADULT - BSA (M2)
10/04/24 11:27 AM    Patient contacted post ED visit, [facility] department spoke with patient/caregiver and outreach was successful.    Thank you.  Jenny Hernández  [facility]      
10/04/24 9:18 AM    Patient contacted post ED visit, first outreach attempt made. Message was left for patient to return a call to the VBI Department at Jenny: Phone [phone].    Thank you.  Jenny Hernández  [facility]    
1.61
1.55

## 2025-02-04 NOTE — PROGRESS NOTE ADULT - PROBLEM SELECTOR PLAN 6
DVT: lovenox  Diet: clear liquids, NPO at midnight  Dispo: anticipate home DVT: lovenox (hold for procedure)   Diet: NPO for procedure  PT: NPT   Dispo: anticipate home

## 2025-02-04 NOTE — DISCHARGE NOTE PROVIDER - NSDCFUADDAPPT_GEN_ALL_CORE_FT
You may call the IR booking office @ [phone] to schedule your 3 month follow up. Please feel free to contact us at [phone] if any problems arise. After 6PM, Monday through Friday, on weekends and on holidays, please call [phone] and ask for the radiology resident on call to be paged.  APPTS ARE READY TO BE MADE: [x] YES    Best Family or Patient Contact (if needed):    Additional Information about above appointments (if needed):    1:   2:   3:     Other comments or requests:     You may call the IR booking office @ [phone] to schedule your 3 month follow up. Please feel free to contact us at [phone] if any problems arise. After 6PM, Monday through Friday, on weekends and on holidays, please call [phone] and ask for the radiology resident on call to be paged.  APPTS ARE READY TO BE MADE: [x] YES    Best Family or Patient Contact (if needed):    Additional Information about above appointments (if needed):    1:   2:   3:     Other comments or requests:     You may call the IR booking office @ [phone] to schedule your 3 month follow up. Please feel free to contact us at [phone] if any problems arise. After 6PM, Monday through Friday, on weekends and on holidays, please call [phone] and ask for the radiology resident on call to be paged.     Patient was scheduled for ID on 2/14 at [address] [place] [id number]    Patient is [facility] on 2/25, with an additional follow up scheduled with Dr. Martinez on 3/5 at [address] [place] [id number] APPTS ARE READY TO BE MADE: [x] YES    Best Family or Patient Contact (if needed):    Additional Information about above appointments (if needed):    1:   2:   3:     Other comments or requests:     You may call the IR booking office @ [phone] to schedule your 3 month follow up. Please feel free to contact us at [phone] if any problems arise. After 6PM, Monday through Friday, on weekends and on holidays, please call [phone] and ask for the radiology resident on call to be paged.     Patient was scheduled for ID on 2/14 at [address] [place] [id number]    Patient is scheduled at [facility] on 2/25, with an additional follow up scheduled with Dr. Martinez on 3/5 at [address] [place] [address]    Provided patient with provider referral information, however patient prefers to schedule the appointments on their own.

## 2025-02-04 NOTE — PROGRESS NOTE ADULT - SUBJECTIVE AND OBJECTIVE BOX
Interval Events:   -Pending IR procedure  -Uptrending TB   -Pain is overall improved today. Tolerated apple juice yesterday     Hospital Medications:  acetaminophen   Oral Liquid .. 650 milliGRAM(s) Oral every 6 hours  aluminum hydroxide/magnesium hydroxide/simethicone Suspension 30 milliLiter(s) Oral every 4 hours PRN  calamine/zinc oxide Lotion 1 Application(s) Topical four times a day PRN  chlorhexidine 4% Liquid 1 Application(s) Topical daily  lactated ringers. 1000 milliLiter(s) (75 mL/Hr) IV Continuous <Continuous>  melatonin 3 milliGRAM(s) Oral at bedtime PRN  metoclopramide Injectable 10 milliGRAM(s) IV Push every 6 hours  morphine  - Injectable 2 milliGRAM(s) IV Push every 4 hours PRN  morphine  - Injectable 4 milliGRAM(s) IV Push every 4 hours PRN  multivitamin 1 Tablet(s) Oral daily  ondansetron Injectable 4 milliGRAM(s) IV Push every 6 hours  piperacillin/tazobactam IVPB. 3.375 Gram(s) IV Intermittent once  piperacillin/tazobactam IVPB.- 3.375 Gram(s) IV Intermittent once  sodium chloride 0.9%. 1000 milliLiter(s) (75 mL/Hr) IV Continuous <Continuous>  thiamine 100 milliGRAM(s) Oral daily  triamterene 75 mG/hydrochlorothiazide 50 mG Tablet 1 Tablet(s) Oral daily            PHYSICAL EXAM:   Vital Signs:  Vital Signs Last 24 Hrs  T(C): 36.8 (2025 15:22), Max: 37.4 (2025 20:34)  T(F): 98.2 (2025 15:16), Max: 99.3 (2025 20:34)  HR: 66 (2025 15:22) (61 - 76)  BP: 166/74 (2025 15:22) (154/66 - 166/74)  BP(mean): --  RR: 18 (2025 15:22) (18 - 18)  SpO2: 100% (2025 15:22) (95% - 100%)    Parameters below as of 2025 15:22    O2 Flow (L/min): 2    Daily Height in cm: 149.86 (2025 15:22)    Daily   GENERAL:  NAD, Appears stated age, jaundiced  HEENT:  NC/AT,  conjunctivae clear and pink, sclera icteric  CHEST:  Normal Effort, no signs of resp distress  HEART:  RRR, HD stable  ABDOMEN: minimally tender epigastric region  EXTREMITIES:  no cyanosis or edema  SKIN:  Warm & Dry. No rash or erythema  NEURO:  Alert, oriented, no focal deficit  LABS:                        13.0   10.31 )-----------( 205      ( 2025 04:36 )             36.2     Last Hb:Hemoglobin: 13.0 g/dL (25 @ 04:36)  Hemoglobin: 12.3 g/dL (25 @ 07:02)  Hemoglobin: 14.4 g/dL (25 @ 11:07)               136   |  100   |  7                  Ca: 8.1    BMP:   ----------------------------< 112    M.8   (25 @ 04:36)             3.3    |  21    | 0.51               Ph: 2.0      LFT:     TPro: 5.6 / Alb: 3.5 / TBili: 9.7 / DBili: x / AST: 127 / ALT: 174 / AlkPhos: 197   (25 @ 04:36)    Creatinine: 0.51 mg/dL  Creatinine: 0.46 mg/dL  Creatinine: 0.47 mg/dL      LIVER FUNCTIONS - ( 2025 04:36 )  Alb: 3.5 g/dL / Pro: 5.6 g/dL / ALK PHOS: 197 U/L / ALT: 174 U/L / AST: 127 U/L / GGT: x           PT/INR - ( 2025 04:36 )   PT: 11.5 sec;   INR: 1.00 ratio         PTT - ( 2025 04:36 )  PTT:30.1 sec  Urinalysis Basic - ( 2025 04:36 )    Color: x / Appearance: x / SG: x / pH: x  Gluc: 112 mg/dL / Ketone: x  / Bili: x / Urobili: x   Blood: x / Protein: x / Nitrite: x   Leuk Esterase: x / RBC: x / WBC x   Sq Epi: x / Non Sq Epi: x / Bacteria: x        Culture - Blood (collected 25 @ 22:20)  Source: .Blood BLOOD  Final Report (25 @ 02:01):    No growth at 5 days    Culture - Blood (collected 25 @ 22:10)  Source: .Blood BLOOD  Final Report (25 @ 02:01):    No growth at 5 days

## 2025-02-04 NOTE — DISCHARGE NOTE PROVIDER - NSDCCPCAREPLAN_GEN_ALL_CORE_FT
PRINCIPAL DISCHARGE DIAGNOSIS  Diagnosis: Acute pancreatitis  Assessment and Plan of Treatment: You presented after a scheduled       PRINCIPAL DISCHARGE DIAGNOSIS  Diagnosis: Acute pancreatitis  Assessment and Plan of Treatment: You came to the hospital for a planned duodenal stent placement, and after the procedure you experienced nausea and vomiting.  Your labs showed elevated lipase, a blood marker of pancreatic inflammation.  You got a CT scan that showed inflammation in your pancreas.  You were given fluids and were given medications to control your pain and nausea.  After a few days, your lipase was much lower.     PRINCIPAL DISCHARGE DIAGNOSIS  Diagnosis: Acute pancreatitis  Assessment and Plan of Treatment: You came to the hospital for a planned duodenal stent placement, and after the procedure you experienced nausea and vomiting.  Your labs showed elevated lipase, a blood marker of pancreatic inflammation.  You got a CT scan that showed inflammation in your pancreas.  You were given fluids and were given medications to control your pain and nausea.  After a few days, your lipase was much lower.  Important Medication Changes and Reason:  - continue Zofran 4 mg every 6 hours as needed for nausea  Active or Pending Issues Requiring Follow-up:  - Please follow up with your primary care doctor within 1 week to monitor your bilirubin levels  - Please follow up with interventional radiology within 3 months to evaluate your drain  - Please follow up with gastroenterology within 1-2 weeks.

## 2025-02-05 LAB
ALBUMIN SERPL ELPH-MCNC: 3.8 G/DL — SIGNIFICANT CHANGE UP (ref 3.3–5)
ALP SERPL-CCNC: 218 U/L — HIGH (ref 40–120)
ALT FLD-CCNC: 178 U/L — HIGH (ref 10–45)
ANION GAP SERPL CALC-SCNC: 18 MMOL/L — HIGH (ref 5–17)
AST SERPL-CCNC: 116 U/L — HIGH (ref 10–40)
BASOPHILS # BLD AUTO: 0.07 K/UL — SIGNIFICANT CHANGE UP (ref 0–0.2)
BASOPHILS NFR BLD AUTO: 0.5 % — SIGNIFICANT CHANGE UP (ref 0–2)
BILIRUB SERPL-MCNC: 4.9 MG/DL — HIGH (ref 0.2–1.2)
BUN SERPL-MCNC: 10 MG/DL — SIGNIFICANT CHANGE UP (ref 7–23)
CALCIUM SERPL-MCNC: 8.2 MG/DL — LOW (ref 8.4–10.5)
CHLORIDE SERPL-SCNC: 98 MMOL/L — SIGNIFICANT CHANGE UP (ref 96–108)
CO2 SERPL-SCNC: 19 MMOL/L — LOW (ref 22–31)
CREAT SERPL-MCNC: 0.58 MG/DL — SIGNIFICANT CHANGE UP (ref 0.5–1.3)
EGFR: 101 ML/MIN/1.73M2 — SIGNIFICANT CHANGE UP
EOSINOPHIL # BLD AUTO: 0.15 K/UL — SIGNIFICANT CHANGE UP (ref 0–0.5)
EOSINOPHIL NFR BLD AUTO: 1.2 % — SIGNIFICANT CHANGE UP (ref 0–6)
GLUCOSE SERPL-MCNC: 113 MG/DL — HIGH (ref 70–99)
HCT VFR BLD CALC: 41.4 % — SIGNIFICANT CHANGE UP (ref 34.5–45)
HGB BLD-MCNC: 14.1 G/DL — SIGNIFICANT CHANGE UP (ref 11.5–15.5)
IMM GRANULOCYTES NFR BLD AUTO: 1.4 % — HIGH (ref 0–0.9)
LYMPHOCYTES # BLD AUTO: 16.8 % — SIGNIFICANT CHANGE UP (ref 13–44)
LYMPHOCYTES # BLD AUTO: 2.16 K/UL — SIGNIFICANT CHANGE UP (ref 1–3.3)
MAGNESIUM SERPL-MCNC: 1.9 MG/DL — SIGNIFICANT CHANGE UP (ref 1.6–2.6)
MCHC RBC-ENTMCNC: 34.1 G/DL — SIGNIFICANT CHANGE UP (ref 32–36)
MCHC RBC-ENTMCNC: 34.6 PG — HIGH (ref 27–34)
MCV RBC AUTO: 101.5 FL — HIGH (ref 80–100)
MONOCYTES # BLD AUTO: 1 K/UL — HIGH (ref 0–0.9)
MONOCYTES NFR BLD AUTO: 7.8 % — SIGNIFICANT CHANGE UP (ref 2–14)
NEUTROPHILS # BLD AUTO: 9.28 K/UL — HIGH (ref 1.8–7.4)
NEUTROPHILS NFR BLD AUTO: 72.3 % — SIGNIFICANT CHANGE UP (ref 43–77)
NRBC # BLD: 0 /100 WBCS — SIGNIFICANT CHANGE UP (ref 0–0)
NRBC BLD-RTO: 0 /100 WBCS — SIGNIFICANT CHANGE UP (ref 0–0)
PHOSPHATE SERPL-MCNC: 2.6 MG/DL — SIGNIFICANT CHANGE UP (ref 2.5–4.5)
PLATELET # BLD AUTO: 277 K/UL — SIGNIFICANT CHANGE UP (ref 150–400)
POTASSIUM SERPL-MCNC: 3.6 MMOL/L — SIGNIFICANT CHANGE UP (ref 3.5–5.3)
POTASSIUM SERPL-SCNC: 3.6 MMOL/L — SIGNIFICANT CHANGE UP (ref 3.5–5.3)
PROT SERPL-MCNC: 6.4 G/DL — SIGNIFICANT CHANGE UP (ref 6–8.3)
RBC # BLD: 4.08 M/UL — SIGNIFICANT CHANGE UP (ref 3.8–5.2)
RBC # FLD: 13.6 % — SIGNIFICANT CHANGE UP (ref 10.3–14.5)
SODIUM SERPL-SCNC: 135 MMOL/L — SIGNIFICANT CHANGE UP (ref 135–145)
WBC # BLD: 12.84 K/UL — HIGH (ref 3.8–10.5)
WBC # FLD AUTO: 12.84 K/UL — HIGH (ref 3.8–10.5)

## 2025-02-05 PROCEDURE — 99232 SBSQ HOSP IP/OBS MODERATE 35: CPT | Mod: GC

## 2025-02-05 RX ORDER — ENOXAPARIN SODIUM 100 MG/ML
40 INJECTION SUBCUTANEOUS EVERY 24 HOURS
Refills: 0 | Status: DISCONTINUED | OUTPATIENT
Start: 2025-02-06 | End: 2025-02-06

## 2025-02-05 RX ORDER — METOCLOPRAMIDE 10 MG/1
10 TABLET ORAL EVERY 6 HOURS
Refills: 0 | Status: DISCONTINUED | OUTPATIENT
Start: 2025-02-05 | End: 2025-02-06

## 2025-02-05 RX ADMIN — PIPERACILLIN SODIUM AND TAZOBACTAM SODIUM 25 GRAM(S): 2; 250 INJECTION, POWDER, FOR SOLUTION INTRAVENOUS at 18:27

## 2025-02-05 RX ADMIN — ACETAMINOPHEN 650 MILLIGRAM(S): 160 SUSPENSION ORAL at 10:17

## 2025-02-05 RX ADMIN — ACETAMINOPHEN 650 MILLIGRAM(S): 160 SUSPENSION ORAL at 00:03

## 2025-02-05 RX ADMIN — Medication 100 MILLIGRAM(S): at 12:04

## 2025-02-05 RX ADMIN — ACETAMINOPHEN 650 MILLIGRAM(S): 160 SUSPENSION ORAL at 18:28

## 2025-02-05 RX ADMIN — ACETAMINOPHEN 650 MILLIGRAM(S): 160 SUSPENSION ORAL at 23:45

## 2025-02-05 RX ADMIN — TRIAMTERENE AND HYDROCHLOROTHIAZIDE 1 TABLET(S): 37.5; 25 TABLET ORAL at 05:58

## 2025-02-05 RX ADMIN — PIPERACILLIN SODIUM AND TAZOBACTAM SODIUM 25 GRAM(S): 2; 250 INJECTION, POWDER, FOR SOLUTION INTRAVENOUS at 02:08

## 2025-02-05 RX ADMIN — PIPERACILLIN SODIUM AND TAZOBACTAM SODIUM 25 GRAM(S): 2; 250 INJECTION, POWDER, FOR SOLUTION INTRAVENOUS at 10:16

## 2025-02-05 RX ADMIN — ANTISEPTIC SURGICAL SCRUB 1 APPLICATION(S): 0.04 SOLUTION TOPICAL at 18:31

## 2025-02-05 RX ADMIN — METOCLOPRAMIDE 10 MILLIGRAM(S): 10 TABLET ORAL at 00:01

## 2025-02-05 RX ADMIN — Medication 1 TABLET(S): at 12:04

## 2025-02-05 RX ADMIN — ACETAMINOPHEN 650 MILLIGRAM(S): 160 SUSPENSION ORAL at 00:33

## 2025-02-05 RX ADMIN — METOCLOPRAMIDE 10 MILLIGRAM(S): 10 TABLET ORAL at 05:59

## 2025-02-05 RX ADMIN — ACETAMINOPHEN 650 MILLIGRAM(S): 160 SUSPENSION ORAL at 11:17

## 2025-02-05 RX ADMIN — ONDANSETRON 4 MILLIGRAM(S): 4 TABLET, ORALLY DISINTEGRATING ORAL at 10:17

## 2025-02-05 RX ADMIN — MORPHINE SULFATE 2 MILLIGRAM(S): 60 TABLET, FILM COATED, EXTENDED RELEASE ORAL at 05:58

## 2025-02-05 RX ADMIN — PIPERACILLIN SODIUM AND TAZOBACTAM SODIUM 25 GRAM(S): 2; 250 INJECTION, POWDER, FOR SOLUTION INTRAVENOUS at 23:46

## 2025-02-05 RX ADMIN — ONDANSETRON 4 MILLIGRAM(S): 4 TABLET, ORALLY DISINTEGRATING ORAL at 04:01

## 2025-02-05 RX ADMIN — METOCLOPRAMIDE 10 MILLIGRAM(S): 10 TABLET ORAL at 12:04

## 2025-02-05 NOTE — PROGRESS NOTE ADULT - PROBLEM SELECTOR PLAN 3
Resolved, pt finished course of Zosyn and hasn't been febrile in several days.    Oral temp 101.7 on 1/30.  Down to 97.2 on 1/31 AM.  Blood cultures no growth after 24 hr, expanded viral panel negative.  Unclear origin of fever.  CXR showed bibasilar atelectasis.  Low concern for UTI without dysuria, pneumonia given acute onset and lack of CXR findings, and no areas of skin breakdown.     Plan:  - Zosyn 3.375 g in dextrose 5% 100 mL, q8hr (01/30 -02/03)

## 2025-02-05 NOTE — PROGRESS NOTE ADULT - PROBLEM SELECTOR PLAN 6
DVT: lovenox (hold for procedure)   Diet: NPO for procedure  PT: NPT   Dispo: anticipate home DVT: lovenox resume in AM    Diet: CLD (advance as tolerated)   PT: NPT   Dispo: anticipate home

## 2025-02-05 NOTE — PROGRESS NOTE ADULT - SUBJECTIVE AND OBJECTIVE BOX
Interval Events:   -Feeling well post procedure  -TB 9.7 --> 4.9  -Tolerated apple sauce    Hospital Medications:  acetaminophen   Oral Liquid .. 650 milliGRAM(s) Oral every 6 hours  aluminum hydroxide/magnesium hydroxide/simethicone Suspension 30 milliLiter(s) Oral every 4 hours PRN  calamine/zinc oxide Lotion 1 Application(s) Topical four times a day PRN  chlorhexidine 4% Liquid 1 Application(s) Topical daily  lactated ringers. 1000 milliLiter(s) (75 mL/Hr) IV Continuous <Continuous>  melatonin 3 milliGRAM(s) Oral at bedtime PRN  metoclopramide Injectable 10 milliGRAM(s) IV Push every 6 hours  morphine  - Injectable 2 milliGRAM(s) IV Push every 4 hours PRN  morphine  - Injectable 4 milliGRAM(s) IV Push every 4 hours PRN  multivitamin 1 Tablet(s) Oral daily  ondansetron Injectable 4 milliGRAM(s) IV Push every 6 hours  piperacillin/tazobactam IVPB.. 3.375 Gram(s) IV Intermittent every 8 hours  sodium chloride 0.9%. 1000 milliLiter(s) (75 mL/Hr) IV Continuous <Continuous>  thiamine 100 milliGRAM(s) Oral daily  triamterene 75 mG/hydrochlorothiazide 50 mG Tablet 1 Tablet(s) Oral daily          PHYSICAL EXAM:   Vital Signs:  Vital Signs Last 24 Hrs  T(C): 36.9 (2025 05:38), Max: 36.9 (2025 21:22)  T(F): 98.5 (2025 05:38), Max: 98.5 (2025 21:22)  HR: 65 (2025 05:38) (64 - 76)  BP: 110/70 (2025 05:38) (110/70 - 166/74)  BP(mean): 101 (2025 18:35) (97 - 109)  RR: 18 (2025 05:38) (16 - 21)  SpO2: 96% (2025 05:38) (95% - 100%)    Parameters below as of 2025 05:38  Patient On (Oxygen Delivery Method): room air      Daily Height in cm: 149.86 (2025 15:22)    Daily   GENERAL:  NAD, Appears stated age  HEENT:  NC/AT,  conjunctivae clear and pink, sclera icteric  CHEST:  Normal Effort, no signs of resp distress  HEART:  RRR, HD stable  ABDOMEN:  Soft, non-tender, non-distended  EXTREMITIES:  no cyanosis or edema  SKIN:  Warm & Dry. No rash or erythema  NEURO:  Alert, oriented, no focal deficit  LABS:                        14.1   12.84 )-----------( 277      ( 2025 07:14 )             41.4     Last Hb:Hemoglobin: 14.1 g/dL (25 @ 07:14)  Hemoglobin: 13.0 g/dL (25 @ 04:36)  Hemoglobin: 12.3 g/dL (25 @ 07:02)               135   |  98    |  10                 Ca: 8.2    BMP:   ----------------------------< 113    M.9   (25 @ 07:12)             3.6    |  19    | 0.58               Ph: 2.6      LFT:     TPro: 6.4 / Alb: 3.8 / TBili: 4.9 / DBili: x / AST: 116 / ALT: 178 / AlkPhos: 218   (25 @ 07:12)    Creatinine: 0.58 mg/dL  Creatinine: 0.51 mg/dL  Creatinine: 0.46 mg/dL      LIVER FUNCTIONS - ( 2025 07:12 )  Alb: 3.8 g/dL / Pro: 6.4 g/dL / ALK PHOS: 218 U/L / ALT: 178 U/L / AST: 116 U/L / GGT: x           PT/INR - ( 2025 04:36 )   PT: 11.5 sec;   INR: 1.00 ratio         PTT - ( 2025 04:36 )  PTT:30.1 sec  Urinalysis Basic - ( 2025 07:12 )    Color: x / Appearance: x / SG: x / pH: x  Gluc: 113 mg/dL / Ketone: x  / Bili: x / Urobili: x   Blood: x / Protein: x / Nitrite: x   Leuk Esterase: x / RBC: x / WBC x   Sq Epi: x / Non Sq Epi: x / Bacteria: x        Culture - Body Fluid with Gram Stain (collected 25 @ 17:29)  Source: Bile  Gram Stain (25 @ 23:00):    No polymorphonuclear cells seen    No organisms seen    by cytocentrifuge    Culture - Blood (collected 25 @ 22:20)  Source: .Blood BLOOD  Final Report (25 @ 02:01):    No growth at 5 days    Culture - Blood (collected 25 @ 22:10)  Source: .Blood BLOOD  Final Report (25 @ 02:01):    No growth at 5 days

## 2025-02-05 NOTE — PROGRESS NOTE ADULT - PROBLEM SELECTOR PLAN 2
CT a/p with intrahepatic biliary ductal dilation, increased from 01/25 CT. Rising T bili.  External biliary drain placed 2/4 by IR.  T bili downtrend to 4.9 2/5.    Plan:  - GI f/u  - GI rec Zosyn 3.375 g in dextrose 5% 100 mL, q8hr (02/05 - )  - IR f/u s/p external biliary drain placement 02/04

## 2025-02-05 NOTE — PROGRESS NOTE ADULT - ASSESSMENT
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 A second stenosis was seen in distal duodenum/prox jeujunum which was not reached.  Admitted for N/V found to have pancreatitis, likely related to malignant infiltration into papilla vs. compression bile duct worsened in setting new stent. Pain and pancreatitis appear to be improving however w/ uptrending TB and white count now s/p percutaneous biliary drain 2/4 w/ IR. Doing well post procedure w/ downtrending bilirubin.  Recommendations:  -C/w zosyn  -Advance diet as tolerated; if able to advance may be ok for discharge later this week  -Trend liver enzymes    Note incomplete until finalized by attending signature/attestation.    Kaia Michaud  GI/Hepatology Fellow PGY5    NON-URGENT CONSULTS:  Please email [email or web address]  AT NIGHT AND ON WEEKENDS:  Available on [employer] Teams  [phone] (Long Range Pager)    For urgent consults, please contact on call GI team. See Amion schedule ([facility]) or Angelfishing system ([facility])

## 2025-02-05 NOTE — PROGRESS NOTE ADULT - SUBJECTIVE AND OBJECTIVE BOX
[facility]  64y  MRN: [id number]    Patient is a 64y old  Female who presents with a chief complaint of Nausea/vomiting post procedure (05 Feb 2025 10:45)      Interval/Overnight Events: no events ON.     Subjective: Pt seen and examined at bedside.  Continues to endorse N/V, slight abdominal pain with swallowing.  Pain at site of biliary drain placement, but manageable.      MEDICATIONS  (STANDING):  acetaminophen   Oral Liquid .. 650 milliGRAM(s) Oral every 6 hours  chlorhexidine 4% Liquid 1 Application(s) Topical daily  lactated ringers. 1000 milliLiter(s) (75 mL/Hr) IV Continuous <Continuous>  metoclopramide Injectable 10 milliGRAM(s) IV Push every 6 hours  multivitamin 1 Tablet(s) Oral daily  ondansetron Injectable 4 milliGRAM(s) IV Push every 6 hours  piperacillin/tazobactam IVPB.. 3.375 Gram(s) IV Intermittent every 8 hours  sodium chloride 0.9%. 1000 milliLiter(s) (75 mL/Hr) IV Continuous <Continuous>  thiamine 100 milliGRAM(s) Oral daily  triamterene 75 mG/hydrochlorothiazide 50 mG Tablet 1 Tablet(s) Oral daily    MEDICATIONS  (PRN):  aluminum hydroxide/magnesium hydroxide/simethicone Suspension 30 milliLiter(s) Oral every 4 hours PRN Dyspepsia  calamine/zinc oxide Lotion 1 Application(s) Topical four times a day PRN Itching  melatonin 3 milliGRAM(s) Oral at bedtime PRN Insomnia  morphine  - Injectable 2 milliGRAM(s) IV Push every 4 hours PRN Moderate Pain (4 - 6)  morphine  - Injectable 4 milliGRAM(s) IV Push every 4 hours PRN Severe Pain (7 - 10)      Objective:    Vitals: Vital Signs Last 24 Hrs  T(C): 36.9 (02-05-25 @ 05:38), Max: 36.9 (02-04-25 @ 21:22)  T(F): 98.5 (02-05-25 @ 05:38), Max: 98.5 (02-04-25 @ 21:22)  HR: 65 (02-05-25 @ 05:38) (64 - 76)  BP: 110/70 (02-05-25 @ 05:38) (110/70 - 166/74)  BP(mean): 101 (02-04-25 @ 18:35) (97 - 109)  RR: 18 (02-05-25 @ 05:38) (16 - 21)  SpO2: 96% (02-05-25 @ 05:38) (95% - 100%)                I&O's Summary    04 Feb 2025 07:01  -  05 Feb 2025 07:00  --------------------------------------------------------  IN: 1550 mL / OUT: 550 mL / NET: 1000 mL        PHYSICAL EXAM:  GENERAL: Jaundiced.  Normal disposition.  HEAD:  Atraumatic, Normocephalic  EYES: Scleral icterus.  EOMI  CHEST/LUNG: Clear to auscultation bilaterally; No rales, rhonchi, wheezing, or rubs  HEART: Regular rate and rhythm; No murmurs, rubs, or gallops  ABDOMEN: Soft, Nontender, Nondistended;   SKIN: No rashes or lesions  NERVOUS SYSTEM:  Alert & Oriented X3, no focal deficits    LABS:                        14.1   12.84 )-----------( 277      ( 05 Feb 2025 07:14 )             41.4                         13.0   10.31 )-----------( 205      ( 04 Feb 2025 04:36 )             36.2                         12.3   8.90  )-----------( 187      ( 03 Feb 2025 07:02 )             35.4     02-05    135  |  98  |  10  ----------------------------<  113[H]  3.6   |  19[L]  |  0.58  02-04    136  |  100  |  7   ----------------------------<  112[H]  3.3[L]   |  21[L]  |  0.51  02-03    137  |  103  |  6[L]  ----------------------------<  105[H]  3.6   |  22  |  0.46[L]    Ca    8.2[L]      05 Feb 2025 07:12  Ca    8.1[L]      04 Feb 2025 04:36  Ca    8.0[L]      03 Feb 2025 07:04  Phos  2.6     02-05  Mg     1.9     02-05    TPro  6.4  /  Alb  3.8  /  TBili  4.9[H]  /  DBili  x   /  AST  116[H]  /  ALT  178[H]  /  AlkPhos  218[H]  02-05  TPro  5.6[L]  /  Alb  3.5  /  TBili  9.7[H]  /  DBili  x   /  AST  127[H]  /  ALT  174[H]  /  AlkPhos  197[H]  02-04  TPro  5.4[L]  /  Alb  3.3  /  TBili  8.5[H]  /  DBili  x   /  AST  165[H]  /  ALT  170[H]  /  AlkPhos  176[H]  02-03    CAPILLARY BLOOD GLUCOSE        PT/INR - ( 04 Feb 2025 04:36 )   PT: 11.5 sec;   INR: 1.00 ratio         PTT - ( 04 Feb 2025 04:36 )  PTT:30.1 sec    Urinalysis Basic - ( 05 Feb 2025 07:12 )    Color: x / Appearance: x / SG: x / pH: x  Gluc: 113 mg/dL / Ketone: x  / Bili: x / Urobili: x   Blood: x / Protein: x / Nitrite: x   Leuk Esterase: x / RBC: x / WBC x   Sq Epi: x / Non Sq Epi: x / Bacteria: x          RADIOLOGY & ADDITIONAL TESTS:    Imaging Personally Reviewed:  [x ] YES  [ ] NO    Consultants involved in case:   Consultant(s) Notes Reviewed:  [ x] YES  [ ] NO:   Care Discussed with Consultants/Other Providers [x ] YES  [ ] NO

## 2025-02-06 ENCOUNTER — TRANSCRIPTION ENCOUNTER (OUTPATIENT)
Age: 65
End: 2025-02-06

## 2025-02-06 VITALS
DIASTOLIC BLOOD PRESSURE: 86 MMHG | RESPIRATION RATE: 18 BRPM | OXYGEN SATURATION: 97 % | HEART RATE: 94 BPM | SYSTOLIC BLOOD PRESSURE: 128 MMHG

## 2025-02-06 DIAGNOSIS — E80.6 OTHER DISORDERS OF BILIRUBIN METABOLISM: ICD-10-CM

## 2025-02-06 LAB
ALBUMIN SERPL ELPH-MCNC: 4 G/DL — SIGNIFICANT CHANGE UP (ref 3.3–5)
ALP SERPL-CCNC: 206 U/L — HIGH (ref 40–120)
ALT FLD-CCNC: 190 U/L — HIGH (ref 10–45)
ANION GAP SERPL CALC-SCNC: 13 MMOL/L — SIGNIFICANT CHANGE UP (ref 5–17)
AST SERPL-CCNC: 111 U/L — HIGH (ref 10–40)
BASOPHILS # BLD AUTO: 0.06 K/UL — SIGNIFICANT CHANGE UP (ref 0–0.2)
BASOPHILS NFR BLD AUTO: 0.4 % — SIGNIFICANT CHANGE UP (ref 0–2)
BILIRUB SERPL-MCNC: 3.6 MG/DL — HIGH (ref 0.2–1.2)
BUN SERPL-MCNC: 12 MG/DL — SIGNIFICANT CHANGE UP (ref 7–23)
CALCIUM SERPL-MCNC: 9.2 MG/DL — SIGNIFICANT CHANGE UP (ref 8.4–10.5)
CHLORIDE SERPL-SCNC: 94 MMOL/L — LOW (ref 96–108)
CO2 SERPL-SCNC: 25 MMOL/L — SIGNIFICANT CHANGE UP (ref 22–31)
CREAT SERPL-MCNC: 0.6 MG/DL — SIGNIFICANT CHANGE UP (ref 0.5–1.3)
EGFR: 100 ML/MIN/1.73M2 — SIGNIFICANT CHANGE UP
EOSINOPHIL # BLD AUTO: 0.22 K/UL — SIGNIFICANT CHANGE UP (ref 0–0.5)
EOSINOPHIL NFR BLD AUTO: 1.6 % — SIGNIFICANT CHANGE UP (ref 0–6)
GLUCOSE SERPL-MCNC: 122 MG/DL — HIGH (ref 70–99)
HCT VFR BLD CALC: 41.8 % — SIGNIFICANT CHANGE UP (ref 34.5–45)
HGB BLD-MCNC: 14.4 G/DL — SIGNIFICANT CHANGE UP (ref 11.5–15.5)
IMM GRANULOCYTES NFR BLD AUTO: 1.1 % — HIGH (ref 0–0.9)
LYMPHOCYTES # BLD AUTO: 15.3 % — SIGNIFICANT CHANGE UP (ref 13–44)
LYMPHOCYTES # BLD AUTO: 2.11 K/UL — SIGNIFICANT CHANGE UP (ref 1–3.3)
MAGNESIUM SERPL-MCNC: 2 MG/DL — SIGNIFICANT CHANGE UP (ref 1.6–2.6)
MCHC RBC-ENTMCNC: 33.6 PG — SIGNIFICANT CHANGE UP (ref 27–34)
MCHC RBC-ENTMCNC: 34.4 G/DL — SIGNIFICANT CHANGE UP (ref 32–36)
MCV RBC AUTO: 97.7 FL — SIGNIFICANT CHANGE UP (ref 80–100)
MONOCYTES # BLD AUTO: 1.09 K/UL — HIGH (ref 0–0.9)
MONOCYTES NFR BLD AUTO: 7.9 % — SIGNIFICANT CHANGE UP (ref 2–14)
NEUTROPHILS # BLD AUTO: 10.16 K/UL — HIGH (ref 1.8–7.4)
NEUTROPHILS NFR BLD AUTO: 73.7 % — SIGNIFICANT CHANGE UP (ref 43–77)
NRBC # BLD: 0 /100 WBCS — SIGNIFICANT CHANGE UP (ref 0–0)
NRBC BLD-RTO: 0 /100 WBCS — SIGNIFICANT CHANGE UP (ref 0–0)
PHOSPHATE SERPL-MCNC: 2.7 MG/DL — SIGNIFICANT CHANGE UP (ref 2.5–4.5)
PLATELET # BLD AUTO: 328 K/UL — SIGNIFICANT CHANGE UP (ref 150–400)
POTASSIUM SERPL-MCNC: 3.3 MMOL/L — LOW (ref 3.5–5.3)
POTASSIUM SERPL-SCNC: 3.3 MMOL/L — LOW (ref 3.5–5.3)
PROT SERPL-MCNC: 6.7 G/DL — SIGNIFICANT CHANGE UP (ref 6–8.3)
RBC # BLD: 4.28 M/UL — SIGNIFICANT CHANGE UP (ref 3.8–5.2)
RBC # FLD: 13.8 % — SIGNIFICANT CHANGE UP (ref 10.3–14.5)
SODIUM SERPL-SCNC: 132 MMOL/L — LOW (ref 135–145)
WBC # BLD: 13.79 K/UL — HIGH (ref 3.8–10.5)
WBC # FLD AUTO: 13.79 K/UL — HIGH (ref 3.8–10.5)

## 2025-02-06 PROCEDURE — 82962 GLUCOSE BLOOD TEST: CPT

## 2025-02-06 PROCEDURE — 97161 PT EVAL LOW COMPLEX 20 MIN: CPT

## 2025-02-06 PROCEDURE — 85610 PROTHROMBIN TIME: CPT

## 2025-02-06 PROCEDURE — 87641 MR-STAPH DNA AMP PROBE: CPT

## 2025-02-06 PROCEDURE — 84132 ASSAY OF SERUM POTASSIUM: CPT

## 2025-02-06 PROCEDURE — 87075 CULTR BACTERIA EXCEPT BLOOD: CPT

## 2025-02-06 PROCEDURE — 82803 BLOOD GASES ANY COMBINATION: CPT

## 2025-02-06 PROCEDURE — 87015 SPECIMEN INFECT AGNT CONCNTJ: CPT

## 2025-02-06 PROCEDURE — 83690 ASSAY OF LIPASE: CPT

## 2025-02-06 PROCEDURE — 99231 SBSQ HOSP IP/OBS SF/LOW 25: CPT

## 2025-02-06 PROCEDURE — C1876: CPT

## 2025-02-06 PROCEDURE — 82330 ASSAY OF CALCIUM: CPT

## 2025-02-06 PROCEDURE — 87070 CULTURE OTHR SPECIMN AEROBIC: CPT

## 2025-02-06 PROCEDURE — 82435 ASSAY OF BLOOD CHLORIDE: CPT

## 2025-02-06 PROCEDURE — 87205 SMEAR GRAM STAIN: CPT

## 2025-02-06 PROCEDURE — C1769: CPT

## 2025-02-06 PROCEDURE — 74018 RADEX ABDOMEN 1 VIEW: CPT

## 2025-02-06 PROCEDURE — C1751: CPT

## 2025-02-06 PROCEDURE — 85027 COMPLETE CBC AUTOMATED: CPT

## 2025-02-06 PROCEDURE — 85025 COMPLETE CBC W/AUTO DIFF WBC: CPT

## 2025-02-06 PROCEDURE — 86140 C-REACTIVE PROTEIN: CPT

## 2025-02-06 PROCEDURE — 74177 CT ABD & PELVIS W/CONTRAST: CPT | Mod: MC

## 2025-02-06 PROCEDURE — 86900 BLOOD TYPING SEROLOGIC ABO: CPT

## 2025-02-06 PROCEDURE — 0225U NFCT DS DNA&RNA 21 SARSCOV2: CPT

## 2025-02-06 PROCEDURE — 36415 COLL VENOUS BLD VENIPUNCTURE: CPT

## 2025-02-06 PROCEDURE — 83735 ASSAY OF MAGNESIUM: CPT

## 2025-02-06 PROCEDURE — 85014 HEMATOCRIT: CPT

## 2025-02-06 PROCEDURE — 80053 COMPREHEN METABOLIC PANEL: CPT

## 2025-02-06 PROCEDURE — 82150 ASSAY OF AMYLASE: CPT

## 2025-02-06 PROCEDURE — C1894: CPT

## 2025-02-06 PROCEDURE — 86901 BLOOD TYPING SEROLOGIC RH(D): CPT

## 2025-02-06 PROCEDURE — 87637 SARSCOV2&INF A&B&RSV AMP PRB: CPT

## 2025-02-06 PROCEDURE — 47533 PLMT BILIARY DRAINAGE CATH: CPT

## 2025-02-06 PROCEDURE — 84100 ASSAY OF PHOSPHORUS: CPT

## 2025-02-06 PROCEDURE — 85018 HEMOGLOBIN: CPT

## 2025-02-06 PROCEDURE — 36569 INSJ PICC 5 YR+ W/O IMAGING: CPT

## 2025-02-06 PROCEDURE — 84295 ASSAY OF SERUM SODIUM: CPT

## 2025-02-06 PROCEDURE — 86850 RBC ANTIBODY SCREEN: CPT

## 2025-02-06 PROCEDURE — 87640 STAPH A DNA AMP PROBE: CPT

## 2025-02-06 PROCEDURE — 74330 X-RAY BILE/PANC ENDOSCOPY: CPT

## 2025-02-06 PROCEDURE — 85730 THROMBOPLASTIN TIME PARTIAL: CPT

## 2025-02-06 PROCEDURE — 83605 ASSAY OF LACTIC ACID: CPT

## 2025-02-06 PROCEDURE — 82947 ASSAY GLUCOSE BLOOD QUANT: CPT

## 2025-02-06 PROCEDURE — 93005 ELECTROCARDIOGRAM TRACING: CPT

## 2025-02-06 PROCEDURE — C1729: CPT

## 2025-02-06 PROCEDURE — 87040 BLOOD CULTURE FOR BACTERIA: CPT

## 2025-02-06 PROCEDURE — 99239 HOSP IP/OBS DSCHRG MGMT >30: CPT | Mod: GC

## 2025-02-06 PROCEDURE — 71045 X-RAY EXAM CHEST 1 VIEW: CPT

## 2025-02-06 RX ORDER — ACETAMINOPHEN 160 MG/5ML
20.31 SUSPENSION ORAL
Qty: 568.68 | Refills: 0
Start: 2025-02-06 | End: 2025-02-12

## 2025-02-06 RX ORDER — MAGNESIUM, ALUMINUM HYDROXIDE 200-225/5
30 SUSPENSION, ORAL (FINAL DOSE FORM) ORAL
Qty: 1260 | Refills: 0
Start: 2025-02-06 | End: 2025-02-12

## 2025-02-06 RX ORDER — ONDANSETRON 4 MG/1
1 TABLET, ORALLY DISINTEGRATING ORAL
Qty: 20 | Refills: 0
Start: 2025-02-06 | End: 2025-02-10

## 2025-02-06 RX ORDER — PANTOPRAZOLE 20 MG/1
1 TABLET, DELAYED RELEASE ORAL
Qty: 60 | Refills: 0
Start: 2025-02-06 | End: 2025-03-07

## 2025-02-06 RX ORDER — MECOBAL/LEVOMEFOLAT CA/B6 PHOS 2-3-35 MG
1 TABLET ORAL
Qty: 0 | Refills: 0 | DISCHARGE
Start: 2025-02-06

## 2025-02-06 RX ORDER — POTASSIUM CHLORIDE 750 MG/1
10 TABLET, EXTENDED RELEASE ORAL
Refills: 0 | Status: COMPLETED | OUTPATIENT
Start: 2025-02-06 | End: 2025-02-06

## 2025-02-06 RX ORDER — ONDANSETRON 4 MG/1
4 TABLET, ORALLY DISINTEGRATING ORAL EVERY 6 HOURS
Refills: 0 | Status: DISCONTINUED | OUTPATIENT
Start: 2025-02-06 | End: 2025-02-06

## 2025-02-06 RX ORDER — ONDANSETRON 4 MG/1
1 TABLET, ORALLY DISINTEGRATING ORAL
Qty: 2 | Refills: 0
Start: 2025-02-06 | End: 2025-02-10

## 2025-02-06 RX ORDER — THIAMINE HCL 100 MG
1 TABLET ORAL
Qty: 0 | Refills: 0 | DISCHARGE
Start: 2025-02-06

## 2025-02-06 RX ADMIN — PIPERACILLIN SODIUM AND TAZOBACTAM SODIUM 25 GRAM(S): 2; 250 INJECTION, POWDER, FOR SOLUTION INTRAVENOUS at 14:14

## 2025-02-06 RX ADMIN — ENOXAPARIN SODIUM 40 MILLIGRAM(S): 100 INJECTION SUBCUTANEOUS at 05:29

## 2025-02-06 RX ADMIN — POTASSIUM CHLORIDE 100 MILLIEQUIVALENT(S): 750 TABLET, EXTENDED RELEASE ORAL at 11:19

## 2025-02-06 RX ADMIN — Medication 1 TABLET(S): at 12:41

## 2025-02-06 RX ADMIN — TRIAMTERENE AND HYDROCHLOROTHIAZIDE 1 TABLET(S): 37.5; 25 TABLET ORAL at 05:30

## 2025-02-06 RX ADMIN — POTASSIUM CHLORIDE 100 MILLIEQUIVALENT(S): 750 TABLET, EXTENDED RELEASE ORAL at 09:53

## 2025-02-06 RX ADMIN — SODIUM CHLORIDE 75 MILLILITER(S): 9 INJECTION, SOLUTION INTRAVENOUS at 05:30

## 2025-02-06 RX ADMIN — ACETAMINOPHEN 650 MILLIGRAM(S): 160 SUSPENSION ORAL at 00:15

## 2025-02-06 RX ADMIN — POTASSIUM CHLORIDE 100 MILLIEQUIVALENT(S): 750 TABLET, EXTENDED RELEASE ORAL at 09:04

## 2025-02-06 RX ADMIN — Medication 100 MILLIGRAM(S): at 12:40

## 2025-02-06 RX ADMIN — Medication 30 MILLILITER(S): at 05:29

## 2025-02-06 RX ADMIN — PIPERACILLIN SODIUM AND TAZOBACTAM SODIUM 25 GRAM(S): 2; 250 INJECTION, POWDER, FOR SOLUTION INTRAVENOUS at 05:30

## 2025-02-06 RX ADMIN — ANTISEPTIC SURGICAL SCRUB 1 APPLICATION(S): 0.04 SOLUTION TOPICAL at 12:41

## 2025-02-06 NOTE — PROGRESS NOTE ADULT - PROBLEM SELECTOR PLAN 2
CT a/p with intrahepatic biliary ductal dilation, increased from 01/25 CT. Rising T bili.  External biliary drain placed 2/4 by IR.  T bili downtrend to 4.9 2/5.    Plan:  - GI f/u  - GI rec Zosyn 3.375 g in dextrose 5% 100 mL, q8hr (02/05 - )  - IR f/u s/p external biliary drain placement 02/04 CT a/p with intrahepatic biliary ductal dilation, increased from 01/25 CT. Rising T bili.  External biliary drain placed 2/4 by IR.  T bili downtrend to 4.9 2/5.    Plan:  - GI f/u  - GI rec Zosyn 3.375 g in dextrose 5% 100 mL, q8hr (02/04 - )  - IR f/u s/p external biliary drain placement 02/04 CT a/p with intrahepatic biliary ductal dilation, increased from 01/25 CT. Rising T bili.  External biliary drain placed 2/4 by IR.  T bili downtrend to 4.9 2/5.    Plan:  - GI f/u  - GI rec Zosyn 3.375 g in dextrose 5% 100 mL, q8hr (02/04 - 02/06), d/c 02/06 after 8 day total course  - IR f/u s/p external biliary drain placement 02/04

## 2025-02-06 NOTE — DISCHARGE NOTE NURSING/CASE MANAGEMENT/SOCIAL WORK - PATIENT PORTAL LINK FT
You can access the FollowMyHealth Patient Portal offered by [facility] [place] by registering at the following website: [email or web address]. By joining [employer]’s FollowMyHealth portal, you will also be able to view your health information using other applications (apps) compatible with our system.

## 2025-02-06 NOTE — PROGRESS NOTE ADULT - SUBJECTIVE AND OBJECTIVE BOX
Interventional Radiology Follow-Up Note    This is a 64y Female s/p right percutaneous external biliary drain placement on 25 in Interventional Radiology with Dr. Silva.      S: Patient seen and examined @ bedside. Pt c/o frequent loose stools. Denies any n/v or abdominal pain.     Medication:     enoxaparin Injectable: ()  piperacillin/tazobactam IVPB.: ()  piperacillin/tazobactam IVPB.-: ()  piperacillin/tazobactam IVPB.-: ()  piperacillin/tazobactam IVPB.-: ()  piperacillin/tazobactam IVPB..: ()  triamterene 75 mG/hydrochlorothiazide 50 mG Tablet: ()    Vitals:   T(F): 99.1, Max: 99.1 (04:55)  HR: 69  BP: 130/83  RR: 18  SpO2: 94%    Physical Exam:  General: Nontoxic, in NAD, A&O x3.  Abdomen: soft, NTND, no peritoneal signs.  Drain Device: Drain intact attached to gravity bag. Dressing clean, dry, intact. Drain flushed with 5cc saline with positive fluid return to gravity bag.     24hr Drain output: 1000cc yellow biliary fluid    LABS:  WBC 13.79 / Hgb 14.4 / Hct 41.8 / Plt 328  Na -- / K -- / CO2 -- / Cl -- / BUN -- / Cr -- / Glucose --  ALT -- / AST -- / Alk Phos -- / Tbili --  Ptt -- / Pt -- / INR --    Preliminary Report:    No growth to date.        Assessment/Plan:  64y Female admitted with Obstruction of duodenum    Pt most recently s/p right percutaneous external biliary drain placement on 25 in Interventional Radiology with Dr. Silva.      -continue global management per primary team  -trend vs/labs  -flush drain with 5cc NS daily forward only; DO NOT aspirate  -change dressing q3 days or when dressing is saturated  -regarding outpatient follow up with IR, if the patient is d/c home with drainage catheter they can make an appointment with IR by calling the IR booking office at [phone]; recommend IR follow in 3 months for tube evaluation / tube exchange  - pt educated on importance of oral intake of fluid, given large amount of fluid drainage from biliary drainage catheter.  - Greater than 50% of the encounter was spent counseling and/or coordination of care on drain management and follow up.   -they will benefit from VNS service to help with drainage catheter care; they should continue same drainage catheter care as an outpatient.     Any questions or concerns regarding above please reach out to IR:   -Available on [employer] teams  -During working hours (7a-5p): call -[phone]  -Emergent issues after 5pm: page: [phone]  -Non-emergent consults: Please place a [place] order "IR Consult" with an appropriate callback number  -Scheduling questions: [phone]  -Clinic/Outpatient [phone]      Jennifer Murphy NP  Available on teams

## 2025-02-06 NOTE — PROGRESS NOTE ADULT - PROBLEM SELECTOR PLAN 1
Presenting with nausea/vomiting post-procedure. Admitted for observation. History of pancreatitis post-EGD. QTC WNL.  - CT a/p with pancreatitis    Plan:  - GI consulted, recs appreciated  - Nausea: Zofran, Metoclopramide scheduled  - Pain: acetaminophen scheduled, morphine 2 mg q4h, morphine 4 mg q4h prn  - NS 75 cc/hr Presenting with nausea/vomiting post-procedure. Admitted for observation. History of pancreatitis post-EGD. QTC WNL.  - CT a/p with pancreatitis    Plan:  - GI consulted, recs appreciated  - Nausea: Mylanta PRN, switching to Zofran oral PRN, Metoclopramide d/c  - Pain: acetaminophen scheduled, morphine 2 mg q4h, morphine 4 mg q4h prn  - NS 75 cc/hr Presenting with nausea/vomiting post-procedure. Admitted for observation. History of pancreatitis post-EGD. QTC WNL.  - CT a/p with pancreatitis    Plan:  - GI consulted, recs appreciated  - Nausea: Mylanta PRN, switching to Zofran oral PRN 4mg q4-8hrs, Metoclopramide d/c  - Pain: acetaminophen scheduled, morphine 2 mg q4h, morphine 4 mg q4h prn  - NS 75 cc/hr

## 2025-02-06 NOTE — PROGRESS NOTE ADULT - PROVIDER SPECIALTY LIST ADULT
Gastroenterology
Internal Medicine
Internal Medicine
Gastroenterology
Internal Medicine
Intervent Radiology
Gastroenterology
Internal Medicine

## 2025-02-06 NOTE — PROGRESS NOTE ADULT - PROBLEM SELECTOR PLAN 6
DVT: lovenox resume in AM    Diet: CLD (advance as tolerated)   PT: NPT   Dispo: anticipate home DVT: lovenox   Diet: CLD (advance as tolerated)   PT: NPT   Dispo: anticipate home

## 2025-02-06 NOTE — DISCHARGE NOTE NURSING/CASE MANAGEMENT/SOCIAL WORK - FINANCIAL ASSISTANCE
[facility] provides services at a reduced cost to those who are determined to be eligible through [facility]’s financial assistance program. Information regarding [facility]’s financial assistance program can be found by going to [email or web address] or by calling [phone].

## 2025-02-06 NOTE — DISCHARGE NOTE NURSING/CASE MANAGEMENT/SOCIAL WORK - NSDCFUADDAPPT_GEN_ALL_CORE_FT
You may call the IR booking office @ [phone] to schedule your 3 month follow up. Please feel free to contact us at [phone] if any problems arise. After 6PM, Monday through Friday, on weekends and on holidays, please call [phone] and ask for the radiology resident on call to be paged.

## 2025-02-06 NOTE — PROGRESS NOTE ADULT - REASON FOR ADMISSION
Nausea/vomiting post procedure

## 2025-02-06 NOTE — PROGRESS NOTE ADULT - ATTENDING COMMENTS
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  Please call the advanced GI service with any questions or concerns.
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  Today with improvement in n/v (afternoon) however elevated liver test worsening  May have some biliary obstruction from mass/stent and pancreatitis  Would trend liver tests, slowly advance diet as tolerated, manage pain  When symptoms stabilize would obtain repeat CT with IV and oral contrast to eval extent of biliary dilatation as well as passage of contrast through stent and through distal stricture  Pending above will assess need for ERCP with stent placement through duodenal stent struts if possible, and whether J-tube placement distal to 2nd stricture would be recommended (surgical vs percutaneous via IR)    Thank you for this interesting consult.  Please call the advanced GI service with any questions or concerns.
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  Please call the advanced GI service with any questions or concerns.
64F, metastatic breast ca, duodenal stricture s/p duodenal stent placement, course c/b post procedure pancreatitis, now with uptrending T bili   Long dicussion today with kirstin and Mindy paniagua, re: uptrending bili and CT scan yesterday  Reviewed w advanced GI attg; patient will ultimately need ERCP vs IR PTC for treatment of biliary obstruction   Please obtain IR c/s   Will discuss w advanced GI team on monday   trend cbc, cmp   ok to start clear liquid diet   pain control   rest of care per primary team  GI to follow, please call w questions
As above.    Patient seen and examined on 2/5/2025 in the late afternoon.  Discussed with GI fellow earlier in the day.    Impression:    #1.  Duodenal obstruction due to malignancy, status post recent duodenal stent, also with known distal duodenal/proximal jejunal additional stricture, admitted with nausea and vomiting and abdominal pain due to pancreatitis and jaundice due to biliary obstruction.  Patient currently tolerating clear liquid diet    #2.  Jaundice resolving after percutaneous biliary drainage by interventional radiology    Recommendations:    #1.  Follow CBC/CMP    #2.  Full liquid diet, advance as tolerated to low fiber puréed diet.  Risk of stent occlusion with p.o. intake above liquids discussed.  Patient chooses liquid diet for 2 weeks, and then trial low fiber puréed diet.
GI following for duodenal stricture s/p duodenal stent placement.   Course c/b pancreatitis.   NOw with uptrending TB.   Please obtain CT abd pelvis to further evaluate   NPO for now   abx per primary team   trend cbc, LFTs   GI to follow, please call w questions.
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  Please call the advanced GI service with any questions or concerns.
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 Found to have pancreatitis.    #Pancreatitis  - Patient is s/p duodenal stent placement 1/28 with advanced GI  - Experiencing nausea/vomiting after procedure. Will monitor patient post-procedure.   - Supportive care with zofran/reglan PRN.   - Advance diet as tolerated.   - GI following, appreciate recs  - continue IVF  - continue pain control with IV morphine    Rest as above.
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 Found to have pancreatitis.    #Pancreatitis  - Patient is s/p duodenal stent placement 1/28 with advanced GI  - Experiencing nausea/vomiting after procedure  - Supportive care with zofran/reglan PRN.   - GI following, appreciate recs  - continue pain control with IV morphine  - CT abd ordered to assess biliary duct given uptrending bilirubin- "Intrahepatic biliary ductal dilatation has increased when compared to 1/25/2025 exam"; f/u GI recs   - on CLD for now; advance as tolerated     #Fever  - had fever 1/29 overnight  - blood cultures neg to date   - flu/covid/rsv negative  - completed short course of empiric zosyn given recent GI instrumentation; continue to monitor; currently afebrile      - s/p external biliary drain placlement with IR 2/4; tolerated procedure well; Tbili downtrending overall; on zosyn per GI recs   - f/u GI about abx duration   - advance diet as tolerated  - monitor Tbili     - resume lovenox in AM     DISPO: hopeful DC home later this week pending tolerating diet and final GI and IR recs     Rest as above. Discussed with HS.
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 Found to have pancreatitis.    #Pancreatitis  - Patient is s/p duodenal stent placement 1/28 with advanced GI  - Experiencing nausea/vomiting after procedure  - Supportive care with zofran/reglan PRN.   - NPO for now as per GI  - GI following, appreciate recs  - continue IVF  - continue pain control with IV morphine  - CT w/IV and oral contrast ordered to assess biliary duct given uptrending bilirubin    #Fever  - had fever 1/29 overnight  - blood cultures sent  - flu/covid/rsv negative  - started on empiric zosyn give recent GI instrumentation      Rest as above.
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 Found to have pancreatitis.    #Pancreatitis  - Patient is s/p duodenal stent placement 1/28 with advanced GI  - Experiencing nausea/vomiting after procedure  - Supportive care with zofran/reglan PRN.   - GI following, appreciate recs  - continue pain control with IV morphine  - CT abd ordered to assess biliary duct given uptrending bilirubin- "Intrahepatic biliary ductal dilatation has increased when compared to 1/25/2025 exam"; f/u GI recs     #Fever  - had fever 1/29 overnight  - blood cultures neg to date   - flu/covid/rsv negative  - completed short course of empiric zosyn given recent GI instrumentation; continue to monitor     - IR recs appreciated; plan for drain placement today; currently NPO for procedure; hold lovenox     Rest as above. Discussed with HS.
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 Found to have pancreatitis.    #Pancreatitis  - Patient is s/p duodenal stent placement 1/28 with advanced GI  - Experiencing nausea/vomiting after procedure  - Supportive care with zofran/reglan PRN.   - Advance diet as tolerated.   - GI following, appreciate recs  - continue IVF  - continue pain control with IV morphine  - CT w/IV and oral contrast ordered to assess biliary duct given uptrending bilirubin    #Fever  - had fever 1/29 overnight  - blood cultures sent  - flu/covid/rsv negative  - started on empiric zosyn give recent GI instrumentation      Rest as above.
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 Found to have pancreatitis.    #Pancreatitis  - Patient is s/p duodenal stent placement 1/28 with advanced GI  - Experiencing nausea/vomiting after procedure  - Supportive care with zofran/reglan PRN.   - GI following, appreciate recs  - continue IVF  - continue pain control with IV morphine  - CT abd ordered to assess biliary duct given uptrending bilirubin- "Intrahepatic biliary ductal dilatation has increased when compared to 1/25/2025 exam"; f/u GI recs     #Fever  - had fever 1/29 overnight  - blood cultures neg to date   - flu/covid/rsv negative  - started on empiric zosyn give recent GI instrumentation; will plan to dc zosyn today and monitor     - IR recs appreciated; plan for drain placement tomorrow; NPO at 11pm; hold lovenox     Rest as above. Discussed with HS.
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 Found to have pancreatitis.    #Pancreatitis  - Patient is s/p duodenal stent placement 1/28 with advanced GI  - Experiencing nausea/vomiting after procedure  - Supportive care with zofran/reglan PRN.   - Advance diet as tolerated.   - GI following, appreciate recs  - continue IVF  - continue pain control with IV morphine    #Fever  - had fever 1/29 overnight  - blood cultures sent  - flu/covid/rsv negative  - started on empiric zosyn give recent GI instrumentation    Rest as above.
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 Found to have pancreatitis.    #Pancreatitis  - Patient is s/p duodenal stent placement 1/28 with advanced GI  - Experiencing nausea/vomiting after procedure  - Supportive care with zofran/reglan PRN.   - GI following, appreciate recs  - CT abd ordered to assess biliary duct given uptrending bilirubin- "Intrahepatic biliary ductal dilatation has increased when compared to 1/25/2025 exam"; f/u GI recs   - on CLD; advance to full liquid; advance as tolerated to low fiber puree diet per GI recs      #Fever  - had fever 1/29 overnight  - blood cultures neg to date   - flu/covid/rsv negative  - completed course of zosyn given recent GI instrumentation; continue to monitor; currently afebrile      - s/p external biliary drain placlement with IR 2/4; tolerated procedure well; Tbili downtrending overall; completed 7 day course of zosyn    - GI recs appreciated   - IR recs appreciated for drain care; CM f/u for home care     DISPO: DC planning to home today   40 mins spent on DC planning      Rest as above. Discussed with HS.
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 Found to have pancreatitis.    #Pancreatitis  - Patient is s/p duodenal stent placement 1/28 with advanced GI  - Experiencing nausea/vomiting after procedure  - Supportive care with zofran/reglan PRN.   - NPO for now as per GI  - GI following, appreciate recs  - continue IVF  - continue pain control with IV morphine  - CT w/IV and oral contrast ordered to assess biliary duct given uptrending bilirubin- "Intrahepatic biliary ductal dilatation has increased when compared to 1/25/2025 exam", will discuss with GI for future plans and possible procedures    #Fever  - had fever 1/29 overnight  - blood cultures sent  - flu/covid/rsv negative  - started on empiric zosyn give recent GI instrumentation      Rest as above.
with Baby/Home

## 2025-02-06 NOTE — PROGRESS NOTE ADULT - SUBJECTIVE AND OBJECTIVE BOX
CLOVER OG  64y  MRN: [id number]    Patient is a 64y old  Female who presents with a chief complaint of Nausea/vomiting post procedure (05 Feb 2025 11:51)      Interval/Overnight Events: no events ON.     Subjective: Pt seen and examined at bedside.     MEDICATIONS  (STANDING):  acetaminophen   Oral Liquid .. 650 milliGRAM(s) Oral every 6 hours  chlorhexidine 4% Liquid 1 Application(s) Topical daily  enoxaparin Injectable 40 milliGRAM(s) SubCutaneous every 24 hours  lactated ringers. 1000 milliLiter(s) (75 mL/Hr) IV Continuous <Continuous>  multivitamin 1 Tablet(s) Oral daily  piperacillin/tazobactam IVPB.. 3.375 Gram(s) IV Intermittent every 8 hours  sodium chloride 0.9%. 1000 milliLiter(s) (75 mL/Hr) IV Continuous <Continuous>  thiamine 100 milliGRAM(s) Oral daily  triamterene 75 mG/hydrochlorothiazide 50 mG Tablet 1 Tablet(s) Oral daily    MEDICATIONS  (PRN):  aluminum hydroxide/magnesium hydroxide/simethicone Suspension 30 milliLiter(s) Oral every 4 hours PRN Dyspepsia  calamine/zinc oxide Lotion 1 Application(s) Topical four times a day PRN Itching  melatonin 3 milliGRAM(s) Oral at bedtime PRN Insomnia  metoclopramide Injectable 10 milliGRAM(s) IV Push every 6 hours PRN nausea      Objective:    Vitals: Vital Signs Last 24 Hrs  T(C): 37.3 (02-06-25 @ 04:55), Max: 37.3 (02-06-25 @ 04:55)  T(F): 99.1 (02-06-25 @ 04:55), Max: 99.1 (02-06-25 @ 04:55)  HR: 69 (02-06-25 @ 04:55) (64 - 69)  BP: 130/83 (02-06-25 @ 04:55) (115/68 - 138/81)  BP(mean): --  RR: 18 (02-06-25 @ 04:55) (18 - 18)  SpO2: 94% (02-06-25 @ 04:55) (94% - 98%)                I&O's Summary    05 Feb 2025 07:01  -  06 Feb 2025 07:00  --------------------------------------------------------  IN: 1580 mL / OUT: 1000 mL / NET: 580 mL        PHYSICAL EXAM:  GENERAL: NAD  HEAD:  Atraumatic, Normocephalic  EYES: EOMI, conjunctiva and sclera clear  CHEST/LUNG: Clear to auscultation bilaterally; No rales, rhonchi, wheezing, or rubs  HEART: Regular rate and rhythm; No murmurs, rubs, or gallops  ABDOMEN: Soft, Nontender, Nondistended;   SKIN: No rashes or lesions  NERVOUS SYSTEM:  Alert & Oriented X3, no focal deficits    LABS:                        14.4   13.79 )-----------( 328      ( 06 Feb 2025 06:50 )             41.8                         14.1   12.84 )-----------( 277      ( 05 Feb 2025 07:14 )             41.4                         13.0   10.31 )-----------( 205      ( 04 Feb 2025 04:36 )             36.2     02-05    135  |  98  |  10  ----------------------------<  113[H]  3.6   |  19[L]  |  0.58  02-04    136  |  100  |  7   ----------------------------<  112[H]  3.3[L]   |  21[L]  |  0.51    Ca    8.2[L]      05 Feb 2025 07:12  Ca    8.1[L]      04 Feb 2025 04:36  Phos  2.6     02-05  Mg     1.9     02-05    TPro  6.4  /  Alb  3.8  /  TBili  4.9[H]  /  DBili  x   /  AST  116[H]  /  ALT  178[H]  /  AlkPhos  218[H]  02-05  TPro  5.6[L]  /  Alb  3.5  /  TBili  9.7[H]  /  DBili  x   /  AST  127[H]  /  ALT  174[H]  /  AlkPhos  197[H]  02-04    CAPILLARY BLOOD GLUCOSE            Urinalysis Basic - ( 05 Feb 2025 07:12 )    Color: x / Appearance: x / SG: x / pH: x  Gluc: 113 mg/dL / Ketone: x  / Bili: x / Urobili: x   Blood: x / Protein: x / Nitrite: x   Leuk Esterase: x / RBC: x / WBC x   Sq Epi: x / Non Sq Epi: x / Bacteria: x          RADIOLOGY & ADDITIONAL TESTS:    Imaging Personally Reviewed:  [x ] YES  [ ] NO    Consultants involved in case:   Consultant(s) Notes Reviewed:  [ x] YES  [ ] NO:   Care Discussed with Consultants/Other Providers [x ] YES  [ ] NO           CLOVER OG  64y  MRN: [id number]    Patient is a 64y old  Female who presents with a chief complaint of Nausea/vomiting post procedure (05 Feb 2025 11:51)      Interval/Overnight Events: no events ON.     Subjective: Pt seen and examined at bedside.  Didn't take zofran or reglan last night, endorses nausea, GI distress.  Was able to eat some broth and 1/3 of an Ensure yesterday.  No vomiting.  Mild pain at site of biliary drain.  Able to ambulate independently yesterday.    MEDICATIONS  (STANDING):  acetaminophen   Oral Liquid .. 650 milliGRAM(s) Oral every 6 hours  chlorhexidine 4% Liquid 1 Application(s) Topical daily  enoxaparin Injectable 40 milliGRAM(s) SubCutaneous every 24 hours  lactated ringers. 1000 milliLiter(s) (75 mL/Hr) IV Continuous <Continuous>  multivitamin 1 Tablet(s) Oral daily  piperacillin/tazobactam IVPB.. 3.375 Gram(s) IV Intermittent every 8 hours  sodium chloride 0.9%. 1000 milliLiter(s) (75 mL/Hr) IV Continuous <Continuous>  thiamine 100 milliGRAM(s) Oral daily  triamterene 75 mG/hydrochlorothiazide 50 mG Tablet 1 Tablet(s) Oral daily    MEDICATIONS  (PRN):  aluminum hydroxide/magnesium hydroxide/simethicone Suspension 30 milliLiter(s) Oral every 4 hours PRN Dyspepsia  calamine/zinc oxide Lotion 1 Application(s) Topical four times a day PRN Itching  melatonin 3 milliGRAM(s) Oral at bedtime PRN Insomnia  metoclopramide Injectable 10 milliGRAM(s) IV Push every 6 hours PRN nausea      Objective:    Vitals: Vital Signs Last 24 Hrs  T(C): 37.3 (02-06-25 @ 04:55), Max: 37.3 (02-06-25 @ 04:55)  T(F): 99.1 (02-06-25 @ 04:55), Max: 99.1 (02-06-25 @ 04:55)  HR: 69 (02-06-25 @ 04:55) (64 - 69)  BP: 130/83 (02-06-25 @ 04:55) (115/68 - 138/81)  BP(mean): --  RR: 18 (02-06-25 @ 04:55) (18 - 18)  SpO2: 94% (02-06-25 @ 04:55) (94% - 98%)                I&O's Summary    05 Feb 2025 07:01  -  06 Feb 2025 07:00  --------------------------------------------------------  IN: 1580 mL / OUT: 1000 mL / NET: 580 mL        PHYSICAL EXAM:  GENERAL: NAD  HEAD:  Atraumatic, Normocephalic  EYES: EOMI, conjunctiva and sclera clear  CHEST/LUNG: Clear to auscultation bilaterally; No rales, rhonchi, wheezing, or rubs  HEART: Regular rate and rhythm; No murmurs, rubs, or gallops  ABDOMEN: Soft, Nontender, Nondistended;   SKIN: No rashes or lesions  NERVOUS SYSTEM:  Alert & Oriented X3, no focal deficits    LABS:                        14.4   13.79 )-----------( 328      ( 06 Feb 2025 06:50 )             41.8                         14.1   12.84 )-----------( 277      ( 05 Feb 2025 07:14 )             41.4                         13.0   10.31 )-----------( 205      ( 04 Feb 2025 04:36 )             36.2     02-05    135  |  98  |  10  ----------------------------<  113[H]  3.6   |  19[L]  |  0.58  02-04    136  |  100  |  7   ----------------------------<  112[H]  3.3[L]   |  21[L]  |  0.51    Ca    8.2[L]      05 Feb 2025 07:12  Ca    8.1[L]      04 Feb 2025 04:36  Phos  2.6     02-05  Mg     1.9     02-05    TPro  6.4  /  Alb  3.8  /  TBili  4.9[H]  /  DBili  x   /  AST  116[H]  /  ALT  178[H]  /  AlkPhos  218[H]  02-05  TPro  5.6[L]  /  Alb  3.5  /  TBili  9.7[H]  /  DBili  x   /  AST  127[H]  /  ALT  174[H]  /  AlkPhos  197[H]  02-04    CAPILLARY BLOOD GLUCOSE            Urinalysis Basic - ( 05 Feb 2025 07:12 )    Color: x / Appearance: x / SG: x / pH: x  Gluc: 113 mg/dL / Ketone: x  / Bili: x / Urobili: x   Blood: x / Protein: x / Nitrite: x   Leuk Esterase: x / RBC: x / WBC x   Sq Epi: x / Non Sq Epi: x / Bacteria: x          RADIOLOGY & ADDITIONAL TESTS:    Imaging Personally Reviewed:  [x ] YES  [ ] NO    Consultants involved in case:   Consultant(s) Notes Reviewed:  [ x] YES  [ ] NO:   Care Discussed with Consultants/Other Providers [x ] YES  [ ] NO

## 2025-02-06 NOTE — PROGRESS NOTE ADULT - ASSESSMENT
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 A second stenosis was seen in distal duodenum/prox jeujunum which was not reached.  Admitted for N/V found to have pancreatitis, likely related to malignant infiltration into papilla vs. compression bile duct worsened in setting new stent. Pain and pancreatitis appear to be improving however w/ uptrending TB and white count now s/p percutaneous biliary drain 2/4 w/ IR. Doing well post procedure w/ downtrending bilirubin.  Recommendations:  -Would complete 7 day course of zosyn. Can transition to cipro on d/c  -Advance diet to as much as low fiber pureed- discussed with patient consistency of foods to eat and those to avoid  -Management of KATHERYN drain per IR  -Encourage PO intake- still w/ limited nutritional intake  -Rest per primary team   -Advanced GI will sign off. Please call with questions    Note incomplete until finalized by attending signature/attestation.    Kaia Michaud  GI/Hepatology Fellow PGY5    NON-URGENT CONSULTS:  Please email [email or web address]  AT NIGHT AND ON WEEKENDS:  Available on Microsoft Teams  [phone] (Long Range Pager)    For urgent consults, please contact on call GI team. See Amion schedule ([facility]) or [employer] system ([facility])   64F PMH of metastatic breast cancer s/p bilateral mastectomy with reconstruction, on immunotherapy c/b duodenal stricture s/p duodenal stent placement on 1/28 admitted for nausea/vomiting after procedure admitted for pancreatitis w/ course further c/b uptrending bilirubin in setting likely biliary ductal dilatation from malignancy now s/p percutaneous tube with IR.    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 A second stenosis was seen in distal duodenum/prox jeujunum which was not reached.  Admitted for N/V found to have pancreatitis, likely related to malignant infiltration into papilla vs. compression bile duct worsened in setting new stent. Pain and pancreatitis appear to be improving however w/ uptrending TB and white count now s/p percutaneous biliary drain 2/4 w/ IR. Doing well post procedure w/ downtrending bilirubin.  Recommendations:  -Would complete 7 day course of zosyn. Can transition to cipro on d/c  -Advance diet to as much as low fiber pureed- discussed with patient consistency of foods to eat and those to avoid  -Management of KATHERYN drain per IR  -PPI BID  -Encourage PO intake- still w/ limited nutritional intake  -Rest per primary team   -Advanced GI will sign off. Please call with questions    Note incomplete until finalized by attending signature/attestation.    Kaia Michaud  GI/Hepatology Fellow PGY5    NON-URGENT CONSULTS:  Please email [email or web address]  AT NIGHT AND ON WEEKENDS:  Available on [employer] Teams  [phone] (Long Range Pager)    For urgent consults, please contact on call GI team. See Amion schedule ([facility]) or [employer] system ([facility])

## 2025-02-06 NOTE — PROGRESS NOTE ADULT - SUBJECTIVE AND OBJECTIVE BOX
Interval Events:   -Still w/ a little discomfort when taking liquids  -Trying to drink ensure    Hospital Medications:  acetaminophen   Oral Liquid .. 650 milliGRAM(s) Oral every 6 hours  aluminum hydroxide/magnesium hydroxide/simethicone Suspension 30 milliLiter(s) Oral every 4 hours PRN  calamine/zinc oxide Lotion 1 Application(s) Topical four times a day PRN  chlorhexidine 4% Liquid 1 Application(s) Topical daily  enoxaparin Injectable 40 milliGRAM(s) SubCutaneous every 24 hours  lactated ringers. 1000 milliLiter(s) (75 mL/Hr) IV Continuous <Continuous>  melatonin 3 milliGRAM(s) Oral at bedtime PRN  multivitamin 1 Tablet(s) Oral daily  ondansetron   Disintegrating Tablet 4 milliGRAM(s) Oral every 6 hours PRN  piperacillin/tazobactam IVPB.. 3.375 Gram(s) IV Intermittent every 8 hours  sodium chloride 0.9%. 1000 milliLiter(s) (75 mL/Hr) IV Continuous <Continuous>  thiamine 100 milliGRAM(s) Oral daily  triamterene 75 mG/hydrochlorothiazide 50 mG Tablet 1 Tablet(s) Oral daily        25 @ 07:01  -  25 @ 07:00  --------------------------------------------------------  IN: 480 mL / OUT: 0 mL / NET: 480 mL          PHYSICAL EXAM:   Vital Signs:  Vital Signs Last 24 Hrs  T(C): 37.3 (2025 04:55), Max: 37.3 (2025 04:55)  T(F): 99.1 (2025 04:55), Max: 99.1 (2025 04:55)  HR: 69 (2025 04:55) (64 - 69)  BP: 130/83 (2025 04:55) (115/68 - 138/81)  BP(mean): --  RR: 18 (2025 04:55) (18 - 18)  SpO2: 94% (2025 04:55) (94% - 98%)    Parameters below as of 2025 04:55  Patient On (Oxygen Delivery Method): room air      Daily     Daily   GENERAL:  NAD, Appears stated age  HEENT:  NC/AT,  conjunctivae clear and pink, sclera -anicteric  CHEST:  Normal Effort, no signs of resp distress  HEART:  RRR, HD stable  ABDOMEN:  Soft, minimally tender epigastric region  EXTREMITIES:  no cyanosis or edema  SKIN:  Warm & Dry. No rash or erythema  NEURO:  Alert, oriented, no focal deficit  LABS:                        14.4   13.79 )-----------( 328      ( 2025 06:50 )             41.8     Last Hb:Hemoglobin: 14.4 g/dL (25 @ 06:50)  Hemoglobin: 14.1 g/dL (25 @ 07:14)  Hemoglobin: 13.0 g/dL (25 @ 04:36)               132   |  94    |  12                 Ca: 9.2    BMP:   ----------------------------< 122    M.0   (25 @ 06:49)             3.3    |  25    | 0.60               Ph: 2.7      LFT:     TPro: 6.7 / Alb: 4.0 / TBili: 3.6 / DBili: x / AST: 111 / ALT: 190 / AlkPhos: 206   (25 @ 06:49)    Creatinine: 0.60 mg/dL  Creatinine: 0.58 mg/dL  Creatinine: 0.51 mg/dL      LIVER FUNCTIONS - ( 2025 06:49 )  Alb: 4.0 g/dL / Pro: 6.7 g/dL / ALK PHOS: 206 U/L / ALT: 190 U/L / AST: 111 U/L / GGT: x             Urinalysis Basic - ( 2025 06:49 )    Color: x / Appearance: x / SG: x / pH: x  Gluc: 122 mg/dL / Ketone: x  / Bili: x / Urobili: x   Blood: x / Protein: x / Nitrite: x   Leuk Esterase: x / RBC: x / WBC x   Sq Epi: x / Non Sq Epi: x / Bacteria: x        Culture - Body Fluid with Gram Stain (collected 25 @ 17:29)  Source: Bile  Gram Stain (25 @ 23:00):    No polymorphonuclear cells seen    No organisms seen    by cytocentrifuge  Preliminary Report (25 @ 18:41):    No growth to date.    Culture - Blood (collected 25 @ 22:20)  Source: .Blood BLOOD  Final Report (25 @ 02:01):    No growth at 5 days    Culture - Blood (collected 25 @ 22:10)  Source: .Blood BLOOD  Final Report (25 @ 02:01):    No growth at 5 days

## 2025-02-07 ENCOUNTER — NON-APPOINTMENT (OUTPATIENT)
Age: 65
End: 2025-02-07

## 2025-02-07 RX ORDER — ONDANSETRON 4 MG/1
4 TABLET, ORALLY DISINTEGRATING ORAL 3 TIMES DAILY
Qty: 60 | Refills: 0 | Status: ACTIVE | COMMUNITY
Start: 2025-02-07 | End: 1900-01-01

## 2025-02-07 RX ORDER — ONDANSETRON 4 MG/1
4 TABLET, ORALLY DISINTEGRATING ORAL
Qty: 30 | Refills: 1 | Status: ACTIVE | COMMUNITY
Start: 2025-02-07 | End: 1900-01-01

## 2025-02-09 LAB
CULTURE RESULTS: SIGNIFICANT CHANGE UP
SPECIMEN SOURCE: SIGNIFICANT CHANGE UP

## 2025-02-10 ENCOUNTER — TRANSCRIPTION ENCOUNTER (OUTPATIENT)
Age: 65
End: 2025-02-10

## 2025-02-11 ENCOUNTER — NON-APPOINTMENT (OUTPATIENT)
Age: 65
End: 2025-02-11

## 2025-02-13 ENCOUNTER — LABORATORY RESULT (OUTPATIENT)
Age: 65
End: 2025-02-13

## 2025-02-14 ENCOUNTER — TRANSCRIPTION ENCOUNTER (OUTPATIENT)
Age: 65
End: 2025-02-14

## 2025-02-14 ENCOUNTER — OUTPATIENT (OUTPATIENT)
Dept: OUTPATIENT SERVICES | Facility: HOSPITAL | Age: 65
LOS: 1 days | End: 2025-02-14
Payer: COMMERCIAL

## 2025-02-14 ENCOUNTER — NON-APPOINTMENT (OUTPATIENT)
Age: 65
End: 2025-02-14

## 2025-02-14 VITALS
TEMPERATURE: 97 F | OXYGEN SATURATION: 100 % | HEART RATE: 58 BPM | WEIGHT: 149.91 LBS | DIASTOLIC BLOOD PRESSURE: 63 MMHG | SYSTOLIC BLOOD PRESSURE: 126 MMHG | HEIGHT: 59.25 IN | RESPIRATION RATE: 16 BRPM

## 2025-02-14 DIAGNOSIS — R10.11 RIGHT UPPER QUADRANT PAIN: ICD-10-CM

## 2025-02-14 DIAGNOSIS — Z98.89 OTHER SPECIFIED POSTPROCEDURAL STATES: Chronic | ICD-10-CM

## 2025-02-14 DIAGNOSIS — Z90.13 ACQUIRED ABSENCE OF BILATERAL BREASTS AND NIPPLES: Chronic | ICD-10-CM

## 2025-02-14 DIAGNOSIS — Z92.21 PERSONAL HISTORY OF ANTINEOPLASTIC CHEMOTHERAPY: Chronic | ICD-10-CM

## 2025-02-14 DIAGNOSIS — Z00.00 ENCOUNTER FOR GENERAL ADULT MEDICAL EXAMINATION WITHOUT ABNORMAL FINDINGS: ICD-10-CM

## 2025-02-14 PROCEDURE — 47531 INJECTION FOR CHOLANGIOGRAM: CPT

## 2025-02-14 RX ORDER — CAPECITABINE 150 MG/1
3 TABLET, FILM COATED ORAL
Refills: 0 | DISCHARGE

## 2025-02-14 RX ORDER — TRIAMTERENE/HYDROCHLOROTHIAZID 50 MG-25MG
1 CAPSULE ORAL
Refills: 0 | DISCHARGE

## 2025-02-14 RX ORDER — ANASTROZOLE TABLETS 1 MG/1
1 TABLET ORAL
Refills: 0 | DISCHARGE

## 2025-02-14 RX ORDER — ROSUVASTATIN CALCIUM 20 MG/1
1 TABLET, FILM COATED ORAL
Refills: 0 | DISCHARGE

## 2025-02-14 RX ORDER — FULVESTRANT 50 MG/ML
500 INJECTION INTRAMUSCULAR
Refills: 0 | DISCHARGE

## 2025-02-14 RX ORDER — LETROZOLE AND RIBOCICLIB 600-2.5 MG
1 KIT ORAL
Refills: 0 | DISCHARGE

## 2025-02-14 NOTE — ASU DISCHARGE PLAN (ADULT/PEDIATRIC) - FINANCIAL ASSISTANCE
[facility] provides services at a reduced cost to those who are determined to be eligible through [facility]’s financial assistance program. Information regarding [facility]’s financial assistance program can be found by going to [email or web address] or by calling [phone].

## 2025-02-14 NOTE — ASU DISCHARGE PLAN (ADULT/PEDIATRIC) - ASU DC SPECIAL INSTRUCTIONSFT
Drain Check    Discharge Instructions  - You have had a drain checked.  - Keep the area clean and dry.  - Do not soak in a tub or pool with the drain, however you may shower with the drain and dressing covered in plastic wrap.  - Do not put traction on the drain and be careful that the drain does not get accidentally dislodged or kinked.  - Record output daily from the drain. Empty the bag as needed.  - You may resume your normal diet.  - You may resume your normal medications.  - It is normal to experience some pain over the site for the next few days. You may take apply ice to the area (20 minutes on, 20 minutes off) and take Tylenol for that pain. Do not take more frequently than every 6 hours and do not exceed more than 3000mg of Tylenol in a 24 hour period.    Notify your primary physician and/or Interventional Radiology IMMEDIATELY if you experience any of the following       - Fever of 100.4F  or 38C       - Chills or Rigors/ Shakes       - Swelling and/or Redness in the area of the puncture site       - Worsening Pain       - Blood soaked bandages or worsening bleeding       - Lightheadedness and/or dizziness upon standing       - Chest Pain/ Tightness       - Shortness of Breath       - Difficulty walking    If you have a problem that you believe requires IMMEDIATE attention, please go to your NEAREST Emergency Room. If you believe your problem can safely wait until you speak to a physician, please call Interventional Radiology for any concerns.    Please feel free to contact us at [phone] if any problems arise. After 6PM, Monday through Friday, on weekends and on holidays, please call [phone] and ask for the radiology resident on call to be paged.

## 2025-02-14 NOTE — ASU PATIENT PROFILE, ADULT - FALL HARM RISK - CONCLUSION
in pounds and multiply by 10 and that is your average daily calorie allowance. For example if you wish to weigh 170 lb x 10 = 1700 blanquita/day (this is how to gradually lose the weight and maintain your desired weight). Avoid soda/coke and all \"wet carbs\" => Drink ice water instead    Drink a large glass of ice water before meals and EAT SLOWLY (talk while you eat)! Rethink your hunger => it means your losing weight.     Minimize highly processed carbohydrates as they stimulate your appetite:  Specifically cut back on Bread, Rice, Pasta and Potatoes    Avoid eating calories after 6 pm Universal Safety Interventions

## 2025-02-14 NOTE — PROCEDURE NOTE - PROCEDURE FINDINGS AND DETAILS
Contrast injection demonstrates filling of the right biliary tree without visualization of the common bile duct. Drain in appropriate position. Drain reattached to gravity bag. Clean, dry and sterile dressing applied. full report to follow.

## 2025-02-14 NOTE — ASU DISCHARGE PLAN (ADULT/PEDIATRIC) - NURSING INSTRUCTIONS
Please feel free to contact us at [phone] if any problems arise. After 6PM, Monday through Friday, on weekends and on holidays, please call [phone] and ask for the radiology resident on call to be paged.

## 2025-02-14 NOTE — ASU DISCHARGE PLAN (ADULT/PEDIATRIC) - COMMENTS
Routine follow up and exchange in 3 months. Please call IR booking office at [phone] to schedule.

## 2025-02-14 NOTE — PRE PROCEDURE NOTE - PRE PROCEDURE EVALUATION
Interventional Radiology    HPI: 64F PMH of metastatic breast cancer s/p bilateral mastectomy with reconstruction, on immunotherapy c/b duodenal stricture s/p duodenal stent placement on 1/28 presenting with nausea and vomiting post-procedure, found to have pancreatitis and new concern for biliary obstruction. s/p right percutaneous external biliary drain placement on 2/4/25 in Interventional Radiology with Dr. Silva subsequently discharged consuelo on 2/6.    Pt presents today with worsening pain over past day and a half, worse with movement and deep breathing. States slight decrease in drain output as well since pain started. Pt at baseline has abdominal discomfort, nausea unchanged from baseline, remains on liquid diet. Denies any fevers/chills. Seen with MD Hansen will plan for biliary drain evaluation today.     Allergies: No Known Allergies    Medications (Abx/Cardiac/Anticoagulation/Blood Products)      Data:  150.5  68  T(C): 36.2  HR: 58  BP: 126/63  RR: 16  SpO2: 100%    Exam  General: No acute distress  Chest: Non labored breathing  Abdomen: Non-distended, biliary drain to gravity bag with bilious output, site c/d/i, no redness, no pericatheter leaking, + pain to light touch medial to drain insertion site  Extremities: No swelling, warm      Imaging: reviewed    Plan: 64y Female presents for Biliary drain evaluation.    -Risks/Benefits/alternatives explained with the patient and/or healthcare proxy and witnessed informed consent obtained.

## 2025-02-17 ENCOUNTER — APPOINTMENT (OUTPATIENT)
Dept: INFUSION THERAPY | Facility: HOSPITAL | Age: 65
End: 2025-02-17

## 2025-02-18 ENCOUNTER — TRANSCRIPTION ENCOUNTER (OUTPATIENT)
Age: 65
End: 2025-02-18

## 2025-02-18 ENCOUNTER — NON-APPOINTMENT (OUTPATIENT)
Age: 65
End: 2025-02-18

## 2025-02-20 ENCOUNTER — TRANSCRIPTION ENCOUNTER (OUTPATIENT)
Age: 65
End: 2025-02-20

## 2025-02-20 ENCOUNTER — LABORATORY RESULT (OUTPATIENT)
Age: 65
End: 2025-02-20

## 2025-02-20 ENCOUNTER — RESULT REVIEW (OUTPATIENT)
Age: 65
End: 2025-02-20

## 2025-02-20 ENCOUNTER — OUTPATIENT (OUTPATIENT)
Dept: OUTPATIENT SERVICES | Facility: HOSPITAL | Age: 65
LOS: 1 days | End: 2025-02-20
Payer: COMMERCIAL

## 2025-02-20 VITALS
HEIGHT: 59 IN | WEIGHT: 134.92 LBS | DIASTOLIC BLOOD PRESSURE: 77 MMHG | HEART RATE: 67 BPM | RESPIRATION RATE: 16 BRPM | SYSTOLIC BLOOD PRESSURE: 127 MMHG | OXYGEN SATURATION: 96 % | TEMPERATURE: 98 F

## 2025-02-20 DIAGNOSIS — Z98.89 OTHER SPECIFIED POSTPROCEDURAL STATES: Chronic | ICD-10-CM

## 2025-02-20 DIAGNOSIS — R10.11 RIGHT UPPER QUADRANT PAIN: ICD-10-CM

## 2025-02-20 DIAGNOSIS — Z92.21 PERSONAL HISTORY OF ANTINEOPLASTIC CHEMOTHERAPY: Chronic | ICD-10-CM

## 2025-02-20 DIAGNOSIS — Z90.13 ACQUIRED ABSENCE OF BILATERAL BREASTS AND NIPPLES: Chronic | ICD-10-CM

## 2025-02-20 PROCEDURE — 47531 INJECTION FOR CHOLANGIOGRAM: CPT

## 2025-02-20 RX ORDER — AMOXICILLIN 500 MG/1
0 CAPSULE ORAL
Refills: 0 | DISCHARGE

## 2025-02-20 RX ORDER — DENOSUMAB 120 MG/1.7ML
1 INJECTION SUBCUTANEOUS
Refills: 0 | DISCHARGE

## 2025-02-20 RX ORDER — SUCRALFATE 1 G
0 TABLET ORAL
Refills: 0 | DISCHARGE

## 2025-02-20 NOTE — ASU PATIENT PROFILE, ADULT - FALL HARM RISK - UNIVERSAL INTERVENTIONS
Bed in lowest position, wheels locked, appropriate side rails in place/Call bell, personal items and telephone in reach/Instruct patient to call for assistance before getting out of bed or chair/Non-slip footwear when patient is out of bed/[place] to call system/Physically safe environment - no spills, clutter or unnecessary equipment/Purposeful Proactive Rounding/Room/bathroom lighting operational, light cord in reach

## 2025-02-20 NOTE — PRE PROCEDURE NOTE - PRE PROCEDURE EVALUATION
Interventional Radiology    HPI: 64F PMH of metastatic breast cancer s/p bilateral mastectomy with reconstruction, on immunotherapy c/b duodenal stricture s/p duodenal stent placement on 1/28 presenting with nausea and vomiting post-procedure, found to have pancreatitis and new concern for biliary obstruction. s/p right percutaneous external biliary drain placement on 2/4/25 in Interventional Radiology with Dr. Silva subsequently discharged consuelo on 2/6. Biliary drain last checed on 2/14 demonstrating fillin go fthe right biliary tree without visualization of the common bile duct. Patient now presents for repeat biliary drain evaluation.     Allergies: No Known Allergies    Medications (Abx/Cardiac/Anticoagulation/Blood Products)  Reviewed     Data:  149.9  61.2  T(C): 36.7  HR: 67  BP: 127/77  RR: 16  SpO2: 96%    Exam  General: No acute distress  Chest: Non labored breathing    Imaging:       Plan: 64y Female presents for biliary drain evaluation   -Risks/Benefits/alternatives explained with the patient and/or healthcare proxy and witnessed informed consent obtained.

## 2025-02-20 NOTE — ASU DISCHARGE PLAN (ADULT/PEDIATRIC) - FINANCIAL ASSISTANCE
[facility] provides services at a reduced cost to those who are determined to be eligible through [facility]’s financial assistance program. Information regarding [facility]’s financial assistance program can be found by going to [email or web address] or by calling [phone].

## 2025-02-20 NOTE — ASU DISCHARGE PLAN (ADULT/PEDIATRIC) - NURSING INSTRUCTIONS
Please feel free to contact us at [phone] if any problems arise. After 6PM, Monday through Friday, on weekends and on holidays, please call [phone] and ask for the radiology resident on call to be paged.

## 2025-02-20 NOTE — PROCEDURE NOTE - PROCEDURE FINDINGS AND DETAILS
Hand injection of contrast opacifies the right biliary system, CBD, cystic duct and gallbladder. Images reviewed with Dr. Silva. Drain flushed and connected to gravity bag. Full report to follow.

## 2025-02-20 NOTE — ASU DISCHARGE PLAN (ADULT/PEDIATRIC) - ASU DC SPECIAL INSTRUCTIONSFT
Drain Check and Exchange     Discharge Instructions  - You have had a drain checked.  - Keep the area clean and dry.  - Do not soak in a tub or pool with the drain, however you may shower with the drain and dressing covered in plastic wrap.  - Do not put traction on the drain and be careful that the drain does not get accidentally dislodged or kinked.  - Record output daily from the drain. Empty the bag as needed.  - You may resume your normal diet.  - You may resume your normal medications.  - It is normal to experience some pain over the site for the next few days. You may take apply ice to the area (20 minutes on, 20 minutes off) and take Tylenol for that pain. Do not take more frequently than every 6 hours and do not exceed more than 3000mg of Tylenol in a 24 hour period.    Notify your primary physician and/or Interventional Radiology IMMEDIATELY if you experience any of the following       - Fever of 100.4F  or 38C       - Chills or Rigors/ Shakes       - Swelling and/or Redness in the area of the puncture site       - Worsening Pain       - Blood soaked bandages or worsening bleeding       - Lightheadedness and/or dizziness upon standing       - Chest Pain/ Tightness       - Shortness of Breath       - Difficulty walking    If you have a problem that you believe requires IMMEDIATE attention, please go to your NEAREST Emergency Room. If you believe your problem can safely wait until you speak to a physician, please call Interventional Radiology for any concerns.    Please feel free to contact us at [phone] if any problems arise. After 6PM, Monday through Friday, on weekends and on holidays, please call [phone] and ask for the radiology resident on call to be paged.

## 2025-02-21 ENCOUNTER — LABORATORY RESULT (OUTPATIENT)
Age: 65
End: 2025-02-21

## 2025-02-25 ENCOUNTER — APPOINTMENT (OUTPATIENT)
Dept: INFUSION THERAPY | Facility: HOSPITAL | Age: 65
End: 2025-02-25

## 2025-02-25 ENCOUNTER — APPOINTMENT (OUTPATIENT)
Dept: HEMATOLOGY ONCOLOGY | Facility: CLINIC | Age: 65
End: 2025-02-25

## 2025-02-25 ENCOUNTER — NON-APPOINTMENT (OUTPATIENT)
Age: 65
End: 2025-02-25

## 2025-02-25 ENCOUNTER — RESULT REVIEW (OUTPATIENT)
Age: 65
End: 2025-02-25

## 2025-02-25 LAB
BASOPHILS # BLD AUTO: 0.05 K/UL — SIGNIFICANT CHANGE UP (ref 0–0.2)
BASOPHILS NFR BLD AUTO: 0.7 % — SIGNIFICANT CHANGE UP (ref 0–2)
EOSINOPHIL # BLD AUTO: 0.26 K/UL — SIGNIFICANT CHANGE UP (ref 0–0.5)
EOSINOPHIL NFR BLD AUTO: 3.7 % — SIGNIFICANT CHANGE UP (ref 0–6)
HCT VFR BLD CALC: 38.6 % — SIGNIFICANT CHANGE UP (ref 34.5–45)
HGB BLD-MCNC: 13.6 G/DL — SIGNIFICANT CHANGE UP (ref 11.5–15.5)
IMM GRANULOCYTES NFR BLD AUTO: 0.6 % — SIGNIFICANT CHANGE UP (ref 0–0.9)
LYMPHOCYTES # BLD AUTO: 1.88 K/UL — SIGNIFICANT CHANGE UP (ref 1–3.3)
LYMPHOCYTES # BLD AUTO: 26.8 % — SIGNIFICANT CHANGE UP (ref 13–44)
MCHC RBC-ENTMCNC: 33.6 PG — SIGNIFICANT CHANGE UP (ref 27–34)
MCHC RBC-ENTMCNC: 35.2 G/DL — SIGNIFICANT CHANGE UP (ref 32–36)
MCV RBC AUTO: 95.3 FL — SIGNIFICANT CHANGE UP (ref 80–100)
MONOCYTES # BLD AUTO: 0.43 K/UL — SIGNIFICANT CHANGE UP (ref 0–0.9)
MONOCYTES NFR BLD AUTO: 6.1 % — SIGNIFICANT CHANGE UP (ref 2–14)
NEUTROPHILS # BLD AUTO: 4.36 K/UL — SIGNIFICANT CHANGE UP (ref 1.8–7.4)
NEUTROPHILS NFR BLD AUTO: 62.1 % — SIGNIFICANT CHANGE UP (ref 43–77)
NRBC BLD AUTO-RTO: 0 /100 WBCS — SIGNIFICANT CHANGE UP (ref 0–0)
PLATELET # BLD AUTO: 239 K/UL — SIGNIFICANT CHANGE UP (ref 150–400)
RBC # BLD: 4.05 M/UL — SIGNIFICANT CHANGE UP (ref 3.8–5.2)
RBC # FLD: 13 % — SIGNIFICANT CHANGE UP (ref 10.3–14.5)
WBC # BLD: 7.02 K/UL — SIGNIFICANT CHANGE UP (ref 3.8–10.5)
WBC # FLD AUTO: 7.02 K/UL — SIGNIFICANT CHANGE UP (ref 3.8–10.5)

## 2025-02-27 ENCOUNTER — LABORATORY RESULT (OUTPATIENT)
Age: 65
End: 2025-02-27

## 2025-02-28 ENCOUNTER — APPOINTMENT (OUTPATIENT)
Dept: CARDIOLOGY | Facility: CLINIC | Age: 65
End: 2025-02-28

## 2025-02-28 DIAGNOSIS — Z46.59 ENCOUNTER FOR FITTING AND ADJUSTMENT OF OTHER GASTROINTESTINAL APPLIANCE AND DEVICE: ICD-10-CM

## 2025-02-28 DIAGNOSIS — K83.1 OBSTRUCTION OF BILE DUCT: ICD-10-CM

## 2025-03-03 ENCOUNTER — OUTPATIENT (OUTPATIENT)
Dept: OUTPATIENT SERVICES | Facility: HOSPITAL | Age: 65
LOS: 1 days | Discharge: HOME | End: 2025-03-03

## 2025-03-03 DIAGNOSIS — Z90.13 ACQUIRED ABSENCE OF BILATERAL BREASTS AND NIPPLES: Chronic | ICD-10-CM

## 2025-03-03 DIAGNOSIS — Z92.21 PERSONAL HISTORY OF ANTINEOPLASTIC CHEMOTHERAPY: Chronic | ICD-10-CM

## 2025-03-04 ENCOUNTER — RX RENEWAL (OUTPATIENT)
Age: 65
End: 2025-03-04

## 2025-03-05 ENCOUNTER — APPOINTMENT (OUTPATIENT)
Dept: HEMATOLOGY ONCOLOGY | Facility: CLINIC | Age: 65
End: 2025-03-05
Payer: COMMERCIAL

## 2025-03-05 ENCOUNTER — RESULT REVIEW (OUTPATIENT)
Age: 65
End: 2025-03-05

## 2025-03-05 ENCOUNTER — NON-APPOINTMENT (OUTPATIENT)
Age: 65
End: 2025-03-05

## 2025-03-05 VITALS
DIASTOLIC BLOOD PRESSURE: 82 MMHG | WEIGHT: 126.74 LBS | RESPIRATION RATE: 16 BRPM | BODY MASS INDEX: 25.6 KG/M2 | TEMPERATURE: 97.6 F | HEART RATE: 66 BPM | SYSTOLIC BLOOD PRESSURE: 122 MMHG | OXYGEN SATURATION: 99 %

## 2025-03-05 DIAGNOSIS — C50.919 MALIGNANT NEOPLASM OF UNSPECIFIED SITE OF UNSPECIFIED FEMALE BREAST: ICD-10-CM

## 2025-03-05 DIAGNOSIS — Z79.69 LONG TERM (CURRENT) USE OF OTHER IMMUNOMODULATORS AND IMMUNOSUPPRESSANTS: ICD-10-CM

## 2025-03-05 DIAGNOSIS — C78.89 SECONDARY MALIGNANT NEOPLASM OF OTHER DIGESTIVE ORGANS: ICD-10-CM

## 2025-03-05 DIAGNOSIS — K31.5 OBSTRUCTION OF DUODENUM: ICD-10-CM

## 2025-03-05 DIAGNOSIS — R63.4 ABNORMAL WEIGHT LOSS: ICD-10-CM

## 2025-03-05 DIAGNOSIS — C79.51 SECONDARY MALIGNANT NEOPLASM OF BONE: ICD-10-CM

## 2025-03-05 DIAGNOSIS — C41.9 MALIGNANT NEOPLASM OF BONE AND ARTICULAR CARTILAGE, UNSPECIFIED: ICD-10-CM

## 2025-03-05 DIAGNOSIS — R79.89 OTHER SPECIFIED ABNORMAL FINDINGS OF BLOOD CHEMISTRY: ICD-10-CM

## 2025-03-05 LAB
BASOPHILS # BLD AUTO: 0.05 K/UL — SIGNIFICANT CHANGE UP (ref 0–0.2)
BASOPHILS NFR BLD AUTO: 0.8 % — SIGNIFICANT CHANGE UP (ref 0–2)
EOSINOPHIL # BLD AUTO: 0.16 K/UL — SIGNIFICANT CHANGE UP (ref 0–0.5)
EOSINOPHIL NFR BLD AUTO: 2.6 % — SIGNIFICANT CHANGE UP (ref 0–6)
HCT VFR BLD CALC: 38.8 % — SIGNIFICANT CHANGE UP (ref 34.5–45)
HGB BLD-MCNC: 13.2 G/DL — SIGNIFICANT CHANGE UP (ref 11.5–15.5)
IMM GRANULOCYTES NFR BLD AUTO: 0.5 % — SIGNIFICANT CHANGE UP (ref 0–0.9)
LYMPHOCYTES # BLD AUTO: 1.92 K/UL — SIGNIFICANT CHANGE UP (ref 1–3.3)
LYMPHOCYTES # BLD AUTO: 30.6 % — SIGNIFICANT CHANGE UP (ref 13–44)
MCHC RBC-ENTMCNC: 32.8 PG — SIGNIFICANT CHANGE UP (ref 27–34)
MCHC RBC-ENTMCNC: 34 G/DL — SIGNIFICANT CHANGE UP (ref 32–36)
MCV RBC AUTO: 96.5 FL — SIGNIFICANT CHANGE UP (ref 80–100)
MONOCYTES # BLD AUTO: 0.42 K/UL — SIGNIFICANT CHANGE UP (ref 0–0.9)
MONOCYTES NFR BLD AUTO: 6.7 % — SIGNIFICANT CHANGE UP (ref 2–14)
NEUTROPHILS NFR BLD AUTO: 58.8 % — SIGNIFICANT CHANGE UP (ref 43–77)
NRBC BLD AUTO-RTO: 0 /100 WBCS — SIGNIFICANT CHANGE UP (ref 0–0)
PLATELET # BLD AUTO: 227 K/UL — SIGNIFICANT CHANGE UP (ref 150–400)
RBC # BLD: 4.02 M/UL — SIGNIFICANT CHANGE UP (ref 3.8–5.2)
RBC # FLD: 13.1 % — SIGNIFICANT CHANGE UP (ref 10.3–14.5)
WBC # FLD AUTO: 6.27 K/UL — SIGNIFICANT CHANGE UP (ref 3.8–10.5)

## 2025-03-05 PROCEDURE — G2211 COMPLEX E/M VISIT ADD ON: CPT | Mod: NC

## 2025-03-05 PROCEDURE — 99215 OFFICE O/P EST HI 40 MIN: CPT

## 2025-03-06 ENCOUNTER — LABORATORY RESULT (OUTPATIENT)
Age: 65
End: 2025-03-06

## 2025-03-06 ENCOUNTER — APPOINTMENT (OUTPATIENT)
Dept: INTERNAL MEDICINE | Facility: CLINIC | Age: 65
End: 2025-03-06

## 2025-03-06 LAB
ALBUMIN SERPL ELPH-MCNC: 3.8 G/DL
ALP BLD-CCNC: 249 U/L
ALT SERPL-CCNC: 177 U/L
ANION GAP SERPL CALC-SCNC: 12 MMOL/L
AST SERPL-CCNC: 98 U/L
BILIRUB SERPL-MCNC: 1.1 MG/DL
BUN SERPL-MCNC: 17 MG/DL
CALCIUM SERPL-MCNC: 9.9 MG/DL
CANCER AG27-29 SERPL-ACNC: 73.6 U/ML
CEA SERPL-MCNC: 27.1 NG/ML
CHLORIDE SERPL-SCNC: 106 MMOL/L
CO2 SERPL-SCNC: 23 MMOL/L
CREAT SERPL-MCNC: 0.62 MG/DL
EGFR: 99 ML/MIN/1.73M2
GLUCOSE SERPL-MCNC: 101 MG/DL
POTASSIUM SERPL-SCNC: 4.2 MMOL/L
PROT SERPL-MCNC: 5.8 G/DL
SODIUM SERPL-SCNC: 142 MMOL/L

## 2025-03-10 ENCOUNTER — APPOINTMENT (OUTPATIENT)
Dept: CARDIOLOGY | Facility: CLINIC | Age: 65
End: 2025-03-10
Payer: COMMERCIAL

## 2025-03-10 PROCEDURE — 93356 MYOCRD STRAIN IMG SPCKL TRCK: CPT

## 2025-03-10 PROCEDURE — 93306 TTE W/DOPPLER COMPLETE: CPT

## 2025-03-13 ENCOUNTER — LABORATORY RESULT (OUTPATIENT)
Age: 65
End: 2025-03-13

## 2025-03-14 ENCOUNTER — NON-APPOINTMENT (OUTPATIENT)
Age: 65
End: 2025-03-14

## 2025-03-19 ENCOUNTER — APPOINTMENT (OUTPATIENT)
Dept: NUCLEAR MEDICINE | Facility: IMAGING CENTER | Age: 65
End: 2025-03-19
Payer: COMMERCIAL

## 2025-03-19 ENCOUNTER — OUTPATIENT (OUTPATIENT)
Dept: OUTPATIENT SERVICES | Facility: HOSPITAL | Age: 65
LOS: 1 days | End: 2025-03-19
Payer: COMMERCIAL

## 2025-03-19 DIAGNOSIS — C79.51 SECONDARY MALIGNANT NEOPLASM OF BONE: ICD-10-CM

## 2025-03-19 DIAGNOSIS — K31.5 OBSTRUCTION OF DUODENUM: ICD-10-CM

## 2025-03-19 DIAGNOSIS — Z90.13 ACQUIRED ABSENCE OF BILATERAL BREASTS AND NIPPLES: Chronic | ICD-10-CM

## 2025-03-19 DIAGNOSIS — C50.919 MALIGNANT NEOPLASM OF UNSPECIFIED SITE OF UNSPECIFIED FEMALE BREAST: ICD-10-CM

## 2025-03-19 DIAGNOSIS — Z00.8 ENCOUNTER FOR OTHER GENERAL EXAMINATION: ICD-10-CM

## 2025-03-19 DIAGNOSIS — Z98.89 OTHER SPECIFIED POSTPROCEDURAL STATES: Chronic | ICD-10-CM

## 2025-03-19 PROCEDURE — 78815 PET IMAGE W/CT SKULL-THIGH: CPT | Mod: 26,PS

## 2025-03-19 PROCEDURE — A9552: CPT

## 2025-03-19 PROCEDURE — 78815 PET IMAGE W/CT SKULL-THIGH: CPT

## 2025-03-20 ENCOUNTER — NON-APPOINTMENT (OUTPATIENT)
Age: 65
End: 2025-03-20

## 2025-03-20 ENCOUNTER — LABORATORY RESULT (OUTPATIENT)
Age: 65
End: 2025-03-20

## 2025-03-26 ENCOUNTER — APPOINTMENT (OUTPATIENT)
Dept: INTERNAL MEDICINE | Facility: CLINIC | Age: 65
End: 2025-03-26

## 2025-03-26 VITALS
HEART RATE: 63 BPM | TEMPERATURE: 98 F | HEIGHT: 59 IN | WEIGHT: 127 LBS | DIASTOLIC BLOOD PRESSURE: 70 MMHG | SYSTOLIC BLOOD PRESSURE: 100 MMHG | OXYGEN SATURATION: 98 % | BODY MASS INDEX: 25.6 KG/M2

## 2025-03-26 DIAGNOSIS — E78.5 HYPERLIPIDEMIA, UNSPECIFIED: ICD-10-CM

## 2025-03-26 DIAGNOSIS — I10 ESSENTIAL (PRIMARY) HYPERTENSION: ICD-10-CM

## 2025-03-26 DIAGNOSIS — C50.919 MALIGNANT NEOPLASM OF UNSPECIFIED SITE OF UNSPECIFIED FEMALE BREAST: ICD-10-CM

## 2025-03-26 DIAGNOSIS — H92.01 OTALGIA, RIGHT EAR: ICD-10-CM

## 2025-03-26 DIAGNOSIS — K31.5 OBSTRUCTION OF DUODENUM: ICD-10-CM

## 2025-03-26 DIAGNOSIS — R60.0 LOCALIZED EDEMA: ICD-10-CM

## 2025-03-26 PROCEDURE — 99214 OFFICE O/P EST MOD 30 MIN: CPT

## 2025-03-26 PROCEDURE — G2211 COMPLEX E/M VISIT ADD ON: CPT | Mod: NC

## 2025-03-27 ENCOUNTER — NON-APPOINTMENT (OUTPATIENT)
Age: 65
End: 2025-03-27

## 2025-03-27 ENCOUNTER — LABORATORY RESULT (OUTPATIENT)
Age: 65
End: 2025-03-27

## 2025-03-28 ENCOUNTER — RX RENEWAL (OUTPATIENT)
Age: 65
End: 2025-03-28

## 2025-03-31 ENCOUNTER — OUTPATIENT (OUTPATIENT)
Dept: OUTPATIENT SERVICES | Facility: HOSPITAL | Age: 65
LOS: 1 days | End: 2025-03-31
Payer: COMMERCIAL

## 2025-03-31 ENCOUNTER — TRANSCRIPTION ENCOUNTER (OUTPATIENT)
Age: 65
End: 2025-03-31

## 2025-03-31 VITALS
SYSTOLIC BLOOD PRESSURE: 134 MMHG | DIASTOLIC BLOOD PRESSURE: 75 MMHG | OXYGEN SATURATION: 100 % | HEART RATE: 62 BPM | WEIGHT: 126.99 LBS | HEIGHT: 59 IN | TEMPERATURE: 98 F | RESPIRATION RATE: 18 BRPM

## 2025-03-31 DIAGNOSIS — Z92.21 PERSONAL HISTORY OF ANTINEOPLASTIC CHEMOTHERAPY: Chronic | ICD-10-CM

## 2025-03-31 DIAGNOSIS — Z98.89 OTHER SPECIFIED POSTPROCEDURAL STATES: Chronic | ICD-10-CM

## 2025-03-31 DIAGNOSIS — K31.5 OBSTRUCTION OF DUODENUM: ICD-10-CM

## 2025-03-31 DIAGNOSIS — Z46.59 ENCOUNTER FOR FITTING AND ADJUSTMENT OF OTHER GASTROINTESTINAL APPLIANCE AND DEVICE: ICD-10-CM

## 2025-03-31 DIAGNOSIS — K83.1 OBSTRUCTION OF BILE DUCT: ICD-10-CM

## 2025-03-31 DIAGNOSIS — Z90.13 ACQUIRED ABSENCE OF BILATERAL BREASTS AND NIPPLES: Chronic | ICD-10-CM

## 2025-03-31 PROCEDURE — 47531 INJECTION FOR CHOLANGIOGRAM: CPT

## 2025-03-31 NOTE — ASU DISCHARGE PLAN (ADULT/PEDIATRIC) - NURSING INSTRUCTIONS
Please feel free to contact us at [phone] if any problems arise. After 6PM, Monday through Friday, on weekends and on holidays, please call [phone] and ask for the radiology resident on call to be paged.

## 2025-03-31 NOTE — ASU DISCHARGE PLAN (ADULT/PEDIATRIC) - NS MD DC FALL RISK RISK
For information on Fall & Injury Prevention, visit: [email or web address] OR  [email or web address] OR  https://www.cdc.gov/steadi/patient.html

## 2025-03-31 NOTE — ASU DISCHARGE PLAN (ADULT/PEDIATRIC) - FINANCIAL ASSISTANCE
[facility] provides services at a reduced cost to those who are determined to be eligible through [facility]’s financial assistance program. Information regarding [facility]’s financial assistance program can be found by going to [email or web address] or by calling [phone].

## 2025-03-31 NOTE — PROCEDURE NOTE - NSPERFORMEDBY_GEN_A_CORE
Myself/PA Patient is a 79 year old male w/ incontinence.  Patient UA was clean and patient not exhibiting symptoms of UTI.  He also is not in retention.  team was able to witness patient voiding and bladder scanned performed PVR resulting in approx 90cc in bladder. With patient age this is WNL. Patient requires no acute surgical intervention at moment, and can follow up at the Urology clinic at [address] [phone], for outpatient follow up of his Incontinence.    Plan:  -Patient not in retention can be discharged to home  -Follow up at Urology clinic by calling to make an appointment.

## 2025-03-31 NOTE — H&P ADULT - HISTORY OF PRESENT ILLNESS
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 Biliary drain last checked on 2/20 demonstrating filling of the right biliary tree  common bile and cystic ducts, and gallbladder. Patient presents to IR today for drain evaluation c/o pain and redness near drain site as well as increased drainage on dressing over the past week. States the pain worses when she moves around and is mostly near the anchoring suture. Endorses flushing with 5cc NS daily. Drain continues to have high volume outputs and patient has no issues when flushing. Denies issues with functionality of the drain. Denies f/c/n/v.    Review of Systems:   Constitutional: No fever  Respiratory:  No shortness of breath  Cardiovascular: No chest pain  Gastrointestinal: No abdominal or epigastric pain. No nausea    Allergies: No Known Allergies    Medications (Abx/Cardiac/Anticoagulation/Blood Products)      Data:  149.9  57.6  T(C): 36.5  HR: 62  BP: 134/75  RR: 18  SpO2: 100%    Physical Exam  General: No acute distress, nontoxic, A&Ox3  Chest: Non labored breathing  Abdomen: Non-distended, non-tender, +drain with yellow bilious output. Skin around anchoring drain suture erythematous and tender to palpation    RADIOLOGY & ADDITIONAL TESTS:    Imaging Reviewed    Plan: 64y Female presents for biliary drain evaluation  -Risks/Benefits/alternatives explained with the patient and/or healthcare proxy and witnessed informed consent obtained.    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 Biliary drain last checked on 2/20 demonstrating filling of the right biliary tree  common bile and cystic ducts, and gallbladder. Patient presents to IR today for drain evaluation c/o pain and redness near drain site as well as increased drainage on dressing over the past week. States the pain worsens when she moves around and is mostly near the anchoring suture. Endorses flushing with 5cc NS daily. Drain continues to have high volume outputs and patient has no issues when flushing. Denies issues with functionality of the drain. Denies f/c/n/v.    Review of Systems:   Constitutional: No fever  Respiratory:  No shortness of breath  Cardiovascular: No chest pain  Gastrointestinal: No abdominal or epigastric pain. No nausea    Allergies: No Known Allergies    Medications (Abx/Cardiac/Anticoagulation/Blood Products)      Data:  149.9  57.6  T(C): 36.5  HR: 62  BP: 134/75  RR: 18  SpO2: 100%    Physical Exam  General: No acute distress, nontoxic, A&Ox3  Chest: Non labored breathing  Abdomen: Non-distended, non-tender, +drain with yellow bilious output in bag. Dressing C/D/I. Removed to reveal skin around anchoring drain suture erythematous and tender to palpation    RADIOLOGY & ADDITIONAL TESTS:    Imaging Reviewed    Plan: 64y Female presents for biliary drain evaluation  -Risks/Benefits/alternatives explained with the patient and/or healthcare proxy and witnessed informed consent obtained.

## 2025-03-31 NOTE — PROCEDURE NOTE - PROCEDURE FINDINGS AND DETAILS
Hand injection of contrast demonstrated opacification of the right biliary system, gallbladder, cystic duct, and CBD. Drain in similar position compared to previous study. No pericatheter leakage of fluid noted. Drain remained in place, flushed, and reconnected to gravity bag. Skin around anchoring stitch noted to be erythematous and raised. Stitch cut and new one applied at different site.    Full report to follow.

## 2025-03-31 NOTE — ASU PREOP CHECKLIST - TEMPERATURE IN CELSIUS (DEGREES C)
HEMATOLOGY/ONCOLOGY IN-PATIENT CONSULTATION    Date of Service:8/18/2020  Patient: Adrián Mahajan   MRN: [id number]  YOB: 1938  REQUESTING PHYSICIAN: Evelio Mccabe MD  PCP: No Pcp  REASON FOR CONSULT: Osseous metastatic disease.   Diagnosis:  1. Yael 4+4=8 prostate adenocarcinoma, dx 07/2019    Chief Complaint: Pelvic Pain      History of Present Illness:   Mr. Adrián Mahajan is a 82 year old- year old male who I am seeing for the above.   The patient has a past medical history notable for: CKD.   He was admitted for weakness and failure to thrive, with creatinine  19.2, K+ 7.2. He was also noted to have a large inguinal hernia which he had surgical repair by Dr. Razo. This was somewhat complicated and actually underwent left simple orchiectomy, hydrocelectomy, and extensive lysis of adhesions on 06/09/2019.   He was having urinary issues and was requiring Cruz catheter. His PSA was found to be 362, prostate biopsy showed Kremmling 4+4 prostate cancer and a staging workup confirmed metastatic disease in the bones and lymph nodes. He declined treatment and placed on surveillance. He was lost to followup.   Adrián presented to ER on 8/17 with increasing Pelvic pain and Back pain over the last 6 months along with weight loss. He did not seek medical attention due to COVID per his report. He started having significant pain he could not walk.  No focal weakness or numbness in his lower extremities. No urinary/fecal incontinence.   The patient has significantly elevated LFTs. No fever, chills, sweats, unintentional weight loss, chest pain, shortness of breath, dyspnea on exertion, dizziness, lightheadedness, palpitations, focal weakness, vision change, or confusion. He denies any new bone pain, new headaches or new lumps/bumps.     ECOG performance status is 1.     Past Medical History:   Diagnosis Date   • Chronic kidney disease     Pt states \"many years ago\"   • Ulcer        Past Surgical History:    Procedure Laterality Date   • Abdomen surgery     • Appendectomy     • Removal gallbladder         ALLERGIES:   Allergen Reactions   • Seasonal Cough       Social History     Tobacco Use   • Smoking status: Never Smoker   • Smokeless tobacco: Never Used   Substance Use Topics   • Alcohol use: No     Frequency: Never     Drinks per session: 1 or 2     Binge frequency: Never           Drug Use:    No                Family History   Problem Relation Age of Onset   • Heart disease Father    • Hypertension Brother      No family History of hematologic malignancies or thrombotic disorders    Current Facility-Administered Medications   Medication Dose Route Frequency Provider Last Rate Last Dose   • morphine SR (MS CONTIN) tablet 15 mg  15 mg Oral Q12H IBRAHIMA Heard MD   15 mg at 08/18/20 0811   • docusate sodium-sennosides (SENOKOT S) 50-8.6 MG 2 tablet  2 tablet Oral Nightly Leann Heard MD   2 tablet at 08/18/20 0104   • oxyCODONE (IMM REL) (ROXICODONE) tablet 5 mg  5 mg Oral Q4H PRN Evelio Mccabe MD       • sodium chloride 0.9 % flush bag 25 mL  25 mL Intravenous PRN Leann Heard MD       • sodium chloride (PF) 0.9 % injection 2 mL  2 mL Intracatheter Q12H IBRAHIMA Heard MD   2 mL at 08/18/20 0811   • enoxaparin (LOVENOX) injection 40 mg  40 mg Subcutaneous Daily Leann Heard MD   40 mg at 08/18/20 0811   • meropenem (MERREM) 500 mg in sodium chloride 0.9 % 100 mL IVPB  500 mg Intravenous Q6H IBRAHIMA Heard  mL/hr at 08/18/20 1712 500 mg at 08/18/20 1712   • sodium chloride 0.9 % flush bag 25 mL  25 mL Intravenous PRN Leann Heard MD       • potassium CHLORIDE (KLOR-CON M) minal ER tablet 20 mEq  20 mEq Oral Q4H PRN Leann Heard MD       • potassium CHLORIDE (KLOR-CON) packet 20 mEq  20 mEq Per NG tube Q4H PRN Leann Heard MD       • potassium CHLORIDE 20 mEq/100mL IVPB premix  20 mEq Intravenous Q4H PRN  Leann Heard MD       • potassium CHLORIDE (KLOR-CON M) minal ER tablet 40 mEq  40 mEq Oral Q4H PRN Leann Heard MD       • potassium CHLORIDE (KLOR-CON) packet 40 mEq  40 mEq Per NG tube Q4H PRN Leann Heard MD       • potassium CHLORIDE 20 mEq/100mL IVPB premix  40 mEq Intravenous Q4H PRN Leann Heard MD       • magnesium sulfate 1 g in dextrose 5% 100 mL IVPB premix  1 g Intravenous Daily PRN Leann Heard MD       • magnesium sulfate 2 g in 50 mL premix IVPB  2 g Intravenous Daily PRN Leann Heard MD       • magnesium sulfate 2 g in 50 mL premix IVPB  2 g Intravenous Q4H PRN Leann Heard MD           REVIEW OF SYSTEMS:  CONSTITUTIONAL: As per HPI.  HEENT: Eyes: No diplopia or blurred vision. ENT: No earache, sore throat or runny nose.  CARDIOVASCULAR: No pressure, squeezing, strangling, tightness, heaviness or aching about the chest, neck, axilla or epigastrium.  RESPIRATORY: No cough, shortness of breath, PND or orthopnea.  GASTROINTESTINAL: No nausea, vomiting or diarrhea.  GENITOURINARY: No dysuria, frequency or urgency.  MUSCULOSKELETAL: as per HPI.  SKIN: No change in skin, hair or nails.  NEUROLOGIC: No paresthesias, fasciculations, seizures or weakness.  PSYCHIATRIC: No disorder of thought or mood.  ENDOCRINE: No heat or cold intolerance, polyuria or polydipsia.  HEMATOLOGICAL: No easy bruising or bleeding.    Physical Examination:  Vital Last Value 24 Hour Range   Temperature 98.3 °F (36.8 °C) Temp  Min: 98.2 °F (36.8 °C)  Max: 98.8 °F (37.1 °C)   Pulse 92 Pulse  Min: 88  Max: 101   Respiratory 19 Resp  Min: 17  Max: 22   Non-Invasive  Blood Pressure 124/70 (08/18/20 1600) BP  Min: 105/62  Max: 175/95   Pulse Oximetry 96 % SpO2  Min: 90 %  Max: 96 %     Vital Today Admitted   Weight 84.3 kg Weight: 86.2 kg   Height N/A Height: 6' 2\" (188 cm)       CONSTITUTIONAL: Alert, cooperative, oriented. Mood and affect appropriate. Appears close to  chronological age. Well nourished. Well developed.  HEAD: Normocephalic; no scars.  EYES: Conjunctivae and sclerae are clear and without icterus. Pupils are reactive and equal.  ENMT: Sinuses are nontender. No oral exudates, ulcers, masses, thrush or mucositis. Oropharynx clear. Tongue normal.  NECK: Supple without masses or thyromegaly. No jugular venous distension.   HEMATOLOGIC/LYMPHATIC: No petechiae or purpura. No tender or palpable lymph nodes in the cervical, supraclavicular, axillary or inguinal area.   RESPIRATORY: Lungs are clear to auscultation without rhonchi or wheezing.   CARDIOVASCULAR: Regular rate and rhythm of heart without murmurs, gallops or rubs.  CHEST: Chest is symmetric, without chest wall deformities.  ABDOMEN: Nontender, nondistended, no masses, ascites or hepatosplenomegaly. Good bowel sounds. No guarding or rebound tenderness. No pulsatile masses.   BACK/SPINE: No kyphosis, scoliosis, compression fractures. Nontender to palpation.  MUSCULOSKELETAL: No tenderness or swelling, normal range of motion without obvious weakness.  EXTREMITIES: No visible deformities, no cyanosis, clubbing or edema. Pulses 4+ and equal bilaterally.  INTEGUMENTARY: No rashes, scars, or lesions suggestive of malignancy.     Imaging:  IMPRESSION:          1. Interval progression of prostate cancer. Findings include new and  worsening retroperitoneal/pelvic adenopathy, increasing tumor bulk of the  prostate gland and seminal vesicles, and worsened extensive osseous  metastases.  2. Small bilateral pleural effusions.  3. Small pericardial effusion.  4. Diverticulosis.  5. A few small benign-appearing hepatic cysts are present.  Labs:  Lab Results   Component Value Date/Time    WBC 8.1 08/18/2020 06:12 AM    WBC 7.9 07/27/2019 09:05 AM    RBC 4.70 08/18/2020 06:12 AM    RBC 3.79 (L) 07/27/2019 09:05 AM    HGB 16.0 08/18/2020 06:12 AM    HGB 12.7 (L) 07/27/2019 09:05 AM    HCT 46.1 08/18/2020 06:12 AM    HCT 38.5 (L)  07/27/2019 09:05 AM    MCV 98.1 08/18/2020 06:12 AM    .6 (H) 07/27/2019 09:05 AM     (L) 08/18/2020 06:12 AM     07/27/2019 09:05 AM    ANEUT 6.8 08/18/2020 06:12 AM    ANEUT 6.3 07/27/2019 09:05 AM    ALYMS 0.6 (L) 08/18/2020 06:12 AM    ALYMS 0.9 (L) 07/27/2019 09:05 AM    RUBÉN 0.7 08/18/2020 06:12 AM    RUBÉN 0.7 07/27/2019 09:05 AM    AEOS 0.0 (L) 08/18/2020 06:12 AM    AEOS 0.1 07/27/2019 09:05 AM    ABASO 0.0 08/18/2020 06:12 AM    ABASO 0.0 07/27/2019 09:05 AM     Lab Results   Component Value Date/Time    GLUCOSE 136 (H) 08/18/2020 06:12 AM    GLUCOSE 83 06/19/2019 05:08 AM    SODIUM 139 08/18/2020 06:12 AM    SODIUM 142 06/19/2019 05:08 AM    POTASSIUM 4.5 08/18/2020 06:12 AM    POTASSIUM 3.8 06/19/2019 05:08 AM    CHLORIDE 111 (H) 08/18/2020 06:12 AM    CHLORIDE 109 (H) 06/19/2019 05:08 AM    BUN 21 (H) 08/18/2020 06:12 AM    BUN 13 06/19/2019 05:08 AM    CREATININE 1.33 (H) 08/18/2020 06:12 AM    CREATININE 0.81 06/19/2019 05:08 AM    CALCIUM 9.0 08/18/2020 06:12 AM    CALCIUM 8.9 06/19/2019 05:08 AM    ALBUMIN 3.5 (L) 08/18/2020 06:12 AM    ALBUMIN 2.9 (L) 06/19/2019 05:08 AM    AST 1,422 (H) 08/18/2020 06:12 AM    AST 41 (H) 06/19/2019 05:08 AM    ALKPT 456 (H) 08/18/2020 06:12 AM    ALKPT 230 (H) 06/19/2019 05:08 AM     (H) 08/18/2020 06:12 AM    GPT 53 06/19/2019 05:08 AM    ANIONGAP 12 08/18/2020 06:12 AM    ANIONGAP 11 06/19/2019 05:08 AM    BCRAT 16 08/18/2020 06:12 AM    BCRAT 16 06/19/2019 05:08 AM    GLOB 3.8 08/18/2020 06:12 AM    GLOB 3.9 06/19/2019 05:08 AM    AGR 0.9 (L) 08/18/2020 06:12 AM    AGR 0.7 (L) 06/19/2019 05:08 AM       Admission on 08/17/2020   Component Date Value   • Sodium 08/17/2020 138    • Potassium 08/17/2020 4.0    • Chloride 08/17/2020 106    • Carbon Dioxide 08/17/2020 21    • Anion Gap 08/17/2020 15    • Glucose 08/17/2020 140*   • BUN 08/17/2020 14    • Creatinine 08/17/2020 0.93    • Glomerular Filtration Ra* 08/17/2020 76*   • BUN/  Creatinine Ratio 08/17/2020 15    • Calcium 08/17/2020 9.6    • Albumin 08/17/2020 4.0    • Bilirubin, Total 08/17/2020 0.5    • Bilirubin, Direct 08/17/2020 0.2    • Alkaline Phosphatase 08/17/2020 391*   • GPT/ALT 08/17/2020 17    • GOT/AST 08/17/2020 16    • Protein, Total 08/17/2020 7.9    • Lipase 08/17/2020 120    • ABO/RH(D) 08/17/2020 O Rh Negative    • ANTIBODY SCREEN 08/17/2020 Negative    • TYPE AND SCREEN EXPIRATI* 08/17/2020 08/20/2020 23:59    • PTT 08/17/2020 38*   • Prothrombin Time 08/17/2020 13.4*   • INR 08/17/2020 1.3    • WBC 08/17/2020 14.2*   • RBC 08/17/2020 4.56    • HGB 08/17/2020 15.5    • HCT 08/17/2020 44.9    • MCV 08/17/2020 98.5    • MCH 08/17/2020 34.0    • MCHC 08/17/2020 34.5    • RDW-CV 08/17/2020 12.5    • PLT 08/17/2020 201    • NRBC 08/17/2020 0    • Neutrophil, Percent 08/17/2020 89    • Lymphocytes, Percent 08/17/2020 5    • Mono, Percent 08/17/2020 5    • Eosinophils, Percent 08/17/2020 0    • Basophils, Percent 08/17/2020 0    • Immature Granulocytes 08/17/2020 1    • Absolute Neutrophils 08/17/2020 12.6*   • Absolute Lymphocytes 08/17/2020 0.7*   • Absolute Monocytes 08/17/2020 0.7    • Absolute Eosinophils  08/17/2020 0.0*   • Absolute Basophils 08/17/2020 0.0    • Absolute Immmature Granu* 08/17/2020 0.2    • RDW-SD 08/17/2020 44.9    • COLOR, URINALYSIS 08/17/2020 Pat    • APPEARANCE, URINALYSIS 08/17/2020 Hazy    • GLUCOSE, URINALYSIS 08/17/2020 Negative    • BILIRUBIN, URINALYSIS 08/17/2020 Negative    • KETONES, URINALYSIS 08/17/2020 15  *   • SPECIFIC GRAVITY, URINAL* 08/17/2020 >1.030*   • OCCULT BLOOD, URINALYSIS 08/17/2020 Moderate*   • PH, URINALYSIS 08/17/2020 5.5    • PROTEIN, URINALYSIS 08/17/2020 300 *   • UROBILINOGEN, URINALYSIS 08/17/2020 0.2    • NITRITE, URINALYSIS 08/17/2020 Positive*   • LEUKOCYTE ESTERASE, URIN* 08/17/2020 Small*   • Extra Tube 08/17/2020 Hold for Add Ons    • Extra Tube 08/17/2020 Hold for Add Ons    • BUN - POINT OF CARE  08/17/2020 15    • SODIUM - POINT OF CARE 08/17/2020 140    • POTASSIUM - POINT OF CARE 08/17/2020 4.0    • CHLORIDE - POINT OF CARE 08/17/2020 106    • TCO2 - POINT OF CARE 08/17/2020 21    • ANION GAP - POINT OF CARE 08/17/2020 19    • HEMATOCRIT - POINT OF CA* 08/17/2020 49.0    • HEMOGLOBIN - POINT OF CA* 08/17/2020 16.7    • GLUCOSE - POINT OF CARE 08/17/2020 149*   • CALCIUM, IONIZED - POINT* 08/17/2020 1.19    • Creatinine 08/17/2020 0.90    • Glomerular Filtration Ra* 08/17/2020 79*   • LACTIC ACID, VENOUS - PO* 08/17/2020 2.0*   • TROPONIN I - POINT OF CA* 08/17/2020 <0.10    • SQUAMOUS EPITHELIAL, URI* 08/17/2020 None Seen    • ERYTHROCYTES, URINALYSIS 08/17/2020 3 to 5*   • LEUKOCYTES, URINALYSIS 08/17/2020 26 to 100*   • BACTERIA, URINALYSIS 08/17/2020 Moderate*   • HYALINE CASTS, URINALYSIS 08/17/2020 1 to 5    • Culture, Blood, Bone Mar* 08/17/2020 No Growth <24 hours    • Culture, Blood, Bone Mar* 08/17/2020 No Growth <24 hours    • Magnesium 08/18/2020 2.4    • Sodium 08/18/2020 139    • Potassium 08/18/2020 4.5    • Chloride 08/18/2020 111*   • Carbon Dioxide 08/18/2020 21    • Anion Gap 08/18/2020 12    • Glucose 08/18/2020 136*   • BUN 08/18/2020 21*   • Creatinine 08/18/2020 1.33*   • Glomerular Filtration Ra* 08/18/2020 49*   • BUN/ Creatinine Ratio 08/18/2020 16    • Bilirubin, Total 08/18/2020 1.7*   • GOT/AST 08/18/2020 1,422*   • Alkaline Phosphatase 08/18/2020 456*   • Albumin 08/18/2020 3.5*   • Protein, Total 08/18/2020 7.3    • Globulin 08/18/2020 3.8    • A/G Ratio 08/18/2020 0.9*   • GPT/ALT 08/18/2020 868*   • Calcium 08/18/2020 9.0    • WBC 08/18/2020 8.1    • RBC 08/18/2020 4.70    • HGB 08/18/2020 16.0    • HCT 08/18/2020 46.1    • MCV 08/18/2020 98.1    • MCH 08/18/2020 34.0    • MCHC 08/18/2020 34.7    • RDW-CV 08/18/2020 12.8    • PLT 08/18/2020 108*   • NRBC 08/18/2020 0    • Neutrophil, Percent 08/18/2020 84    • Lymphocytes, Percent 08/18/2020 7    • Mono, Percent 08/18/2020  8    • Eosinophils, Percent 08/18/2020 0    • Basophils, Percent 08/18/2020 0    • Immature Granulocytes 08/18/2020 1    • Absolute Neutrophils 08/18/2020 6.8    • Absolute Lymphocytes 08/18/2020 0.6*   • Absolute Monocytes 08/18/2020 0.7    • Absolute Eosinophils  08/18/2020 0.0*   • Absolute Basophils 08/18/2020 0.0    • Absolute Immmature Granu* 08/18/2020 0.1    • RDW-SD 08/18/2020 45.5    • Extra Tube 08/18/2020 Hold for Add Ons    • Prostate Specific Antigen 08/18/2020 1,950.00*       PATHOLOGICAL DATA:      Pathologic Diagnosis :     A: Prostate, left base, needle biopsy:     - Prostatic adenocarcinoma, Dunellen grade 4+3, involving 90% of specimen.         B: Prostate, left mid, needle biopsy:     - Prostatic adenocarcinoma, Yael grade 4+3, involving 80% of specimen.         C: Prostate, left apex, needle biopsy:     - Prostatic adenocarcinoma, Yael grade 4+4, involving 50% of specimen with     perineural invasion.         D: Prostate, right base, needle biopsy:     - Prostatic adenocarcinoma, Yael grade 4+4, involving 70% of specimen.         E: Prostate, right mid, needle biopsy:     - Prostatic adenocarcinoma, Yael grade 4+3, involving 80% of specimen.         F: Prostate, right apex, needle biopsy:     - Prostatic adenocarcinoma, Yael grade 4+4, involving 100% of specimen with     perineural invasion.         Diagnosis Comment:     Specimen:  A: Left Base     Histologic type: Acinar     Yael score: 4+3     Proportion involved by tumor: 90%     Number of cores positive / Total cores:  1/1     Greater than 50 % tumor in any one core: Present     Perineural invasion: Not identified     Lymphatic invasion: Not identified     Color ink identified: Orange         Specimen:  B: Left Mid     Histologic type: Acinar     Dunellen score: 4+3     Proportion involved by tumor: 80%     Number of cores positive / Total cores:  1/1     Greater than 50 % tumor in any one core: Present     Perineural  invasion: Not identified     Lymphatic invasion: Not identified     Color ink identified: Orange         Specimen:  C: Left [place]     Histologic type: Acinar     Damascus score: 4+4     Proportion involved by tumor: 50%     Number of cores positive / Total cores:  2/2     Greater than 50 % tumor in any one core: Present     Perineural invasion: Present     Lymphatic invasion: Not identified     Color ink identified: Orange         Specimen:  D: Right base     Histologic type: Acinar     Yael score: 4+4     Proportion involved by tumor: 70%     Number of cores positive / Total cores:  1/1     Greater than 50 % tumor in any one core: Present     Perineural invasion: Not identified     Lymphatic invasion: Not identified     Color ink identified: Orange         Specimen:  E: Right Mid     Histologic type: Acinar     Damascus score: 4+3     Proportion involved by tumor: 80%     Number of cores positive / Total cores:  1/1     Greater than 50 % tumor in any one core: Present     Perineural invasion: Not identified     Lymphatic invasion: Not identified     Color ink identified: Orange         Specimen:  F: Left [place]     Histologic type: Acinar     Damascus score: 4+4     Proportion involved by tumor: 100%     Number of cores positive / Total cores:  1/1     Greater than 50 % tumor in any one core: Present     Perineural invasion: Present     Lymphatic invasion: Not identified     Color ink identified: Braxton Brown MD     ** Electronic Signature (MGT) 7/5/2019 09:13    Impression/Plan:  Mr. Adrián Mahajan is a 82 year old- year old male :     1. Prostate cancer  Yael 4+4=8, biopsy 07/2019, pathology as above. He declined treatment at that time. He now presents with disease progression worsening retroperitoneal/pelvic adenopathy, extensive osseous metastases, small bilateral pleural effusions. PSA >1900  He has clear progression of disease from 07/2019, extensive metastatic bone disease.  He is in  visceral crisis with elevated LFTs, waxing/waning mental status.     2. Pain management  Related to above.     3. Renal failure/renal dysfunction  Related to above,    Recommendations:  I reviewed imaging, pathology staging of prostate cancer with him. His PSA rise along with sinister radiographic findings are all pointing towards to metastatic prostate cancer. Biopsy indicated to confirm and for ancillary testing PDL1/Foundation One etc but due to timing and nature of presentation expeditious treatment is warranted.   Treatment options discussed with him. ADT with Degarelix vs systemic chemotherapy Docetaxel. Given his age and current scenario I would opt for ADT/Degarelix. Docetaxel is contraindicated at present given his LFT elevation.   Xgeva/Zometa also discussed. Palliative radiation is also indicated if he has worsening pain inspite of po regimen.   Hospice was also briefly discussed with him.    Check ammonia levels, does have intermittent confusion    Jose Nassar MD  Pager: [phone]                 36.5

## 2025-03-31 NOTE — ASU DISCHARGE PLAN (ADULT/PEDIATRIC) - ASU DC SPECIAL INSTRUCTIONSFT
Drain Check    Discharge Instructions  - You have had a drain checked.  - Keep the area clean and dry.  - Do not soak in a tub or pool with the drain, however you may shower with the drain and dressing covered in plastic wrap.  - Do not put traction on the drain and be careful that the drain does not get accidentally dislodged or kinked.  - Record output daily from the drain. Empty the bag as needed.  - You may resume your normal diet.  - You may resume your normal medications.  - It is normal to experience some pain over the site for the next few days. You may take apply ice to the area (20 minutes on, 20 minutes off) and take Tylenol for that pain. Do not take more frequently than every 6 hours and do not exceed more than 3000mg of Tylenol in a 24 hour period.    Notify your primary physician and/or Interventional Radiology IMMEDIATELY if you experience any of the following       - Fever of 100.4F  or 38C       - Chills or Rigors/ Shakes       - Swelling and/or Redness in the area of the puncture site       - Worsening Pain       - Blood soaked bandages or worsening bleeding       - Lightheadedness and/or dizziness upon standing       - Chest Pain/ Tightness       - Shortness of Breath       - Difficulty walking    If you have a problem that you believe requires IMMEDIATE attention, please go to your NEAREST Emergency Room. If you believe your problem can safely wait until you speak to a physician, please call Interventional Radiology for any concerns.    Please feel free to contact us at [phone] if any problems arise. After 6PM, Monday through Friday, on weekends and on holidays, please call [phone] and ask for the radiology resident on call to be paged.

## 2025-03-31 NOTE — PROCEDURE NOTE - GENERAL PROCEDURE NAME
Echocardiogram on 8/2/2017 demonstrating a normal EF with elevated pulmonary arterial pressures, enlarged atrial and elevated central venous pressure.    -Cardiology following  -Repeat 's  -Resuming diuresis lasix 60 BID   Biliary drain evaluation

## 2025-04-01 ENCOUNTER — TRANSCRIPTION ENCOUNTER (OUTPATIENT)
Age: 65
End: 2025-04-01

## 2025-04-01 ENCOUNTER — OUTPATIENT (OUTPATIENT)
Dept: OUTPATIENT SERVICES | Facility: HOSPITAL | Age: 65
LOS: 1 days | End: 2025-04-01
Payer: MEDICARE

## 2025-04-01 VITALS
TEMPERATURE: 98 F | OXYGEN SATURATION: 99 % | HEART RATE: 74 BPM | DIASTOLIC BLOOD PRESSURE: 62 MMHG | RESPIRATION RATE: 18 BRPM | SYSTOLIC BLOOD PRESSURE: 122 MMHG | WEIGHT: 126.99 LBS | HEIGHT: 59 IN

## 2025-04-01 DIAGNOSIS — K31.5 OBSTRUCTION OF DUODENUM: ICD-10-CM

## 2025-04-01 DIAGNOSIS — K86.89 OTHER SPECIFIED DISEASES OF PANCREAS: ICD-10-CM

## 2025-04-01 DIAGNOSIS — Z98.89 OTHER SPECIFIED POSTPROCEDURAL STATES: Chronic | ICD-10-CM

## 2025-04-01 DIAGNOSIS — Z90.13 ACQUIRED ABSENCE OF BILATERAL BREASTS AND NIPPLES: Chronic | ICD-10-CM

## 2025-04-01 DIAGNOSIS — Z92.21 PERSONAL HISTORY OF ANTINEOPLASTIC CHEMOTHERAPY: Chronic | ICD-10-CM

## 2025-04-01 PROCEDURE — C1894: CPT

## 2025-04-01 PROCEDURE — 47536 EXCHANGE BILIARY DRG CATH: CPT

## 2025-04-01 PROCEDURE — C1769: CPT

## 2025-04-01 PROCEDURE — C1729: CPT

## 2025-04-01 RX ORDER — CEFTRIAXONE 500 MG/1
1000 INJECTION, POWDER, FOR SOLUTION INTRAMUSCULAR; INTRAVENOUS ONCE
Refills: 0 | Status: DISCONTINUED | OUTPATIENT
Start: 2025-04-01 | End: 2025-04-15

## 2025-04-01 NOTE — PROCEDURE NOTE - PROCEDURE FINDINGS AND DETAILS
Drain evaluation demonstrated drain pigtail in good position within biliary system.  Contrast injection was sluggish indicating partial clog of drainage catheter. 8.5Fr MPD was exchanged over a wire under fluoroscopy. Contrast injection confirms position of new drain. Full report to follow.

## 2025-04-01 NOTE — PRE PROCEDURE NOTE - PRE PROCEDURE EVALUATION
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 Biliary drain last checked on 2/20 demonstrating filling of the right biliary tree  common bile and cystic ducts, and gallbladder. Patient had drain evaluation in IR yesterday 3/31 with no drain exchange however drain was resutured. Patient now presenting with decreased drain output and pericatheter leakage. Plan to evaluate and exchange external biliary drain.     Allergies: No Known Allergies    Medications (Abx/Cardiac/Anticoagulation/Blood Products)      Data:/  149.9, 149.9  57.6, 57.6  T(C): 36.9  HR: 74  BP: 122/62  RR: 18  SpO2: 99%    Exam  General: No acute distress  Chest: Non labored breathing  Abdomen: Non-distended  Extremities: No swelling, warm          Imaging: Reviewed    Plan: 64y Female presents for Biliary drain evaluation and exchange.    -Risks/Benefits/alternatives explained with the patient and/or healthcare proxy and witnessed informed consent obtained.     --  Luis Mota NP  Interventional Radiology  Available on [employer] TE[employer] / [employer]    For EMERGENT inquiries/questions:  IR Pager ([facility]): [phone]    For non-emergent consults/questions:   Please place a sunrise order "Consult- Interventional Radiology" with an appropriate callback number    For questions about scheduling during appropriate work hours, call IR :  [facility]: [phone]    For outpatient IR booking:  [facility]: [phone]

## 2025-04-01 NOTE — PRE PROCEDURE NOTE - RECENT H&P AUTHOR/LOCATION
Palliative Care/Hospice Follow Up Note       LOS: 3 days   Patient Care Team:  Leonor Echevarria MD as PCP - General (Family Medicine)    Chief Complaint:  encephalopathic    Interval History:     Encephalopathic     Review of Systems: Review of systems could not be obtained due to patient unresponsive.       Vital Signs  Temp:  [97.4 °F (36.3 °C)-100.3 °F (37.9 °C)] 100.3 °F (37.9 °C)  Heart Rate:  [] 115  Resp:  [10-18] 18  BP: ()/(53-55) 114/55  Device (Oxygen Therapy): room air SpO2:  [90 %-94 %] 90 %    Physical Exam:  General Appearance:    Not awake and in no acute distress     Throat:   No oral lesions, no thrush, oral mucosa moist     Neck:   No adenopathy, supple, trachea midline   Lungs:     Clear to auscultation, respirations regular, even and not labored      Heart:    Regular rhythm and normal rate   Abdomen:     Occasional bowel sounds, no masses, no organomegaly, soft and non-tender, non-distended     Extremities:   No edema, no cyanosis     Pulses:   Radial pulses palpable and equal bilaterally          Results Review:     I reviewed the patient's new clinical results.    Medication Reviewed.        •  acetaminophen **OR** acetaminophen **OR** acetaminophen  •  diphenoxylate-atropine  •  Glycerin-Hypromellose-  •  glycopyrrolate **OR** glycopyrrolate **OR** glycopyrrolate **OR** glycopyrrolate  •  haloperidol **OR** haloperidol **OR** haloperidol lactate  •  haloperidol **OR** haloperidol **OR** haloperidol lactate  •  HYDROmorphone **OR** Morphine **OR** morphine  •  HYDROmorphone **OR** Morphine **OR** morphine  •  HYDROmorphone **OR** Morphine **OR** morphine  •  ipratropium-albuterol  •  LORazepam **OR** LORazepam  •  LORazepam **OR** LORazepam  •  LORazepam **OR** LORazepam  •  magic mouthwash  •  miconazole  •  promethazine **OR** promethazine **OR** promethazine **OR** promethazine  •  promethazine **OR** promethazine **OR** promethazine **OR** promethazine  •   [facility] Scopolamine  •  sodium chloride      Assessment/Plan       PAF (paroxysmal atrial fibrillation) (CMS/[facility])    COPD (chronic obstructive pulmonary disease) (CMS/[facility])    PNA (pneumonia)    Admission for hospice care      Remains hours to days, awake at times but not eating and not really responsive.  Using robinol morphine and ativan for turns and comfort.  Continue present management    Plan for disposition:Where: JEANETTE Walker MD  10/14/18  6:08 AM

## 2025-04-01 NOTE — ASU DISCHARGE PLAN (ADULT/PEDIATRIC) - FINANCIAL ASSISTANCE
[facility] provides services at a reduced cost to those who are determined to be eligible through [facility]’s financial assistance program. Information regarding [facility]’s financial assistance program can be found by going to [email or web address] or by calling [phone].

## 2025-04-01 NOTE — ASU DISCHARGE PLAN (ADULT/PEDIATRIC) - NURSING INSTRUCTIONS
Please feel free to contact us at [phone] if any problems arise. After 6PM, Monday through Friday, on weekends and on holidays, please call [phone] and ask for the radiology resident on call to be paged.

## 2025-04-01 NOTE — ASU PREOP CHECKLIST - RESPIRATORY RATE (BREATHS/MIN)
"Otolaryngology Progress Note           Chief Complaint:     Patient presents with:  RECHECK: Ear cleaning         History of Present Illness:     Jacklyn Hein is a 82 year old female here for an ear cleaning. I last saw her 1/31/18 for cerumen debridement. More recently, she has felt her right ear being more itchy and feeling plugged.    Hearing loss has been gradual over the years with no acute changes. No fluctuating hearing loss.  No otalgia, otorrhea.   Vertigo: none  Tinnitus: none  There is no facial weakness, facial numbness or dysphagia.  No COM, otologic surgeries or trauma.  No history of significant noise exposure.   Dad with hearing loss, not sure when this started.     No audiogram and she defers future audiogram.          Review of Systems:     See HPI         Physical Exam:     /70  Pulse 72  Temp 97.1  F (36.2  C) (Tympanic)  Ht 5' 5\" (1.651 m)  Wt 175 lb (79.4 kg)  SpO2 98%  BMI 29.12 kg/m2  General - The patient is well nourished and well developed, and appears to have good nutritional status.  Alert and oriented to person and place, interactive.  Head and Face - Normocephalic and atraumatic, with no gross asymmetry noted of the contour of the facial features.  The facial nerve is intact, with strong symmetric movements.  Neck-no palpable lymphadenopathy or thyroid mass.  Trachea is midline.  Eyes - Extraocular movements intact.   Ears- External ears normal. The ear canals were examined underneath the operating microscope and with an otologic speculum. The canals were cleaned of all debris with  use of alligator forceps. There is no granulation tissue or sign of cholesteatoma. The patient tolerates this well. TM's are intact without effusion.  Nose - Nasal mucosa is pink and moist with no abnormal mucus.  The septum was grossly midline and non-obstructive, turbinates of normal size and position.  No polyps, masses, or purulence noted on examination.  Mouth - Examination of the " oral cavity shows pink, healthy, moist mucosa. Dentition in good condition.  No lesions or ulceration noted. The tongue is mobile and midline.    Throat - The walls of the oropharynx were smooth, pink, moist, symmetric, and had no lesions or ulcerations.  The tonsillar pillars and soft palate were symmetric.  The uvula was midline on elevation.           Assessment and Plan:       ICD-10-CM    1. Bilateral impacted cerumen H61.23    2. Sensorineural hearing loss (SNHL), unspecified laterality H90.5      The ears were cleaned today. Aural hygiene for the ear canals was discussed.  Avoidance of Q-tips was highly encouraged. The patient was told to avoid flushing the ear canal as there is a risk of perforating the ear drum.      Recommend annual audiograms, sooner with any acute changes in hearing. (she defers audiograms at this time)    The patient may return here as needed.    Raine Feng NP  ENT  [facility], [place]  [phone]     18

## 2025-04-01 NOTE — ASU DISCHARGE PLAN (ADULT/PEDIATRIC) - ASU DC SPECIAL INSTRUCTIONSFT
Biliary Drain Check and Exchange    Discharge Instructions  - You have had a drain checked.  - Keep the area clean and dry.  - Do not soak in a tub or pool with the drain, however you may shower with the drain and dressing covered in plastic wrap.  - Do not put traction on the drain and be careful that the drain does not get accidentally dislodged or kinked.  - Record output daily from the drain. Empty the bag as needed.  - You may resume your normal diet.  - You may resume your normal medications.  - It is normal to experience some pain over the site for the next few days. You may take apply ice to the area (20 minutes on, 20 minutes off) and take Tylenol for that pain. Do not take more frequently than every 6 hours and do not exceed more than 3000mg of Tylenol in a 24 hour period.    Notify your primary physician and/or Interventional Radiology IMMEDIATELY if you experience any of the following       - Fever of 100.4F  or 38C       - Chills or Rigors/ Shakes       - Swelling and/or Redness in the area of the puncture site       - Worsening Pain       - Blood soaked bandages or worsening bleeding       - Lightheadedness and/or dizziness upon standing       - Chest Pain/ Tightness       - Shortness of Breath       - Difficulty walking    If you have a problem that you believe requires IMMEDIATE attention, please go to your NEAREST Emergency Room. If you believe your problem can safely wait until you speak to a physician, please call Interventional Radiology for any concerns.    Please feel free to contact us at [phone] if any problems arise. After 6PM, Monday through Friday, on weekends and on holidays, please call [phone] and ask for the radiology resident on call to be paged.

## 2025-04-02 ENCOUNTER — OUTPATIENT (OUTPATIENT)
Dept: OUTPATIENT SERVICES | Facility: HOSPITAL | Age: 65
LOS: 1 days | End: 2025-04-02
Payer: MEDICARE

## 2025-04-02 ENCOUNTER — TRANSCRIPTION ENCOUNTER (OUTPATIENT)
Age: 65
End: 2025-04-02

## 2025-04-02 VITALS
SYSTOLIC BLOOD PRESSURE: 115 MMHG | DIASTOLIC BLOOD PRESSURE: 78 MMHG | WEIGHT: 126.99 LBS | TEMPERATURE: 98 F | HEIGHT: 59 IN | RESPIRATION RATE: 18 BRPM | OXYGEN SATURATION: 98 %

## 2025-04-02 DIAGNOSIS — K85.90 ACUTE PANCREATITIS WITHOUT NECROSIS OR INFECTION, UNSPECIFIED: ICD-10-CM

## 2025-04-02 DIAGNOSIS — K31.5 OBSTRUCTION OF DUODENUM: ICD-10-CM

## 2025-04-02 DIAGNOSIS — C50.919 MALIGNANT NEOPLASM OF UNSPECIFIED SITE OF UNSPECIFIED FEMALE BREAST: ICD-10-CM

## 2025-04-02 DIAGNOSIS — K83.1 OBSTRUCTION OF BILE DUCT: ICD-10-CM

## 2025-04-02 DIAGNOSIS — T85.590A OTHER MECHANICAL COMPLICATION OF BILE DUCT PROSTHESIS, INITIAL ENCOUNTER: ICD-10-CM

## 2025-04-02 DIAGNOSIS — K86.89 OTHER SPECIFIED DISEASES OF PANCREAS: ICD-10-CM

## 2025-04-02 DIAGNOSIS — Z92.21 PERSONAL HISTORY OF ANTINEOPLASTIC CHEMOTHERAPY: Chronic | ICD-10-CM

## 2025-04-02 PROCEDURE — 47536 EXCHANGE BILIARY DRG CATH: CPT

## 2025-04-02 PROCEDURE — C1894: CPT

## 2025-04-02 PROCEDURE — C1729: CPT

## 2025-04-02 RX ORDER — TRANSPARENT DRESSING 4"X4 3/4"
BANDAGE TOPICAL
Qty: 1 | Refills: 3 | Status: ACTIVE | OUTPATIENT
Start: 2025-04-02

## 2025-04-02 RX ORDER — CEFTRIAXONE 500 MG/1
1000 INJECTION, POWDER, FOR SOLUTION INTRAMUSCULAR; INTRAVENOUS ONCE
Refills: 0 | Status: COMPLETED | OUTPATIENT
Start: 2025-04-02 | End: 2025-04-02

## 2025-04-02 RX ORDER — GAUZE BANDAGE 2" X 2"
2"X2" BANDAGE TOPICAL
Qty: 1 | Refills: 1 | Status: ACTIVE | OUTPATIENT
Start: 2025-04-02

## 2025-04-02 RX ADMIN — CEFTRIAXONE 100 MILLIGRAM(S): 500 INJECTION, POWDER, FOR SOLUTION INTRAMUSCULAR; INTRAVENOUS at 11:37

## 2025-04-02 NOTE — ASU DISCHARGE PLAN (ADULT/PEDIATRIC) - NS MD DC FALL RISK RISK
For information on Fall & Injury Prevention, visit: [email or web address] OR  [email or web address] OR  https://www.cdc.gov/steadi/patient.html

## 2025-04-02 NOTE — ASU PATIENT PROFILE, ADULT - PREOP PAIN SCORE
My chart message sent to patient. cardiac clearance to be given after Echo is completed.    ECHO scheduled 7/26/23    RN postponing to follow up on ECHO.'   3

## 2025-04-02 NOTE — PRE PROCEDURE NOTE - PRE PROCEDURE EVALUATION
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 Biliary drain last checked on 2/20 demonstrating filling of the right biliary tree  common bile and cystic ducts, and gallbladder. Patient had drain evaluation in IR 3/31 with no drain exchange however drain was resutured. Patient again presented next day 4/1 for pericatheter leaking for which an exchange was preformed. Now again leaking around catheter. Patient presents to IR for now Biliary drain evaluation and possible upsizing/exchange.     Allergies: No Known Allergies    Medications (Abx/Cardiac/Anticoagulation/Blood Products)  Review    Data:  149.9  57.6  T(C): 36.9  HR: 74  BP: 122/62  RR: 18  SpO2: 99%    Exam  General: No acute distress  Chest: Non labored breathing    Imaging:     Plan: 64y Female presents for Biliary drain evaluation and possible upsizing/exchange  -Risks/Benefits/alternatives explained with the patient and/or healthcare proxy and witnessed informed consent obtained.

## 2025-04-02 NOTE — ASU PATIENT PROFILE, ADULT - FALL HARM RISK - HARM RISK INTERVENTIONS

## 2025-04-02 NOTE — PROCEDURE NOTE - PROCEDURE FINDINGS AND DETAILS
Successful evaluation and upsizing from 8.5  to 10.2F external biliary drain. Sutured in place x2. Full report to follow.   Pre medicated with 1G Ceftriaxone

## 2025-04-02 NOTE — ASU DISCHARGE PLAN (ADULT/PEDIATRIC) - ASU DC SPECIAL INSTRUCTIONSFT
Drain Check    Discharge Instructions  - You have had a drain checked.  - Keep the area clean and dry.  - Do not soak in a tub or pool with the drain, however you may shower with the drain and dressing covered in plastic wrap.  - Do not put traction on the drain and be careful that the drain does not get accidentally dislodged or kinked.  - Record output daily from the drain. Empty the bag as needed.  - You may resume your normal diet.  - You may resume your normal medications.  - It is normal to experience some pain over the site for the next few days. You may take apply ice to the area (20 minutes on, 20 minutes off) and take Tylenol for that pain. Do not take more frequently than every 6 hours and do not exceed more than 3000mg of Tylenol in a 24 hour period.    Notify your primary physician and/or Interventional Radiology IMMEDIATELY if you experience any of the following       - Fever of 100.4F  or 38C       - Chills or Rigors/ Shakes       - Swelling and/or Redness in the area of the puncture site       - Worsening Pain       - Blood soaked bandages or worsening bleeding       - Lightheadedness and/or dizziness upon standing       - Chest Pain/ Tightness       - Shortness of Breath       - Difficulty walking    If you have a problem that you believe requires IMMEDIATE attention, please go to your NEAREST Emergency Room. If you believe your problem can safely wait until you speak to a physician, please call Interventional Radiology for any concerns.    Please feel free to contact us at [phone] if any problems arise. After 6PM, Monday through Friday, on weekends and on holidays, please call [phone] and ask for the radiology resident on call to be paged.

## 2025-04-02 NOTE — ASU DISCHARGE PLAN (ADULT/PEDIATRIC) - FINANCIAL ASSISTANCE
[facility] provides services at a reduced cost to those who are determined to be eligible through [facility]’s financial assistance program. Information regarding [facility]’s financial assistance program can be found by going to [email or web address] or by calling [phone].

## 2025-04-02 NOTE — ASU DISCHARGE PLAN (ADULT/PEDIATRIC) - NURSING INSTRUCTIONS
Please feel free to contact us at [phone] if any problems arise. After 6PM, Monday through Friday, on weekends and on holidays, please call [phone] and ask for the radiology resident on call to be paged.

## 2025-04-03 ENCOUNTER — OUTPATIENT (OUTPATIENT)
Dept: OUTPATIENT SERVICES | Facility: HOSPITAL | Age: 65
LOS: 1 days | Discharge: HOME | End: 2025-04-03

## 2025-04-03 ENCOUNTER — LABORATORY RESULT (OUTPATIENT)
Age: 65
End: 2025-04-03

## 2025-04-03 DIAGNOSIS — C50.919 MALIGNANT NEOPLASM OF UNSPECIFIED SITE OF UNSPECIFIED FEMALE BREAST: ICD-10-CM

## 2025-04-04 ENCOUNTER — APPOINTMENT (OUTPATIENT)
Dept: HEMATOLOGY ONCOLOGY | Facility: CLINIC | Age: 65
End: 2025-04-04
Payer: MEDICARE

## 2025-04-04 ENCOUNTER — RESULT REVIEW (OUTPATIENT)
Age: 65
End: 2025-04-04

## 2025-04-04 ENCOUNTER — APPOINTMENT (OUTPATIENT)
Dept: INFUSION THERAPY | Facility: HOSPITAL | Age: 65
End: 2025-04-04

## 2025-04-04 VITALS
OXYGEN SATURATION: 98 % | TEMPERATURE: 97.3 F | SYSTOLIC BLOOD PRESSURE: 115 MMHG | WEIGHT: 123.46 LBS | HEART RATE: 64 BPM | RESPIRATION RATE: 16 BRPM | DIASTOLIC BLOOD PRESSURE: 72 MMHG | BODY MASS INDEX: 24.94 KG/M2

## 2025-04-04 DIAGNOSIS — C79.51 SECONDARY MALIGNANT NEOPLASM OF BONE: ICD-10-CM

## 2025-04-04 DIAGNOSIS — R10.9 UNSPECIFIED ABDOMINAL PAIN: ICD-10-CM

## 2025-04-04 DIAGNOSIS — R79.89 OTHER SPECIFIED ABNORMAL FINDINGS OF BLOOD CHEMISTRY: ICD-10-CM

## 2025-04-04 DIAGNOSIS — C78.89 SECONDARY MALIGNANT NEOPLASM OF OTHER DIGESTIVE ORGANS: ICD-10-CM

## 2025-04-04 LAB
ALBUMIN SERPL ELPH-MCNC: 3.8 G/DL
ALP BLD-CCNC: 281 U/L
ALT SERPL-CCNC: 104 U/L
ANION GAP SERPL CALC-SCNC: 13 MMOL/L
AST SERPL-CCNC: 55 U/L
BASOPHILS # BLD AUTO: 0.03 K/UL — SIGNIFICANT CHANGE UP (ref 0–0.2)
BASOPHILS NFR BLD AUTO: 0.5 % — SIGNIFICANT CHANGE UP (ref 0–2)
BILIRUB SERPL-MCNC: 1.1 MG/DL
BUN SERPL-MCNC: 13 MG/DL
CALCIUM SERPL-MCNC: 9.1 MG/DL
CEA SERPL-MCNC: 35.4 NG/ML
CHLORIDE SERPL-SCNC: 107 MMOL/L
CO2 SERPL-SCNC: 22 MMOL/L
CREAT SERPL-MCNC: 0.56 MG/DL
EGFRCR SERPLBLD CKD-EPI 2021: 102 ML/MIN/1.73M2
EOSINOPHIL # BLD AUTO: 0.22 K/UL — SIGNIFICANT CHANGE UP (ref 0–0.5)
EOSINOPHIL NFR BLD AUTO: 4 % — SIGNIFICANT CHANGE UP (ref 0–6)
GLUCOSE SERPL-MCNC: 99 MG/DL
HCT VFR BLD CALC: 38.8 % — SIGNIFICANT CHANGE UP (ref 34.5–45)
HGB BLD-MCNC: 13 G/DL — SIGNIFICANT CHANGE UP (ref 11.5–15.5)
IMM GRANULOCYTES NFR BLD AUTO: 0.4 % — SIGNIFICANT CHANGE UP (ref 0–0.9)
LYMPHOCYTES # BLD AUTO: 2 K/UL — SIGNIFICANT CHANGE UP (ref 1–3.3)
LYMPHOCYTES # BLD AUTO: 36 % — SIGNIFICANT CHANGE UP (ref 13–44)
MCHC RBC-ENTMCNC: 31.3 PG — SIGNIFICANT CHANGE UP (ref 27–34)
MCHC RBC-ENTMCNC: 33.5 G/DL — SIGNIFICANT CHANGE UP (ref 32–36)
MCV RBC AUTO: 93.5 FL — SIGNIFICANT CHANGE UP (ref 80–100)
MONOCYTES # BLD AUTO: 0.35 K/UL — SIGNIFICANT CHANGE UP (ref 0–0.9)
MONOCYTES NFR BLD AUTO: 6.3 % — SIGNIFICANT CHANGE UP (ref 2–14)
NEUTROPHILS # BLD AUTO: 2.94 K/UL — SIGNIFICANT CHANGE UP (ref 1.8–7.4)
NEUTROPHILS NFR BLD AUTO: 52.8 % — SIGNIFICANT CHANGE UP (ref 43–77)
NRBC BLD AUTO-RTO: 0 /100 WBCS — SIGNIFICANT CHANGE UP (ref 0–0)
PLAT MORPH BLD: NORMAL — SIGNIFICANT CHANGE UP
PLATELET # BLD AUTO: 242 K/UL — SIGNIFICANT CHANGE UP (ref 150–400)
POTASSIUM SERPL-SCNC: 3.7 MMOL/L
PROT SERPL-MCNC: 5.9 G/DL
RBC # BLD: 4.15 M/UL — SIGNIFICANT CHANGE UP (ref 3.8–5.2)
RBC # FLD: 13.1 % — SIGNIFICANT CHANGE UP (ref 10.3–14.5)
RBC BLD AUTO: SIGNIFICANT CHANGE UP
SODIUM SERPL-SCNC: 141 MMOL/L
WBC # BLD: 5.56 K/UL — SIGNIFICANT CHANGE UP (ref 3.8–10.5)
WBC # FLD AUTO: 5.56 K/UL — SIGNIFICANT CHANGE UP (ref 3.8–10.5)

## 2025-04-04 PROCEDURE — G2211 COMPLEX E/M VISIT ADD ON: CPT

## 2025-04-04 PROCEDURE — 99205 OFFICE O/P NEW HI 60 MIN: CPT

## 2025-04-05 LAB — CANCER AG27-29 SERPL-ACNC: 70 U/ML

## 2025-04-07 ENCOUNTER — NON-APPOINTMENT (OUTPATIENT)
Age: 65
End: 2025-04-07

## 2025-04-07 DIAGNOSIS — C79.51 SECONDARY MALIGNANT NEOPLASM OF BONE: ICD-10-CM

## 2025-04-10 ENCOUNTER — LABORATORY RESULT (OUTPATIENT)
Age: 65
End: 2025-04-10

## 2025-04-17 ENCOUNTER — LABORATORY RESULT (OUTPATIENT)
Age: 65
End: 2025-04-17

## 2025-04-25 ENCOUNTER — LABORATORY RESULT (OUTPATIENT)
Age: 65
End: 2025-04-25

## 2025-04-30 ENCOUNTER — APPOINTMENT (OUTPATIENT)
Dept: INFUSION THERAPY | Facility: HOSPITAL | Age: 65
End: 2025-04-30

## 2025-04-30 ENCOUNTER — APPOINTMENT (OUTPATIENT)
Dept: HEMATOLOGY ONCOLOGY | Facility: CLINIC | Age: 65
End: 2025-04-30

## 2025-05-02 ENCOUNTER — LABORATORY RESULT (OUTPATIENT)
Age: 65
End: 2025-05-02

## 2025-05-02 ENCOUNTER — RESULT REVIEW (OUTPATIENT)
Age: 65
End: 2025-05-02

## 2025-05-02 ENCOUNTER — APPOINTMENT (OUTPATIENT)
Dept: HEMATOLOGY ONCOLOGY | Facility: CLINIC | Age: 65
End: 2025-05-02
Payer: MEDICARE

## 2025-05-02 ENCOUNTER — APPOINTMENT (OUTPATIENT)
Dept: INFUSION THERAPY | Facility: HOSPITAL | Age: 65
End: 2025-05-02

## 2025-05-02 ENCOUNTER — NON-APPOINTMENT (OUTPATIENT)
Age: 65
End: 2025-05-02

## 2025-05-02 VITALS
SYSTOLIC BLOOD PRESSURE: 130 MMHG | TEMPERATURE: 98 F | OXYGEN SATURATION: 100 % | DIASTOLIC BLOOD PRESSURE: 85 MMHG | HEART RATE: 74 BPM | RESPIRATION RATE: 16 BRPM | BODY MASS INDEX: 22.49 KG/M2 | WEIGHT: 111.33 LBS

## 2025-05-02 DIAGNOSIS — C50.919 MALIGNANT NEOPLASM OF UNSPECIFIED SITE OF UNSPECIFIED FEMALE BREAST: ICD-10-CM

## 2025-05-02 DIAGNOSIS — K31.5 OBSTRUCTION OF DUODENUM: ICD-10-CM

## 2025-05-02 DIAGNOSIS — C79.51 SECONDARY MALIGNANT NEOPLASM OF BONE: ICD-10-CM

## 2025-05-02 DIAGNOSIS — C78.89 SECONDARY MALIGNANT NEOPLASM OF OTHER DIGESTIVE ORGANS: ICD-10-CM

## 2025-05-02 DIAGNOSIS — R63.4 ABNORMAL WEIGHT LOSS: ICD-10-CM

## 2025-05-02 LAB
ALBUMIN SERPL ELPH-MCNC: 4.1 G/DL — SIGNIFICANT CHANGE UP (ref 3.3–5)
ALP SERPL-CCNC: 222 U/L — HIGH (ref 40–120)
ALT FLD-CCNC: 129 U/L — HIGH (ref 10–45)
ANION GAP SERPL CALC-SCNC: 10 MMOL/L — SIGNIFICANT CHANGE UP (ref 5–17)
AST SERPL-CCNC: 104 U/L — HIGH (ref 10–40)
BASOPHILS # BLD AUTO: 0.05 K/UL — SIGNIFICANT CHANGE UP (ref 0–0.2)
BASOPHILS NFR BLD AUTO: 0.7 % — SIGNIFICANT CHANGE UP (ref 0–2)
BILIRUB SERPL-MCNC: 0.8 MG/DL — SIGNIFICANT CHANGE UP (ref 0.2–1.2)
BUN SERPL-MCNC: 12 MG/DL — SIGNIFICANT CHANGE UP (ref 7–23)
CALCIUM SERPL-MCNC: 9.1 MG/DL — SIGNIFICANT CHANGE UP (ref 8.4–10.5)
CHLORIDE SERPL-SCNC: 102 MMOL/L — SIGNIFICANT CHANGE UP (ref 96–108)
CO2 SERPL-SCNC: 25 MMOL/L — SIGNIFICANT CHANGE UP (ref 22–31)
CREAT SERPL-MCNC: 0.65 MG/DL — SIGNIFICANT CHANGE UP (ref 0.5–1.3)
EGFR: 98 ML/MIN/1.73M2 — SIGNIFICANT CHANGE UP
EGFR: 98 ML/MIN/1.73M2 — SIGNIFICANT CHANGE UP
EOSINOPHIL # BLD AUTO: 0.14 K/UL — SIGNIFICANT CHANGE UP (ref 0–0.5)
EOSINOPHIL NFR BLD AUTO: 2 % — SIGNIFICANT CHANGE UP (ref 0–6)
FERRITIN SERPL-MCNC: 330 NG/ML — SIGNIFICANT CHANGE UP (ref 13–330)
GLUCOSE SERPL-MCNC: 115 MG/DL — HIGH (ref 70–99)
HCT VFR BLD CALC: 40.8 % — SIGNIFICANT CHANGE UP (ref 34.5–45)
HGB BLD-MCNC: 14 G/DL — SIGNIFICANT CHANGE UP (ref 11.5–15.5)
IMM GRANULOCYTES NFR BLD AUTO: 0.3 % — SIGNIFICANT CHANGE UP (ref 0–0.9)
LYMPHOCYTES # BLD AUTO: 2.06 K/UL — SIGNIFICANT CHANGE UP (ref 1–3.3)
LYMPHOCYTES # BLD AUTO: 29.9 % — SIGNIFICANT CHANGE UP (ref 13–44)
MAGNESIUM SERPL-MCNC: 2.3 MG/DL — SIGNIFICANT CHANGE UP (ref 1.6–2.6)
MCHC RBC-ENTMCNC: 30.6 PG — SIGNIFICANT CHANGE UP (ref 27–34)
MCHC RBC-ENTMCNC: 34.3 G/DL — SIGNIFICANT CHANGE UP (ref 32–36)
MCV RBC AUTO: 89.3 FL — SIGNIFICANT CHANGE UP (ref 80–100)
MONOCYTES # BLD AUTO: 0.54 K/UL — SIGNIFICANT CHANGE UP (ref 0–0.9)
MONOCYTES NFR BLD AUTO: 7.8 % — SIGNIFICANT CHANGE UP (ref 2–14)
NEUTROPHILS # BLD AUTO: 4.07 K/UL — SIGNIFICANT CHANGE UP (ref 1.8–7.4)
NEUTROPHILS NFR BLD AUTO: 59.3 % — SIGNIFICANT CHANGE UP (ref 43–77)
NRBC BLD AUTO-RTO: 0 /100 WBCS — SIGNIFICANT CHANGE UP (ref 0–0)
PLATELET # BLD AUTO: 257 K/UL — SIGNIFICANT CHANGE UP (ref 150–400)
POTASSIUM SERPL-MCNC: 4.4 MMOL/L — SIGNIFICANT CHANGE UP (ref 3.5–5.3)
POTASSIUM SERPL-SCNC: 4.4 MMOL/L — SIGNIFICANT CHANGE UP (ref 3.5–5.3)
PROT SERPL-MCNC: 6.5 G/DL — SIGNIFICANT CHANGE UP (ref 6–8.3)
RBC # BLD: 4.57 M/UL — SIGNIFICANT CHANGE UP (ref 3.8–5.2)
RBC # FLD: 13.2 % — SIGNIFICANT CHANGE UP (ref 10.3–14.5)
SODIUM SERPL-SCNC: 137 MMOL/L — SIGNIFICANT CHANGE UP (ref 135–145)
WBC # BLD: 6.88 K/UL — SIGNIFICANT CHANGE UP (ref 3.8–10.5)
WBC # FLD AUTO: 6.88 K/UL — SIGNIFICANT CHANGE UP (ref 3.8–10.5)

## 2025-05-02 PROCEDURE — G2211 COMPLEX E/M VISIT ADD ON: CPT

## 2025-05-02 PROCEDURE — 99214 OFFICE O/P EST MOD 30 MIN: CPT

## 2025-05-02 RX ORDER — PROCHLORPERAZINE MALEATE 10 MG/1
10 TABLET ORAL EVERY 6 HOURS
Qty: 60 | Refills: 1 | Status: ACTIVE | COMMUNITY
Start: 2025-05-02 | End: 1900-01-01

## 2025-05-03 LAB
CANCER AG27-29 SERPL-ACNC: 97.7 U/ML — HIGH (ref 0–38.6)
HBV CORE AB SER-ACNC: SIGNIFICANT CHANGE UP
HBV SURFACE AB SER-ACNC: ABNORMAL
HBV SURFACE AG SER-ACNC: SIGNIFICANT CHANGE UP

## 2025-05-05 DIAGNOSIS — R11.2 NAUSEA WITH VOMITING, UNSPECIFIED: ICD-10-CM

## 2025-05-05 DIAGNOSIS — Z51.11 ENCOUNTER FOR ANTINEOPLASTIC CHEMOTHERAPY: ICD-10-CM

## 2025-05-06 ENCOUNTER — NON-APPOINTMENT (OUTPATIENT)
Age: 65
End: 2025-05-06

## 2025-05-09 ENCOUNTER — LABORATORY RESULT (OUTPATIENT)
Age: 65
End: 2025-05-09

## 2025-05-14 ENCOUNTER — LABORATORY RESULT (OUTPATIENT)
Age: 65
End: 2025-05-14

## 2025-05-14 ENCOUNTER — TRANSCRIPTION ENCOUNTER (OUTPATIENT)
Age: 65
End: 2025-05-14

## 2025-05-14 ENCOUNTER — OUTPATIENT (OUTPATIENT)
Dept: OUTPATIENT SERVICES | Facility: HOSPITAL | Age: 65
LOS: 1 days | End: 2025-05-14
Payer: MEDICARE

## 2025-05-14 VITALS
RESPIRATION RATE: 15 BRPM | HEIGHT: 59 IN | OXYGEN SATURATION: 100 % | WEIGHT: 110.89 LBS | TEMPERATURE: 97 F | SYSTOLIC BLOOD PRESSURE: 129 MMHG | HEART RATE: 80 BPM | DIASTOLIC BLOOD PRESSURE: 74 MMHG

## 2025-05-14 DIAGNOSIS — Z90.13 ACQUIRED ABSENCE OF BILATERAL BREASTS AND NIPPLES: Chronic | ICD-10-CM

## 2025-05-14 DIAGNOSIS — Z98.89 OTHER SPECIFIED POSTPROCEDURAL STATES: Chronic | ICD-10-CM

## 2025-05-14 DIAGNOSIS — Z92.21 PERSONAL HISTORY OF ANTINEOPLASTIC CHEMOTHERAPY: Chronic | ICD-10-CM

## 2025-05-14 DIAGNOSIS — K31.5 OBSTRUCTION OF DUODENUM: ICD-10-CM

## 2025-05-14 PROCEDURE — 47531 INJECTION FOR CHOLANGIOGRAM: CPT

## 2025-05-14 PROCEDURE — C1769: CPT

## 2025-05-14 RX ORDER — CEFTRIAXONE 500 MG/1
1000 INJECTION, POWDER, FOR SOLUTION INTRAMUSCULAR; INTRAVENOUS ONCE
Refills: 0 | Status: DISCONTINUED | OUTPATIENT
Start: 2025-05-14 | End: 2025-05-28

## 2025-05-14 NOTE — H&P ADULT - HISTORY OF PRESENT ILLNESS
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 Biliary drain last checked on 2/20 demonstrating filling of the right biliary tree  common bile and cystic ducts, and gallbladder. Patient had drain evaluation in IR 3/31 with no drain exchange however drain was resutured. Patient again presented next day 4/1 for pericatheter leaking for which an exchange was preformed followed by upsizing on 4/2. Patient presenting today for leaking. Plan for biliary drain evaluation.    Review of Systems:   Constitutional: No fever, weight loss, or fatigue  Neurological: No headaches, memory loss, loss of strength, numbness, or tremors  Respiratory: No cough, wheezing, chills or hemoptysis; No shortness of breath  Cardiovascular: No chest pain, palpitations, dizziness, or leg swelling  Gastrointestinal: No abdominal or epigastric pain. No nausea, vomiting, or hematemesis; No diarrhea or constipation. No melena or hematochezia.  Skin: No itching, burning, rashes, or lesions   Musculoskeletal: No joint pain or swelling; No muscle, back, or extremity pain    Allergies: No Known Allergies    Medications (Abx/Cardiac/Anticoagulation/Blood Products)      Data:    T(C): --  HR: --  BP: --  RR: --  SpO2: --            Physical Exam  General: No acute distress, nontoxic, A&Ox3  Chest: Non labored breathing  Abdomen: Non-distended, non-tender, no preitoneal signs  Skin: No rashes or lesions    RADIOLOGY & ADDITIONAL TESTS:    Imaging Reviewed    H & P Note Reviewed from: IR procedure note 04/2/25    Plan: 65y Female presents for biliary drain evaluation and likely exchange.    -Risks/Benefits/alternatives explained with the patient and/or healthcare proxy and witnessed informed consent obtained.     --  Luis Mota NP  Interventional Radiology  Available on [employer] TEAMS / [email or web address]    For EMERGENT inquiries/questions:  IR Pager ([facility]): [phone]    For non-emergent consults/questions:   Please place a sunrise order "Consult- Interventional Radiology" with an appropriate callback number    For questions about scheduling during appropriate work hours, call IR :  [facility]: [phone]    For outpatient IR booking:  [facility]: [phone]

## 2025-05-14 NOTE — H&P ADULT - HIV OFFER
Opt out Spironolactone Counseling: Patient advised regarding risks of diarrhea, abdominal pain, hyperkalemia, birth defects (for female patients), liver toxicity and renal toxicity. The patient may need blood work to monitor liver and kidney function and potassium levels while on therapy. The patient verbalized understanding of the proper use and possible adverse effects of spironolactone.  All of the patient's questions and concerns were addressed.

## 2025-05-14 NOTE — ASU DISCHARGE PLAN (ADULT/PEDIATRIC) - NURSING INSTRUCTIONS
Please feel free to contact us at [phone] if any problems arise. After 6PM, Monday through Friday, on weekends and on holidays, please call [phone] and ask for the radiology resident on call to be paged.

## 2025-05-14 NOTE — ASU PATIENT PROFILE, ADULT - FALL HARM RISK - HARM RISK INTERVENTIONS

## 2025-05-14 NOTE — ASU PREOP CHECKLIST - BP NONINVASIVE SYSTOLIC (MM HG)
JEREMY Allan J Np Nurse Msg Pool1 minute ago (3:44 PM)     MK  What imaging was the ascending aorta dilatation noted on?     
On her echo, it showed Dilated ascending aorta, 4.3 cm. On 07/18. She has hypertension and shortness of breath. Due to age, I thought it was appropriate to refer. Should this be seen by cardiology instead.   
Patient can be seen by Dr. Ibrahim/Vascular Surgery for this diagnosis. Writer was seeking clarification on the imaging this was noted on for triaging.     Upon chart review, no prior diagnosis of AAA. Per vascular protocol, ordered US Aorta screening to be completed prior to next available consultation.   Called and discussed with the patient who is understanding and willing/was transferred to scheduling to make the appointments.     Consultation and US scheduled 4/2024.  
Triage and please advise with scheduling instructions.    atient: Phillip Luo [[id number]]     Procedure: SERVICE TO SURGERY VASCULAR Status: Needs Scheduling   Requested appt date:  Authorizing: Charissa Salazar APNP in [facility]   Referral: [id number]   (Authorized)       Priority: Routine               Diagnosis: Ascending aorta dilatation (CMD) [I77.810]       Request History    Action Date and Time User Details   Request Created 01/31/2024 07:41 Charissa Salazar APNP Appointment Encounter: Details         
129

## 2025-05-14 NOTE — ASU DISCHARGE PLAN (ADULT/PEDIATRIC) - NS MD DC FALL RISK RISK
For information on Fall & Injury Prevention, visit: [email or web address] OR  [email or web address] OR  https://www.cdc.gov/steadi/patient.html

## 2025-05-14 NOTE — ASU DISCHARGE PLAN (ADULT/PEDIATRIC) - ASU DC SPECIAL INSTRUCTIONSFT
Drain Check and Exchange    Discharge Instructions  - You have had a drain checked.  - Keep the area clean and dry.  - Do not soak in a tub or pool with the drain, however you may shower with the drain and dressing covered in plastic wrap.  - Do not put traction on the drain and be careful that the drain does not get accidentally dislodged or kinked.  - Record output daily from the drain. Empty the bag as needed.  - You may resume your normal diet.  - You may resume your normal medications.  - It is normal to experience some pain over the site for the next few days. You may take apply ice to the area (20 minutes on, 20 minutes off) and take Tylenol for that pain. Do not take more frequently than every 6 hours and do not exceed more than 3000mg of Tylenol in a 24 hour period.    Notify your primary physician and/or Interventional Radiology IMMEDIATELY if you experience any of the following       - Fever of 100.4F  or 38C       - Chills or Rigors/ Shakes       - Swelling and/or Redness in the area of the puncture site       - Worsening Pain       - Blood soaked bandages or worsening bleeding       - Lightheadedness and/or dizziness upon standing       - Chest Pain/ Tightness       - Shortness of Breath       - Difficulty walking    If you have a problem that you believe requires IMMEDIATE attention, please go to your NEAREST Emergency Room. If you believe your problem can safely wait until you speak to a physician, please call Interventional Radiology for any concerns.    Please feel free to contact us at [phone] if any problems arise. After 6PM, Monday through Friday, on weekends and on holidays, please call [phone] and ask for the radiology resident on call to be paged. Drain Check     Discharge Instructions  - You have had a drain checked.  - Keep the area clean and dry.  - Do not soak in a tub or pool with the drain, however you may shower with the drain and dressing covered in plastic wrap.  - Do not put traction on the drain and be careful that the drain does not get accidentally dislodged or kinked.  - Record output daily from the drain. Empty the bag as needed.  - You may resume your normal diet.  - You may resume your normal medications.  - It is normal to experience some pain over the site for the next few days. You may take apply ice to the area (20 minutes on, 20 minutes off) and take Tylenol for that pain. Do not take more frequently than every 6 hours and do not exceed more than 3000mg of Tylenol in a 24 hour period.    Notify your primary physician and/or Interventional Radiology IMMEDIATELY if you experience any of the following       - Fever of 100.4F  or 38C       - Chills or Rigors/ Shakes       - Swelling and/or Redness in the area of the puncture site       - Worsening Pain       - Blood soaked bandages or worsening bleeding       - Lightheadedness and/or dizziness upon standing       - Chest Pain/ Tightness       - Shortness of Breath       - Difficulty walking    If you have a problem that you believe requires IMMEDIATE attention, please go to your NEAREST Emergency Room. If you believe your problem can safely wait until you speak to a physician, please call Interventional Radiology for any concerns.    Please feel free to contact us at [phone] if any problems arise. After 6PM, Monday through Friday, on weekends and on holidays, please call [phone] and ask for the radiology resident on call to be paged.

## 2025-05-14 NOTE — PROCEDURE NOTE - PLAN
- Patient has routine exchange in 1 month. Opted to wait till then for exchange since positioning of drain was WDL.

## 2025-05-14 NOTE — PROCEDURE NOTE - PROCEDURE FINDINGS AND DETAILS
Hand contrast injection demonstrates drain in position within the biliary system with no pericatheter leakage. Drain was resutured in place. Site covered with DSD. Full report to follow.

## 2025-05-14 NOTE — ASU DISCHARGE PLAN (ADULT/PEDIATRIC) - FINANCIAL ASSISTANCE
[facility] provides services at a reduced cost to those who are determined to be eligible through [facility]’s financial assistance program. Information regarding [facility]’s financial assistance program can be found by going to [email or web address] or by calling [phone].

## 2025-05-16 ENCOUNTER — NON-APPOINTMENT (OUTPATIENT)
Age: 65
End: 2025-05-16

## 2025-05-20 ENCOUNTER — NON-APPOINTMENT (OUTPATIENT)
Age: 65
End: 2025-05-20

## 2025-05-21 ENCOUNTER — LABORATORY RESULT (OUTPATIENT)
Age: 65
End: 2025-05-21

## 2025-05-24 ENCOUNTER — APPOINTMENT (OUTPATIENT)
Dept: CT IMAGING | Facility: CLINIC | Age: 65
End: 2025-05-24
Payer: MEDICARE

## 2025-05-24 ENCOUNTER — OUTPATIENT (OUTPATIENT)
Dept: OUTPATIENT SERVICES | Facility: HOSPITAL | Age: 65
LOS: 1 days | End: 2025-05-24
Payer: MEDICARE

## 2025-05-24 DIAGNOSIS — R10.9 UNSPECIFIED ABDOMINAL PAIN: ICD-10-CM

## 2025-05-24 DIAGNOSIS — Z90.13 ACQUIRED ABSENCE OF BILATERAL BREASTS AND NIPPLES: Chronic | ICD-10-CM

## 2025-05-24 DIAGNOSIS — Z92.21 PERSONAL HISTORY OF ANTINEOPLASTIC CHEMOTHERAPY: Chronic | ICD-10-CM

## 2025-05-24 DIAGNOSIS — K31.5 OBSTRUCTION OF DUODENUM: ICD-10-CM

## 2025-05-24 DIAGNOSIS — Z98.89 OTHER SPECIFIED POSTPROCEDURAL STATES: Chronic | ICD-10-CM

## 2025-05-24 PROCEDURE — 74177 CT ABD & PELVIS W/CONTRAST: CPT

## 2025-05-24 PROCEDURE — 74177 CT ABD & PELVIS W/CONTRAST: CPT | Mod: 26

## 2025-05-28 ENCOUNTER — RESULT REVIEW (OUTPATIENT)
Age: 65
End: 2025-05-28

## 2025-05-28 ENCOUNTER — APPOINTMENT (OUTPATIENT)
Dept: INFUSION THERAPY | Facility: HOSPITAL | Age: 65
End: 2025-05-28

## 2025-05-28 ENCOUNTER — APPOINTMENT (OUTPATIENT)
Dept: HEMATOLOGY ONCOLOGY | Facility: CLINIC | Age: 65
End: 2025-05-28
Payer: MEDICARE

## 2025-05-28 VITALS
HEART RATE: 57 BPM | BODY MASS INDEX: 21.37 KG/M2 | DIASTOLIC BLOOD PRESSURE: 86 MMHG | OXYGEN SATURATION: 99 % | RESPIRATION RATE: 16 BRPM | SYSTOLIC BLOOD PRESSURE: 143 MMHG | WEIGHT: 105.82 LBS | TEMPERATURE: 97.7 F

## 2025-05-28 DIAGNOSIS — C41.9 MALIGNANT NEOPLASM OF BONE AND ARTICULAR CARTILAGE, UNSPECIFIED: ICD-10-CM

## 2025-05-28 DIAGNOSIS — C78.89 SECONDARY MALIGNANT NEOPLASM OF OTHER DIGESTIVE ORGANS: ICD-10-CM

## 2025-05-28 DIAGNOSIS — C50.919 MALIGNANT NEOPLASM OF UNSPECIFIED SITE OF UNSPECIFIED FEMALE BREAST: ICD-10-CM

## 2025-05-28 DIAGNOSIS — Z79.69 LONG TERM (CURRENT) USE OF OTHER IMMUNOMODULATORS AND IMMUNOSUPPRESSANTS: ICD-10-CM

## 2025-05-28 LAB
ALBUMIN SERPL ELPH-MCNC: 4 G/DL — SIGNIFICANT CHANGE UP (ref 3.3–5)
ALP SERPL-CCNC: 213 U/L — HIGH (ref 40–120)
ALT FLD-CCNC: 80 U/L — HIGH (ref 10–45)
ANION GAP SERPL CALC-SCNC: 11 MMOL/L — SIGNIFICANT CHANGE UP (ref 5–17)
AST SERPL-CCNC: 56 U/L — HIGH (ref 10–40)
BASOPHILS # BLD AUTO: 0.05 K/UL — SIGNIFICANT CHANGE UP (ref 0–0.2)
BASOPHILS NFR BLD AUTO: 0.8 % — SIGNIFICANT CHANGE UP (ref 0–2)
BILIRUB SERPL-MCNC: 0.5 MG/DL — SIGNIFICANT CHANGE UP (ref 0.2–1.2)
BUN SERPL-MCNC: 14 MG/DL — SIGNIFICANT CHANGE UP (ref 7–23)
CALCIUM SERPL-MCNC: 9.5 MG/DL — SIGNIFICANT CHANGE UP (ref 8.4–10.5)
CHLORIDE SERPL-SCNC: 105 MMOL/L — SIGNIFICANT CHANGE UP (ref 96–108)
CO2 SERPL-SCNC: 25 MMOL/L — SIGNIFICANT CHANGE UP (ref 22–31)
CREAT SERPL-MCNC: 0.61 MG/DL — SIGNIFICANT CHANGE UP (ref 0.5–1.3)
EGFR: 99 ML/MIN/1.73M2 — SIGNIFICANT CHANGE UP
EGFR: 99 ML/MIN/1.73M2 — SIGNIFICANT CHANGE UP
EOSINOPHIL # BLD AUTO: 0.13 K/UL — SIGNIFICANT CHANGE UP (ref 0–0.5)
EOSINOPHIL NFR BLD AUTO: 2 % — SIGNIFICANT CHANGE UP (ref 0–6)
GLUCOSE SERPL-MCNC: 107 MG/DL — HIGH (ref 70–99)
HCT VFR BLD CALC: 41.5 % — SIGNIFICANT CHANGE UP (ref 34.5–45)
HGB BLD-MCNC: 14 G/DL — SIGNIFICANT CHANGE UP (ref 11.5–15.5)
IMM GRANULOCYTES NFR BLD AUTO: 0.3 % — SIGNIFICANT CHANGE UP (ref 0–0.9)
LYMPHOCYTES # BLD AUTO: 2.14 K/UL — SIGNIFICANT CHANGE UP (ref 1–3.3)
LYMPHOCYTES # BLD AUTO: 33.3 % — SIGNIFICANT CHANGE UP (ref 13–44)
MAGNESIUM SERPL-MCNC: 2.1 MG/DL — SIGNIFICANT CHANGE UP (ref 1.6–2.6)
MCHC RBC-ENTMCNC: 29.8 PG — SIGNIFICANT CHANGE UP (ref 27–34)
MCHC RBC-ENTMCNC: 33.7 G/DL — SIGNIFICANT CHANGE UP (ref 32–36)
MCV RBC AUTO: 88.3 FL — SIGNIFICANT CHANGE UP (ref 80–100)
MONOCYTES # BLD AUTO: 0.51 K/UL — SIGNIFICANT CHANGE UP (ref 0–0.9)
MONOCYTES NFR BLD AUTO: 7.9 % — SIGNIFICANT CHANGE UP (ref 2–14)
NEUTROPHILS # BLD AUTO: 3.57 K/UL — SIGNIFICANT CHANGE UP (ref 1.8–7.4)
NEUTROPHILS NFR BLD AUTO: 55.7 % — SIGNIFICANT CHANGE UP (ref 43–77)
NRBC BLD AUTO-RTO: 0 /100 WBCS — SIGNIFICANT CHANGE UP (ref 0–0)
PLATELET # BLD AUTO: 282 K/UL — SIGNIFICANT CHANGE UP (ref 150–400)
POTASSIUM SERPL-MCNC: 3.6 MMOL/L — SIGNIFICANT CHANGE UP (ref 3.5–5.3)
POTASSIUM SERPL-SCNC: 3.6 MMOL/L — SIGNIFICANT CHANGE UP (ref 3.5–5.3)
PROT SERPL-MCNC: 6.3 G/DL — SIGNIFICANT CHANGE UP (ref 6–8.3)
RBC # BLD: 4.7 M/UL — SIGNIFICANT CHANGE UP (ref 3.8–5.2)
RBC # FLD: 14.6 % — HIGH (ref 10.3–14.5)
SODIUM SERPL-SCNC: 142 MMOL/L — SIGNIFICANT CHANGE UP (ref 135–145)
WBC # BLD: 6.42 K/UL — SIGNIFICANT CHANGE UP (ref 3.8–10.5)
WBC # FLD AUTO: 6.42 K/UL — SIGNIFICANT CHANGE UP (ref 3.8–10.5)

## 2025-05-28 PROCEDURE — 99213 OFFICE O/P EST LOW 20 MIN: CPT

## 2025-05-29 ENCOUNTER — OUTPATIENT (OUTPATIENT)
Dept: OUTPATIENT SERVICES | Facility: HOSPITAL | Age: 65
LOS: 1 days | Discharge: HOME | End: 2025-05-29

## 2025-05-29 ENCOUNTER — LABORATORY RESULT (OUTPATIENT)
Age: 65
End: 2025-05-29

## 2025-05-29 DIAGNOSIS — Z98.89 OTHER SPECIFIED POSTPROCEDURAL STATES: Chronic | ICD-10-CM

## 2025-05-29 DIAGNOSIS — Z92.21 PERSONAL HISTORY OF ANTINEOPLASTIC CHEMOTHERAPY: Chronic | ICD-10-CM

## 2025-05-29 DIAGNOSIS — C50.919 MALIGNANT NEOPLASM OF UNSPECIFIED SITE OF UNSPECIFIED FEMALE BREAST: ICD-10-CM

## 2025-05-29 DIAGNOSIS — Z90.13 ACQUIRED ABSENCE OF BILATERAL BREASTS AND NIPPLES: Chronic | ICD-10-CM

## 2025-05-29 LAB — CANCER AG27-29 SERPL-ACNC: 107.5 U/ML — HIGH (ref 0–38.6)

## 2025-06-02 ENCOUNTER — RESULT REVIEW (OUTPATIENT)
Age: 65
End: 2025-06-02

## 2025-06-02 ENCOUNTER — OUTPATIENT (OUTPATIENT)
Dept: OUTPATIENT SERVICES | Facility: HOSPITAL | Age: 65
LOS: 1 days | End: 2025-06-02
Payer: MEDICARE

## 2025-06-02 ENCOUNTER — TRANSCRIPTION ENCOUNTER (OUTPATIENT)
Age: 65
End: 2025-06-02

## 2025-06-02 VITALS
RESPIRATION RATE: 14 BRPM | HEART RATE: 70 BPM | WEIGHT: 104.94 LBS | OXYGEN SATURATION: 98 % | HEIGHT: 59 IN | SYSTOLIC BLOOD PRESSURE: 129 MMHG | TEMPERATURE: 100 F | DIASTOLIC BLOOD PRESSURE: 72 MMHG

## 2025-06-02 DIAGNOSIS — K31.5 OBSTRUCTION OF DUODENUM: ICD-10-CM

## 2025-06-02 DIAGNOSIS — Z92.21 PERSONAL HISTORY OF ANTINEOPLASTIC CHEMOTHERAPY: Chronic | ICD-10-CM

## 2025-06-02 DIAGNOSIS — Z98.89 OTHER SPECIFIED POSTPROCEDURAL STATES: Chronic | ICD-10-CM

## 2025-06-02 DIAGNOSIS — Z90.13 ACQUIRED ABSENCE OF BILATERAL BREASTS AND NIPPLES: Chronic | ICD-10-CM

## 2025-06-02 PROCEDURE — C1729: CPT

## 2025-06-02 PROCEDURE — C1769: CPT

## 2025-06-02 PROCEDURE — 47536 EXCHANGE BILIARY DRG CATH: CPT

## 2025-06-02 NOTE — PROCEDURE NOTE - PLAN
.    follow up every 3 months for routine exhange.   keep to gravity bag  flush with 10cc NS for tube patency daily .    follow up every 3 months for routine exhange.   keep to gravity bag  flush with 10cc NS for tube patency daily  mild skin erythema around catheter insertion site. patient instructed to change dressing if soiled. keep skin dry.

## 2025-06-02 NOTE — PROCEDURE NOTE - PROCEDURE FINDINGS AND DETAILS
.    EXTERNAL BILIARY DRAIN  Hand contrast injection demonstrates drain in position within the biliary system with no pericatheter leakage.  successful guidewire exchange for new 10.2fr MPD.   full report to follow .    bedside exam:   patient c/o drain leakage. dressing taken down and noted to have mild skin erythema around catheter insertion site.       EXTERNAL BILIARY DRAIN UNDER FLOURO:  Hand contrast injection demonstrates drain in position within the biliary system with no pericatheter leakage.  successful guidewire exchange for new 10.2fr MPD.   full report to follow

## 2025-06-02 NOTE — ASU DISCHARGE PLAN (ADULT/PEDIATRIC) - FINANCIAL ASSISTANCE
[facility] provides services at a reduced cost to those who are determined to be eligible through [facility]’s financial assistance program. Information regarding [facility]’s financial assistance program can be found by going to [email or web address] or by calling [phone].

## 2025-06-04 ENCOUNTER — APPOINTMENT (OUTPATIENT)
Dept: INFUSION THERAPY | Facility: HOSPITAL | Age: 65
End: 2025-06-04

## 2025-06-05 DIAGNOSIS — C79.51 SECONDARY MALIGNANT NEOPLASM OF BONE: ICD-10-CM

## 2025-06-12 ENCOUNTER — TRANSCRIPTION ENCOUNTER (OUTPATIENT)
Age: 65
End: 2025-06-12

## 2025-06-12 ENCOUNTER — RESULT REVIEW (OUTPATIENT)
Age: 65
End: 2025-06-12

## 2025-06-12 ENCOUNTER — OUTPATIENT (OUTPATIENT)
Dept: OUTPATIENT SERVICES | Facility: HOSPITAL | Age: 65
LOS: 1 days | End: 2025-06-12
Payer: MEDICARE

## 2025-06-12 VITALS
SYSTOLIC BLOOD PRESSURE: 127 MMHG | DIASTOLIC BLOOD PRESSURE: 77 MMHG | TEMPERATURE: 98 F | HEIGHT: 59 IN | WEIGHT: 104.94 LBS | RESPIRATION RATE: 17 BRPM | HEART RATE: 62 BPM | OXYGEN SATURATION: 100 %

## 2025-06-12 DIAGNOSIS — K31.5 OBSTRUCTION OF DUODENUM: ICD-10-CM

## 2025-06-12 DIAGNOSIS — Z92.21 PERSONAL HISTORY OF ANTINEOPLASTIC CHEMOTHERAPY: Chronic | ICD-10-CM

## 2025-06-12 DIAGNOSIS — Z98.89 OTHER SPECIFIED POSTPROCEDURAL STATES: Chronic | ICD-10-CM

## 2025-06-12 DIAGNOSIS — Z90.13 ACQUIRED ABSENCE OF BILATERAL BREASTS AND NIPPLES: Chronic | ICD-10-CM

## 2025-06-12 PROCEDURE — 47536 EXCHANGE BILIARY DRG CATH: CPT

## 2025-06-12 PROCEDURE — C1729: CPT

## 2025-06-12 PROCEDURE — C1769: CPT

## 2025-06-12 NOTE — PRE PROCEDURE NOTE - PRE PROCEDURE EVALUATION
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 Biliary drain last checked on 2/20 demonstrating filling of the right biliary tree  common bile and cystic ducts, and gallbladder. Patient had drain evaluation in IR 3/31 with no drain exchange however drain was resutured. Patient again presented next day 4/1 for pericatheter leaking for which an exchange was performed followed by upsizing on 4/2. Last exchanged on 6/2/25 however patient now presenting today for leaking.    Allergies: No Known Allergies    Medications (Abx/Cardiac/Anticoagulation/Blood Products)  Protonix: per pt (02 Jun 2025 13:12)    Data:    T(C): --  HR: --  BP: --  RR: --  SpO2: --    Exam  General: No acute distress  Chest: Non labored breathing  Abdomen: Non-distended  Extremities: No swelling, warm    Imaging:     Plan: 65y Female presents for external biliary drain evaluation and exchange.    -Risks/Benefits/alternatives explained with the patient and/or healthcare proxy and witnessed informed consent obtained.

## 2025-06-12 NOTE — ASU DISCHARGE PLAN (ADULT/PEDIATRIC) - NURSING INSTRUCTIONS
Please feel free to contact us at [phone] if any problems arise. After 6PM, Monday through Friday, on weekends and on holidays, please call [phone] and ask for the radiology resident on call to be paged.

## 2025-06-12 NOTE — PROCEDURE NOTE - PROCEDURE FINDINGS AND DETAILS
Technically successful exchange of external biliary drain. Clean, dry and sterile dressing applied. Full report to follow.

## 2025-06-12 NOTE — ASU DISCHARGE PLAN (ADULT/PEDIATRIC) - NS MD DC FALL RISK RISK
For information on Fall & Injury Prevention, visit: [email or web address] OR  [email or web address] OR  https://www.cdc.gov/steadi/patient.html

## 2025-06-12 NOTE — ASU PREOP CHECKLIST - BSA (M2)
Anesthesia Evaluation     NPO Solid Status: > 8 hours  NPO Liquid Status: > 8 hours           Airway   Mallampati: I  TM distance: >3 FB  No difficulty expected  Dental - normal exam     Pulmonary    Cardiovascular         Neuro/Psych  GI/Hepatic/Renal/Endo    (+)   thyroid problem     Musculoskeletal     Abdominal    Substance History      OB/GYN          Other                        Anesthesia Plan    ASA 2     MAC and regional     intravenous induction     Anesthetic plan, risks, benefits, and alternatives have been provided, discussed and informed consent has been obtained with: patient.        CODE STATUS:       
1.4

## 2025-06-12 NOTE — ASU DISCHARGE PLAN (ADULT/PEDIATRIC) - ASU DC SPECIAL INSTRUCTIONSFT
Biliary drain evaluation and exchange    Discharge Instructions  - You have had a drain checked and exchanged  - Keep the area clean and dry.  - Do not soak in a tub or pool with the drain, however you may shower with the drain and dressing covered in plastic wrap.  - Do not put traction on the drain and be careful that the drain does not get accidentally dislodged or kinked.  - Record output daily from the drain. Empty the bag as needed.  - You may resume your normal diet.  - You may resume your normal medications.  - It is normal to experience some pain over the site for the next few days. You may take apply ice to the area (20 minutes on, 20 minutes off) and take Tylenol for that pain. Do not take more frequently than every 6 hours and do not exceed more than 3000mg of Tylenol in a 24 hour period.    Notify your primary physician and/or Interventional Radiology IMMEDIATELY if you experience any of the following       - Fever of 100.4F  or 38C       - Chills or Rigors/ Shakes       - Swelling and/or Redness in the area of the puncture site       - Worsening Pain       - Blood soaked bandages or worsening bleeding       - Lightheadedness and/or dizziness upon standing       - Chest Pain/ Tightness       - Shortness of Breath       - Difficulty walking    If you have a problem that you believe requires IMMEDIATE attention, please go to your NEAREST Emergency Room. If you believe your problem can safely wait until you speak to a physician, please call Interventional Radiology for any concerns.    Please feel free to contact us at [phone] if any problems arise. After 6PM, Monday through Friday, on weekends and on holidays, please call [phone] and ask for the radiology resident on call to be paged. Biliary drain evaluation and exchange    Discharge Instructions  - You have had a drain checked and exchanged  - Keep the area clean and dry.  - Do not soak in a tub or pool with the drain, however you may shower with the drain and dressing covered in plastic wrap.  - Do not put traction on the drain and be careful that the drain does not get accidentally dislodged or kinked.  - We used an ethilon suture to secure the drain.  - Record output daily from the drain. Empty the bag as needed.  - You may resume your normal diet.  - You may resume your normal medications.  - It is normal to experience some pain over the site for the next few days. You may take apply ice to the area (20 minutes on, 20 minutes off) and take Tylenol for that pain. Do not take more frequently than every 6 hours and do not exceed more than 3000mg of Tylenol in a 24 hour period.    Notify your primary physician and/or Interventional Radiology IMMEDIATELY if you experience any of the following       - Fever of 100.4F  or 38C       - Chills or Rigors/ Shakes       - Swelling and/or Redness in the area of the puncture site       - Worsening Pain       - Blood soaked bandages or worsening bleeding       - Lightheadedness and/or dizziness upon standing       - Chest Pain/ Tightness       - Shortness of Breath       - Difficulty walking    If you have a problem that you believe requires IMMEDIATE attention, please go to your NEAREST Emergency Room. If you believe your problem can safely wait until you speak to a physician, please call Interventional Radiology for any concerns.    Please feel free to contact us at [phone] if any problems arise. After 6PM, Monday through Friday, on weekends and on holidays, please call [phone] and ask for the radiology resident on call to be paged.

## 2025-06-12 NOTE — ASU DISCHARGE PLAN (ADULT/PEDIATRIC) - FINANCIAL ASSISTANCE
[facility] provides services at a reduced cost to those who are determined to be eligible through [facility]’s financial assistance program. Information regarding [facility]’s financial assistance program can be found by going to [email or web address] or by calling [phone].

## 2025-06-12 NOTE — ASU PREOP CHECKLIST - AS TEMP SITE
[Nutrition/ Exercise/ Weight Management] : nutrition, exercise, weight management [Body Image] : body image [Vitamins/Supplements] : vitamins/supplements forehead

## 2025-06-14 DIAGNOSIS — Z46.59 ENCOUNTER FOR FITTING AND ADJUSTMENT OF OTHER GASTROINTESTINAL APPLIANCE AND DEVICE: ICD-10-CM

## 2025-06-14 DIAGNOSIS — K83.1 OBSTRUCTION OF BILE DUCT: ICD-10-CM

## 2025-06-18 ENCOUNTER — RESULT REVIEW (OUTPATIENT)
Age: 65
End: 2025-06-18

## 2025-06-18 ENCOUNTER — APPOINTMENT (OUTPATIENT)
Dept: HEMATOLOGY ONCOLOGY | Facility: CLINIC | Age: 65
End: 2025-06-18

## 2025-06-18 ENCOUNTER — APPOINTMENT (OUTPATIENT)
Dept: INFUSION THERAPY | Facility: HOSPITAL | Age: 65
End: 2025-06-18

## 2025-06-18 ENCOUNTER — APPOINTMENT (OUTPATIENT)
Dept: HEMATOLOGY ONCOLOGY | Facility: CLINIC | Age: 65
End: 2025-06-18
Payer: MEDICARE

## 2025-06-18 ENCOUNTER — NON-APPOINTMENT (OUTPATIENT)
Age: 65
End: 2025-06-18

## 2025-06-18 VITALS
HEIGHT: 58.66 IN | TEMPERATURE: 97.2 F | SYSTOLIC BLOOD PRESSURE: 126 MMHG | DIASTOLIC BLOOD PRESSURE: 73 MMHG | HEART RATE: 61 BPM | BODY MASS INDEX: 21.89 KG/M2 | WEIGHT: 107.12 LBS | OXYGEN SATURATION: 99 % | RESPIRATION RATE: 16 BRPM

## 2025-06-18 LAB
ALBUMIN SERPL ELPH-MCNC: 3.4 G/DL — SIGNIFICANT CHANGE UP (ref 3.3–5)
ALP SERPL-CCNC: 227 U/L — HIGH (ref 40–120)
ALT FLD-CCNC: 79 U/L — HIGH (ref 10–45)
ANION GAP SERPL CALC-SCNC: 12 MMOL/L — SIGNIFICANT CHANGE UP (ref 5–17)
AST SERPL-CCNC: 65 U/L — HIGH (ref 10–40)
BASOPHILS # BLD AUTO: 0.04 K/UL — SIGNIFICANT CHANGE UP (ref 0–0.2)
BASOPHILS NFR BLD AUTO: 0.9 % — SIGNIFICANT CHANGE UP (ref 0–2)
BILIRUB SERPL-MCNC: 0.4 MG/DL — SIGNIFICANT CHANGE UP (ref 0.2–1.2)
BUN SERPL-MCNC: 8 MG/DL — SIGNIFICANT CHANGE UP (ref 7–23)
CALCIUM SERPL-MCNC: 8.3 MG/DL — LOW (ref 8.4–10.5)
CHLORIDE SERPL-SCNC: 110 MMOL/L — HIGH (ref 96–108)
CO2 SERPL-SCNC: 20 MMOL/L — LOW (ref 22–31)
CREAT SERPL-MCNC: 0.42 MG/DL — LOW (ref 0.5–1.3)
EGFR: 108 ML/MIN/1.73M2 — SIGNIFICANT CHANGE UP
EGFR: 108 ML/MIN/1.73M2 — SIGNIFICANT CHANGE UP
EOSINOPHIL # BLD AUTO: 0.16 K/UL — SIGNIFICANT CHANGE UP (ref 0–0.5)
EOSINOPHIL NFR BLD AUTO: 3.7 % — SIGNIFICANT CHANGE UP (ref 0–6)
GLUCOSE SERPL-MCNC: 100 MG/DL — HIGH (ref 70–99)
HCT VFR BLD CALC: 37.6 % — SIGNIFICANT CHANGE UP (ref 34.5–45)
HGB BLD-MCNC: 12.9 G/DL — SIGNIFICANT CHANGE UP (ref 11.5–15.5)
IMM GRANULOCYTES NFR BLD AUTO: 0.7 % — SIGNIFICANT CHANGE UP (ref 0–0.9)
LYMPHOCYTES # BLD AUTO: 2.13 K/UL — SIGNIFICANT CHANGE UP (ref 1–3.3)
LYMPHOCYTES # BLD AUTO: 49.1 % — HIGH (ref 13–44)
MAGNESIUM SERPL-MCNC: 2.1 MG/DL — SIGNIFICANT CHANGE UP (ref 1.6–2.6)
MCHC RBC-ENTMCNC: 30 PG — SIGNIFICANT CHANGE UP (ref 27–34)
MCHC RBC-ENTMCNC: 34.3 G/DL — SIGNIFICANT CHANGE UP (ref 32–36)
MCV RBC AUTO: 87.4 FL — SIGNIFICANT CHANGE UP (ref 80–100)
MONOCYTES # BLD AUTO: 0.41 K/UL — SIGNIFICANT CHANGE UP (ref 0–0.9)
MONOCYTES NFR BLD AUTO: 9.4 % — SIGNIFICANT CHANGE UP (ref 2–14)
NEUTROPHILS # BLD AUTO: 1.57 K/UL — LOW (ref 1.8–7.4)
NEUTROPHILS NFR BLD AUTO: 36.2 % — LOW (ref 43–77)
NRBC BLD AUTO-RTO: 0 /100 WBCS — SIGNIFICANT CHANGE UP (ref 0–0)
PLATELET # BLD AUTO: 304 K/UL — SIGNIFICANT CHANGE UP (ref 150–400)
POTASSIUM SERPL-MCNC: 4.4 MMOL/L — SIGNIFICANT CHANGE UP (ref 3.5–5.3)
POTASSIUM SERPL-SCNC: 4.4 MMOL/L — SIGNIFICANT CHANGE UP (ref 3.5–5.3)
PROT SERPL-MCNC: 5.5 G/DL — LOW (ref 6–8.3)
RBC # BLD: 4.3 M/UL — SIGNIFICANT CHANGE UP (ref 3.8–5.2)
RBC # FLD: 15.7 % — HIGH (ref 10.3–14.5)
SODIUM SERPL-SCNC: 143 MMOL/L — SIGNIFICANT CHANGE UP (ref 135–145)
WBC # BLD: 4.34 K/UL — SIGNIFICANT CHANGE UP (ref 3.8–10.5)
WBC # FLD AUTO: 4.34 K/UL — SIGNIFICANT CHANGE UP (ref 3.8–10.5)

## 2025-06-18 PROCEDURE — 99215 OFFICE O/P EST HI 40 MIN: CPT

## 2025-06-18 PROCEDURE — G2211 COMPLEX E/M VISIT ADD ON: CPT

## 2025-06-19 DIAGNOSIS — Z51.11 ENCOUNTER FOR ANTINEOPLASTIC CHEMOTHERAPY: ICD-10-CM

## 2025-06-19 DIAGNOSIS — R11.2 NAUSEA WITH VOMITING, UNSPECIFIED: ICD-10-CM

## 2025-06-19 LAB — CANCER AG27-29 SERPL-ACNC: 95 U/ML — HIGH (ref 0–38.6)

## 2025-06-20 ENCOUNTER — TRANSCRIPTION ENCOUNTER (OUTPATIENT)
Age: 65
End: 2025-06-20

## 2025-06-20 ENCOUNTER — OUTPATIENT (OUTPATIENT)
Dept: OUTPATIENT SERVICES | Facility: HOSPITAL | Age: 65
LOS: 1 days | End: 2025-06-20
Payer: MEDICARE

## 2025-06-20 VITALS
DIASTOLIC BLOOD PRESSURE: 58 MMHG | HEIGHT: 58 IN | OXYGEN SATURATION: 99 % | WEIGHT: 106.92 LBS | RESPIRATION RATE: 16 BRPM | HEART RATE: 68 BPM | TEMPERATURE: 98 F | SYSTOLIC BLOOD PRESSURE: 104 MMHG

## 2025-06-20 DIAGNOSIS — Z98.89 OTHER SPECIFIED POSTPROCEDURAL STATES: Chronic | ICD-10-CM

## 2025-06-20 DIAGNOSIS — Z92.21 PERSONAL HISTORY OF ANTINEOPLASTIC CHEMOTHERAPY: Chronic | ICD-10-CM

## 2025-06-20 DIAGNOSIS — K31.5 OBSTRUCTION OF DUODENUM: ICD-10-CM

## 2025-06-20 DIAGNOSIS — Z90.13 ACQUIRED ABSENCE OF BILATERAL BREASTS AND NIPPLES: Chronic | ICD-10-CM

## 2025-06-20 PROCEDURE — 47536 EXCHANGE BILIARY DRG CATH: CPT

## 2025-06-20 PROCEDURE — C1729: CPT

## 2025-06-20 PROCEDURE — C1769: CPT

## 2025-06-20 NOTE — ASU DISCHARGE PLAN (ADULT/PEDIATRIC) - ASU DC SPECIAL INSTRUCTIONSFT
Drain Check and exchanged    Discharge Instructions  - You have had a drain checked and exchanged.  - Keep the area clean and dry.  - Do not soak in a tub or pool with the drain, however you may shower with the drain and dressing covered in plastic wrap.  - Do not put traction on the drain and be careful that the drain does not get accidentally dislodged or kinked.  - Record output daily from the drain. Empty the bag as needed.  - You may resume your normal diet.  - You may resume your normal medications.  - It is normal to experience some pain over the site for the next few days. You may take apply ice to the area (20 minutes on, 20 minutes off) and take Tylenol for that pain. Do not take more frequently than every 6 hours and do not exceed more than 3000mg of Tylenol in a 24 hour period.    Notify your primary physician and/or Interventional Radiology IMMEDIATELY if you experience any of the following       - Fever of 100.4F  or 38C       - Chills or Rigors/ Shakes       - Swelling and/or Redness in the area of the puncture site       - Worsening Pain       - Blood soaked bandages or worsening bleeding       - Lightheadedness and/or dizziness upon standing       - Chest Pain/ Tightness       - Shortness of Breath       - Difficulty walking    If you have a problem that you believe requires IMMEDIATE attention, please go to your NEAREST Emergency Room. If you believe your problem can safely wait until you speak to a physician, please call Interventional Radiology for any concerns.    Please feel free to contact us at [phone] if any problems arise. After 6PM, Monday through Friday, on weekends and on holidays, please call [phone] and ask for the radiology resident on call to be paged.

## 2025-06-20 NOTE — PROCEDURE NOTE - PROCEDURE FINDINGS AND DETAILS
Contrast injection into existing biliary drain shows contrast filling right biliary tree, cystic duct and common bile duct. Drain successfully exchanged over wire for a new 10.2 Bulgarian MPD, sutured and placed to gravity bag. Dry sterile dressing placed. Full report to follow.

## 2025-06-20 NOTE — ASU DISCHARGE PLAN (ADULT/PEDIATRIC) - FINANCIAL ASSISTANCE
[facility] provides services at a reduced cost to those who are determined to be eligible through [facility]’s financial assistance program. Information regarding [facility]’s financial assistance program can be found by going to [email or web address] or by calling [phone].

## 2025-06-20 NOTE — ASU DISCHARGE PLAN (ADULT/PEDIATRIC) - COMMENTS
Please follow up in 3 months for routine evaluation and exchange. Call IR booking office at [phone] to schedule appointment.

## 2025-06-20 NOTE — H&P ADULT - HISTORY OF PRESENT ILLNESS
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 s/p drain upsizing on 4/2.  Pt has presented multiple times for leaking and drain repostioning/exchanges. Last exchanged on 6/12/25 however patient now presenting today for leaking.    Review of Systems:   Constitutional: No fever, weight loss, or fatigue  Neurological: No headaches, memory loss, loss of strength, numbness, or tremors  Respiratory: No cough, wheezing, chills or hemoptysis; No shortness of breath  Cardiovascular: No chest pain, palpitations, dizziness, or leg swelling  Gastrointestinal: No abdominal or epigastric pain. No nausea, vomiting, or hematemesis; No diarrhea or constipation. No melena or hematochezia.  Skin: No itching, burning, rashes, or lesions   Musculoskeletal: No joint pain or swelling; No muscle, back, or extremity pain    Allergies: No Known Allergies    Medications (Abx/Cardiac/Anticoagulation/Blood Products)      Data:    T(C): --  HR: --  BP: --  RR: --  SpO2: --    -WBC 4.34 / HgB 12.9 / Hct 37.6 / Plt 304  -Na 143 / Cl 110 / BUN 8 / Glucose 100  -K 4.4 / CO2 20 / Cr 0.42  -ALT 79 / Alk Phos 227 / T.Bili 0.4      Physical Exam  General: No acute distress, nontoxic, A&Ox3  Chest: Non labored breathing  Abdomen: Non-distended, non-tender, no peritoneal signs + biliary drain  Skin: No rashes or lesions    RADIOLOGY & ADDITIONAL TESTS:    Imaging Reviewed    H & P Note Reviewed from: 5/14/2025    Plan: 65y Female presents for biliary drain evaluation.     -Risks/Benefits/alternatives explained with the patient and/or healthcare proxy and witnessed informed consent obtained.    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 s/p drain upsizing on 4/2.  Pt has presented multiple times for leaking and drain repositioning/exchanges. Last exchanged on 6/12/25 however patient now presenting today for leaking. States no pain or resistance flushing, but has been leaking since the weekend. Denies N/V/abd pain. Drain still putting out 200-300ml/day.     Review of Systems:   Constitutional: No fever, weight loss, or fatigue  Neurological: No headaches, memory loss, loss of strength, numbness, or tremors  Respiratory: No cough, wheezing, chills or hemoptysis; No shortness of breath  Cardiovascular: No chest pain, palpitations, dizziness, or leg swelling  Gastrointestinal: No abdominal or epigastric pain. No nausea, vomiting, or hematemesis; No diarrhea or constipation. No melena or hematochezia.  Skin: No itching, burning, rashes, or lesions   Musculoskeletal: No joint pain or swelling; No muscle, back, or extremity pain    Allergies: No Known Allergies    Medications (Abx/Cardiac/Anticoagulation/Blood Products)      Data:    T(C): --  HR: --  BP: --  RR: --  SpO2: --    -WBC 4.34 / HgB 12.9 / Hct 37.6 / Plt 304  -Na 143 / Cl 110 / BUN 8 / Glucose 100  -K 4.4 / CO2 20 / Cr 0.42  -ALT 79 / Alk Phos 227 / T.Bili 0.4      Physical Exam  General: No acute distress, nontoxic, A&Ox3  Chest: Non labored breathing  Abdomen: Non-distended, non-tender, no peritoneal signs + biliary drain  Skin: No rashes or lesions    RADIOLOGY & ADDITIONAL TESTS:    Imaging Reviewed    H & P Note Reviewed from: 5/14/2025    Plan: 65y Female presents for biliary drain evaluation.     -Risks/Benefits/alternatives explained with the patient and/or healthcare proxy and witnessed informed consent obtained.

## 2025-06-20 NOTE — H&P ADULT - NSICDXPASTSURGICALHX_GEN_ALL_CORE_FT
PAST SURGICAL HISTORY:  History of cancer chemotherapy Infusaport insertion 2013; removal 03/2014    History of hysterectomy partial - 1996 - uterus removed    S/P mastectomy, bilateral     Status post bilateral mastectomy     Status post transverse rectus abdominis muscle flap breast reconstruction 2013    Uterine fibroid myomectomy - 1993    
RASH

## 2025-06-25 ENCOUNTER — RX RENEWAL (OUTPATIENT)
Age: 65
End: 2025-06-25

## 2025-06-25 RX ORDER — FUROSEMIDE 20 MG/1
20 TABLET ORAL DAILY
Qty: 10 | Refills: 0 | Status: ACTIVE | COMMUNITY
Start: 2025-06-25 | End: 1900-01-01

## 2025-07-01 ENCOUNTER — NON-APPOINTMENT (OUTPATIENT)
Age: 65
End: 2025-07-01

## 2025-07-01 DIAGNOSIS — Z46.59 ENCOUNTER FOR FITTING AND ADJUSTMENT OF OTHER GASTROINTESTINAL APPLIANCE AND DEVICE: ICD-10-CM

## 2025-07-01 DIAGNOSIS — K83.1 OBSTRUCTION OF BILE DUCT: ICD-10-CM

## 2025-07-02 ENCOUNTER — APPOINTMENT (OUTPATIENT)
Dept: HEMATOLOGY ONCOLOGY | Facility: CLINIC | Age: 65
End: 2025-07-02
Payer: MEDICARE

## 2025-07-02 ENCOUNTER — RESULT REVIEW (OUTPATIENT)
Age: 65
End: 2025-07-02

## 2025-07-02 ENCOUNTER — APPOINTMENT (OUTPATIENT)
Dept: INFUSION THERAPY | Facility: HOSPITAL | Age: 65
End: 2025-07-02

## 2025-07-02 VITALS
SYSTOLIC BLOOD PRESSURE: 154 MMHG | OXYGEN SATURATION: 99 % | BODY MASS INDEX: 21.66 KG/M2 | WEIGHT: 103.17 LBS | HEIGHT: 58 IN | HEART RATE: 65 BPM | TEMPERATURE: 97.7 F | DIASTOLIC BLOOD PRESSURE: 81 MMHG | RESPIRATION RATE: 16 BRPM

## 2025-07-02 LAB
BASOPHILS # BLD AUTO: 0.05 K/UL — SIGNIFICANT CHANGE UP (ref 0–0.2)
BASOPHILS NFR BLD AUTO: 0.7 % — SIGNIFICANT CHANGE UP (ref 0–2)
EOSINOPHIL # BLD AUTO: 0.08 K/UL — SIGNIFICANT CHANGE UP (ref 0–0.5)
EOSINOPHIL NFR BLD AUTO: 1.2 % — SIGNIFICANT CHANGE UP (ref 0–6)
HCT VFR BLD CALC: 38.4 % — SIGNIFICANT CHANGE UP (ref 34.5–45)
HGB BLD-MCNC: 13.4 G/DL — SIGNIFICANT CHANGE UP (ref 11.5–15.5)
IMM GRANULOCYTES NFR BLD AUTO: 0.1 % — SIGNIFICANT CHANGE UP (ref 0–0.9)
LYMPHOCYTES # BLD AUTO: 2.42 K/UL — SIGNIFICANT CHANGE UP (ref 1–3.3)
LYMPHOCYTES # BLD AUTO: 35.3 % — SIGNIFICANT CHANGE UP (ref 13–44)
MCHC RBC-ENTMCNC: 30.2 PG — SIGNIFICANT CHANGE UP (ref 27–34)
MCHC RBC-ENTMCNC: 34.9 G/DL — SIGNIFICANT CHANGE UP (ref 32–36)
MCV RBC AUTO: 86.7 FL — SIGNIFICANT CHANGE UP (ref 80–100)
MONOCYTES # BLD AUTO: 0.36 K/UL — SIGNIFICANT CHANGE UP (ref 0–0.9)
MONOCYTES NFR BLD AUTO: 5.2 % — SIGNIFICANT CHANGE UP (ref 2–14)
NEUTROPHILS # BLD AUTO: 3.94 K/UL — SIGNIFICANT CHANGE UP (ref 1.8–7.4)
NEUTROPHILS NFR BLD AUTO: 57.5 % — SIGNIFICANT CHANGE UP (ref 43–77)
NRBC BLD AUTO-RTO: 0 /100 WBCS — SIGNIFICANT CHANGE UP (ref 0–0)
PLATELET # BLD AUTO: 232 K/UL — SIGNIFICANT CHANGE UP (ref 150–400)
RBC # BLD: 4.43 M/UL — SIGNIFICANT CHANGE UP (ref 3.8–5.2)
RBC # FLD: 16.1 % — HIGH (ref 10.3–14.5)
WBC # BLD: 6.86 K/UL — SIGNIFICANT CHANGE UP (ref 3.8–10.5)
WBC # FLD AUTO: 6.86 K/UL — SIGNIFICANT CHANGE UP (ref 3.8–10.5)

## 2025-07-02 PROCEDURE — 99213 OFFICE O/P EST LOW 20 MIN: CPT

## 2025-07-03 ENCOUNTER — NON-APPOINTMENT (OUTPATIENT)
Age: 65
End: 2025-07-03

## 2025-07-03 LAB
ALBUMIN SERPL ELPH-MCNC: 3.7 G/DL
ALP BLD-CCNC: 226 U/L
ALT SERPL-CCNC: 68 U/L
ANION GAP SERPL CALC-SCNC: 13 MMOL/L
AST SERPL-CCNC: 62 U/L
BILIRUB SERPL-MCNC: 0.7 MG/DL
BUN SERPL-MCNC: 8 MG/DL
CALCIUM SERPL-MCNC: 8 MG/DL
CHLORIDE SERPL-SCNC: 105 MMOL/L
CO2 SERPL-SCNC: 26 MMOL/L
CREAT SERPL-MCNC: 0.58 MG/DL
EGFRCR SERPLBLD CKD-EPI 2021: 100 ML/MIN/1.73M2
GLUCOSE SERPL-MCNC: 101 MG/DL
POTASSIUM SERPL-SCNC: 2.8 MMOL/L
PROT SERPL-MCNC: 5.3 G/DL
SODIUM SERPL-SCNC: 143 MMOL/L

## 2025-07-03 RX ORDER — POTASSIUM CHLORIDE 1500 MG/1
20 TABLET, FILM COATED, EXTENDED RELEASE ORAL
Qty: 6 | Refills: 0 | Status: ACTIVE | COMMUNITY
Start: 2025-07-03 | End: 1900-01-01

## 2025-07-09 ENCOUNTER — APPOINTMENT (OUTPATIENT)
Dept: HEMATOLOGY ONCOLOGY | Facility: CLINIC | Age: 65
End: 2025-07-09

## 2025-07-09 ENCOUNTER — RESULT REVIEW (OUTPATIENT)
Age: 65
End: 2025-07-09

## 2025-07-09 ENCOUNTER — APPOINTMENT (OUTPATIENT)
Dept: INFUSION THERAPY | Facility: HOSPITAL | Age: 65
End: 2025-07-09

## 2025-07-09 LAB
ANION GAP SERPL CALC-SCNC: 11 MMOL/L — SIGNIFICANT CHANGE UP (ref 5–17)
BASOPHILS # BLD AUTO: 0.06 K/UL — SIGNIFICANT CHANGE UP (ref 0–0.2)
BASOPHILS NFR BLD AUTO: 0.9 % — SIGNIFICANT CHANGE UP (ref 0–2)
BUN SERPL-MCNC: 14 MG/DL — SIGNIFICANT CHANGE UP (ref 7–23)
CALCIUM SERPL-MCNC: 9 MG/DL — SIGNIFICANT CHANGE UP (ref 8.4–10.5)
CHLORIDE SERPL-SCNC: 105 MMOL/L — SIGNIFICANT CHANGE UP (ref 96–108)
CO2 SERPL-SCNC: 25 MMOL/L — SIGNIFICANT CHANGE UP (ref 22–31)
CREAT SERPL-MCNC: 0.56 MG/DL — SIGNIFICANT CHANGE UP (ref 0.5–1.3)
EGFR: 101 ML/MIN/1.73M2 — SIGNIFICANT CHANGE UP
EGFR: 101 ML/MIN/1.73M2 — SIGNIFICANT CHANGE UP
EOSINOPHIL # BLD AUTO: 0.23 K/UL — SIGNIFICANT CHANGE UP (ref 0–0.5)
EOSINOPHIL NFR BLD AUTO: 3.6 % — SIGNIFICANT CHANGE UP (ref 0–6)
GLUCOSE SERPL-MCNC: 105 MG/DL — HIGH (ref 70–99)
HCT VFR BLD CALC: 34.6 % — SIGNIFICANT CHANGE UP (ref 34.5–45)
HGB BLD-MCNC: 12.1 G/DL — SIGNIFICANT CHANGE UP (ref 11.5–15.5)
IMM GRANULOCYTES NFR BLD AUTO: 0.9 % — SIGNIFICANT CHANGE UP (ref 0–0.9)
LYMPHOCYTES # BLD AUTO: 2.33 K/UL — SIGNIFICANT CHANGE UP (ref 1–3.3)
LYMPHOCYTES # BLD AUTO: 36.2 % — SIGNIFICANT CHANGE UP (ref 13–44)
MAGNESIUM SERPL-MCNC: 1.7 MG/DL — SIGNIFICANT CHANGE UP (ref 1.6–2.6)
MCHC RBC-ENTMCNC: 30.6 PG — SIGNIFICANT CHANGE UP (ref 27–34)
MCHC RBC-ENTMCNC: 35 G/DL — SIGNIFICANT CHANGE UP (ref 32–36)
MCV RBC AUTO: 87.6 FL — SIGNIFICANT CHANGE UP (ref 80–100)
MONOCYTES # BLD AUTO: 0.62 K/UL — SIGNIFICANT CHANGE UP (ref 0–0.9)
MONOCYTES NFR BLD AUTO: 9.6 % — SIGNIFICANT CHANGE UP (ref 2–14)
NEUTROPHILS # BLD AUTO: 3.13 K/UL — SIGNIFICANT CHANGE UP (ref 1.8–7.4)
NEUTROPHILS NFR BLD AUTO: 48.8 % — SIGNIFICANT CHANGE UP (ref 43–77)
NRBC BLD AUTO-RTO: 0 /100 WBCS — SIGNIFICANT CHANGE UP (ref 0–0)
PLATELET # BLD AUTO: 271 K/UL — SIGNIFICANT CHANGE UP (ref 150–400)
POTASSIUM SERPL-MCNC: 3 MMOL/L — LOW (ref 3.5–5.3)
POTASSIUM SERPL-SCNC: 3 MMOL/L — LOW (ref 3.5–5.3)
RBC # BLD: 3.95 M/UL — SIGNIFICANT CHANGE UP (ref 3.8–5.2)
RBC # FLD: 16.9 % — HIGH (ref 10.3–14.5)
SODIUM SERPL-SCNC: 141 MMOL/L — SIGNIFICANT CHANGE UP (ref 135–145)
WBC # BLD: 6.43 K/UL — SIGNIFICANT CHANGE UP (ref 3.8–10.5)
WBC # FLD AUTO: 6.43 K/UL — SIGNIFICANT CHANGE UP (ref 3.8–10.5)

## 2025-07-10 ENCOUNTER — TRANSCRIPTION ENCOUNTER (OUTPATIENT)
Age: 65
End: 2025-07-10

## 2025-07-10 ENCOUNTER — OUTPATIENT (OUTPATIENT)
Dept: OUTPATIENT SERVICES | Facility: HOSPITAL | Age: 65
LOS: 1 days | End: 2025-07-10
Payer: MEDICARE

## 2025-07-10 VITALS
RESPIRATION RATE: 18 BRPM | HEIGHT: 58 IN | OXYGEN SATURATION: 100 % | TEMPERATURE: 98 F | WEIGHT: 110.89 LBS | SYSTOLIC BLOOD PRESSURE: 195 MMHG | HEART RATE: 69 BPM | DIASTOLIC BLOOD PRESSURE: 76 MMHG

## 2025-07-10 DIAGNOSIS — Z90.13 ACQUIRED ABSENCE OF BILATERAL BREASTS AND NIPPLES: Chronic | ICD-10-CM

## 2025-07-10 DIAGNOSIS — Z98.89 OTHER SPECIFIED POSTPROCEDURAL STATES: Chronic | ICD-10-CM

## 2025-07-10 DIAGNOSIS — K31.5 OBSTRUCTION OF DUODENUM: ICD-10-CM

## 2025-07-10 DIAGNOSIS — E86.0 DEHYDRATION: ICD-10-CM

## 2025-07-10 DIAGNOSIS — Z92.21 PERSONAL HISTORY OF ANTINEOPLASTIC CHEMOTHERAPY: Chronic | ICD-10-CM

## 2025-07-10 DIAGNOSIS — K86.89 OTHER SPECIFIED DISEASES OF PANCREAS: ICD-10-CM

## 2025-07-10 LAB
ALBUMIN SERPL ELPH-MCNC: 3.5 G/DL — SIGNIFICANT CHANGE UP (ref 3.3–5)
ALP SERPL-CCNC: 181 U/L — HIGH (ref 40–120)
ALT FLD-CCNC: 57 U/L — HIGH (ref 10–40)
AST SERPL-CCNC: 48 U/L — HIGH (ref 10–35)
BILIRUB DIRECT SERPL-MCNC: 0.21 MG/DL — SIGNIFICANT CHANGE UP (ref 0–0.3)
BILIRUB INDIRECT FLD-MCNC: 0.2 MG/DL — SIGNIFICANT CHANGE UP (ref 0.2–1.2)
BILIRUB SERPL-MCNC: 0.4 MG/DL — SIGNIFICANT CHANGE UP (ref 0.2–1.2)
CANCER AG27-29 SERPL-ACNC: 93.7 U/ML — HIGH (ref 0–38.6)
PROT SERPL-MCNC: 5.3 G/DL — LOW (ref 6–8.3)

## 2025-07-10 PROCEDURE — 47531 INJECTION FOR CHOLANGIOGRAM: CPT

## 2025-07-10 RX ORDER — SENNA 187 MG
1 TABLET ORAL
Refills: 0 | DISCHARGE

## 2025-07-10 NOTE — ASU DISCHARGE PLAN (ADULT/PEDIATRIC) - FINANCIAL ASSISTANCE
[facility] provides services at a reduced cost to those who are determined to be eligible through [facility]’s financial assistance program. Information regarding [facility]’s financial assistance program can be found by going to [email or web address] or by calling [phone].

## 2025-07-10 NOTE — PRE PROCEDURE NOTE - PRE PROCEDURE EVALUATION
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 s/p drain upsizing on 4/2.  Pt has presented multiple times for leaking and drain repositioning/exchanges. Last exchanged on 6/12/25 however patient now presenting today for leaking. States no pain or resistance flushing, has intermittent leaking but irritation at the site is the main concern today.     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 s/p drain upsizing on 4/2.  Pt has presented multiple times for leaking and drain repositioning/exchanges. Last exchanged on 6/12/25 however patient now presenting today for leaking. States no pain or resistance flushing, has intermittent leaking but irritation at the site is the main concern today.     -Risks/Benefits/alternatives explained with the patient and/or healthcare proxy and witnessed informed consent obtained.     --  Luis Mota NP  Interventional Radiology  Available on [employer] TE[employer] / [email or web address]    For EMERGENT inquiries/questions:  IR Pager ([facility]): [phone]    For non-emergent consults/questions:   Please place a sunrise order "Consult- Interventional Radiology" with an appropriate callback number    For questions about scheduling during appropriate work hours, call IR :  [facility]: [phone]    For outpatient IR booking:  [facility]: [phone]

## 2025-07-10 NOTE — ASU DISCHARGE PLAN (ADULT/PEDIATRIC) - ASU DC SPECIAL INSTRUCTIONSFT
Drain Check    Discharge Instructions  - You have had a drain checked.  - Keep the area clean and dry.  - Do not soak in a tub or pool with the drain, however you may shower with the drain and dressing covered in plastic wrap.  - Do not put traction on the drain and be careful that the drain does not get accidentally dislodged or kinked.  - Record output daily from the drain. Empty the bag as needed.  - You may resume your normal diet.  - You may resume your normal medications.  - It is normal to experience some pain over the site for the next few days. You may take apply ice to the area (20 minutes on, 20 minutes off) and take Tylenol for that pain. Do not take more frequently than every 6 hours and do not exceed more than 3000mg of Tylenol in a 24 hour period.    Notify your primary physician and/or Interventional Radiology IMMEDIATELY if you experience any of the following       - Fever of 100.4F  or 38C       - Chills or Rigors/ Shakes       - Swelling and/or Redness in the area of the puncture site       - Worsening Pain       - Blood soaked bandages or worsening bleeding       - Lightheadedness and/or dizziness upon standing       - Chest Pain/ Tightness       - Shortness of Breath       - Difficulty walking    If you have a problem that you believe requires IMMEDIATE attention, please go to your NEAREST Emergency Room. If you believe your problem can safely wait until you speak to a physician, please call Interventional Radiology for any concerns.    Please feel free to contact us at [phone] if any problems arise. After 6PM, Monday through Friday, on weekends and on holidays, please call [phone] and ask for the radiology resident on call to be paged.

## 2025-07-10 NOTE — ASU DISCHARGE PLAN (ADULT/PEDIATRIC) - NURSING INSTRUCTIONS
Please feel free to contact us at [phone] if any problems arise. After 6PM, Monday through Friday, on weekends and on holidays, please call [phone] and ask for the radiology resident on call to be paged.

## 2025-07-10 NOTE — PROCEDURE NOTE - PROCEDURE FINDINGS AND DETAILS
External biliary drain evaluation demonstrated pigtail situated centrally within the bile duct with no evidence of leakage upon contrast injection. The tube was resutured to a healthy area of skin. A griplock was used to reinforce the tube position. Site covered with DSD. Full report to follow.

## 2025-07-16 ENCOUNTER — APPOINTMENT (OUTPATIENT)
Dept: CARDIOLOGY | Facility: CLINIC | Age: 65
End: 2025-07-16
Payer: MEDICARE

## 2025-07-16 ENCOUNTER — APPOINTMENT (OUTPATIENT)
Dept: INTERNAL MEDICINE | Facility: CLINIC | Age: 65
End: 2025-07-16
Payer: MEDICARE

## 2025-07-16 VITALS
WEIGHT: 113 LBS | DIASTOLIC BLOOD PRESSURE: 72 MMHG | BODY MASS INDEX: 23.62 KG/M2 | OXYGEN SATURATION: 99 % | RESPIRATION RATE: 16 BRPM | TEMPERATURE: 98.5 F | SYSTOLIC BLOOD PRESSURE: 130 MMHG | HEART RATE: 68 BPM

## 2025-07-16 PROCEDURE — 99204 OFFICE O/P NEW MOD 45 MIN: CPT

## 2025-07-16 PROCEDURE — 93356 MYOCRD STRAIN IMG SPCKL TRCK: CPT

## 2025-07-16 PROCEDURE — 93306 TTE W/DOPPLER COMPLETE: CPT

## 2025-07-16 PROCEDURE — G2211 COMPLEX E/M VISIT ADD ON: CPT

## 2025-07-16 PROCEDURE — 93000 ELECTROCARDIOGRAM COMPLETE: CPT

## 2025-07-17 LAB
ALBUMIN SERPL ELPH-MCNC: 3.3 G/DL
ALP BLD-CCNC: 167 U/L
ALT SERPL-CCNC: 55 U/L
ANION GAP SERPL CALC-SCNC: 13 MMOL/L
AST SERPL-CCNC: 49 U/L
BILIRUB SERPL-MCNC: 0.3 MG/DL
BUN SERPL-MCNC: 12 MG/DL
CALCIUM SERPL-MCNC: 9 MG/DL
CHLORIDE SERPL-SCNC: 106 MMOL/L
CO2 SERPL-SCNC: 23 MMOL/L
CREAT SERPL-MCNC: 0.54 MG/DL
EGFRCR SERPLBLD CKD-EPI 2021: 102 ML/MIN/1.73M2
GLUCOSE SERPL-MCNC: 96 MG/DL
POTASSIUM SERPL-SCNC: 3.9 MMOL/L
PROT SERPL-MCNC: 5.6 G/DL
SODIUM SERPL-SCNC: 141 MMOL/L

## 2025-07-21 PROBLEM — E87.6 HYPOKALEMIA: Status: ACTIVE | Noted: 2025-07-03

## 2025-07-21 PROBLEM — Z01.818 PREOP EXAM FOR INTERNAL MEDICINE: Status: ACTIVE | Noted: 2025-07-16

## 2025-07-21 PROBLEM — R94.31 ABNORMAL EKG: Status: ACTIVE | Noted: 2025-07-21

## 2025-07-23 DIAGNOSIS — K59.01 SLOW TRANSIT CONSTIPATION: ICD-10-CM

## 2025-07-30 ENCOUNTER — APPOINTMENT (OUTPATIENT)
Dept: INFUSION THERAPY | Facility: HOSPITAL | Age: 65
End: 2025-07-30

## 2025-07-30 ENCOUNTER — RESULT REVIEW (OUTPATIENT)
Age: 65
End: 2025-07-30

## 2025-07-30 ENCOUNTER — APPOINTMENT (OUTPATIENT)
Dept: HEMATOLOGY ONCOLOGY | Facility: CLINIC | Age: 65
End: 2025-07-30

## 2025-08-04 ENCOUNTER — APPOINTMENT (OUTPATIENT)
Dept: HEMATOLOGY ONCOLOGY | Facility: CLINIC | Age: 65
End: 2025-08-04

## 2025-08-04 ENCOUNTER — RESULT REVIEW (OUTPATIENT)
Age: 65
End: 2025-08-04

## 2025-08-04 ENCOUNTER — APPOINTMENT (OUTPATIENT)
Dept: INFUSION THERAPY | Facility: HOSPITAL | Age: 65
End: 2025-08-04

## 2025-08-04 ENCOUNTER — APPOINTMENT (OUTPATIENT)
Dept: HEMATOLOGY ONCOLOGY | Facility: CLINIC | Age: 65
End: 2025-08-04
Payer: MEDICARE

## 2025-08-04 VITALS
WEIGHT: 112.85 LBS | OXYGEN SATURATION: 99 % | RESPIRATION RATE: 16 BRPM | DIASTOLIC BLOOD PRESSURE: 76 MMHG | HEART RATE: 58 BPM | BODY MASS INDEX: 23.59 KG/M2 | SYSTOLIC BLOOD PRESSURE: 152 MMHG | TEMPERATURE: 97.3 F

## 2025-08-04 DIAGNOSIS — C41.9 MALIGNANT NEOPLASM OF BONE AND ARTICULAR CARTILAGE, UNSPECIFIED: ICD-10-CM

## 2025-08-04 DIAGNOSIS — Z79.69 LONG TERM (CURRENT) USE OF OTHER IMMUNOMODULATORS AND IMMUNOSUPPRESSANTS: ICD-10-CM

## 2025-08-04 DIAGNOSIS — M79.89 OTHER SPECIFIED SOFT TISSUE DISORDERS: ICD-10-CM

## 2025-08-04 DIAGNOSIS — C78.89 SECONDARY MALIGNANT NEOPLASM OF OTHER DIGESTIVE ORGANS: ICD-10-CM

## 2025-08-04 DIAGNOSIS — C79.51 SECONDARY MALIGNANT NEOPLASM OF BONE: ICD-10-CM

## 2025-08-04 DIAGNOSIS — R10.11 RIGHT UPPER QUADRANT PAIN: ICD-10-CM

## 2025-08-04 PROCEDURE — G2211 COMPLEX E/M VISIT ADD ON: CPT

## 2025-08-04 PROCEDURE — 99215 OFFICE O/P EST HI 40 MIN: CPT

## 2025-08-04 RX ORDER — LACTULOSE 10 G/15ML
20 SOLUTION ORAL DAILY
Qty: 1 | Refills: 1 | Status: ACTIVE | COMMUNITY
Start: 2025-07-23 | End: 1900-01-01

## 2025-08-05 PROBLEM — R10.11 ABDOMINAL PAIN, ACUTE, RIGHT UPPER QUADRANT: Status: RESOLVED | Noted: 2024-08-02 | Resolved: 2025-08-05

## 2025-08-05 PROBLEM — M79.89 LEG SWELLING: Status: ACTIVE | Noted: 2025-06-25

## 2025-08-05 LAB
ALBUMIN SERPL ELPH-MCNC: 3.5 G/DL
ALP BLD-CCNC: 136 U/L
ALT SERPL-CCNC: 59 U/L
ANION GAP SERPL CALC-SCNC: 10 MMOL/L
AST SERPL-CCNC: 52 U/L
BILIRUB SERPL-MCNC: 0.4 MG/DL
BUN SERPL-MCNC: 9 MG/DL
CALCIUM SERPL-MCNC: 9.5 MG/DL
CANCER AG27-29 SERPL-ACNC: 98.8 U/ML
CEA SERPL-MCNC: 38.1 NG/ML
CHLORIDE SERPL-SCNC: 105 MMOL/L
CO2 SERPL-SCNC: 27 MMOL/L
CREAT SERPL-MCNC: 0.64 MG/DL
EGFRCR SERPLBLD CKD-EPI 2021: 98 ML/MIN/1.73M2
GLUCOSE SERPL-MCNC: 88 MG/DL
POTASSIUM SERPL-SCNC: 3.6 MMOL/L
PROT SERPL-MCNC: 5.2 G/DL
SODIUM SERPL-SCNC: 141 MMOL/L

## 2025-08-08 DIAGNOSIS — K83.1 OBSTRUCTION OF BILE DUCT: ICD-10-CM

## 2025-08-20 ENCOUNTER — RESULT REVIEW (OUTPATIENT)
Age: 65
End: 2025-08-20

## 2025-08-20 ENCOUNTER — APPOINTMENT (OUTPATIENT)
Dept: INFUSION THERAPY | Facility: HOSPITAL | Age: 65
End: 2025-08-20

## 2025-08-20 ENCOUNTER — APPOINTMENT (OUTPATIENT)
Dept: HEMATOLOGY ONCOLOGY | Facility: CLINIC | Age: 65
End: 2025-08-20

## 2025-08-21 ENCOUNTER — RX RENEWAL (OUTPATIENT)
Age: 65
End: 2025-08-21

## 2025-08-21 DIAGNOSIS — K59.00 CONSTIPATION, UNSPECIFIED: ICD-10-CM

## 2025-08-21 RX ORDER — LACTULOSE 10 G/15ML
10 SOLUTION ORAL
Qty: 600 | Refills: 0 | Status: ACTIVE | COMMUNITY
Start: 2025-08-21 | End: 1900-01-01

## 2025-08-27 ENCOUNTER — TRANSCRIPTION ENCOUNTER (OUTPATIENT)
Age: 65
End: 2025-08-27

## 2025-08-27 ENCOUNTER — OUTPATIENT (OUTPATIENT)
Dept: OUTPATIENT SERVICES | Facility: HOSPITAL | Age: 65
LOS: 1 days | End: 2025-08-27
Payer: MEDICARE

## 2025-08-27 VITALS
HEART RATE: 84 BPM | DIASTOLIC BLOOD PRESSURE: 92 MMHG | OXYGEN SATURATION: 100 % | SYSTOLIC BLOOD PRESSURE: 168 MMHG | RESPIRATION RATE: 19 BRPM | TEMPERATURE: 99 F

## 2025-08-27 DIAGNOSIS — N13.9 OBSTRUCTIVE AND REFLUX UROPATHY, UNSPECIFIED: ICD-10-CM

## 2025-08-27 DIAGNOSIS — Z90.13 ACQUIRED ABSENCE OF BILATERAL BREASTS AND NIPPLES: Chronic | ICD-10-CM

## 2025-08-27 DIAGNOSIS — Z98.89 OTHER SPECIFIED POSTPROCEDURAL STATES: Chronic | ICD-10-CM

## 2025-08-27 DIAGNOSIS — Z92.21 PERSONAL HISTORY OF ANTINEOPLASTIC CHEMOTHERAPY: Chronic | ICD-10-CM

## 2025-08-27 PROCEDURE — 47536 EXCHANGE BILIARY DRG CATH: CPT

## 2025-08-27 PROCEDURE — C1769: CPT

## 2025-08-27 PROCEDURE — C1729: CPT

## 2025-08-28 ENCOUNTER — OUTPATIENT (OUTPATIENT)
Dept: OUTPATIENT SERVICES | Facility: HOSPITAL | Age: 65
LOS: 1 days | End: 2025-08-28
Payer: MEDICARE

## 2025-08-28 ENCOUNTER — TRANSCRIPTION ENCOUNTER (OUTPATIENT)
Age: 65
End: 2025-08-28

## 2025-08-28 ENCOUNTER — NON-APPOINTMENT (OUTPATIENT)
Age: 65
End: 2025-08-28

## 2025-08-28 VITALS
RESPIRATION RATE: 15 BRPM | DIASTOLIC BLOOD PRESSURE: 74 MMHG | OXYGEN SATURATION: 98 % | TEMPERATURE: 99 F | HEART RATE: 85 BPM | SYSTOLIC BLOOD PRESSURE: 129 MMHG

## 2025-08-28 DIAGNOSIS — Z98.89 OTHER SPECIFIED POSTPROCEDURAL STATES: Chronic | ICD-10-CM

## 2025-08-28 DIAGNOSIS — K83.1 OBSTRUCTION OF BILE DUCT: ICD-10-CM

## 2025-08-28 DIAGNOSIS — Z92.21 PERSONAL HISTORY OF ANTINEOPLASTIC CHEMOTHERAPY: Chronic | ICD-10-CM

## 2025-08-28 DIAGNOSIS — Z90.13 ACQUIRED ABSENCE OF BILATERAL BREASTS AND NIPPLES: Chronic | ICD-10-CM

## 2025-08-28 PROCEDURE — 47536 EXCHANGE BILIARY DRG CATH: CPT

## 2025-08-28 PROCEDURE — C1769: CPT

## 2025-08-28 PROCEDURE — C1729: CPT

## 2025-08-28 RX ORDER — CEFTRIAXONE 500 MG/1
1000 INJECTION, POWDER, FOR SOLUTION INTRAMUSCULAR; INTRAVENOUS ONCE
Refills: 0 | Status: COMPLETED | OUTPATIENT
Start: 2025-08-28 | End: 2025-08-28

## 2025-08-28 RX ORDER — LACTULOSE 10 G/15ML
15 SOLUTION ORAL
Refills: 0 | DISCHARGE

## 2025-08-28 RX ADMIN — CEFTRIAXONE 100 MILLIGRAM(S): 500 INJECTION, POWDER, FOR SOLUTION INTRAMUSCULAR; INTRAVENOUS at 11:47

## 2025-08-29 ENCOUNTER — RESULT REVIEW (OUTPATIENT)
Age: 65
End: 2025-08-29

## 2025-08-29 ENCOUNTER — NON-APPOINTMENT (OUTPATIENT)
Age: 65
End: 2025-08-29

## 2025-08-29 ENCOUNTER — LABORATORY RESULT (OUTPATIENT)
Age: 65
End: 2025-08-29

## 2025-08-29 ENCOUNTER — APPOINTMENT (OUTPATIENT)
Dept: INTERNAL MEDICINE | Facility: CLINIC | Age: 65
End: 2025-08-29
Payer: MEDICARE

## 2025-08-29 ENCOUNTER — APPOINTMENT (OUTPATIENT)
Dept: HEMATOLOGY ONCOLOGY | Facility: CLINIC | Age: 65
End: 2025-08-29

## 2025-08-29 VITALS
SYSTOLIC BLOOD PRESSURE: 120 MMHG | OXYGEN SATURATION: 99 % | HEART RATE: 82 BPM | WEIGHT: 112 LBS | HEIGHT: 58 IN | TEMPERATURE: 98.1 F | DIASTOLIC BLOOD PRESSURE: 80 MMHG | BODY MASS INDEX: 23.51 KG/M2

## 2025-08-29 DIAGNOSIS — C78.89 SECONDARY MALIGNANT NEOPLASM OF OTHER DIGESTIVE ORGANS: ICD-10-CM

## 2025-08-29 DIAGNOSIS — Z01.818 ENCOUNTER FOR OTHER PREPROCEDURAL EXAMINATION: ICD-10-CM

## 2025-08-29 DIAGNOSIS — R79.89 OTHER SPECIFIED ABNORMAL FINDINGS OF BLOOD CHEMISTRY: ICD-10-CM

## 2025-08-29 DIAGNOSIS — I10 ESSENTIAL (PRIMARY) HYPERTENSION: ICD-10-CM

## 2025-08-29 DIAGNOSIS — K31.5 OBSTRUCTION OF DUODENUM: ICD-10-CM

## 2025-08-29 DIAGNOSIS — C79.51 SECONDARY MALIGNANT NEOPLASM OF BONE: ICD-10-CM

## 2025-08-29 DIAGNOSIS — C50.919 MALIGNANT NEOPLASM OF UNSPECIFIED SITE OF UNSPECIFIED FEMALE BREAST: ICD-10-CM

## 2025-08-29 DIAGNOSIS — E87.6 HYPOKALEMIA: ICD-10-CM

## 2025-08-29 LAB
ALBUMIN SERPL ELPH-MCNC: 3.6 G/DL
ALP BLD-CCNC: 296 U/L
ALT SERPL-CCNC: 86 U/L
ANION GAP SERPL CALC-SCNC: 12 MMOL/L
AST SERPL-CCNC: 75 U/L
BILIRUB SERPL-MCNC: 1.3 MG/DL
BUN SERPL-MCNC: 9 MG/DL
CALCIUM SERPL-MCNC: 9.4 MG/DL
CHLORIDE SERPL-SCNC: 104 MMOL/L
CO2 SERPL-SCNC: 25 MMOL/L
CREAT SERPL-MCNC: 0.58 MG/DL
EGFRCR SERPLBLD CKD-EPI 2021: 100 ML/MIN/1.73M2
GLUCOSE SERPL-MCNC: 121 MG/DL
POTASSIUM SERPL-SCNC: 3.1 MMOL/L
PROT SERPL-MCNC: 5.5 G/DL
SODIUM SERPL-SCNC: 142 MMOL/L

## 2025-08-29 PROCEDURE — G2211 COMPLEX E/M VISIT ADD ON: CPT

## 2025-08-29 PROCEDURE — 93000 ELECTROCARDIOGRAM COMPLETE: CPT

## 2025-08-29 PROCEDURE — 99214 OFFICE O/P EST MOD 30 MIN: CPT

## 2025-09-02 ENCOUNTER — NON-APPOINTMENT (OUTPATIENT)
Age: 65
End: 2025-09-02

## 2025-09-02 ENCOUNTER — APPOINTMENT (OUTPATIENT)
Dept: INFUSION THERAPY | Facility: HOSPITAL | Age: 65
End: 2025-09-02

## 2025-09-03 LAB — BACTERIA BLD CULT: NORMAL

## 2025-09-10 ENCOUNTER — APPOINTMENT (OUTPATIENT)
Dept: INFUSION THERAPY | Facility: HOSPITAL | Age: 65
End: 2025-09-10

## 2025-09-10 ENCOUNTER — RESULT REVIEW (OUTPATIENT)
Age: 65
End: 2025-09-10

## 2025-09-10 ENCOUNTER — APPOINTMENT (OUTPATIENT)
Dept: HEMATOLOGY ONCOLOGY | Facility: CLINIC | Age: 65
End: 2025-09-10
Payer: MEDICARE

## 2025-09-10 ENCOUNTER — APPOINTMENT (OUTPATIENT)
Dept: HEMATOLOGY ONCOLOGY | Facility: CLINIC | Age: 65
End: 2025-09-10

## 2025-09-10 DIAGNOSIS — C78.89 SECONDARY MALIGNANT NEOPLASM OF OTHER DIGESTIVE ORGANS: ICD-10-CM

## 2025-09-10 DIAGNOSIS — C50.919 MALIGNANT NEOPLASM OF UNSPECIFIED SITE OF UNSPECIFIED FEMALE BREAST: ICD-10-CM

## 2025-09-10 DIAGNOSIS — K83.1 OBSTRUCTION OF BILE DUCT: ICD-10-CM

## 2025-09-10 DIAGNOSIS — C41.9 MALIGNANT NEOPLASM OF BONE AND ARTICULAR CARTILAGE, UNSPECIFIED: ICD-10-CM

## 2025-09-10 DIAGNOSIS — Z79.69 LONG TERM (CURRENT) USE OF OTHER IMMUNOMODULATORS AND IMMUNOSUPPRESSANTS: ICD-10-CM

## 2025-09-10 DIAGNOSIS — Z46.59 ENCOUNTER FOR FITTING AND ADJUSTMENT OF OTHER GASTROINTESTINAL APPLIANCE AND DEVICE: ICD-10-CM

## 2025-09-10 PROCEDURE — 99213 OFFICE O/P EST LOW 20 MIN: CPT

## 2025-09-11 ENCOUNTER — APPOINTMENT (OUTPATIENT)
Dept: HEMATOLOGY ONCOLOGY | Facility: CLINIC | Age: 65
End: 2025-09-11

## (undated) DEVICE — TUBING SUCTION CONN 6FT STERILE

## (undated) DEVICE — SUCTION YANKAUER NO CONTROL VENT

## (undated) DEVICE — SOL INJ NS 0.9% 500ML 2 PORT

## (undated) DEVICE — TUBING IV SET GRAVITY 3Y 100" MACRO

## (undated) DEVICE — BITE BLOCK ADULT 20 X 27MM (GREEN)

## (undated) DEVICE — SENSOR O2 FINGER ADULT

## (undated) DEVICE — BALLOON US ENDO

## (undated) DEVICE — TUBING SUCTION 20FT

## (undated) DEVICE — CATH IV SAFE BC 22G X 1" (BLUE)

## (undated) DEVICE — SYR ALLIANCE II INFLATION 60ML

## (undated) DEVICE — CATH IV SAFE BC 20G X 1.16" (PINK)

## (undated) DEVICE — FOLEY HOLDER STATLOCK 2 WAY ADULT

## (undated) DEVICE — PACK IV START WITH CHG